# Patient Record
Sex: MALE | Race: WHITE | Employment: FULL TIME | URBAN - NONMETROPOLITAN AREA
[De-identification: names, ages, dates, MRNs, and addresses within clinical notes are randomized per-mention and may not be internally consistent; named-entity substitution may affect disease eponyms.]

---

## 2017-02-06 DIAGNOSIS — G89.29 CHRONIC ANKLE PAIN, UNSPECIFIED LATERALITY: ICD-10-CM

## 2017-02-06 DIAGNOSIS — M25.579 CHRONIC ANKLE PAIN, UNSPECIFIED LATERALITY: ICD-10-CM

## 2017-02-07 RX ORDER — HYDROCODONE BITARTRATE AND ACETAMINOPHEN 5; 325 MG/1; MG/1
TABLET ORAL
Qty: 30 TABLET | Refills: 0 | Status: SHIPPED | OUTPATIENT
Start: 2017-02-07 | End: 2017-04-19 | Stop reason: SDUPTHER

## 2017-03-26 DIAGNOSIS — E78.1 HIGH TRIGLYCERIDES: ICD-10-CM

## 2017-03-27 RX ORDER — ATORVASTATIN CALCIUM 20 MG/1
TABLET, FILM COATED ORAL
Qty: 90 TABLET | Refills: 0 | Status: SHIPPED | OUTPATIENT
Start: 2017-03-27 | End: 2017-04-19 | Stop reason: SDUPTHER

## 2017-04-19 ENCOUNTER — OFFICE VISIT (OUTPATIENT)
Dept: FAMILY MEDICINE CLINIC | Age: 52
End: 2017-04-19
Payer: COMMERCIAL

## 2017-04-19 VITALS
WEIGHT: 246 LBS | SYSTOLIC BLOOD PRESSURE: 148 MMHG | HEIGHT: 73 IN | BODY MASS INDEX: 32.6 KG/M2 | HEART RATE: 82 BPM | RESPIRATION RATE: 16 BRPM | DIASTOLIC BLOOD PRESSURE: 98 MMHG

## 2017-04-19 DIAGNOSIS — K21.9 GASTROESOPHAGEAL REFLUX DISEASE, ESOPHAGITIS PRESENCE NOT SPECIFIED: ICD-10-CM

## 2017-04-19 DIAGNOSIS — G89.29 CHRONIC PAIN OF LEFT ANKLE: ICD-10-CM

## 2017-04-19 DIAGNOSIS — Z23 NEED FOR TDAP VACCINATION: ICD-10-CM

## 2017-04-19 DIAGNOSIS — M25.572 CHRONIC PAIN OF LEFT ANKLE: ICD-10-CM

## 2017-04-19 DIAGNOSIS — Z23 NEED FOR PNEUMOCOCCAL VACCINATION: ICD-10-CM

## 2017-04-19 DIAGNOSIS — F95.2 TOURETTE'S SYNDROME: ICD-10-CM

## 2017-04-19 DIAGNOSIS — I10 ESSENTIAL HYPERTENSION: Primary | ICD-10-CM

## 2017-04-19 DIAGNOSIS — E78.1 HIGH TRIGLYCERIDES: ICD-10-CM

## 2017-04-19 PROCEDURE — 99214 OFFICE O/P EST MOD 30 MIN: CPT | Performed by: FAMILY MEDICINE

## 2017-04-19 RX ORDER — BLOOD PRESSURE TEST KIT
KIT MISCELLANEOUS
Qty: 1 KIT | Refills: 0 | Status: SHIPPED | OUTPATIENT
Start: 2017-04-19 | End: 2018-05-08 | Stop reason: SDUPTHER

## 2017-04-19 RX ORDER — BLOOD PRESSURE TEST KIT
KIT MISCELLANEOUS
Qty: 1 KIT | Refills: 0 | Status: SHIPPED | OUTPATIENT
Start: 2017-04-19 | End: 2017-04-19 | Stop reason: SDUPTHER

## 2017-04-19 RX ORDER — ATORVASTATIN CALCIUM 20 MG/1
TABLET, FILM COATED ORAL
Qty: 90 TABLET | Refills: 1 | Status: SHIPPED | OUTPATIENT
Start: 2017-04-19 | End: 2017-08-30 | Stop reason: SDUPTHER

## 2017-04-19 RX ORDER — HYDROCODONE BITARTRATE AND ACETAMINOPHEN 5; 325 MG/1; MG/1
TABLET ORAL
Qty: 30 TABLET | Refills: 0 | Status: SHIPPED | OUTPATIENT
Start: 2017-04-19 | End: 2017-06-02 | Stop reason: SDUPTHER

## 2017-04-19 RX ORDER — OMEPRAZOLE 20 MG/1
CAPSULE, DELAYED RELEASE ORAL
Qty: 90 CAPSULE | Refills: 1 | Status: SHIPPED | OUTPATIENT
Start: 2017-04-19 | End: 2017-08-30 | Stop reason: SDUPTHER

## 2017-04-19 RX ORDER — PIMOZIDE 1 MG/1
TABLET ORAL
Qty: 360 TABLET | Refills: 1 | Status: SHIPPED | OUTPATIENT
Start: 2017-04-19 | End: 2017-08-30 | Stop reason: SDUPTHER

## 2017-04-19 RX ORDER — HYDROCHLOROTHIAZIDE 25 MG/1
TABLET ORAL
Qty: 90 TABLET | Refills: 1 | Status: SHIPPED | OUTPATIENT
Start: 2017-04-19 | End: 2017-08-30 | Stop reason: SDUPTHER

## 2017-04-19 RX ORDER — LOSARTAN POTASSIUM AND HYDROCHLOROTHIAZIDE 25; 100 MG/1; MG/1
TABLET ORAL
Qty: 90 TABLET | Refills: 1 | Status: SHIPPED | OUTPATIENT
Start: 2017-04-19 | End: 2017-08-30 | Stop reason: SDUPTHER

## 2017-05-09 ENCOUNTER — OFFICE VISIT (OUTPATIENT)
Dept: PRIMARY CARE CLINIC | Age: 52
End: 2017-05-09
Payer: COMMERCIAL

## 2017-05-09 VITALS
SYSTOLIC BLOOD PRESSURE: 160 MMHG | BODY MASS INDEX: 32.07 KG/M2 | TEMPERATURE: 97 F | OXYGEN SATURATION: 92 % | HEART RATE: 84 BPM | HEIGHT: 73 IN | DIASTOLIC BLOOD PRESSURE: 94 MMHG | WEIGHT: 242 LBS

## 2017-05-09 DIAGNOSIS — J40 BRONCHITIS: Primary | ICD-10-CM

## 2017-05-09 DIAGNOSIS — Z72.0 TOBACCO ABUSE: ICD-10-CM

## 2017-05-09 DIAGNOSIS — J02.9 SORE THROAT: ICD-10-CM

## 2017-05-09 LAB — S PYO AG THROAT QL: NORMAL

## 2017-05-09 PROCEDURE — 87880 STREP A ASSAY W/OPTIC: CPT | Performed by: PHYSICIAN ASSISTANT

## 2017-05-09 PROCEDURE — 99213 OFFICE O/P EST LOW 20 MIN: CPT | Performed by: PHYSICIAN ASSISTANT

## 2017-05-09 RX ORDER — PREDNISONE 20 MG/1
20 TABLET ORAL 2 TIMES DAILY
Qty: 10 TABLET | Refills: 0 | Status: SHIPPED | OUTPATIENT
Start: 2017-05-09 | End: 2017-05-14

## 2017-05-09 RX ORDER — DOXYCYCLINE HYCLATE 100 MG
100 TABLET ORAL 2 TIMES DAILY
Qty: 20 TABLET | Refills: 0 | Status: SHIPPED | OUTPATIENT
Start: 2017-05-09 | End: 2017-05-19

## 2017-05-09 RX ORDER — GUAIFENESIN AND CODEINE PHOSPHATE 100; 10 MG/5ML; MG/5ML
5 SOLUTION ORAL 4 TIMES DAILY PRN
Qty: 150 ML | Refills: 0 | Status: SHIPPED | OUTPATIENT
Start: 2017-05-09 | End: 2017-05-16

## 2017-05-09 ASSESSMENT — ENCOUNTER SYMPTOMS
SORE THROAT: 1
COUGH: 1
RHINORRHEA: 1
SHORTNESS OF BREATH: 1
WHEEZING: 1

## 2017-06-02 DIAGNOSIS — M25.572 CHRONIC PAIN OF LEFT ANKLE: ICD-10-CM

## 2017-06-02 DIAGNOSIS — G89.29 CHRONIC PAIN OF LEFT ANKLE: ICD-10-CM

## 2017-06-02 RX ORDER — HYDROCODONE BITARTRATE AND ACETAMINOPHEN 5; 325 MG/1; MG/1
1 TABLET ORAL EVERY 4 HOURS PRN
Qty: 30 TABLET | Refills: 0 | Status: SHIPPED | OUTPATIENT
Start: 2017-06-02 | End: 2017-08-30 | Stop reason: SDUPTHER

## 2017-06-05 RX ORDER — HYDROCODONE BITARTRATE AND ACETAMINOPHEN 5; 325 MG/1; MG/1
TABLET ORAL
Qty: 30 TABLET | Refills: 0 | OUTPATIENT
Start: 2017-06-05

## 2017-07-27 ENCOUNTER — OFFICE VISIT (OUTPATIENT)
Dept: PRIMARY CARE CLINIC | Age: 52
End: 2017-07-27
Payer: COMMERCIAL

## 2017-07-27 VITALS
BODY MASS INDEX: 32.76 KG/M2 | TEMPERATURE: 98.2 F | DIASTOLIC BLOOD PRESSURE: 74 MMHG | WEIGHT: 247.2 LBS | HEART RATE: 70 BPM | HEIGHT: 73 IN | SYSTOLIC BLOOD PRESSURE: 132 MMHG | OXYGEN SATURATION: 98 %

## 2017-07-27 DIAGNOSIS — J01.00 ACUTE MAXILLARY SINUSITIS, RECURRENCE NOT SPECIFIED: Primary | ICD-10-CM

## 2017-07-27 PROCEDURE — 99213 OFFICE O/P EST LOW 20 MIN: CPT | Performed by: FAMILY MEDICINE

## 2017-07-27 RX ORDER — AMOXICILLIN 875 MG/1
875 TABLET, COATED ORAL 2 TIMES DAILY
Qty: 20 TABLET | Refills: 0 | Status: SHIPPED | OUTPATIENT
Start: 2017-07-27 | End: 2017-08-06

## 2017-07-27 RX ORDER — PREDNISONE 20 MG/1
20 TABLET ORAL 2 TIMES DAILY
Qty: 6 TABLET | Refills: 0 | Status: SHIPPED | OUTPATIENT
Start: 2017-07-27 | End: 2017-07-30

## 2017-07-27 RX ORDER — GUAIFENESIN AND CODEINE PHOSPHATE 100; 10 MG/5ML; MG/5ML
5 SOLUTION ORAL NIGHTLY PRN
Qty: 75 ML | Refills: 0 | Status: SHIPPED | OUTPATIENT
Start: 2017-07-27 | End: 2017-08-03

## 2017-08-30 ENCOUNTER — OFFICE VISIT (OUTPATIENT)
Dept: FAMILY MEDICINE CLINIC | Age: 52
End: 2017-08-30
Payer: COMMERCIAL

## 2017-08-30 VITALS
RESPIRATION RATE: 16 BRPM | DIASTOLIC BLOOD PRESSURE: 96 MMHG | SYSTOLIC BLOOD PRESSURE: 136 MMHG | HEIGHT: 73 IN | BODY MASS INDEX: 32.97 KG/M2 | HEART RATE: 86 BPM | WEIGHT: 248.8 LBS

## 2017-08-30 DIAGNOSIS — R73.01 ELEVATED FASTING GLUCOSE: ICD-10-CM

## 2017-08-30 DIAGNOSIS — K21.9 GASTROESOPHAGEAL REFLUX DISEASE, ESOPHAGITIS PRESENCE NOT SPECIFIED: ICD-10-CM

## 2017-08-30 DIAGNOSIS — I10 ESSENTIAL HYPERTENSION: Primary | ICD-10-CM

## 2017-08-30 DIAGNOSIS — Z23 NEED FOR TDAP VACCINATION: ICD-10-CM

## 2017-08-30 DIAGNOSIS — Z23 NEED FOR PNEUMOCOCCAL VACCINATION: ICD-10-CM

## 2017-08-30 DIAGNOSIS — G89.29 CHRONIC PAIN OF LEFT ANKLE: ICD-10-CM

## 2017-08-30 DIAGNOSIS — F95.2 TOURETTE'S SYNDROME: ICD-10-CM

## 2017-08-30 DIAGNOSIS — M25.572 CHRONIC PAIN OF LEFT ANKLE: ICD-10-CM

## 2017-08-30 DIAGNOSIS — Z23 NEED FOR INFLUENZA VACCINATION: ICD-10-CM

## 2017-08-30 DIAGNOSIS — E78.1 HIGH TRIGLYCERIDES: ICD-10-CM

## 2017-08-30 PROCEDURE — 90471 IMMUNIZATION ADMIN: CPT | Performed by: FAMILY MEDICINE

## 2017-08-30 PROCEDURE — 90686 IIV4 VACC NO PRSV 0.5 ML IM: CPT | Performed by: FAMILY MEDICINE

## 2017-08-30 PROCEDURE — 99214 OFFICE O/P EST MOD 30 MIN: CPT | Performed by: FAMILY MEDICINE

## 2017-08-30 PROCEDURE — 90472 IMMUNIZATION ADMIN EACH ADD: CPT | Performed by: FAMILY MEDICINE

## 2017-08-30 PROCEDURE — 90732 PPSV23 VACC 2 YRS+ SUBQ/IM: CPT | Performed by: FAMILY MEDICINE

## 2017-08-30 RX ORDER — PIMOZIDE 1 MG/1
TABLET ORAL
Qty: 360 TABLET | Refills: 1 | Status: SHIPPED | OUTPATIENT
Start: 2017-08-30 | End: 2017-10-17 | Stop reason: SDUPTHER

## 2017-08-30 RX ORDER — LOSARTAN POTASSIUM AND HYDROCHLOROTHIAZIDE 25; 100 MG/1; MG/1
TABLET ORAL
Qty: 90 TABLET | Refills: 1 | Status: SHIPPED | OUTPATIENT
Start: 2017-08-30 | End: 2018-03-01 | Stop reason: SDUPTHER

## 2017-08-30 RX ORDER — HYDROCODONE BITARTRATE AND ACETAMINOPHEN 5; 325 MG/1; MG/1
1 TABLET ORAL EVERY 4 HOURS PRN
Qty: 30 TABLET | Refills: 0 | Status: SHIPPED | OUTPATIENT
Start: 2017-08-30 | End: 2017-10-09 | Stop reason: SDUPTHER

## 2017-08-30 RX ORDER — AMLODIPINE BESYLATE 5 MG/1
5 TABLET ORAL DAILY
Qty: 90 TABLET | Refills: 1 | Status: SHIPPED | OUTPATIENT
Start: 2017-08-30 | End: 2017-11-30

## 2017-08-30 RX ORDER — ATORVASTATIN CALCIUM 20 MG/1
TABLET, FILM COATED ORAL
Qty: 90 TABLET | Refills: 1 | Status: SHIPPED | OUTPATIENT
Start: 2017-08-30 | End: 2017-11-30

## 2017-08-30 RX ORDER — HYDROCHLOROTHIAZIDE 25 MG/1
TABLET ORAL
Qty: 90 TABLET | Refills: 1 | Status: SHIPPED | OUTPATIENT
Start: 2017-08-30 | End: 2018-03-01 | Stop reason: SDUPTHER

## 2017-08-30 RX ORDER — OMEPRAZOLE 20 MG/1
CAPSULE, DELAYED RELEASE ORAL
Qty: 90 CAPSULE | Refills: 1 | Status: SHIPPED | OUTPATIENT
Start: 2017-08-30 | End: 2017-10-17 | Stop reason: SDUPTHER

## 2017-10-09 DIAGNOSIS — G89.29 CHRONIC PAIN OF LEFT ANKLE: Primary | ICD-10-CM

## 2017-10-09 DIAGNOSIS — M25.572 CHRONIC PAIN OF LEFT ANKLE: Primary | ICD-10-CM

## 2017-10-09 NOTE — TELEPHONE ENCOUNTER
OAR[place] from [place], [place] and [place] reviewed, last filled 8/30/17 #30 for a 5 day supply. Report available for your review. Last OV 8/30/17; next OV 11/30/17.

## 2017-10-10 RX ORDER — HYDROCODONE BITARTRATE AND ACETAMINOPHEN 5; 325 MG/1; MG/1
1 TABLET ORAL EVERY 4 HOURS PRN
Qty: 30 TABLET | Refills: 0 | Status: SHIPPED | OUTPATIENT
Start: 2017-10-10 | End: 2017-11-30 | Stop reason: SDUPTHER

## 2017-10-10 NOTE — TELEPHONE ENCOUNTER
Controlled substances monitoring: No signs of potential drug abuse or diversion identified when the OAR[place] report from [place], [place], and [place] was reviewed today. The activity on the report was consistent with the treatment plan.

## 2017-10-16 DIAGNOSIS — K21.9 GASTROESOPHAGEAL REFLUX DISEASE, ESOPHAGITIS PRESENCE NOT SPECIFIED: ICD-10-CM

## 2017-10-16 DIAGNOSIS — F95.2 TOURETTE'S SYNDROME: ICD-10-CM

## 2017-10-17 RX ORDER — PIMOZIDE 1 MG/1
TABLET ORAL
Qty: 360 TABLET | Refills: 0 | Status: SHIPPED | OUTPATIENT
Start: 2017-10-17 | End: 2018-03-01 | Stop reason: SDUPTHER

## 2017-10-17 RX ORDER — OMEPRAZOLE 20 MG/1
CAPSULE, DELAYED RELEASE ORAL
Qty: 90 CAPSULE | Refills: 0 | Status: SHIPPED | OUTPATIENT
Start: 2017-10-17 | End: 2018-03-01 | Stop reason: SDUPTHER

## 2017-11-17 ENCOUNTER — OFFICE VISIT (OUTPATIENT)
Dept: PRIMARY CARE CLINIC | Age: 52
End: 2017-11-17
Payer: COMMERCIAL

## 2017-11-17 VITALS
BODY MASS INDEX: 33.13 KG/M2 | TEMPERATURE: 98 F | DIASTOLIC BLOOD PRESSURE: 88 MMHG | OXYGEN SATURATION: 98 % | RESPIRATION RATE: 14 BRPM | HEIGHT: 73 IN | SYSTOLIC BLOOD PRESSURE: 138 MMHG | WEIGHT: 250 LBS | HEART RATE: 78 BPM

## 2017-11-17 DIAGNOSIS — J32.9 SINUSITIS, UNSPECIFIED CHRONICITY, UNSPECIFIED LOCATION: Primary | ICD-10-CM

## 2017-11-17 DIAGNOSIS — R05.9 COUGH: ICD-10-CM

## 2017-11-17 PROCEDURE — 99202 OFFICE O/P NEW SF 15 MIN: CPT | Performed by: NURSE PRACTITIONER

## 2017-11-17 RX ORDER — GUAIFENESIN AND CODEINE PHOSPHATE 100; 10 MG/5ML; MG/5ML
10 SOLUTION ORAL 3 TIMES DAILY PRN
Qty: 200 ML | Refills: 0 | Status: SHIPPED | OUTPATIENT
Start: 2017-11-17 | End: 2017-11-24

## 2017-11-17 RX ORDER — AMOXICILLIN 500 MG/1
1000 CAPSULE ORAL 2 TIMES DAILY
Qty: 28 CAPSULE | Refills: 0 | Status: SHIPPED | OUTPATIENT
Start: 2017-11-17 | End: 2017-11-24

## 2017-11-17 RX ORDER — PREDNISONE 20 MG/1
20 TABLET ORAL DAILY
Qty: 5 TABLET | Refills: 0 | Status: SHIPPED | OUTPATIENT
Start: 2017-11-17 | End: 2017-11-22

## 2017-11-17 ASSESSMENT — ENCOUNTER SYMPTOMS
COUGH: 1
RHINORRHEA: 1

## 2017-11-17 NOTE — PATIENT INSTRUCTIONS
Patient Education        Sinusitis: Care Instructions  Your Care Instructions    Sinusitis is an infection of the lining of the sinus cavities in your head. Sinusitis often follows a cold. It causes pain and pressure in your head and face. In most cases, sinusitis gets better on its own in 1 to 2 weeks. But some mild symptoms may last for several weeks. Sometimes antibiotics are needed. Follow-up care is a key part of your treatment and safety. Be sure to make and go to all appointments, and call your doctor if you are having problems. It's also a good idea to know your test results and keep a list of the medicines you take. How can you care for yourself at home? · Take an over-the-counter pain medicine, such as acetaminophen (Tylenol), ibuprofen (Advil, Motrin), or naproxen (Aleve). Read and follow all instructions on the label. · If the doctor prescribed antibiotics, take them as directed. Do not stop taking them just because you feel better. You need to take the full course of antibiotics. · Be careful when taking over-the-counter cold or flu medicines and Tylenol at the same time. Many of these medicines have acetaminophen, which is Tylenol. Read the labels to make sure that you are not taking more than the recommended dose. Too much acetaminophen (Tylenol) can be harmful. · Breathe warm, moist air from a steamy shower, a hot bath, or a sink filled with hot water. Avoid cold, dry air. Using a humidifier in your home may help. Follow the directions for cleaning the machine. · Use saline (saltwater) nasal washes to help keep your nasal passages open and wash out mucus and bacteria. You can buy saline nose drops at a grocery store or drugstore. Or you can make your own at home by adding 1 teaspoon of salt and 1 teaspoon of baking soda to 2 cups of distilled water. If you make your own, fill a bulb syringe with the solution, insert the tip into your nostril, and squeeze gently. Shanda Gaw your nose.   · Put a hot, wet towel or a warm gel pack on your face 3 or 4 times a day for 5 to 10 minutes each time. · Try a decongestant nasal spray like oxymetazoline (Afrin). Do not use it for more than 3 days in a row. Using it for more than 3 days can make your congestion worse. When should you call for help? Call your doctor now or seek immediate medical care if:  · You have new or worse swelling or redness in your face or around your eyes. · You have a new or higher fever. Watch closely for changes in your health, and be sure to contact your doctor if:  · You have new or worse facial pain. · The mucus from your nose becomes thicker (like pus) or has new blood in it. · You are not getting better as expected. Where can you learn more? Go to https://CounterTackronaeb.Lunagames. org and sign in to your [employer] account. Enter G173 in the [employer] box to learn more about \"Sinusitis: Care Instructions. \"     If you do not have an account, please click on the \"Sign Up Now\" link. Current as of: July 29, 2016  Content Version: 11.3  © 3348-2061 [employer]. Care instructions adapted under license by [facility] ([facility]). If you have questions about a medical condition or this instruction, always ask your healthcare professional. [place] [address] any warranty or liability for your use of this information. Patient Education        Saline Nasal Washes: Care Instructions  Your Care Instructions  Saline nasal washes help keep the nasal passages open by washing out thick or dried mucus. This simple remedy can help relieve symptoms of allergies, sinusitis, and colds. It also can make the nose feel more comfortable by keeping the mucous membranes moist. You may notice a little burning sensation in your nose the first few times you use the solution, but this usually gets better in a few days. Follow-up care is a key part of your treatment and safety.  Be sure to make and go to all appointments, warranty or liability for your use of this information. Patient Education        Cough: Care Instructions  Your Care Instructions  A cough is your body's response to something that bothers your throat or airways. Many things can cause a cough. You might cough because of a cold or the flu, bronchitis, or asthma. Smoking, postnasal drip, allergies, and stomach acid that backs up into your throat also can cause coughs. A cough is a symptom, not a disease. Most coughs stop when the cause, such as a cold, goes away. You can take a few steps at home to cough less and feel better. Follow-up care is a key part of your treatment and safety. Be sure to make and go to all appointments, and call your doctor if you are having problems. It's also a good idea to know your test results and keep a list of the medicines you take. How can you care for yourself at home? · Drink lots of water and other fluids. This helps thin the mucus and soothes a dry or sore throat. Honey or lemon juice in hot water or tea may ease a dry cough. · Take cough medicine as directed by your doctor. · Prop up your head on pillows to help you breathe and ease a dry cough. · Try cough drops to soothe a dry or sore throat. Cough drops don't stop a cough. Medicine-flavored cough drops are no better than candy-flavored drops or hard candy. · Do not smoke. Avoid secondhand smoke. If you need help quitting, talk to your doctor about stop-smoking programs and medicines. These can increase your chances of quitting for good. When should you call for help? Call 911 anytime you think you may need emergency care. For example, call if:  · You have severe trouble breathing. Call your doctor now or seek immediate medical care if:  · You cough up blood. · You have new or worse trouble breathing. · You have a new or higher fever. · You have a new rash.   Watch closely for changes in your health, and be sure to contact your doctor if:  · You cough more deeply or more often, especially if you notice more mucus or a change in the color of your mucus. · You have new symptoms, such as a sore throat, an earache, or sinus pain. · You do not get better as expected. Where can you learn more? Go to https://chpepiceweb.Spectra7 Microsystems. org and sign in to your [employer] account. Enter D279 in the [employer] box to learn more about \"Cough: Care Instructions. \"     If you do not have an account, please click on the \"Sign Up Now\" link. Current as of: March 25, 2017  Content Version: 11.3  © 1419-2167 Veebox, [employer]. Care instructions adapted under license by [facility] ([facility]). If you have questions about a medical condition or this instruction, always ask your healthcare professional. [address] any warranty or liability for your use of this information.

## 2017-11-30 ENCOUNTER — OFFICE VISIT (OUTPATIENT)
Dept: FAMILY MEDICINE CLINIC | Age: 52
End: 2017-11-30
Payer: COMMERCIAL

## 2017-11-30 VITALS
SYSTOLIC BLOOD PRESSURE: 138 MMHG | WEIGHT: 251.6 LBS | BODY MASS INDEX: 33.34 KG/M2 | DIASTOLIC BLOOD PRESSURE: 86 MMHG | HEIGHT: 73 IN | RESPIRATION RATE: 16 BRPM | HEART RATE: 86 BPM

## 2017-11-30 DIAGNOSIS — I10 ESSENTIAL HYPERTENSION: Primary | ICD-10-CM

## 2017-11-30 DIAGNOSIS — M25.572 CHRONIC PAIN OF LEFT ANKLE: ICD-10-CM

## 2017-11-30 DIAGNOSIS — E78.1 HYPERTRIGLYCERIDEMIA: ICD-10-CM

## 2017-11-30 DIAGNOSIS — G89.29 CHRONIC PAIN OF LEFT ANKLE: ICD-10-CM

## 2017-11-30 DIAGNOSIS — R73.01 ELEVATED FASTING GLUCOSE: ICD-10-CM

## 2017-11-30 PROCEDURE — 99214 OFFICE O/P EST MOD 30 MIN: CPT | Performed by: FAMILY MEDICINE

## 2017-11-30 RX ORDER — HYDROCODONE BITARTRATE AND ACETAMINOPHEN 5; 325 MG/1; MG/1
1 TABLET ORAL EVERY 4 HOURS PRN
Qty: 30 TABLET | Refills: 0 | Status: SHIPPED | OUTPATIENT
Start: 2017-11-30 | End: 2018-01-04 | Stop reason: SDUPTHER

## 2017-11-30 NOTE — PROGRESS NOTES
[address], [address] [place] [address] Drive                        Telephone [phone]             Fax [phone]     Ana Paula Dolan  1965  MRN:  [id number]  Date of visit:  11/30/17    Subjective:    Michelle Tran is a 47 y.o.  male who presents to [facility] today (11/30/17) for follow up/evaluation of:  Hypertension (3 month follow up,had stopped Norvasc-caused him to be drowsy); Ankle Pain (left ankle pain-request refill of Norco); and Hyperlipidemia (stopped Lipitor-had issue with drowsiness unsure which new medication caused symptoms)      At his last office visit 8/30/2017 was added to his regimen to try to achieve better blood pressure control. He states that after he started taking Norvasc, he felt very fatigued and drowsy. He states that he felt like he was unable to work due to the drowsiness. He discontinued Norvasc and Lipitor, as he was not certain which medication might be causing the fatigue. He states that he has felt much better since discontinuing these medications. He does not typically check his blood pressure outside of the office. He denies chest pain. He admits to some shortness of breath recently when he had a respiratory infection. He does request a refill of Norco that he takes for chronic left ankle pain. He has the following problem list:  Patient Active Problem List   Diagnosis    Essential hypertension    Tourette's syndrome    Gastroesophageal reflux disease    Tobacco abuse    Chronic pain of left ankle    Elevated fasting glucose    Hypertriglyceridemia        Current medications are:  Outpatient Prescriptions Marked as Taking for the 11/30/17 encounter (Office Visit) with Son Amezquita MD   Medication Sig Dispense Refill    HYDROcodone-acetaminophen (NORCO) 5-325 MG per tablet Take 1 tablet by mouth every 4 hours as needed for Pain . 30 tablet 0    omeprazole (PRILOSEC) 20 MG delayed release capsule TAKE 1 CAPSULE DAILY 90 capsule 0    pimozide (ORAP) 1 MG tablet TAKE 2 TABLETS TWICE A  tablet 0    losartan-hydrochlorothiazide (HYZAAR) 100-25 MG per tablet TAKE 1 TABLET DAILY 90 tablet 1    hydrochlorothiazide (HYDRODIURIL) 25 MG tablet TAKE 1 TABLET DAILY 90 tablet 1    Blood Pressure KIT Check your blood pressure three times a week. 1 kit 0       He is allergic to chantix [varenicline]. He is a smoker. He  reports that he has been smoking Cigarettes. He has a 30.00 pack-year smoking history. He quit smokeless tobacco use about 30 years ago. His smokeless tobacco use included Snuff. Objective:    Vitals:    11/30/17 1321   BP: 138/86   Site: Right Arm   Position: Sitting   Cuff Size: Large Adult   Pulse: 86   Resp: 16   Weight: 251 lb 9.6 oz (114.1 kg)   Height: 6' 1\" (1.854 m)     Body mass index is 33.19 kg/m². Obese  male, alert and cooperative. Neck is supple, with no lymphadenopathy. Chest is clear to auscultation, no wheezes, rales, or rhonchi. Heart sounds are regular rate and rhythm, no murmurs. Right lower extremity has no edema. There is trace to 1+ edema of the left ankle. He has had no recent labs. Assessment and Plan:    1. Essential hypertension  His blood pressure is marginally-controlled. (BP: 138/86)  As above, he was intolerant of Norvasc 5 mg. I have recommended that he continue with Hyzaar and Hydrochlorothiazide, and that he check his blood pressure outside of the office. He has orders for a comprehensive metabolic panel to be done when he returns fasting. He will be contacted when the lab results are available. 2. Chronic pain of left ankle  Controlled substances monitoring: No signs of potential drug abuse or diversion identified when the OAR[place] report from [place], and [place] was reviewed today.   The activity on the report was consistent with the treatment

## 2018-01-04 DIAGNOSIS — M25.572 CHRONIC PAIN OF LEFT ANKLE: ICD-10-CM

## 2018-01-04 DIAGNOSIS — G89.29 CHRONIC PAIN OF LEFT ANKLE: ICD-10-CM

## 2018-01-04 RX ORDER — HYDROCODONE BITARTRATE AND ACETAMINOPHEN 5; 325 MG/1; MG/1
1 TABLET ORAL EVERY 4 HOURS PRN
Qty: 30 TABLET | Refills: 0 | Status: SHIPPED | OUTPATIENT
Start: 2018-01-04 | End: 2018-02-07 | Stop reason: SDUPTHER

## 2018-02-07 DIAGNOSIS — G89.29 CHRONIC PAIN OF LEFT ANKLE: ICD-10-CM

## 2018-02-07 DIAGNOSIS — M25.572 CHRONIC PAIN OF LEFT ANKLE: ICD-10-CM

## 2018-02-07 RX ORDER — HYDROCODONE BITARTRATE AND ACETAMINOPHEN 5; 325 MG/1; MG/1
1 TABLET ORAL EVERY 4 HOURS PRN
Qty: 30 TABLET | Refills: 0 | Status: SHIPPED | OUTPATIENT
Start: 2018-02-07 | End: 2018-04-02 | Stop reason: SDUPTHER

## 2018-03-01 ENCOUNTER — OFFICE VISIT (OUTPATIENT)
Dept: FAMILY MEDICINE CLINIC | Age: 53
End: 2018-03-01
Payer: COMMERCIAL

## 2018-03-01 VITALS
WEIGHT: 251.6 LBS | SYSTOLIC BLOOD PRESSURE: 134 MMHG | BODY MASS INDEX: 33.34 KG/M2 | HEIGHT: 73 IN | HEART RATE: 89 BPM | TEMPERATURE: 96.9 F | DIASTOLIC BLOOD PRESSURE: 88 MMHG | OXYGEN SATURATION: 99 % | RESPIRATION RATE: 12 BRPM

## 2018-03-01 DIAGNOSIS — K21.9 GASTROESOPHAGEAL REFLUX DISEASE, ESOPHAGITIS PRESENCE NOT SPECIFIED: ICD-10-CM

## 2018-03-01 DIAGNOSIS — I10 ESSENTIAL HYPERTENSION: ICD-10-CM

## 2018-03-01 DIAGNOSIS — F95.2 TOURETTE'S SYNDROME: ICD-10-CM

## 2018-03-01 DIAGNOSIS — S91.302A OPEN WOUND OF LEFT FOOT, INITIAL ENCOUNTER: Primary | ICD-10-CM

## 2018-03-01 PROCEDURE — 99213 OFFICE O/P EST LOW 20 MIN: CPT | Performed by: NURSE PRACTITIONER

## 2018-03-01 RX ORDER — LOSARTAN POTASSIUM AND HYDROCHLOROTHIAZIDE 25; 100 MG/1; MG/1
TABLET ORAL
Qty: 90 TABLET | Refills: 0 | Status: SHIPPED | OUTPATIENT
Start: 2018-03-01 | End: 2018-05-08 | Stop reason: SDUPTHER

## 2018-03-01 RX ORDER — HYDROCHLOROTHIAZIDE 25 MG/1
TABLET ORAL
Qty: 90 TABLET | Refills: 0 | Status: SHIPPED | OUTPATIENT
Start: 2018-03-01 | End: 2018-05-08 | Stop reason: SDUPTHER

## 2018-03-01 RX ORDER — PIMOZIDE 1 MG/1
TABLET ORAL
Qty: 360 TABLET | Refills: 0 | Status: SHIPPED | OUTPATIENT
Start: 2018-03-01 | End: 2018-05-08 | Stop reason: SDUPTHER

## 2018-03-01 RX ORDER — OMEPRAZOLE 20 MG/1
CAPSULE, DELAYED RELEASE ORAL
Qty: 90 CAPSULE | Refills: 0 | Status: SHIPPED | OUTPATIENT
Start: 2018-03-01 | End: 2018-05-08 | Stop reason: SDUPTHER

## 2018-03-01 ASSESSMENT — ENCOUNTER SYMPTOMS
EYES NEGATIVE: 1
CONSTIPATION: 0
GASTROINTESTINAL NEGATIVE: 1
ABDOMINAL PAIN: 0
CHEST TIGHTNESS: 0
ALLERGIC/IMMUNOLOGIC NEGATIVE: 1
DIARRHEA: 0
TROUBLE SWALLOWING: 0
NAUSEA: 0
COUGH: 0
SHORTNESS OF BREATH: 0
VOMITING: 0
SINUS PRESSURE: 0

## 2018-03-01 NOTE — PATIENT INSTRUCTIONS
smoking, you improve the health of everyone who now breathes in your smoke. · Their heart, lung, and cancer risks drop, much like yours. · They are sick less. For babies and small children, living smoke-free means they're less likely to have ear infections, pneumonia, and bronchitis. · If you're a woman who is or will be pregnant someday, quitting smoking means a healthier . · Children who are close to you are less likely to become adult smokers. Where can you learn more? Go to https://Catheter ConnectionspeCodesign Cooperative.DocRun. org and sign in to your [employer] account. Enter [phone] in the [facility] box to learn more about \"Learning About Benefits From Quitting Smoking. \"     If you do not have an account, please click on the \"Sign Up Now\" link. Current as of: 2017  Content Version: 11.5  © 8625-7198 Healthwise, Incorporated. Care instructions adapted under license by [facility] ([facility]). If you have questions about a medical condition or this instruction, always ask your healthcare professional. [address] any warranty or liability for your use of this information.

## 2018-03-05 ENCOUNTER — OFFICE VISIT (OUTPATIENT)
Dept: WOUND CARE | Age: 53
End: 2018-03-05
Payer: COMMERCIAL

## 2018-03-05 VITALS
SYSTOLIC BLOOD PRESSURE: 138 MMHG | DIASTOLIC BLOOD PRESSURE: 80 MMHG | HEIGHT: 73 IN | WEIGHT: 252 LBS | HEART RATE: 68 BPM | TEMPERATURE: 97.3 F | BODY MASS INDEX: 33.4 KG/M2

## 2018-03-05 DIAGNOSIS — L97.522 SKIN ULCER OF LEFT GREAT TOE WITH FAT LAYER EXPOSED (HCC): Primary | ICD-10-CM

## 2018-03-05 DIAGNOSIS — Z72.0 TOBACCO ABUSE: ICD-10-CM

## 2018-03-05 PROCEDURE — 99202 OFFICE O/P NEW SF 15 MIN: CPT | Performed by: PODIATRIST

## 2018-03-05 PROCEDURE — 11042 DBRDMT SUBQ TIS 1ST 20SQCM/<: CPT | Performed by: PODIATRIST

## 2018-03-07 ENCOUNTER — HOSPITAL ENCOUNTER (OUTPATIENT)
Dept: GENERAL RADIOLOGY | Age: 53
Discharge: HOME | End: 2018-03-09
Payer: COMMERCIAL

## 2018-03-07 DIAGNOSIS — L97.522 SKIN ULCER OF LEFT GREAT TOE WITH FAT LAYER EXPOSED (HCC): ICD-10-CM

## 2018-03-07 DIAGNOSIS — Z72.0 TOBACCO ABUSE: ICD-10-CM

## 2018-03-07 PROCEDURE — 73630 X-RAY EXAM OF FOOT: CPT

## 2018-03-12 ENCOUNTER — OFFICE VISIT (OUTPATIENT)
Dept: WOUND CARE | Age: 53
End: 2018-03-12
Payer: COMMERCIAL

## 2018-03-12 VITALS
DIASTOLIC BLOOD PRESSURE: 76 MMHG | SYSTOLIC BLOOD PRESSURE: 134 MMHG | BODY MASS INDEX: 32.62 KG/M2 | TEMPERATURE: 97.8 F | HEART RATE: 76 BPM | HEIGHT: 74 IN | WEIGHT: 254.2 LBS

## 2018-03-12 DIAGNOSIS — L97.522 SKIN ULCER OF LEFT GREAT TOE WITH FAT LAYER EXPOSED (HCC): Primary | ICD-10-CM

## 2018-03-12 PROCEDURE — 97597 DBRDMT OPN WND 1ST 20 CM/<: CPT | Performed by: PODIATRIST

## 2018-03-12 NOTE — PROGRESS NOTES
Subjective:    Manuel Underwood is a 47 y.o. male who presents for an ulceration of the L big toe. Patient relates pain is Present. Pain is rated 2 out of 10 and is described as intermittent. Pt has been compliant with treatments. Currently denies F/C/N/V. Allergies   Allergen Reactions    Chantix [Varenicline] Other (See Comments)     Unusual dreams. Past Medical History:   Diagnosis Date    Ankle pain, left     chronic; s/p fracture in 1999    Gastroesophageal reflux disease     Hyperlipemia     Hypertension     Obesity     Tobacco abuse     Tourette's syndrome        Prior to Admission medications    Medication Sig Start Date End Date Taking? Authorizing Provider   omeprazole (PRILOSEC) 20 MG delayed release capsule TAKE 1 CAPSULE DAILY 3/1/18  Yes iLnda Kothari MD   pimozide (ORAP) 1 MG tablet TAKE 2 TABLETS TWICE A DAY 3/1/18  Yes Linda Kothari MD   losartan-hydrochlorothiazide (HYZAAR) 100-25 MG per tablet TAKE 1 TABLET DAILY 3/1/18  Yes Linda Kothari MD   hydrochlorothiazide (HYDRODIURIL) 25 MG tablet TAKE 1 TABLET DAILY 3/1/18  Yes Linda Kothari MD   Blood Pressure KIT Check your blood pressure three times a week. 4/19/17  Yes Linda Kothari MD       Past Surgical History:   Procedure Laterality Date    ANKLE FRACTURE SURGERY Left 1999    WISDOM TOOTH EXTRACTION Bilateral        Family History   Problem Relation Age of Onset    Cancer Maternal Uncle     Hypertension Mother     Hypertension Father     Cancer Maternal Uncle        Social History   Substance Use Topics    Smoking status: Current Every Day Smoker     Packs/day: 1.00     Years: 30.00     Types: Cigarettes    Smokeless tobacco: Former User     Types: Snuff     Quit date: 11/17/1987    Alcohol use 0.0 oz/week      Comment: occasional       Review of Systems: All 12 systems reviewed and pertinent positives noted above.     Lower Extremity Physical Examination:     Vitals:   Vitals:    03/12/18 1106   BP: 134/76   Pulse: 76

## 2018-03-19 ENCOUNTER — OFFICE VISIT (OUTPATIENT)
Dept: WOUND CARE | Age: 53
End: 2018-03-19
Payer: COMMERCIAL

## 2018-03-19 VITALS
HEIGHT: 73 IN | SYSTOLIC BLOOD PRESSURE: 130 MMHG | TEMPERATURE: 96.9 F | HEART RATE: 74 BPM | RESPIRATION RATE: 16 BRPM | DIASTOLIC BLOOD PRESSURE: 90 MMHG | WEIGHT: 255 LBS | BODY MASS INDEX: 33.8 KG/M2

## 2018-03-19 DIAGNOSIS — Z72.0 TOBACCO ABUSE: ICD-10-CM

## 2018-03-19 DIAGNOSIS — L97.521 SKIN ULCER OF LEFT GREAT TOE, LIMITED TO BREAKDOWN OF SKIN (HCC): Primary | ICD-10-CM

## 2018-03-19 PROCEDURE — 97597 DBRDMT OPN WND 1ST 20 CM/<: CPT | Performed by: PODIATRIST

## 2018-03-19 NOTE — PROGRESS NOTES
Subjective:    David Matson is a 47 y.o. male who presents for an ulceration of the L big toe. Patient relates pain is Present. Pain is rated 1 out of 10 and is described as intermittent. Currently denies F/C/N/V. Allergies   Allergen Reactions    Chantix [Varenicline] Other (See Comments)     Unusual dreams. Past Medical History:   Diagnosis Date    Ankle pain, left     chronic; s/p fracture in 1999    Gastroesophageal reflux disease     Hyperlipemia     Hypertension     Obesity     Tobacco abuse     Tourette's syndrome        Prior to Admission medications    Medication Sig Start Date End Date Taking? Authorizing Provider   omeprazole (PRILOSEC) 20 MG delayed release capsule TAKE 1 CAPSULE DAILY 3/1/18   Ariella Vila MD   pimozide (ORAP) 1 MG tablet TAKE 2 TABLETS TWICE A DAY 3/1/18   Ariella Vila MD   losartan-hydrochlorothiazide (HYZAAR) 100-25 MG per tablet TAKE 1 TABLET DAILY 3/1/18   Zachariah Garcia MD   hydrochlorothiazide (HYDRODIURIL) 25 MG tablet TAKE 1 TABLET DAILY 3/1/18   Zachariah Garcia MD   Blood Pressure KIT Check your blood pressure three times a week. 4/19/17   Zachariah Garcia MD       Past Surgical History:   Procedure Laterality Date    ANKLE FRACTURE SURGERY Left 1999    WISDOM TOOTH EXTRACTION Bilateral        Family History   Problem Relation Age of Onset    Cancer Maternal Uncle     Hypertension Mother     Hypertension Father     Cancer Maternal Uncle        Social History   Substance Use Topics    Smoking status: Current Every Day Smoker     Packs/day: 1.00     Years: 30.00     Types: Cigarettes    Smokeless tobacco: Former User     Types: Snuff     Quit date: 11/17/1987    Alcohol use 0.0 oz/week      Comment: occasional       Review of Systems: All 12 systems reviewed and pertinent positives noted above. Lower Extremity Physical Examination:     Vitals:   Vitals:    03/19/18 1130   BP: (!) 130/90   Pulse:    Resp:    Temp:      General: AAO x 3 in NAD. Vascular: DP and PT pulses palpable 2/4, bilateral.  CFT <3 seconds, bilateral.  Hair growth present to the level of the digits, bilateral.  Edema absent, bilateral.  Varicosities absent, bilateral. Erythema absent, bilateral. Distal Rubor absent bilateral.  Temperature within normal limits bilateral. Hyperpigmentation absent bilateral. No atrophic skin. Neurological: Sensation intact to light touch to level of digits, bilateral.  Protective sensation intact 10/10 sites via 5.07/10g [place] Monofilament, bilateral.  negative Tinel's, bilateral.  negative Valleix sign, bilateral.  Vibratory intact bilateral.  Reflexes Decreased bilateral.  Paresthesias negative. Dysthesias negative. Sharp/dull intact bilateral.    Musculoskeletal: Muscle strength 5/5, bilateral.  Pain absent upon palpation bilateral. Normal medial longitudinal arch, bilateral.  Ankle ROM decreased,bilateral.  1st MPJ ROM decreased bilateral.  Dorsally contracted digits absent. No other foot deformities. Integument: Warm, dry, supple, Bilateral.  Open lesion present, Left. Ulcer sub L hallux IPJ, decreased callous rim, healthy red granular base, no probing no undermining no malodor. No surrounding signs of infx. Interdigital maceration absent to web spaces , Bilateral.  Fissures absent, Bilateral. Hyperkeratotic tissue is present.    Wound 03/05/18 Left Great Toe (Active)   Dressing/Treatment Antibacterial Ointment 3/12/2018 11:14 AM   Wound Cleansed Rinsed/Irrigated with saline 3/19/2018 11:04 AM   Wound Length (cm) 0.8 cm 3/19/2018 11:04 AM   Wound Width (cm) 0.4 cm 3/19/2018 11:04 AM   Wound Depth (cm)  0.3 3/19/2018 11:04 AM   Calculated Wound Size (cm^2) (l*w) 0.32 cm^2 3/19/2018 11:04 AM   Change in Wound Size % (l*w) 84 3/19/2018 11:04 AM   Wound Assessment Red 3/19/2018 11:04 AM   Drainage Amount Small 3/19/2018 11:04 AM   Drainage Description Serosanguinous 3/19/2018 11:04 AM   Odor None 3/19/2018 11:04 AM   Melissa-wound

## 2018-03-26 ENCOUNTER — OFFICE VISIT (OUTPATIENT)
Dept: WOUND CARE | Age: 53
End: 2018-03-26
Payer: COMMERCIAL

## 2018-03-26 VITALS
BODY MASS INDEX: 33.66 KG/M2 | WEIGHT: 254 LBS | DIASTOLIC BLOOD PRESSURE: 80 MMHG | HEART RATE: 80 BPM | TEMPERATURE: 98.8 F | SYSTOLIC BLOOD PRESSURE: 138 MMHG | HEIGHT: 73 IN

## 2018-03-26 DIAGNOSIS — L97.521 SKIN ULCER OF LEFT GREAT TOE, LIMITED TO BREAKDOWN OF SKIN (HCC): Primary | ICD-10-CM

## 2018-03-26 PROCEDURE — 97597 DBRDMT OPN WND 1ST 20 CM/<: CPT | Performed by: PODIATRIST

## 2018-04-02 ENCOUNTER — OFFICE VISIT (OUTPATIENT)
Dept: WOUND CARE | Age: 53
End: 2018-04-02
Payer: COMMERCIAL

## 2018-04-02 VITALS
SYSTOLIC BLOOD PRESSURE: 136 MMHG | BODY MASS INDEX: 34.11 KG/M2 | TEMPERATURE: 96.8 F | WEIGHT: 257.4 LBS | DIASTOLIC BLOOD PRESSURE: 88 MMHG | HEART RATE: 80 BPM | RESPIRATION RATE: 20 BRPM | HEIGHT: 73 IN

## 2018-04-02 DIAGNOSIS — G89.29 CHRONIC PAIN OF LEFT ANKLE: ICD-10-CM

## 2018-04-02 DIAGNOSIS — M25.572 CHRONIC PAIN OF LEFT ANKLE: ICD-10-CM

## 2018-04-02 DIAGNOSIS — L97.521 SKIN ULCER OF LEFT GREAT TOE, LIMITED TO BREAKDOWN OF SKIN (HCC): Primary | ICD-10-CM

## 2018-04-02 DIAGNOSIS — Z72.0 TOBACCO ABUSE: ICD-10-CM

## 2018-04-02 PROCEDURE — 97597 DBRDMT OPN WND 1ST 20 CM/<: CPT | Performed by: PODIATRIST

## 2018-04-03 RX ORDER — HYDROCODONE BITARTRATE AND ACETAMINOPHEN 5; 325 MG/1; MG/1
1 TABLET ORAL EVERY 4 HOURS PRN
Qty: 30 TABLET | Refills: 0 | Status: SHIPPED | OUTPATIENT
Start: 2018-04-03 | End: 2018-05-03

## 2018-04-09 ENCOUNTER — OFFICE VISIT (OUTPATIENT)
Dept: WOUND CARE | Age: 53
End: 2018-04-09
Payer: COMMERCIAL

## 2018-04-09 VITALS
HEART RATE: 88 BPM | SYSTOLIC BLOOD PRESSURE: 136 MMHG | WEIGHT: 258.6 LBS | TEMPERATURE: 96.4 F | DIASTOLIC BLOOD PRESSURE: 84 MMHG | BODY MASS INDEX: 34.27 KG/M2 | HEIGHT: 73 IN | RESPIRATION RATE: 20 BRPM

## 2018-04-09 DIAGNOSIS — L97.521 SKIN ULCER OF LEFT GREAT TOE, LIMITED TO BREAKDOWN OF SKIN (HCC): Primary | ICD-10-CM

## 2018-04-09 PROCEDURE — 97597 DBRDMT OPN WND 1ST 20 CM/<: CPT | Performed by: PODIATRIST

## 2018-04-16 ENCOUNTER — OFFICE VISIT (OUTPATIENT)
Dept: WOUND CARE | Age: 53
End: 2018-04-16
Payer: COMMERCIAL

## 2018-04-16 VITALS
SYSTOLIC BLOOD PRESSURE: 136 MMHG | HEART RATE: 76 BPM | BODY MASS INDEX: 34.38 KG/M2 | RESPIRATION RATE: 20 BRPM | DIASTOLIC BLOOD PRESSURE: 82 MMHG | TEMPERATURE: 97 F | HEIGHT: 73 IN | WEIGHT: 259.4 LBS

## 2018-04-16 DIAGNOSIS — L97.521 SKIN ULCER OF LEFT GREAT TOE, LIMITED TO BREAKDOWN OF SKIN (HCC): Primary | ICD-10-CM

## 2018-04-16 PROCEDURE — 99212 OFFICE O/P EST SF 10 MIN: CPT | Performed by: PODIATRIST

## 2018-05-08 ENCOUNTER — OFFICE VISIT (OUTPATIENT)
Dept: FAMILY MEDICINE CLINIC | Age: 53
End: 2018-05-08
Payer: COMMERCIAL

## 2018-05-08 VITALS
BODY MASS INDEX: 33.72 KG/M2 | RESPIRATION RATE: 16 BRPM | DIASTOLIC BLOOD PRESSURE: 82 MMHG | HEIGHT: 73 IN | HEART RATE: 78 BPM | SYSTOLIC BLOOD PRESSURE: 134 MMHG | WEIGHT: 254.4 LBS

## 2018-05-08 DIAGNOSIS — F95.2 TOURETTE'S SYNDROME: ICD-10-CM

## 2018-05-08 DIAGNOSIS — G89.29 CHRONIC PAIN OF LEFT ANKLE: ICD-10-CM

## 2018-05-08 DIAGNOSIS — R73.01 ELEVATED FASTING GLUCOSE: ICD-10-CM

## 2018-05-08 DIAGNOSIS — M25.572 CHRONIC PAIN OF LEFT ANKLE: ICD-10-CM

## 2018-05-08 DIAGNOSIS — I10 ESSENTIAL HYPERTENSION: Primary | ICD-10-CM

## 2018-05-08 DIAGNOSIS — K21.9 GASTROESOPHAGEAL REFLUX DISEASE, ESOPHAGITIS PRESENCE NOT SPECIFIED: ICD-10-CM

## 2018-05-08 PROCEDURE — 99214 OFFICE O/P EST MOD 30 MIN: CPT | Performed by: FAMILY MEDICINE

## 2018-05-08 RX ORDER — HYDROCODONE BITARTRATE AND ACETAMINOPHEN 5; 325 MG/1; MG/1
1 TABLET ORAL EVERY 4 HOURS PRN
Qty: 30 TABLET | Refills: 0 | Status: SHIPPED | OUTPATIENT
Start: 2018-05-08 | End: 2018-05-08 | Stop reason: SDUPTHER

## 2018-05-08 RX ORDER — LOSARTAN POTASSIUM AND HYDROCHLOROTHIAZIDE 25; 100 MG/1; MG/1
TABLET ORAL
Qty: 90 TABLET | Refills: 1 | Status: SHIPPED | OUTPATIENT
Start: 2018-05-08 | End: 2018-11-08 | Stop reason: SDUPTHER

## 2018-05-08 RX ORDER — BLOOD PRESSURE TEST KIT
KIT MISCELLANEOUS
Qty: 1 KIT | Refills: 0 | Status: SHIPPED | OUTPATIENT
Start: 2018-05-08 | End: 2018-12-17

## 2018-05-08 RX ORDER — HYDROCHLOROTHIAZIDE 25 MG/1
TABLET ORAL
Qty: 90 TABLET | Refills: 1 | Status: SHIPPED | OUTPATIENT
Start: 2018-05-08 | End: 2018-11-08 | Stop reason: SDUPTHER

## 2018-05-08 RX ORDER — PIMOZIDE 1 MG/1
TABLET ORAL
Qty: 360 TABLET | Refills: 1 | Status: SHIPPED | OUTPATIENT
Start: 2018-05-08 | End: 2018-11-08 | Stop reason: SDUPTHER

## 2018-05-08 RX ORDER — HYDROCODONE BITARTRATE AND ACETAMINOPHEN 5; 325 MG/1; MG/1
1 TABLET ORAL EVERY 4 HOURS PRN
Qty: 30 TABLET | Refills: 0 | Status: SHIPPED | OUTPATIENT
Start: 2018-05-08 | End: 2018-06-27 | Stop reason: SDUPTHER

## 2018-05-08 RX ORDER — OMEPRAZOLE 20 MG/1
CAPSULE, DELAYED RELEASE ORAL
Qty: 90 CAPSULE | Refills: 1 | Status: SHIPPED | OUTPATIENT
Start: 2018-05-08 | End: 2018-11-08 | Stop reason: SDUPTHER

## 2018-05-08 ASSESSMENT — PATIENT HEALTH QUESTIONNAIRE - PHQ9
1. LITTLE INTEREST OR PLEASURE IN DOING THINGS: 0
SUM OF ALL RESPONSES TO PHQ9 QUESTIONS 1 & 2: 0
2. FEELING DOWN, DEPRESSED OR HOPELESS: 0
SUM OF ALL RESPONSES TO PHQ QUESTIONS 1-9: 0

## 2018-05-16 ENCOUNTER — TELEPHONE (OUTPATIENT)
Dept: PRIMARY CARE CLINIC | Age: 53
End: 2018-05-16

## 2018-06-27 DIAGNOSIS — M25.572 CHRONIC PAIN OF LEFT ANKLE: ICD-10-CM

## 2018-06-27 DIAGNOSIS — G89.29 CHRONIC PAIN OF LEFT ANKLE: ICD-10-CM

## 2018-06-27 RX ORDER — HYDROCODONE BITARTRATE AND ACETAMINOPHEN 5; 325 MG/1; MG/1
1 TABLET ORAL EVERY 4 HOURS PRN
Qty: 30 TABLET | Refills: 0 | Status: SHIPPED | OUTPATIENT
Start: 2018-06-27 | End: 2018-07-02

## 2018-06-28 ENCOUNTER — TELEPHONE (OUTPATIENT)
Dept: FAMILY MEDICINE CLINIC | Age: 53
End: 2018-06-28

## 2018-06-28 DIAGNOSIS — K08.89 TOOTH PAIN: Primary | ICD-10-CM

## 2018-06-28 RX ORDER — PENICILLIN V POTASSIUM 500 MG/1
500 TABLET ORAL 3 TIMES DAILY
Qty: 30 TABLET | Refills: 0 | Status: SHIPPED | OUTPATIENT
Start: 2018-06-28 | End: 2018-07-08

## 2018-09-07 DIAGNOSIS — E78.1 HYPERTRIGLYCERIDEMIA: ICD-10-CM

## 2018-09-07 DIAGNOSIS — I10 ESSENTIAL HYPERTENSION: Primary | ICD-10-CM

## 2018-09-07 DIAGNOSIS — R73.01 ELEVATED FASTING GLUCOSE: ICD-10-CM

## 2018-11-08 ENCOUNTER — OFFICE VISIT (OUTPATIENT)
Dept: FAMILY MEDICINE CLINIC | Age: 53
End: 2018-11-08
Payer: COMMERCIAL

## 2018-11-08 VITALS
RESPIRATION RATE: 16 BRPM | WEIGHT: 253 LBS | SYSTOLIC BLOOD PRESSURE: 130 MMHG | HEART RATE: 78 BPM | DIASTOLIC BLOOD PRESSURE: 82 MMHG | HEIGHT: 73 IN | BODY MASS INDEX: 33.53 KG/M2

## 2018-11-08 DIAGNOSIS — Z87.891 PERSONAL HISTORY OF TOBACCO USE, PRESENTING HAZARDS TO HEALTH: ICD-10-CM

## 2018-11-08 DIAGNOSIS — G89.29 CHRONIC PAIN OF LEFT ANKLE: ICD-10-CM

## 2018-11-08 DIAGNOSIS — F17.200 NICOTINE DEPENDENCE WITH CURRENT USE: ICD-10-CM

## 2018-11-08 DIAGNOSIS — Z23 NEED FOR INFLUENZA VACCINATION: ICD-10-CM

## 2018-11-08 DIAGNOSIS — M25.572 CHRONIC PAIN OF LEFT ANKLE: ICD-10-CM

## 2018-11-08 DIAGNOSIS — R73.01 ELEVATED FASTING GLUCOSE: ICD-10-CM

## 2018-11-08 DIAGNOSIS — F95.2 TOURETTE'S SYNDROME: ICD-10-CM

## 2018-11-08 DIAGNOSIS — K21.9 GASTROESOPHAGEAL REFLUX DISEASE, ESOPHAGITIS PRESENCE NOT SPECIFIED: ICD-10-CM

## 2018-11-08 DIAGNOSIS — I10 ESSENTIAL HYPERTENSION: Primary | ICD-10-CM

## 2018-11-08 PROCEDURE — 99214 OFFICE O/P EST MOD 30 MIN: CPT | Performed by: FAMILY MEDICINE

## 2018-11-08 PROCEDURE — 99406 BEHAV CHNG SMOKING 3-10 MIN: CPT | Performed by: FAMILY MEDICINE

## 2018-11-08 PROCEDURE — 90686 IIV4 VACC NO PRSV 0.5 ML IM: CPT | Performed by: FAMILY MEDICINE

## 2018-11-08 PROCEDURE — 90471 IMMUNIZATION ADMIN: CPT | Performed by: FAMILY MEDICINE

## 2018-11-08 RX ORDER — PIMOZIDE 1 MG/1
TABLET ORAL
Qty: 360 TABLET | Refills: 1 | Status: SHIPPED | OUTPATIENT
Start: 2018-11-08 | End: 2019-05-14 | Stop reason: SDUPTHER

## 2018-11-08 RX ORDER — HYDROCODONE BITARTRATE AND ACETAMINOPHEN 5; 325 MG/1; MG/1
1 TABLET ORAL EVERY 4 HOURS PRN
Qty: 30 TABLET | Refills: 0 | Status: SHIPPED | OUTPATIENT
Start: 2018-11-08 | End: 2018-11-24 | Stop reason: SDUPTHER

## 2018-11-08 RX ORDER — HYDROCODONE BITARTRATE AND ACETAMINOPHEN 5; 325 MG/1; MG/1
1 TABLET ORAL EVERY 4 HOURS PRN
COMMUNITY
End: 2018-11-08 | Stop reason: SDUPTHER

## 2018-11-08 RX ORDER — LOSARTAN POTASSIUM AND HYDROCHLOROTHIAZIDE 25; 100 MG/1; MG/1
TABLET ORAL
Qty: 90 TABLET | Refills: 1 | Status: SHIPPED | OUTPATIENT
Start: 2018-11-08 | End: 2019-05-14 | Stop reason: SDUPTHER

## 2018-11-08 RX ORDER — HYDROCHLOROTHIAZIDE 25 MG/1
TABLET ORAL
Qty: 90 TABLET | Refills: 1 | Status: SHIPPED | OUTPATIENT
Start: 2018-11-08 | End: 2019-05-14 | Stop reason: SDUPTHER

## 2018-11-08 RX ORDER — OMEPRAZOLE 20 MG/1
CAPSULE, DELAYED RELEASE ORAL
Qty: 90 CAPSULE | Refills: 1 | Status: SHIPPED | OUTPATIENT
Start: 2018-11-08 | End: 2019-05-14 | Stop reason: SDUPTHER

## 2018-11-08 ASSESSMENT — PATIENT HEALTH QUESTIONNAIRE - PHQ9
SUM OF ALL RESPONSES TO PHQ QUESTIONS 1-9: 0
1. LITTLE INTEREST OR PLEASURE IN DOING THINGS: 0
SUM OF ALL RESPONSES TO PHQ QUESTIONS 1-9: 0
SUM OF ALL RESPONSES TO PHQ9 QUESTIONS 1 & 2: 0
2. FEELING DOWN, DEPRESSED OR HOPELESS: 0

## 2018-11-08 NOTE — PATIENT INSTRUCTIONS
Learning About Benefits From Quitting Smoking  How does quitting smoking make you healthier? If you're thinking about quitting smoking, you may have a few reasons to be smoke-free. Your health may be one of them. · When you quit smoking, you lower your risks for cancer, lung disease, heart attack, stroke, blood vessel disease, and blindness from macular degeneration. · When you're smoke-free, you get sick less often, and you heal faster. You are less likely to get colds, flu, bronchitis, and pneumonia. · As a nonsmoker, you may find that your mood is better and you are less stressed. When and how will you feel healthier? Quitting has real health benefits that start from day 1 of being smoke-free. And the longer you stay smoke-free, the healthier you get and the better you feel. The first hours  · After just 20 minutes, your blood pressure and heart rate go down. That means there's less stress on your heart and blood vessels. · Within 12 hours, the level of carbon monoxide in your blood drops back to normal. That makes room for more oxygen. With more oxygen in your body, you may notice that you have more energy than when you smoked. After 2 weeks  · Your lungs start to work better. · Your risk of heart attack starts to drop. After 1 month  · When your lungs are clear, you cough less and breathe deeper, so it's easier to be active. · Your sense of taste and smell return. That means you can enjoy food more than you have since you started smoking. Over the years  · After 1 year, your risk of heart disease is half what it would be if you kept smoking. · After 5 years, your risk of stroke starts to shrink. Within a few years after that, it's about the same as if you'd never smoked. · After 10 years, your risk of dying from lung cancer is cut by about half. And your risk for many other types of cancer is lower too. How would quitting help others in your life?   When you quit smoking, you improve the ease cravings and withdrawal symptoms. · Getting counseling along with using medicine can raise your chances of quitting even more. · If you smoke fewer than 5 cigarettes a day, you may not need medicines to help you quit smoking. · These medicines have less nicotine than cigarettes. And by itself, nicotine is not nearly as harmful as smoking. The tars, carbon monoxide, and other toxic chemicals in tobacco cause the harmful effects. · The side effects of nicotine replacement products depend on the type of product. For example, a patch can make your skin red and itchy. Medicines in pill form can make you sick to your stomach. They can also cause dry mouth and trouble sleeping. For most people, the side effects are not bad enough to make them stop using the products. Why might you choose to use medicines to quit smoking? · You have tried on your own to stop smoking, but you were not able to stop. · You smoke more than 5 cigarettes a day. · You want to increase your chances of quitting smoking. · You want to reduce your cravings and withdrawal symptoms. · You feel the benefits of medicine outweigh the side effects. Why might you choose not to use medicine? · You want to try quitting on your own by stopping all at once (\"cold turkey\"). · You want to cut back slowly on the number of cigarettes you smoke. · You smoke fewer than 5 cigarettes a day. · You do not like using medicine. · You feel the side effects of medicines outweigh the benefits. · You are worried about the cost of medicines. Your decision  Thinking about the facts and your feelings can help you make a decision that is right for you. Be sure you understand the benefits and risks of your options, and think about what else you need to do before you make the decision. Where can you learn more? Go to https://mandy.Employma. org and sign in to your YouTern account.  Enter J952 in the [employer] box to learn more about \"Deciding About Using Medicines To Quit Smoking. \"     If you do not have an account, please click on the \"Sign Up Now\" link. Current as of: November 29, 2017  Content Version: 11.8  © 8057-4410 Healthwise, Incorporated. Care instructions adapted under license by [facility] ([facility]). If you have questions about a medical condition or this instruction, always ask your healthcare professional. [place] [address] any warranty or liability for your use of this information.

## 2018-11-20 ENCOUNTER — OFFICE VISIT (OUTPATIENT)
Dept: PRIMARY CARE CLINIC | Age: 53
End: 2018-11-20
Payer: COMMERCIAL

## 2018-11-20 VITALS
BODY MASS INDEX: 32.8 KG/M2 | TEMPERATURE: 99.4 F | DIASTOLIC BLOOD PRESSURE: 80 MMHG | HEART RATE: 74 BPM | OXYGEN SATURATION: 98 % | WEIGHT: 248.6 LBS | SYSTOLIC BLOOD PRESSURE: 132 MMHG

## 2018-11-20 DIAGNOSIS — J01.40 ACUTE NON-RECURRENT PANSINUSITIS: Primary | ICD-10-CM

## 2018-11-20 DIAGNOSIS — S86.912A MUSCLE STRAIN OF LEFT LOWER LEG, INITIAL ENCOUNTER: ICD-10-CM

## 2018-11-20 PROCEDURE — A6448 LT COMPRES BAND <3"/YD: HCPCS | Performed by: FAMILY MEDICINE

## 2018-11-20 PROCEDURE — 99214 OFFICE O/P EST MOD 30 MIN: CPT | Performed by: FAMILY MEDICINE

## 2018-11-20 RX ORDER — PREDNISONE 20 MG/1
TABLET ORAL
Qty: 15 TABLET | Refills: 0 | Status: SHIPPED | OUTPATIENT
Start: 2018-11-20 | End: 2018-12-07

## 2018-11-20 RX ORDER — CEFUROXIME AXETIL 500 MG/1
500 TABLET ORAL 2 TIMES DAILY
Qty: 20 TABLET | Refills: 0 | Status: SHIPPED | OUTPATIENT
Start: 2018-11-20 | End: 2018-12-07

## 2018-11-20 ASSESSMENT — ENCOUNTER SYMPTOMS
CONSTIPATION: 0
SORE THROAT: 1
TROUBLE SWALLOWING: 0
SINUS PRESSURE: 1
RHINORRHEA: 1
EYE REDNESS: 0
DIARRHEA: 0
VOMITING: 0
ABDOMINAL PAIN: 0
WHEEZING: 0
SINUS PAIN: 1
EYE DISCHARGE: 0
NAUSEA: 0
SHORTNESS OF BREATH: 0
COUGH: 1

## 2018-11-24 ENCOUNTER — OFFICE VISIT (OUTPATIENT)
Dept: PRIMARY CARE CLINIC | Age: 53
End: 2018-11-24
Payer: COMMERCIAL

## 2018-11-24 VITALS
OXYGEN SATURATION: 98 % | SYSTOLIC BLOOD PRESSURE: 140 MMHG | HEIGHT: 73 IN | BODY MASS INDEX: 31.81 KG/M2 | TEMPERATURE: 97.1 F | RESPIRATION RATE: 18 BRPM | WEIGHT: 240 LBS | HEART RATE: 80 BPM | DIASTOLIC BLOOD PRESSURE: 92 MMHG

## 2018-11-24 DIAGNOSIS — M79.662 PAIN OF LEFT CALF: Primary | ICD-10-CM

## 2018-11-24 PROCEDURE — 99214 OFFICE O/P EST MOD 30 MIN: CPT | Performed by: FAMILY MEDICINE

## 2018-11-24 RX ORDER — CYCLOBENZAPRINE HCL 5 MG
5-10 TABLET ORAL 3 TIMES DAILY PRN
Qty: 30 TABLET | Refills: 0 | Status: SHIPPED | OUTPATIENT
Start: 2018-11-24 | End: 2018-12-07 | Stop reason: SDUPTHER

## 2018-11-24 RX ORDER — HYDROCODONE BITARTRATE AND ACETAMINOPHEN 5; 325 MG/1; MG/1
1-2 TABLET ORAL EVERY 6 HOURS PRN
Qty: 30 TABLET | Refills: 0 | Status: SHIPPED | OUTPATIENT
Start: 2018-11-24 | End: 2018-12-01

## 2018-11-24 ASSESSMENT — PATIENT HEALTH QUESTIONNAIRE - PHQ9
2. FEELING DOWN, DEPRESSED OR HOPELESS: 0
1. LITTLE INTEREST OR PLEASURE IN DOING THINGS: 0
SUM OF ALL RESPONSES TO PHQ QUESTIONS 1-9: 0
SUM OF ALL RESPONSES TO PHQ QUESTIONS 1-9: 0
SUM OF ALL RESPONSES TO PHQ9 QUESTIONS 1 & 2: 0

## 2018-11-24 NOTE — PROGRESS NOTES
[address]  [address], [address] Jeannine Mike  [phone]      Eliud Cifuentes is a 47 y.o. male who is c/o of Leg Pain (left leg continues to be painful, unable to bear weight without pain. was seen 11/20 for same issue)      HPI:     HPI   Pt here today for continued L lower leg pain. Pt was seen on 11/20 in UC - diagnosed with probable muscle strain of L lower leg, and prescribed Prednisone; also recommended to wrap with ACE wrap, and do stretches. Pt states he is having worsened pain over L calf - now radiating up into posterior upper leg and L buttock. 9/10 pain, constant but does wax/wane. Worse with ambulation, only able to walk a few minutes before he has to rest again. No relief with the Prednisone from UC visit - taking each morning as directed. Taking Norco (home med) more often - up to 3-4x's per day; however, he states this does not \"even touch the pain\". Tried wrapping it with ACE wrap, but it seemed to make the symptoms worse. Missed work 11/19 - 11/20 - would like a note today.         Subjective:      Past Medical History:   Diagnosis Date    Ankle pain, left     chronic; s/p fracture in 1999    Gastroesophageal reflux disease     Hyperlipemia     Hypertension     Obesity     Tobacco abuse     Tourette's syndrome       Past Surgical History:   Procedure Laterality Date    ANKLE FRACTURE SURGERY Left 1999    WISDOM TOOTH EXTRACTION Bilateral        Social History   Substance Use Topics    Smoking status: Current Every Day Smoker     Packs/day: 1.00     Years: 30.00     Types: Cigarettes    Smokeless tobacco: Former User     Types: Snuff     Quit date: 11/17/1987    Alcohol use 0.0 oz/week      Comment: occasional      Current Outpatient Prescriptions   Medication Sig Dispense Refill    cyclobenzaprine (FLEXERIL) 5 MG tablet Take 1-2 tablets by mouth 3 times daily as needed for Muscle spasms 30 tablet 0   

## 2018-11-24 NOTE — LETTER
[address]  Phone: [phone]  Fax: [phone] Pat Eloy Higgins, DO        November 24, 2018     Patient: Eliud Cifuentes   YOB: 1965   Date of Visit: 11/24/2018       To Whom it May Concern:    Harmeet Marsh was seen in my clinic on 11/24/2018. Please excuse him from work on 11/19, 11/20, and 11/26/18 due to multiple appointments and imaging studies. He may return to work on 11/27/18. If you have any questions or concerns, please don't hesitate to call.     Sincerely,         Bev Spine, DO

## 2018-11-26 ENCOUNTER — TELEPHONE (OUTPATIENT)
Dept: FAMILY MEDICINE CLINIC | Age: 53
End: 2018-11-26

## 2018-11-26 NOTE — LETTER
[facility] [place] [address] [facility]  [address] [address]  Phone: [phone]  Fax: [phone]    Maurisio Coon DO        November 27, 2018     Patient: Homar Tom   YOB: 1965   Date of Visit: 11/24/2018       To Whom It May Concern:    Please excuse Yumiko Sevilla from work 11/27/18 and 11/28/18 due to illness and subsequent medical testing. If you have any questions or concerns, please don't hesitate to call.     Sincerely,        Maurisio Coon DO

## 2018-11-28 ENCOUNTER — OFFICE VISIT (OUTPATIENT)
Dept: FAMILY MEDICINE CLINIC | Age: 53
End: 2018-11-28
Payer: COMMERCIAL

## 2018-11-28 ENCOUNTER — HOSPITAL ENCOUNTER (OUTPATIENT)
Dept: INTERVENTIONAL RADIOLOGY/VASCULAR | Age: 53
Discharge: HOME | End: 2018-11-30
Payer: COMMERCIAL

## 2018-11-28 ENCOUNTER — HOSPITAL ENCOUNTER (OUTPATIENT)
Dept: LAB | Age: 53
Discharge: HOME | End: 2018-11-28
Payer: COMMERCIAL

## 2018-11-28 VITALS
DIASTOLIC BLOOD PRESSURE: 100 MMHG | TEMPERATURE: 95.7 F | SYSTOLIC BLOOD PRESSURE: 140 MMHG | RESPIRATION RATE: 16 BRPM | OXYGEN SATURATION: 97 % | HEART RATE: 88 BPM

## 2018-11-28 DIAGNOSIS — I10 ESSENTIAL HYPERTENSION: ICD-10-CM

## 2018-11-28 DIAGNOSIS — R73.01 ELEVATED FASTING GLUCOSE: ICD-10-CM

## 2018-11-28 DIAGNOSIS — M79.662 PAIN OF LEFT CALF: ICD-10-CM

## 2018-11-28 DIAGNOSIS — I82.4Z2 ACUTE DEEP VEIN THROMBOSIS (DVT) OF DISTAL VEIN OF LEFT LOWER EXTREMITY (HCC): ICD-10-CM

## 2018-11-28 DIAGNOSIS — I82.4Z2 ACUTE DEEP VEIN THROMBOSIS (DVT) OF DISTAL VEIN OF LEFT LOWER EXTREMITY (HCC): Primary | ICD-10-CM

## 2018-11-28 LAB
ABSOLUTE EOS #: 0.1 K/UL (ref 0–0.4)
ABSOLUTE IMMATURE GRANULOCYTE: ABNORMAL K/UL (ref 0–0.3)
ABSOLUTE LYMPH #: 1.7 K/UL (ref 1–4.8)
ABSOLUTE MONO #: 0.8 K/UL (ref 0.1–1.2)
BASOPHILS # BLD: 0 % (ref 0–2)
BASOPHILS ABSOLUTE: 0.1 K/UL (ref 0–0.2)
DIFFERENTIAL TYPE: ABNORMAL
EOSINOPHILS RELATIVE PERCENT: 0 % (ref 1–8)
ESTIMATED AVERAGE GLUCOSE: 140 MG/DL
HBA1C MFR BLD: 6.5 % (ref 4.8–5.9)
HCT VFR BLD CALC: 43.1 % (ref 41–53)
HEMOGLOBIN: 14.5 G/DL (ref 13.5–17.5)
IMMATURE GRANULOCYTES: ABNORMAL %
LYMPHOCYTES # BLD: 8 % (ref 15–43)
MCH RBC QN AUTO: 31.6 PG (ref 26–34)
MCHC RBC AUTO-ENTMCNC: 33.7 G/DL (ref 31–37)
MCV RBC AUTO: 93.9 FL (ref 80–100)
MONOCYTES # BLD: 4 % (ref 6–14)
NRBC AUTOMATED: ABNORMAL PER 100 WBC
PDW BLD-RTO: 13.6 % (ref 11–14.5)
PLATELET # BLD: 484 K/UL (ref 140–450)
PLATELET ESTIMATE: ABNORMAL
PMV BLD AUTO: 6.8 FL (ref 6–12)
RBC # BLD: 4.59 M/UL (ref 4.5–5.9)
RBC # BLD: ABNORMAL 10*6/UL
SEG NEUTROPHILS: 88 % (ref 44–74)
SEGMENTED NEUTROPHILS ABSOLUTE COUNT: 18.5 K/UL (ref 1.8–7.7)
WBC # BLD: 21.1 K/UL (ref 3.5–11)
WBC # BLD: ABNORMAL 10*3/UL

## 2018-11-28 PROCEDURE — 36415 COLL VENOUS BLD VENIPUNCTURE: CPT

## 2018-11-28 PROCEDURE — 99213 OFFICE O/P EST LOW 20 MIN: CPT | Performed by: FAMILY MEDICINE

## 2018-11-28 PROCEDURE — 93971 EXTREMITY STUDY: CPT

## 2018-11-28 PROCEDURE — 85025 COMPLETE CBC W/AUTO DIFF WBC: CPT

## 2018-11-28 PROCEDURE — 83036 HEMOGLOBIN GLYCOSYLATED A1C: CPT

## 2018-11-28 NOTE — PROGRESS NOTES
HISTORY: Pain of left calf TECHNOLOGIST PROVIDED HISTORY: significant pain in L upper calf x 7 days following severe cramp; no trauma; worsening despite rest, steroids, and pain meds Ordering Physician Provided Reason for Exam: left leg pain x 7 days Acuity: Acute Type of Exam: Initial FINDINGS: The visualized common femoral vein, saphenous vein, proximal and mid femoral vein of the left lower extremity are patent and free of echogenic thrombus. The veins are normally compressible and have normal phasic flow. The distal femoral vein and popliteal vein are distended and noncompressible and show little flow with Doppler evaluation. Visualized calf veins including segments of the posterior tibial and peroneal veins are not definitely compressible and shows little to no flow. Acute deep venous thrombosis in the left lower extremity involving the visualized calf veins and popliteal vein extending to the distal femoral vein. Findings were discussed with Griselda Chavis at 1:43 pm on 11/28/2018. Assessment and Plan:    Acute deep vein thrombosis (DVT) of distal vein of left lower extremity ([address]  After discussion, I have recommended Xarelto. He is in agreement. Xarelto was prescribed:  - rivaroxaban (XARELTO) 15 MG TABS tablet; Take 1 tablet by mouth 2 times daily (with meals) for 21 days  Dispense: 30 tablet; Refill: 0    - CBC Auto Differential; Future was ordered to be done today. He also has orders in [facility] for a comprehensive metabolic panel. I will have him get this today as well. I will have him return next week for re-evaluation, or sooner prn. Printed information regarding Deep Vein Thrombosis was provided to the patient with his after visit summary.            (Please note that portions of this note were completed with a voice-recognition program. Efforts were made to edit the dictation but occasionally words are mis-transcribed.)

## 2018-11-29 DIAGNOSIS — E78.1 HYPERTRIGLYCERIDEMIA: Primary | ICD-10-CM

## 2018-11-29 DIAGNOSIS — D72.829 LEUKOCYTOSIS, UNSPECIFIED TYPE: ICD-10-CM

## 2018-11-29 DIAGNOSIS — R73.01 ELEVATED FASTING GLUCOSE: ICD-10-CM

## 2018-12-04 ENCOUNTER — HOSPITAL ENCOUNTER (OUTPATIENT)
Dept: LAB | Age: 53
Discharge: HOME | End: 2018-12-04
Payer: COMMERCIAL

## 2018-12-04 ENCOUNTER — TELEPHONE (OUTPATIENT)
Dept: FAMILY MEDICINE CLINIC | Age: 53
End: 2018-12-04

## 2018-12-04 DIAGNOSIS — R73.01 ELEVATED FASTING GLUCOSE: ICD-10-CM

## 2018-12-04 DIAGNOSIS — E78.1 HYPERTRIGLYCERIDEMIA: ICD-10-CM

## 2018-12-04 DIAGNOSIS — D72.829 LEUKOCYTOSIS, UNSPECIFIED TYPE: ICD-10-CM

## 2018-12-04 LAB
ABSOLUTE EOS #: 0.2 K/UL (ref 0–0.4)
ABSOLUTE IMMATURE GRANULOCYTE: ABNORMAL K/UL (ref 0–0.3)
ABSOLUTE LYMPH #: 2.4 K/UL (ref 1–4.8)
ABSOLUTE MONO #: 1 K/UL (ref 0.1–1.2)
ALBUMIN SERPL-MCNC: 4.1 G/DL (ref 3.5–5.2)
ALBUMIN/GLOBULIN RATIO: 1.1 (ref 1–2.5)
ALP BLD-CCNC: 106 U/L (ref 40–129)
ALT SERPL-CCNC: 19 U/L (ref 5–41)
ANION GAP SERPL CALCULATED.3IONS-SCNC: 11 MMOL/L (ref 9–17)
AST SERPL-CCNC: 14 U/L
BASOPHILS # BLD: 1 % (ref 0–2)
BASOPHILS ABSOLUTE: 0.1 K/UL (ref 0–0.2)
BILIRUB SERPL-MCNC: 0.86 MG/DL (ref 0.3–1.2)
BUN BLDV-MCNC: 10 MG/DL (ref 6–20)
BUN/CREAT BLD: 14 (ref 9–20)
CALCIUM SERPL-MCNC: 9.6 MG/DL (ref 8.6–10.4)
CHLORIDE BLD-SCNC: 96 MMOL/L (ref 98–107)
CHOLESTEROL, FASTING: 181 MG/DL
CHOLESTEROL/HDL RATIO: 5.7
CO2: 29 MMOL/L (ref 20–31)
CREAT SERPL-MCNC: 0.69 MG/DL (ref 0.7–1.2)
DIFFERENTIAL TYPE: ABNORMAL
EOSINOPHILS RELATIVE PERCENT: 1 % (ref 1–8)
GFR AFRICAN AMERICAN: >60 ML/MIN
GFR NON-AFRICAN AMERICAN: >60 ML/MIN
GFR SERPL CREATININE-BSD FRML MDRD: ABNORMAL ML/MIN/{1.73_M2}
GFR SERPL CREATININE-BSD FRML MDRD: ABNORMAL ML/MIN/{1.73_M2}
GLUCOSE BLD-MCNC: 115 MG/DL (ref 70–99)
HCT VFR BLD CALC: 44.4 % (ref 41–53)
HDLC SERPL-MCNC: 32 MG/DL
HEMOGLOBIN: 14.8 G/DL (ref 13.5–17.5)
IMMATURE GRANULOCYTES: ABNORMAL %
LDL CHOLESTEROL: 90 MG/DL (ref 0–130)
LYMPHOCYTES # BLD: 17 % (ref 15–43)
MCH RBC QN AUTO: 31.6 PG (ref 26–34)
MCHC RBC AUTO-ENTMCNC: 33.4 G/DL (ref 31–37)
MCV RBC AUTO: 94.7 FL (ref 80–100)
MONOCYTES # BLD: 7 % (ref 6–14)
NRBC AUTOMATED: ABNORMAL PER 100 WBC
PDW BLD-RTO: 13.9 % (ref 11–14.5)
PLATELET # BLD: 406 K/UL (ref 140–450)
PLATELET ESTIMATE: ABNORMAL
PMV BLD AUTO: 7 FL (ref 6–12)
POTASSIUM SERPL-SCNC: 4.5 MMOL/L (ref 3.7–5.3)
RBC # BLD: 4.69 M/UL (ref 4.5–5.9)
RBC # BLD: ABNORMAL 10*6/UL
SEG NEUTROPHILS: 74 % (ref 44–74)
SEGMENTED NEUTROPHILS ABSOLUTE COUNT: 10.8 K/UL (ref 1.8–7.7)
SODIUM BLD-SCNC: 136 MMOL/L (ref 135–144)
TOTAL PROTEIN: 7.7 G/DL (ref 6.4–8.3)
TRIGLYCERIDE, FASTING: 295 MG/DL
VLDLC SERPL CALC-MCNC: ABNORMAL MG/DL (ref 1–30)
WBC # BLD: 14.4 K/UL (ref 3.5–11)
WBC # BLD: ABNORMAL 10*3/UL

## 2018-12-04 PROCEDURE — 36415 COLL VENOUS BLD VENIPUNCTURE: CPT

## 2018-12-04 PROCEDURE — 80053 COMPREHEN METABOLIC PANEL: CPT

## 2018-12-04 PROCEDURE — 85025 COMPLETE CBC W/AUTO DIFF WBC: CPT

## 2018-12-04 PROCEDURE — 80061 LIPID PANEL: CPT

## 2018-12-06 NOTE — PROGRESS NOTES
Immature Granulocytes 12/04/2018 NOT REPORTED  0 % Final    Absolute Immature Granulocyte 12/04/2018 NOT REPORTED  0.00 - 0.30 k/uL Final    WBC Morphology 12/04/2018 NOT REPORTED   Final    RBC Morphology 12/04/2018 NOT REPORTED   Final    Platelet Estimate 21/34/5866 NOT REPORTED   Final    Seg Neutrophils 12/04/2018 74  44 - 74 % Final    Lymphocytes 12/04/2018 17  15 - 43 % Final    Monocytes 12/04/2018 7  6 - 14 % Final    Eosinophils % 12/04/2018 1  1 - 8 % Final    Basophils 12/04/2018 1  0 - 2 % Final    Segs Absolute 12/04/2018 10.80* 1.8 - 7.7 k/uL Final    Absolute Lymph # 12/04/2018 2.40  1.0 - 4.8 k/uL Final    Absolute Mono # 12/04/2018 1.00  0.1 - 1.2 k/uL Final    Absolute Eos # 12/04/2018 0.20  0.0 - 0.4 k/uL Final    Basophils # 12/04/2018 0.10  0.0 - 0.2 k/uL Final   Hospital Outpatient Visit on 11/28/2018   Component Date Value Ref Range Status    Hemoglobin A1C 11/28/2018 6.5* 4.8 - 5.9 % Final    Estimated Avg Glucose 11/28/2018 140  mg/dL Final    WBC 11/28/2018 21.1* 3.5 - 11.0 k/uL Final    RBC 11/28/2018 4.59  4.5 - 5.9 m/uL Final    Hemoglobin 11/28/2018 14.5  13.5 - 17.5 g/dL Final    Hematocrit 11/28/2018 43.1  41 - 53 % Final    MCV 11/28/2018 93.9  80 - 100 fL Final    MCH 11/28/2018 31.6  26 - 34 pg Final    MCHC 11/28/2018 33.7  31 - 37 g/dL Final    RDW 11/28/2018 13.6  11.0 - 14.5 % Final    Platelets 44/27/9836 484* 140 - 450 k/uL Final    MPV 11/28/2018 6.8  6.0 - 12.0 fL Final    NRBC Automated 11/28/2018 NOT REPORTED  per 100 WBC Final    Differential Type 11/28/2018 NOT REPORTED   Final    Immature Granulocytes 11/28/2018 NOT REPORTED  0 % Final    Absolute Immature Granulocyte 11/28/2018 NOT REPORTED  0.00 - 0.30 k/uL Final    WBC Morphology 11/28/2018 NOT REPORTED   Final    RBC Morphology 11/28/2018 NOT REPORTED   Final    Platelet Estimate 12/41/7925 NOT REPORTED   Final    Seg Neutrophils 11/28/2018 88* 44 - 74 % Final    Lymphocytes provided to the patient with his after visit summary. 5. Hypertriglyceridemia  His lipid profile was not at goal on his recent lab work. I will discuss statin therapy with him at his next office visit. - Lipid, Fasting; Future prior to his return visit in 6 months. 6.  Routine health maintenance  Health maintenance was reviewed with the patient. He has received an influenza vaccine this influenza season. I will discuss colonoscopy, Tdap, and Shingrix with him at his next office visit. All other health maintenance, including Pneumovax, is up to date at this time. There is no indication for Prevnar-13 at this time.      (Please note that portions of this note were completed with a voice-recognition program. Efforts were made to edit the dictation but occasionally words are mis-transcribed.)

## 2018-12-07 ENCOUNTER — OFFICE VISIT (OUTPATIENT)
Dept: FAMILY MEDICINE CLINIC | Age: 53
End: 2018-12-07
Payer: COMMERCIAL

## 2018-12-07 VITALS
BODY MASS INDEX: 33.08 KG/M2 | RESPIRATION RATE: 16 BRPM | SYSTOLIC BLOOD PRESSURE: 138 MMHG | HEIGHT: 73 IN | WEIGHT: 249.6 LBS | DIASTOLIC BLOOD PRESSURE: 86 MMHG | OXYGEN SATURATION: 97 % | HEART RATE: 84 BPM

## 2018-12-07 DIAGNOSIS — I10 ESSENTIAL HYPERTENSION: ICD-10-CM

## 2018-12-07 DIAGNOSIS — E78.1 HYPERTRIGLYCERIDEMIA: ICD-10-CM

## 2018-12-07 DIAGNOSIS — E11.9 TYPE 2 DIABETES MELLITUS WITHOUT COMPLICATION, WITHOUT LONG-TERM CURRENT USE OF INSULIN (HCC): ICD-10-CM

## 2018-12-07 DIAGNOSIS — M79.662 PAIN OF LEFT CALF: ICD-10-CM

## 2018-12-07 DIAGNOSIS — I82.4Z2 ACUTE DEEP VEIN THROMBOSIS (DVT) OF DISTAL VEIN OF LEFT LOWER EXTREMITY (HCC): Primary | ICD-10-CM

## 2018-12-07 PROCEDURE — 99214 OFFICE O/P EST MOD 30 MIN: CPT | Performed by: FAMILY MEDICINE

## 2018-12-07 RX ORDER — CYCLOBENZAPRINE HCL 5 MG
5-10 TABLET ORAL 3 TIMES DAILY PRN
Qty: 30 TABLET | Refills: 0 | Status: SHIPPED | OUTPATIENT
Start: 2018-12-07 | End: 2018-12-17

## 2018-12-07 RX ORDER — HYDROCODONE BITARTRATE AND ACETAMINOPHEN 5; 325 MG/1; MG/1
1 TABLET ORAL EVERY 6 HOURS PRN
COMMUNITY
End: 2019-01-09 | Stop reason: SDUPTHER

## 2018-12-07 ASSESSMENT — PATIENT HEALTH QUESTIONNAIRE - PHQ9
SUM OF ALL RESPONSES TO PHQ QUESTIONS 1-9: 0
SUM OF ALL RESPONSES TO PHQ QUESTIONS 1-9: 0
2. FEELING DOWN, DEPRESSED OR HOPELESS: 0
SUM OF ALL RESPONSES TO PHQ9 QUESTIONS 1 & 2: 0
1. LITTLE INTEREST OR PLEASURE IN DOING THINGS: 0

## 2018-12-07 NOTE — PATIENT INSTRUCTIONS
Patient Education        Prediabetes: Care Instructions  Your Care Instructions    Prediabetes is a warning sign that you are at risk for getting type 2 diabetes. It means that your blood sugar is higher than it should be. The food you eat turns into sugar, which your body uses for energy. Normally, an organ called the pancreas makes insulin, which allows the sugar in your blood to get into your body's cells. But when your body can't use insulin the right way, the sugar doesn't move into cells. It stays in your blood instead. This is called insulin resistance. The buildup of sugar in the blood causes prediabetes. The good news is that lifestyle changes may help you get your blood sugar back to normal and help you avoid or delay diabetes. Follow-up care is a key part of your treatment and safety. Be sure to make and go to all appointments, and call your doctor if you are having problems. It's also a good idea to know your test results and keep a list of the medicines you take. How can you care for yourself at home? · Watch your weight. A healthy weight helps your body use insulin properly. · Limit the amount of calories, sweets, and unhealthy fat you eat. Ask your doctor if you should see a dietitian. A registered dietitian can help you create meal plans that fit your lifestyle. · Get at least 30 minutes of exercise on most days of the week. Exercise helps control your blood sugar. It also helps you maintain a healthy weight. Walking is a good choice. You also may want to do other activities, such as running, swimming, cycling, or playing tennis or team sports. · Do not smoke. Smoking can make prediabetes worse. If you need help quitting, talk to your doctor about stop-smoking programs and medicines. These can increase your chances of quitting for good. · If your doctor prescribed medicines, take them exactly as prescribed. Call your doctor if you think you are having a problem with your medicine.  You will get more details on the specific medicines your doctor prescribes. When should you call for help? Watch closely for changes in your health, and be sure to contact your doctor if:    · You have any symptoms of diabetes. These may include:  ? Being thirsty more often. ? Urinating more. ? Being hungrier. ? Losing weight. ? Being very tired. ? Having blurry vision.     · You have a wound that will not heal.     · You have an infection that will not go away.     · You have problems with your blood pressure.     · You want more information about diabetes and how you can keep from getting it. Where can you learn more? Go to https://DashlanepeMemolaneeb.Ingeniatrics. org and sign in to your [employer] account. Enter I222 in the [employer] box to learn more about \"Prediabetes: Care Instructions. \"     If you do not have an account, please click on the \"Sign Up Now\" link. Current as of: December 7, 2017  Content Version: 11.8  © 8478-6866 Healthwise, Incorporated. Care instructions adapted under license by [facility] ([facility]). If you have questions about a medical condition or this instruction, always ask your healthcare professional. [address] any warranty or liability for your use of this information.

## 2018-12-12 ENCOUNTER — TELEPHONE (OUTPATIENT)
Dept: FAMILY MEDICINE CLINIC | Age: 53
End: 2018-12-12

## 2018-12-12 NOTE — TELEPHONE ENCOUNTER
Writer spoke with Tita Fritz in the  insurance department,she had  faxed the ov notes from 11/28/18 today to  benefits,she will send 12/07/18 notes as requested.

## 2018-12-16 NOTE — PROGRESS NOTES
[address], [address] [place] [address] Drive                        Telephone [phone]             Fax [phone]     Cortney Dolan  1965  MRN:  [id number]  Date of visit:  12/17/2018    Subjective:    Genevieve Montes is a 47 y.o.  male who presents to [facility] today (12/17/2018) for follow up/evaluation of: Other (DVT left lower leg)      He was diagnosed with a DVT in the left leg 11/28/2018. Xarelto was prescribed. He continues to have pain in the left lower leg, but he reports that the pain has been gradually improving. He is now able to stand up and walk around for about an hour before the pain disrupts his activities. He is tolerating Xarelto well. He has been off work, but he feels like he will be able to return to work soon. He has the following problem list:  Patient Active Problem List   Diagnosis    Essential hypertension    Tourette's syndrome    Gastroesophageal reflux disease    Tobacco abuse    Chronic pain of left ankle    Type 2 diabetes mellitus without complication ([facility] [address]    Hypertriglyceridemia        Current medications are:  Outpatient Prescriptions Marked as Taking for the 12/17/18 encounter (Office Visit) with Yesenia King MD   Medication Sig Dispense Refill    HYDROcodone-acetaminophen (NORCO) 5-325 MG per tablet Take 1 tablet by mouth every 6 hours as needed for Pain. Take 1-2 tablets every 6 hours prn .       cyclobenzaprine (FLEXERIL) 5 MG tablet Take 1-2 tablets by mouth 3 times daily as needed for Muscle spasms 30 tablet 0    [START ON 12/20/2018] rivaroxaban (XARELTO) 20 MG TABS tablet Take 1 tablet by mouth daily (with breakfast) 90 tablet 1    omeprazole (PRILOSEC) 20 MG delayed release capsule TAKE 1 CAPSULE DAILY 90 capsule 1    pimozide (ORAP) 1 MG tablet TAKE 2 TABLETS TWICE A  tablet 1    losartan-hydrochlorothiazide

## 2018-12-17 ENCOUNTER — OFFICE VISIT (OUTPATIENT)
Dept: FAMILY MEDICINE CLINIC | Age: 53
End: 2018-12-17
Payer: COMMERCIAL

## 2018-12-17 VITALS
RESPIRATION RATE: 16 BRPM | DIASTOLIC BLOOD PRESSURE: 84 MMHG | WEIGHT: 249.8 LBS | HEIGHT: 73 IN | BODY MASS INDEX: 33.11 KG/M2 | HEART RATE: 80 BPM | SYSTOLIC BLOOD PRESSURE: 136 MMHG

## 2018-12-17 DIAGNOSIS — I82.432 ACUTE DEEP VEIN THROMBOSIS (DVT) OF POPLITEAL VEIN OF LEFT LOWER EXTREMITY (HCC): Primary | ICD-10-CM

## 2018-12-17 PROCEDURE — 99213 OFFICE O/P EST LOW 20 MIN: CPT | Performed by: FAMILY MEDICINE

## 2018-12-17 ASSESSMENT — PATIENT HEALTH QUESTIONNAIRE - PHQ9
SUM OF ALL RESPONSES TO PHQ QUESTIONS 1-9: 0
2. FEELING DOWN, DEPRESSED OR HOPELESS: 0
1. LITTLE INTEREST OR PLEASURE IN DOING THINGS: 0
SUM OF ALL RESPONSES TO PHQ9 QUESTIONS 1 & 2: 0
SUM OF ALL RESPONSES TO PHQ QUESTIONS 1-9: 0

## 2019-01-09 DIAGNOSIS — M25.572 CHRONIC PAIN OF LEFT ANKLE: Primary | ICD-10-CM

## 2019-01-09 DIAGNOSIS — G89.29 CHRONIC PAIN OF LEFT ANKLE: Primary | ICD-10-CM

## 2019-01-09 RX ORDER — HYDROCODONE BITARTRATE AND ACETAMINOPHEN 5; 325 MG/1; MG/1
1 TABLET ORAL EVERY 6 HOURS PRN
Qty: 30 TABLET | Refills: 0 | Status: SHIPPED | OUTPATIENT
Start: 2019-01-09 | End: 2019-02-04 | Stop reason: ALTCHOICE

## 2019-02-04 ENCOUNTER — OFFICE VISIT (OUTPATIENT)
Dept: PRIMARY CARE CLINIC | Age: 54
End: 2019-02-04
Payer: COMMERCIAL

## 2019-02-04 VITALS
WEIGHT: 259.2 LBS | TEMPERATURE: 97.3 F | HEIGHT: 73 IN | DIASTOLIC BLOOD PRESSURE: 86 MMHG | HEART RATE: 89 BPM | OXYGEN SATURATION: 98 % | SYSTOLIC BLOOD PRESSURE: 132 MMHG | BODY MASS INDEX: 34.35 KG/M2

## 2019-02-04 DIAGNOSIS — K04.7 DENTAL INFECTION: Primary | ICD-10-CM

## 2019-02-04 DIAGNOSIS — K08.89 PAIN, DENTAL: ICD-10-CM

## 2019-02-04 PROCEDURE — 99213 OFFICE O/P EST LOW 20 MIN: CPT | Performed by: NURSE PRACTITIONER

## 2019-02-04 RX ORDER — HYDROCODONE BITARTRATE AND ACETAMINOPHEN 5; 325 MG/1; MG/1
1 TABLET ORAL EVERY 6 HOURS PRN
Qty: 10 TABLET | Refills: 0 | Status: SHIPPED | OUTPATIENT
Start: 2019-02-04 | End: 2019-02-07

## 2019-02-04 RX ORDER — PENICILLIN V POTASSIUM 500 MG/1
500 TABLET ORAL 4 TIMES DAILY
Qty: 28 TABLET | Refills: 0 | Status: SHIPPED | OUTPATIENT
Start: 2019-02-04 | End: 2019-02-11

## 2019-02-04 ASSESSMENT — ENCOUNTER SYMPTOMS: RESPIRATORY NEGATIVE: 1

## 2019-05-14 ENCOUNTER — OFFICE VISIT (OUTPATIENT)
Dept: FAMILY MEDICINE CLINIC | Age: 54
End: 2019-05-14
Payer: COMMERCIAL

## 2019-05-14 VITALS
HEART RATE: 82 BPM | SYSTOLIC BLOOD PRESSURE: 150 MMHG | RESPIRATION RATE: 16 BRPM | DIASTOLIC BLOOD PRESSURE: 96 MMHG | HEIGHT: 73 IN | BODY MASS INDEX: 33.48 KG/M2 | WEIGHT: 252.6 LBS

## 2019-05-14 DIAGNOSIS — Z87.891 PERSONAL HISTORY OF TOBACCO USE, PRESENTING HAZARDS TO HEALTH: ICD-10-CM

## 2019-05-14 DIAGNOSIS — I10 ESSENTIAL HYPERTENSION: ICD-10-CM

## 2019-05-14 DIAGNOSIS — E11.9 TYPE 2 DIABETES MELLITUS WITHOUT COMPLICATION, WITHOUT LONG-TERM CURRENT USE OF INSULIN (HCC): Primary | ICD-10-CM

## 2019-05-14 DIAGNOSIS — F17.210 CIGARETTE NICOTINE DEPENDENCE WITHOUT COMPLICATION: ICD-10-CM

## 2019-05-14 DIAGNOSIS — F95.2 TOURETTE'S SYNDROME: ICD-10-CM

## 2019-05-14 DIAGNOSIS — K21.9 GASTROESOPHAGEAL REFLUX DISEASE, ESOPHAGITIS PRESENCE NOT SPECIFIED: ICD-10-CM

## 2019-05-14 DIAGNOSIS — M25.572 CHRONIC PAIN OF LEFT ANKLE: ICD-10-CM

## 2019-05-14 DIAGNOSIS — G89.29 CHRONIC PAIN OF LEFT ANKLE: ICD-10-CM

## 2019-05-14 PROCEDURE — 99406 BEHAV CHNG SMOKING 3-10 MIN: CPT | Performed by: FAMILY MEDICINE

## 2019-05-14 PROCEDURE — 99214 OFFICE O/P EST MOD 30 MIN: CPT | Performed by: FAMILY MEDICINE

## 2019-05-14 RX ORDER — PIMOZIDE 1 MG/1
TABLET ORAL
Qty: 360 TABLET | Refills: 1 | Status: SHIPPED | OUTPATIENT
Start: 2019-05-14 | End: 2019-10-04 | Stop reason: SDUPTHER

## 2019-05-14 RX ORDER — HYDROCHLOROTHIAZIDE 25 MG/1
TABLET ORAL
Qty: 90 TABLET | Refills: 1 | Status: SHIPPED | OUTPATIENT
Start: 2019-05-14 | End: 2019-10-04 | Stop reason: SDUPTHER

## 2019-05-14 RX ORDER — LOSARTAN POTASSIUM AND HYDROCHLOROTHIAZIDE 25; 100 MG/1; MG/1
TABLET ORAL
Qty: 90 TABLET | Refills: 1 | Status: SHIPPED | OUTPATIENT
Start: 2019-05-14 | End: 2019-10-04 | Stop reason: SDUPTHER

## 2019-05-14 RX ORDER — HYDROCODONE BITARTRATE AND ACETAMINOPHEN 5; 325 MG/1; MG/1
1 TABLET ORAL EVERY 6 HOURS PRN
Qty: 30 TABLET | Refills: 0 | Status: SHIPPED | OUTPATIENT
Start: 2019-05-14 | End: 2019-06-13

## 2019-05-14 RX ORDER — OMEPRAZOLE 20 MG/1
CAPSULE, DELAYED RELEASE ORAL
Qty: 90 CAPSULE | Refills: 1 | Status: SHIPPED | OUTPATIENT
Start: 2019-05-14 | End: 2019-10-04 | Stop reason: SDUPTHER

## 2019-05-14 ASSESSMENT — PATIENT HEALTH QUESTIONNAIRE - PHQ9
2. FEELING DOWN, DEPRESSED OR HOPELESS: 0
SUM OF ALL RESPONSES TO PHQ9 QUESTIONS 1 & 2: 0
SUM OF ALL RESPONSES TO PHQ QUESTIONS 1-9: 0
1. LITTLE INTEREST OR PLEASURE IN DOING THINGS: 0
SUM OF ALL RESPONSES TO PHQ QUESTIONS 1-9: 0

## 2019-05-14 NOTE — PROGRESS NOTES
[address], [address] [place] [address] Drive                        Telephone [phone]             Fax [phone]     Karli Dolan  1965  MRN:  [id number]  Date of visit:  5/14/2019    Subjective:    Katie Vyas is a 47 y.o.  male who presents to [facility] today (5/14/2019) for follow up/evaluation of:  Diabetes (6 month follow up); Hypertension (6 month follow up); and Hyperlipidemia (6 month follow up)      He reports that he has not had the labs done that were ordered to be done before today's visit. He is not exercising regularly. He denies chest pain or shortness of breath with activity or at rest.  He is tolerating his medications well. He requests a refill of Norco. He uses Norco for chronic left ankle pain. He continues to smoke. He has no other concerns or complaints today. He has the following problem list:  Patient Active Problem List   Diagnosis    Essential hypertension    Tourette's syndrome    Gastroesophageal reflux disease    Tobacco abuse    Chronic pain of left ankle    Type 2 diabetes mellitus without complication ([facility]    Hypertriglyceridemia        Current medications are:  Outpatient Medications Marked as Taking for the 5/14/19 encounter (Office Visit) with Ion Lombardo MD   Medication Sig Dispense Refill    rivaroxaban (XARELTO) 20 MG TABS tablet Take 1 tablet by mouth daily (with breakfast) 90 tablet 1    omeprazole (PRILOSEC) 20 MG delayed release capsule TAKE 1 CAPSULE DAILY 90 capsule 1    pimozide (ORAP) 1 MG tablet TAKE 2 TABLETS TWICE A  tablet 1    losartan-hydrochlorothiazide (HYZAAR) 100-25 MG per tablet TAKE 1 TABLET DAILY 90 tablet 1    hydrochlorothiazide (HYDRODIURIL) 25 MG tablet TAKE 1 TABLET DAILY 90 tablet 1       He is allergic to chantix [varenicline]. He is a smoker.   He  reports that he has been smoking cigarettes. He started smoking about 39 years ago. He has a 30.00 pack-year smoking history. He quit smokeless tobacco use about 31 years ago. His smokeless tobacco use included snuff. Objective:    Vitals:    05/14/19 1304 05/14/19 1314   BP: (!) 152/98 (!) 150/96   Site: Right Upper Arm Right Upper Arm   Position: Sitting Sitting   Cuff Size: Large Adult Large Adult   Pulse: 82    Resp: 16    Weight: 252 lb 9.6 oz (114.6 kg)    Height: 6' 1\" (1.854 m)      Body mass index is 33.33 kg/m². Obese  male, healthy-appearing, alert, cooperative and in no distress. Neck supple. No adenopathy. Thyroid symmetric, normal size. Chest:  Normal expansion. Clear to auscultation. No rales, rhonchi, or wheezing. Heart sounds are normal.  Regular rate and rhythm without murmur, gallop or rub. Lower extremities have no edema. He has had no recent labs. Assessment and Plan:    1. Type 2 diabetes mellitus without complication, without long-term current use of insulin ([facility] [address] [facility]  As above, he has not had recent labs. He has orders to be done when he returns fasting. Labs were also ordered to be done prior to his return visit in 6 months:  - Microalbumin, Ur; Future  - Lipid, Fasting; Future  - Hemoglobin A1C; Future    2. Essential hypertension  His blood pressure is elevated. (BP: (!) 150/96)   He was advised to continue current medications. Losartan-Hydrochlorothiazide and Hydrochlorothiazide were refilled:   - losartan-hydrochlorothiazide (HYZAAR) 100-25 MG per tablet; TAKE 1 TABLET DAILY  Dispense: 90 tablet; Refill: 1  - hydrochlorothiazide (HYDRODIURIL) 25 MG tablet; TAKE 1 TABLET DAILY  Dispense: 90 tablet; Refill: 1    He was advised to have his blood pressure checked at work, and to call if his blood pressure is consistently above 140/90. He has orders for a comprehensive metabolic panel to be done when he returns fasting.  - Comprehensive Metabolic Panel;  Future prior to his return visit in 6 months. 3. Gastroesophageal reflux disease, esophagitis presence not specified  He is doing well with Omeprazole; this was refilled:  - omeprazole (PRILOSEC) 20 MG delayed release capsule; TAKE 1 CAPSULE DAILY  Dispense: 90 capsule; Refill: 1    4. Tourette's syndrome  He is doing well with Orap, and he is tolerating this well. Orap was refilled:  - pimozide (ORAP) 1 MG tablet; TAKE 2 TABLETS TWICE A DAY  Dispense: 360 tablet; Refill: 1    5. Chronic pain of left ankle  Controlled substances monitoring: No signs of potential drug abuse or diversion identified when the OARRS report from [facility], [place], and [place] was reviewed today. The activity on the report was consistent with the treatment plan. He appears to be taking Norco appropriately. Norco was refilled:  - HYDROcodone-acetaminophen (NORCO) 5-325 MG per tablet; Take 1 tablet by mouth every 6 hours as needed for Pain for up to 30 days. Take 1-2 tablets every 6 hours prn  Dispense: 30 tablet; Refill: 0    6. Personal history of tobacco use, presenting hazards to health  7. Cigarette nicotine dependence without complication  Tobacco Cessation Counseling: Patient advised about behavior change, including information about personal health harms, usage of appropriate cessation measures and benefits of cessation. Time spent (minutes): 5  Printed information regarding Stopping Smoking was provided to the patient with his after visit summary.     - ME TOBACCO USE CESSATION INTERMEDIATE 3-10 MINUTES [76673]    8. Routine health maintenance  Health maintenance was reviewed with the patient. Colonoscopy was recommended. He declined. Shingrix, Tdap, and Hepatitis B vaccines were recommended and declined. All other health maintenance, including Pneumovax, is up to date at this time. There is no indication for Prevnar-13 at this time.      (Please note that portions of this note were completed with a voice-recognition program. Efforts were made to edit the dictation but occasionally words are mis-transcribed.)

## 2019-05-14 NOTE — PATIENT INSTRUCTIONS
Stopping Smoking: Care Instructions  Your Care Instructions  Cigarette smokers crave the nicotine in cigarettes. Giving it up is much harder than simply changing a habit. Your body has to stop craving the nicotine. It is hard to quit, but you can do it. There are many tools that people use to quit smoking. You may find that combining tools works best for you. There are several steps to quitting. First you get ready to quit. Then you get support to help you. After that, you learn new skills and behaviors to become a nonsmoker. For many people, a necessary step is getting and using medicine. Your doctor will help you set up the plan that best meets your needs. You may want to attend a smoking cessation program to help you quit smoking. When you choose a program, look for one that has proven success. Ask your doctor for ideas. You will greatly increase your chances of success if you take medicine as well as get counseling or join a cessation program.  Some of the changes you feel when you first quit tobacco are uncomfortable. Your body will miss the nicotine at first, and you may feel short-tempered and grumpy. You may have trouble sleeping or concentrating. Medicine can help you deal with these symptoms. You may struggle with changing your smoking habits and rituals. The last step is the tricky one: Be prepared for the smoking urge to continue for a time. This is a lot to deal with, but keep at it. You will feel better. Follow-up care is a key part of your treatment and safety. Be sure to make and go to all appointments, and call your doctor if you are having problems. It's also a good idea to know your test results and keep a list of the medicines you take. How can you care for yourself at home? · Ask your family, friends, and coworkers for support. You have a better chance of quitting if you have help and support.   · Join a support group, such as Nicotine Anonymous, for people who are trying to quit to keep trying. Most people are not successful the first few times they try to quit. Do not get mad at yourself if you smoke again. Make a list of things you learned and think about when you want to try again, such as next week, next month, or next year. Where can you learn more? Go to https://chpepicewwoody.DrawQuest. org and sign in to your "[employer] account. Enter A521 in the [employer] box to learn more about \"Stopping Smoking: Care Instructions. \"     If you do not have an account, please click on the \"Sign Up Now\" link. Current as of: September 26, 2018  Content Version: 12.0  © 9567-4191 Healthwise, Incorporated. Care instructions adapted under license by [facility] ([facility]). If you have questions about a medical condition or this instruction, always ask your healthcare professional. [address] any warranty or liability for your use of this information.

## 2019-10-04 DIAGNOSIS — F95.2 TOURETTE'S SYNDROME: ICD-10-CM

## 2019-10-04 DIAGNOSIS — I10 ESSENTIAL HYPERTENSION: ICD-10-CM

## 2019-10-04 DIAGNOSIS — K21.9 GASTROESOPHAGEAL REFLUX DISEASE, ESOPHAGITIS PRESENCE NOT SPECIFIED: ICD-10-CM

## 2019-10-04 DIAGNOSIS — I82.4Z2 ACUTE DEEP VEIN THROMBOSIS (DVT) OF DISTAL VEIN OF LEFT LOWER EXTREMITY (HCC): ICD-10-CM

## 2019-10-04 RX ORDER — LOSARTAN POTASSIUM AND HYDROCHLOROTHIAZIDE 25; 100 MG/1; MG/1
TABLET ORAL
Qty: 90 TABLET | Refills: 0 | Status: SHIPPED | OUTPATIENT
Start: 2019-10-04 | End: 2019-11-13 | Stop reason: SDUPTHER

## 2019-10-04 RX ORDER — OMEPRAZOLE 20 MG/1
CAPSULE, DELAYED RELEASE ORAL
Qty: 15 CAPSULE | Refills: 0 | Status: SHIPPED | OUTPATIENT
Start: 2019-10-04 | End: 2019-11-13

## 2019-10-04 RX ORDER — PIMOZIDE 1 MG/1
2 TABLET ORAL 2 TIMES DAILY
Qty: 30 TABLET | Refills: 0 | Status: SHIPPED | OUTPATIENT
Start: 2019-10-04 | End: 2019-11-13

## 2019-10-04 RX ORDER — HYDROCHLOROTHIAZIDE 25 MG/1
TABLET ORAL
Qty: 90 TABLET | Refills: 0 | Status: SHIPPED | OUTPATIENT
Start: 2019-10-04 | End: 2019-11-13 | Stop reason: SDUPTHER

## 2019-10-04 RX ORDER — OMEPRAZOLE 20 MG/1
20 CAPSULE, DELAYED RELEASE ORAL DAILY
Qty: 30 CAPSULE | Refills: 0 | Status: SHIPPED | OUTPATIENT
Start: 2019-10-04 | End: 2019-11-13 | Stop reason: SDUPTHER

## 2019-10-04 RX ORDER — PIMOZIDE 1 MG/1
TABLET ORAL
Qty: 360 TABLET | Refills: 0 | Status: SHIPPED | OUTPATIENT
Start: 2019-10-04 | End: 2019-11-13 | Stop reason: SDUPTHER

## 2019-10-08 DIAGNOSIS — I10 ESSENTIAL HYPERTENSION: ICD-10-CM

## 2019-10-08 RX ORDER — LOSARTAN POTASSIUM AND HYDROCHLOROTHIAZIDE 25; 100 MG/1; MG/1
TABLET ORAL
Status: CANCELLED | OUTPATIENT
Start: 2019-10-08

## 2019-11-13 ENCOUNTER — OFFICE VISIT (OUTPATIENT)
Dept: FAMILY MEDICINE CLINIC | Age: 54
End: 2019-11-13
Payer: COMMERCIAL

## 2019-11-13 ENCOUNTER — HOSPITAL ENCOUNTER (OUTPATIENT)
Dept: LAB | Age: 54
Discharge: HOME | End: 2019-11-13
Payer: COMMERCIAL

## 2019-11-13 VITALS
RESPIRATION RATE: 16 BRPM | SYSTOLIC BLOOD PRESSURE: 146 MMHG | BODY MASS INDEX: 32.63 KG/M2 | HEART RATE: 84 BPM | DIASTOLIC BLOOD PRESSURE: 98 MMHG | WEIGHT: 246.2 LBS | HEIGHT: 73 IN

## 2019-11-13 DIAGNOSIS — E11.9 TYPE 2 DIABETES MELLITUS WITHOUT COMPLICATION, WITHOUT LONG-TERM CURRENT USE OF INSULIN (HCC): ICD-10-CM

## 2019-11-13 DIAGNOSIS — L84 CALLUS OF FOOT: ICD-10-CM

## 2019-11-13 DIAGNOSIS — Z23 NEED FOR INFLUENZA VACCINATION: ICD-10-CM

## 2019-11-13 DIAGNOSIS — F95.2 TOURETTE'S SYNDROME: ICD-10-CM

## 2019-11-13 DIAGNOSIS — K21.9 GASTROESOPHAGEAL REFLUX DISEASE, ESOPHAGITIS PRESENCE NOT SPECIFIED: ICD-10-CM

## 2019-11-13 DIAGNOSIS — G89.29 CHRONIC PAIN OF LEFT ANKLE: ICD-10-CM

## 2019-11-13 DIAGNOSIS — I10 ESSENTIAL HYPERTENSION: ICD-10-CM

## 2019-11-13 DIAGNOSIS — M25.572 CHRONIC PAIN OF LEFT ANKLE: ICD-10-CM

## 2019-11-13 DIAGNOSIS — E78.2 MIXED HYPERLIPIDEMIA: ICD-10-CM

## 2019-11-13 DIAGNOSIS — Z87.891 PERSONAL HISTORY OF TOBACCO USE, PRESENTING HAZARDS TO HEALTH: ICD-10-CM

## 2019-11-13 DIAGNOSIS — E11.9 TYPE 2 DIABETES MELLITUS WITHOUT COMPLICATION, WITHOUT LONG-TERM CURRENT USE OF INSULIN (HCC): Primary | ICD-10-CM

## 2019-11-13 DIAGNOSIS — E78.1 HYPERTRIGLYCERIDEMIA: ICD-10-CM

## 2019-11-13 LAB
ALBUMIN SERPL-MCNC: 4.4 G/DL (ref 3.5–5.2)
ALBUMIN/GLOBULIN RATIO: 1.1 (ref 1–2.5)
ALP BLD-CCNC: 93 U/L (ref 40–129)
ALT SERPL-CCNC: 15 U/L (ref 5–41)
ANION GAP SERPL CALCULATED.3IONS-SCNC: 10 MMOL/L (ref 9–17)
AST SERPL-CCNC: 18 U/L
BILIRUB SERPL-MCNC: 0.44 MG/DL (ref 0.3–1.2)
BUN BLDV-MCNC: 8 MG/DL (ref 6–20)
BUN/CREAT BLD: 11 (ref 9–20)
CALCIUM SERPL-MCNC: 10.1 MG/DL (ref 8.6–10.4)
CHLORIDE BLD-SCNC: 94 MMOL/L (ref 98–107)
CHOLESTEROL, FASTING: 181 MG/DL
CHOLESTEROL/HDL RATIO: 6.5
CO2: 33 MMOL/L (ref 20–31)
CREAT SERPL-MCNC: 0.75 MG/DL (ref 0.7–1.2)
CREATININE URINE: 121.6 MG/DL (ref 39–259)
ESTIMATED AVERAGE GLUCOSE: 131 MG/DL
GFR AFRICAN AMERICAN: >60 ML/MIN
GFR NON-AFRICAN AMERICAN: >60 ML/MIN
GFR SERPL CREATININE-BSD FRML MDRD: ABNORMAL ML/MIN/{1.73_M2}
GFR SERPL CREATININE-BSD FRML MDRD: ABNORMAL ML/MIN/{1.73_M2}
GLUCOSE BLD-MCNC: 122 MG/DL (ref 70–99)
HBA1C MFR BLD: 6.2 % (ref 4.8–5.9)
HDLC SERPL-MCNC: 28 MG/DL
LDL CHOLESTEROL: 83 MG/DL (ref 0–130)
MICROALBUMIN/CREAT 24H UR: <12 MG/L
MICROALBUMIN/CREAT UR-RTO: NORMAL MCG/MG CREAT
POTASSIUM SERPL-SCNC: 4.5 MMOL/L (ref 3.7–5.3)
SODIUM BLD-SCNC: 137 MMOL/L (ref 135–144)
TOTAL PROTEIN: 8.4 G/DL (ref 6.4–8.3)
TRIGLYCERIDE, FASTING: 349 MG/DL
VLDLC SERPL CALC-MCNC: ABNORMAL MG/DL (ref 1–30)

## 2019-11-13 PROCEDURE — 83036 HEMOGLOBIN GLYCOSYLATED A1C: CPT

## 2019-11-13 PROCEDURE — 80053 COMPREHEN METABOLIC PANEL: CPT

## 2019-11-13 PROCEDURE — 36415 COLL VENOUS BLD VENIPUNCTURE: CPT

## 2019-11-13 PROCEDURE — 99214 OFFICE O/P EST MOD 30 MIN: CPT | Performed by: FAMILY MEDICINE

## 2019-11-13 PROCEDURE — 82043 UR ALBUMIN QUANTITATIVE: CPT

## 2019-11-13 PROCEDURE — 82570 ASSAY OF URINE CREATININE: CPT

## 2019-11-13 PROCEDURE — 99406 BEHAV CHNG SMOKING 3-10 MIN: CPT | Performed by: FAMILY MEDICINE

## 2019-11-13 PROCEDURE — 90471 IMMUNIZATION ADMIN: CPT | Performed by: FAMILY MEDICINE

## 2019-11-13 PROCEDURE — 90686 IIV4 VACC NO PRSV 0.5 ML IM: CPT | Performed by: FAMILY MEDICINE

## 2019-11-13 PROCEDURE — 80061 LIPID PANEL: CPT

## 2019-11-13 RX ORDER — HYDROCODONE BITARTRATE AND ACETAMINOPHEN 5; 325 MG/1; MG/1
1 TABLET ORAL EVERY 6 HOURS PRN
Qty: 30 TABLET | Refills: 0 | Status: SHIPPED | OUTPATIENT
Start: 2019-11-13 | End: 2020-06-17 | Stop reason: SDUPTHER

## 2019-11-13 RX ORDER — ATORVASTATIN CALCIUM 20 MG/1
20 TABLET, FILM COATED ORAL DAILY
Qty: 30 TABLET | Refills: 3 | Status: SHIPPED | OUTPATIENT
Start: 2019-11-13 | End: 2020-10-21 | Stop reason: SDUPTHER

## 2019-11-13 RX ORDER — LISINOPRIL 10 MG/1
10 TABLET ORAL DAILY
Qty: 30 TABLET | Refills: 5 | Status: SHIPPED | OUTPATIENT
Start: 2019-11-13 | End: 2020-08-03

## 2019-11-13 RX ORDER — HYDROCODONE BITARTRATE AND ACETAMINOPHEN 5; 325 MG/1; MG/1
1 TABLET ORAL EVERY 6 HOURS PRN
COMMUNITY
End: 2019-11-13 | Stop reason: SDUPTHER

## 2019-11-13 RX ORDER — PIMOZIDE 1 MG/1
TABLET ORAL
Qty: 360 TABLET | Refills: 1 | Status: SHIPPED | OUTPATIENT
Start: 2019-11-13 | End: 2020-07-15

## 2019-11-13 RX ORDER — LOSARTAN POTASSIUM AND HYDROCHLOROTHIAZIDE 25; 100 MG/1; MG/1
TABLET ORAL
Qty: 90 TABLET | Refills: 1 | Status: SHIPPED | OUTPATIENT
Start: 2019-11-13 | End: 2020-07-15

## 2019-11-13 RX ORDER — HYDROCHLOROTHIAZIDE 25 MG/1
TABLET ORAL
Qty: 90 TABLET | Refills: 1 | Status: SHIPPED | OUTPATIENT
Start: 2019-11-13 | End: 2020-07-15

## 2019-11-13 RX ORDER — OMEPRAZOLE 20 MG/1
20 CAPSULE, DELAYED RELEASE ORAL DAILY
Qty: 90 CAPSULE | Refills: 1 | Status: SHIPPED | OUTPATIENT
Start: 2019-11-13 | End: 2020-06-02

## 2019-11-13 ASSESSMENT — PATIENT HEALTH QUESTIONNAIRE - PHQ9
1. LITTLE INTEREST OR PLEASURE IN DOING THINGS: 0
SUM OF ALL RESPONSES TO PHQ QUESTIONS 1-9: 0
2. FEELING DOWN, DEPRESSED OR HOPELESS: 0
SUM OF ALL RESPONSES TO PHQ9 QUESTIONS 1 & 2: 0
SUM OF ALL RESPONSES TO PHQ QUESTIONS 1-9: 0

## 2019-11-15 DIAGNOSIS — E78.2 MIXED HYPERLIPIDEMIA: ICD-10-CM

## 2019-11-15 RX ORDER — ATORVASTATIN CALCIUM 20 MG/1
20 TABLET, FILM COATED ORAL DAILY
OUTPATIENT
Start: 2019-11-15 | End: 2020-02-13

## 2020-03-09 ENCOUNTER — OFFICE VISIT (OUTPATIENT)
Dept: PRIMARY CARE CLINIC | Age: 55
End: 2020-03-09
Payer: COMMERCIAL

## 2020-03-09 VITALS
TEMPERATURE: 97.6 F | BODY MASS INDEX: 33.27 KG/M2 | HEIGHT: 73 IN | OXYGEN SATURATION: 98 % | HEART RATE: 82 BPM | SYSTOLIC BLOOD PRESSURE: 142 MMHG | WEIGHT: 251 LBS | DIASTOLIC BLOOD PRESSURE: 86 MMHG | RESPIRATION RATE: 18 BRPM

## 2020-03-09 LAB
INFLUENZA A ANTIBODY: NORMAL
INFLUENZA B ANTIBODY: NORMAL

## 2020-03-09 PROCEDURE — 87804 INFLUENZA ASSAY W/OPTIC: CPT | Performed by: FAMILY MEDICINE

## 2020-03-09 PROCEDURE — 99213 OFFICE O/P EST LOW 20 MIN: CPT | Performed by: FAMILY MEDICINE

## 2020-03-09 RX ORDER — PREDNISONE 20 MG/1
40 TABLET ORAL DAILY
Qty: 10 TABLET | Refills: 0 | Status: SHIPPED | OUTPATIENT
Start: 2020-03-09 | End: 2020-03-14

## 2020-03-09 RX ORDER — GUAIFENESIN AND CODEINE PHOSPHATE 100; 10 MG/5ML; MG/5ML
5 SOLUTION ORAL EVERY 4 HOURS PRN
Qty: 120 ML | Refills: 0 | Status: SHIPPED | OUTPATIENT
Start: 2020-03-09 | End: 2020-03-12

## 2020-03-09 RX ORDER — AZITHROMYCIN 250 MG/1
250 TABLET, FILM COATED ORAL SEE ADMIN INSTRUCTIONS
Qty: 6 TABLET | Refills: 0 | Status: SHIPPED | OUTPATIENT
Start: 2020-03-09 | End: 2020-03-14

## 2020-03-09 ASSESSMENT — ENCOUNTER SYMPTOMS
GASTROINTESTINAL NEGATIVE: 1
CHEST TIGHTNESS: 1
EYES NEGATIVE: 1
SHORTNESS OF BREATH: 1
ALLERGIC/IMMUNOLOGIC NEGATIVE: 1
RHINORRHEA: 1
COUGH: 1

## 2020-03-09 NOTE — PROGRESS NOTES
3/9/2020     Dylan Dolan (:  1965) is a 49 y.o. male, here for evaluation of the following medical concerns:    HPI   Acute urgent care visit for cough and cold symptoms over the last week. Weakness and fatigue. Chest tightness ,active smoker. No wheezing. Productive cough in the am.  Dry during. Cant get rid of the cough, seems to slowly progress. Past Medical History:   Diagnosis Date    Ankle pain, left     chronic; s/p fracture in     Gastroesophageal reflux disease     Hyperlipemia     Hypertension     Obesity     Tobacco abuse     Tourette's syndrome      Past Surgical History:   Procedure Laterality Date    ANKLE FRACTURE SURGERY Left     WISDOM TOOTH EXTRACTION Bilateral          Review of Systems   Constitutional: Positive for chills and fatigue. Negative for fever. HENT: Positive for congestion and rhinorrhea. Eyes: Negative. Respiratory: Positive for cough, chest tightness and shortness of breath. Cardiovascular: Negative. Gastrointestinal: Negative. Endocrine: Negative. Genitourinary: Negative. Musculoskeletal: Negative. Skin: Negative. Allergic/Immunologic: Negative. Neurological: Negative. Hematological: Negative. Psychiatric/Behavioral: Negative. Prior to Visit Medications    Medication Sig Taking?  Authorizing Provider   omeprazole (PRILOSEC) 20 MG delayed release capsule Take 1 capsule by mouth daily Yes Randi Savage MD   pimozide (ORAP) 1 MG tablet TAKE 2 TABLETS TWICE A DAY Yes Randi Savage MD   hydrochlorothiazide (HYDRODIURIL) 25 MG tablet TAKE 1 TABLET DAILY Yes Randi Savage MD   losartan-hydrochlorothiazide (HYZAAR) 100-25 MG per tablet TAKE 1 TABLET DAILY Yes Randi Savage MD   lisinopril (PRINIVIL;ZESTRIL) 10 MG tablet Take 1 tablet by mouth daily Yes Randi Savage MD   atorvastatin (LIPITOR) 20 MG tablet Take 1 tablet by mouth daily Yes Randi Savage MD        Social History     Tobacco Use    Smoking Thought content normal.         Judgment: Judgment normal.     BP (!) 142/86   Pulse 82   Temp 97.6 °F (36.4 °C)   Resp 18   Ht 6' 1\" (1.854 m)   Wt 251 lb (113.9 kg)   SpO2 98%   BMI 33.12 kg/m²       ASSESSMENT/PLAN:  Encounter Diagnosis   Name Primary?  Cough Yes     Post viral tracheitis and ongoing cough. Some bronchitis symptoms. An electronic signature was used to authenticate this note.     --Macrina Lara MD on 3/9/2020 at 4:00 PM

## 2020-06-16 RX ORDER — ATORVASTATIN CALCIUM 20 MG/1
TABLET, FILM COATED ORAL
Qty: 30 TABLET | Refills: 3 | OUTPATIENT
Start: 2020-06-16

## 2020-06-17 ENCOUNTER — OFFICE VISIT (OUTPATIENT)
Dept: FAMILY MEDICINE CLINIC | Age: 55
End: 2020-06-17
Payer: COMMERCIAL

## 2020-06-17 VITALS
SYSTOLIC BLOOD PRESSURE: 140 MMHG | HEIGHT: 73 IN | DIASTOLIC BLOOD PRESSURE: 92 MMHG | HEART RATE: 82 BPM | WEIGHT: 240.9 LBS | RESPIRATION RATE: 16 BRPM | BODY MASS INDEX: 31.93 KG/M2

## 2020-06-17 PROCEDURE — 99214 OFFICE O/P EST MOD 30 MIN: CPT | Performed by: FAMILY MEDICINE

## 2020-06-17 PROCEDURE — 99406 BEHAV CHNG SMOKING 3-10 MIN: CPT | Performed by: FAMILY MEDICINE

## 2020-06-17 RX ORDER — HYDROCODONE BITARTRATE AND ACETAMINOPHEN 5; 325 MG/1; MG/1
1 TABLET ORAL EVERY 6 HOURS PRN
Qty: 30 TABLET | Refills: 0 | Status: SHIPPED | OUTPATIENT
Start: 2020-06-17 | End: 2020-07-22 | Stop reason: SDUPTHER

## 2020-06-17 ASSESSMENT — PATIENT HEALTH QUESTIONNAIRE - PHQ9
SUM OF ALL RESPONSES TO PHQ9 QUESTIONS 1 & 2: 0
2. FEELING DOWN, DEPRESSED OR HOPELESS: 0
1. LITTLE INTEREST OR PLEASURE IN DOING THINGS: 0
SUM OF ALL RESPONSES TO PHQ QUESTIONS 1-9: 0
SUM OF ALL RESPONSES TO PHQ QUESTIONS 1-9: 0

## 2020-06-17 NOTE — PATIENT INSTRUCTIONS
Stopping Smoking: Care Instructions  Your Care Instructions     Cigarette smokers crave the nicotine in cigarettes. Giving it up is much harder than simply changing a habit. Your body has to stop craving the nicotine. It is hard to quit, but you can do it. There are many tools that people use to quit smoking. You may find that combining tools works best for you. There are several steps to quitting. First you get ready to quit. Then you get support to help you. After that, you learn new skills and behaviors to become a nonsmoker. For many people, a necessary step is getting and using medicine. Your doctor will help you set up the plan that best meets your needs. You may want to attend a smoking cessation program to help you quit smoking. When you choose a program, look for one that has proven success. Ask your doctor for ideas. You will greatly increase your chances of success if you take medicine as well as get counseling or join a cessation program.  Some of the changes you feel when you first quit tobacco are uncomfortable. Your body will miss the nicotine at first, and you may feel short-tempered and grumpy. You may have trouble sleeping or concentrating. Medicine can help you deal with these symptoms. You may struggle with changing your smoking habits and rituals. The last step is the tricky one: Be prepared for the smoking urge to continue for a time. This is a lot to deal with, but keep at it. You will feel better. Follow-up care is a key part of your treatment and safety. Be sure to make and go to all appointments, and call your doctor if you are having problems. It's also a good idea to know your test results and keep a list of the medicines you take. How can you care for yourself at home? · Ask your family, friends, and coworkers for support. You have a better chance of quitting if you have help and support.   · Join a support group, such as Nicotine Anonymous, for people who are trying to quit

## 2020-06-17 NOTE — PROGRESS NOTES
pressure is marginally-controlled. (BP: (!) 140/92)  He states that his blood pressure is generally better when it is checked outside of the clinic. He was advised to continue current medications. He states that he does not need a refill today. He has orders for a comprehensive panel to be done when he returns for fasting labs. 3. Mixed hyperlipidemia  He has orders for a lipid panel to be done when he returns for fasting labs. He is tolerating Lipitor well. He states that he does not need a refill today. 4. Tourette's syndrome  He is tolerating Orap well. He states that he does not need a refill today. 5. Chronic pain of left ankle  Controlled substances monitoring: No signs of potential drug abuse or diversion identified when the [place] from [place], [place], and [place] was reviewed today. The activity on the report was consistent with the treatment plan. He signed a medication contract today. He signed an opioid agreement today. Norco was refilled:  - HYDROcodone-acetaminophen (NORCO) 5-325 MG per tablet; Take 1 tablet by mouth every 6 hours as needed for Pain for up to 30 days. Dispense: 30 tablet; Refill: 0    6. Foot callus  He was referred to podiatry for diabetic foot care:  - External Referral To Podiatry    7. Personal history of tobacco use, presenting hazards to health  Efforts were made to edit the dictation but occasionally words are mis-transcribed.)  Tobacco Cessation Counseling: Patient advised about behavior change, including information about personal health harms, usage of appropriate cessation measures and benefits of cessation. Time spent (minutes): 5    Printed information regarding Stopping Smoking was provided to the patient with his after visit summary.     - [place] TOBACCO USE CESSATION INTERMEDIATE 3-10 MINUTES [27017]    8. Routine health maintenance  Health maintenance was reviewed with the patient. Colonoscopy was recommended. He declined.    Eusebio,

## 2020-07-09 ENCOUNTER — OFFICE VISIT (OUTPATIENT)
Dept: PODIATRY | Age: 55
End: 2020-07-09
Payer: COMMERCIAL

## 2020-07-09 VITALS
BODY MASS INDEX: 31.28 KG/M2 | HEART RATE: 88 BPM | DIASTOLIC BLOOD PRESSURE: 84 MMHG | WEIGHT: 236 LBS | HEIGHT: 73 IN | SYSTOLIC BLOOD PRESSURE: 138 MMHG

## 2020-07-09 PROCEDURE — 97597 DBRDMT OPN WND 1ST 20 CM/<: CPT | Performed by: PODIATRIST

## 2020-07-09 PROCEDURE — 99213 OFFICE O/P EST LOW 20 MIN: CPT | Performed by: PODIATRIST

## 2020-07-09 PROCEDURE — A9283 FOOT PRESS OFF LOAD SUPP DEV: HCPCS | Performed by: PODIATRIST

## 2020-07-09 NOTE — PROGRESS NOTES
Subjective:    Eliot Philip is a 49 y.o. male who presents with the ulceration of the foot/ankle/leg. Pt just thought the callous was getting bad again, recently saw Ab Coronado. Patient has not been compliant with off loading. Patient relates pain is Present. Pain is rated 3 out of 10 and is described as intermittent. Currently denies F/C/N/V. Overall diabetic control is not changed. Allergies   Allergen Reactions    Chantix [Varenicline] Other (See Comments)     Unusual dreams. Past Medical History:   Diagnosis Date    Ankle pain, left     chronic; s/p fracture in 1999    Gastroesophageal reflux disease     Hyperlipemia     Hypertension     Obesity     Tobacco abuse     Tourette's syndrome        Prior to Admission medications    Medication Sig Start Date End Date Taking? Authorizing Provider   HYDROcodone-acetaminophen (NORCO) 5-325 MG per tablet Take 1 tablet by mouth every 6 hours as needed for Pain for up to 30 days. 6/17/20 7/17/20 Yes Gloria Wang MD   omeprazole (PRILOSEC) 20 MG delayed release capsule TAKE 1 CAPSULE DAILY 6/2/20  Yes Gloria Wang MD   pimozide (ORAP) 1 MG tablet TAKE 2 TABLETS TWICE A DAY 11/13/19  Yes Gloria Wang MD   hydrochlorothiazide (HYDRODIURIL) 25 MG tablet TAKE 1 TABLET DAILY 11/13/19  Yes Gloria Wang MD   losartan-hydrochlorothiazide (HYZAAR) 100-25 MG per tablet TAKE 1 TABLET DAILY 11/13/19  Yes Gloria Wang MD   lisinopril (PRINIVIL;ZESTRIL) 10 MG tablet Take 1 tablet by mouth daily 11/13/19  Yes Gloria Wang MD   atorvastatin (LIPITOR) 20 MG tablet Take 1 tablet by mouth daily 11/13/19  Yes Gloria Wang MD       Social History     Tobacco Use    Smoking status: Current Every Day Smoker     Packs/day: 1.00     Years: 30.00     Pack years: 30.00     Types: Cigarettes     Start date: 1/1/1980    Smokeless tobacco: Former User     Types: Snuff     Quit date: 11/17/1987   Substance Use Topics    Alcohol use:  Yes     Alcohol/week: 0.0 standard drinks Comment: occasional       ROS: All 14 ROS systems reviewed and pertinent positives noted above, all others negative. Lower Extremity Physical Examination:     Vitals:   Vitals:    07/09/20 1315   BP: 138/84   Pulse: 88     General: AAO x 3 in NAD. Vascular: DP and PT pulses palpable 2/4, bilateral.  CFT <3 seconds, bilateral.  Hair growth present to the level of the digits, bilateral.  Edema absent, bilateral.  Varicosities absent, bilateral. Erythema absent, bilateral. Distal Rubor absent bilateral.  Temperature within normal limits bilateral. Hyperpigmentation absent bilateral. No atrophic skin. Neurological: Sensation intact to light touch to level of digits, bilateral.  Protective sensation intact 10/10 sites via 5.07/10g [place] Monofilament, bilateral.  negative Tinel's, bilateral.  negative Valleix sign, bilateral.  Vibratory intact bilateral.  Reflexes Decreased bilateral.  Paresthesias negative. Dysthesias negative. Sharp/dull intact bilateral.    Musculoskeletal: Muscle strength 5/5, bilateral.  Pain absent upon palpation bilateral. Normal medial longitudinal arch, bilateral.  Ankle ROM decreased,bilateral.  1st MPJ ROM decreased L bilateral.  Dorsally contracted digits absent. No other foot deformities. Integument:   Open lesion present, Left. Ulcer sub L hallux IPJ, aggressive callous, positive fibrin down into sub q tissue, healthy red granular base, no probing no undermining no malodor. No surrounding signs of infx. Interdigital maceration absent to web spaces , Bilateral.  Nails b/l dystrophic. Fissures absent, Bilateral. Hyperkeratotic tissue is present.     Wound 07/09/20 Toe (Comment  which one) Left;Plantar left great toe (Active)   Dressing/Treatment Alginate with Ag 7/9/2020  1:44 PM   Wound Cleansed Rinsed/Irrigated with saline 7/9/2020  1:44 PM   Post-Procedure Length (cm) 2.5 cm 7/9/2020  1:44 PM   Post-Procedure Width (cm) 0.5 cm 7/9/2020  1:44 PM   Post-Procedure Depth (cm) 0.4 cm 7/9/2020  1:44 PM   Post-Procedure Surface Area (cm^2) 1.25 cm^2 7/9/2020  1:44 PM   Post-Procedure Volume (cm^3) 0.5 cm^3 7/9/2020  1:44 PM   Drainage Amount Small 7/9/2020  1:44 PM   Drainage Description Serous 7/9/2020  1:44 PM   Odor None 7/9/2020  1:44 PM   Melissa-wound Assessment Calloused 7/9/2020  1:44 PM   Red%Wound Bed 100 7/9/2020  1:44 PM   Number of days: 0             Asessment: Patient is a 49 y.o. male with:    Diagnosis Orders   1. Ulcer of left great toe due to diabetes mellitus (HCC)  [place] DEBRIDEMENT OPEN WOUND 20 SQ CM<    XR FOOT LEFT (MIN 3 VIEWS)   2. Type 2 diabetes mellitus without complication, without long-term current use of insulin (HCC)  VA DEBRIDEMENT OPEN WOUND 20 SQ CM<   3. Tobacco abuse     4. Hallux limitus of left foot  XR FOOT LEFT (MIN 3 VIEWS)     Ulcer Classification:  Diabetic ulcer grade 2 C. IDSA  no infection. Plan:   Patient examined and evaluated. Diabetic foot education and exam.  Current condition and treatment options discussed in detail. Topical lidocaine applied to wound. Debridement Sharp excisional debridement took place of the ulceration, sub-cutaneous in nature,  tissue nippers were used for debridement. All non viable and necrotic tissue was removed to promote healing. Bleeding was present. See wound assessment note for post debridement measurements. .  Minimal blood loss noted and hemostasis was obtained with direct pressure. Patient related pain absent post debridement. Patient advised importance of overall diabetic control and will continue offloading as advised and stressed the importance of this in regards to wound healing. Today's dressing:alginate silver. DM foot ed and exam  Due to level of pain/deformity/condition, it is in my medical opinion that patient will benefit from and is medically necessary for offloading device at this time. Patient was dispensed a surgical shoe with offloading insole to wear 100% of the time WB. Patient was educated on appropriate use of the device and the need to be compliant with offloading therapy. Patient understands that non compliance with the device will be detrimental to the healing of the condition/ulceration. Patient needs the device to help support the foot and reduce pain. This device is medically necessary due to above listed diagnosis. Orders Placed This Encounter   Procedures    XR FOOT LEFT (MIN 3 VIEWS)     Standing Status:   Future     Standing Expiration Date:   7/10/2021     Scheduling Instructions:      WB    [place] DEBRIDEMENT OPEN WOUND 20 SQ CM<     Further recs post xrays  Contact clinic with any questions/problems/concerns or new signs of infection. Follow-up at [facility] in 1week(s).

## 2020-07-09 NOTE — PATIENT INSTRUCTIONS
Start silver alginate to left great toe, apply dressing daily. Wear off loading surgical shoe at all times weight bearing.    Do not soak toe wound   Follow up in 1 week

## 2020-07-15 RX ORDER — LOSARTAN POTASSIUM AND HYDROCHLOROTHIAZIDE 25; 100 MG/1; MG/1
TABLET ORAL
Qty: 90 TABLET | Refills: 1 | Status: SHIPPED | OUTPATIENT
Start: 2020-07-15 | End: 2021-01-25

## 2020-07-15 RX ORDER — HYDROCHLOROTHIAZIDE 25 MG/1
TABLET ORAL
Qty: 90 TABLET | Refills: 1 | Status: SHIPPED | OUTPATIENT
Start: 2020-07-15 | End: 2021-02-11 | Stop reason: SDUPTHER

## 2020-07-15 RX ORDER — PIMOZIDE 1 MG/1
TABLET ORAL
Qty: 360 TABLET | Refills: 1 | Status: SHIPPED | OUTPATIENT
Start: 2020-07-15 | End: 2021-01-08

## 2020-07-16 ENCOUNTER — OFFICE VISIT (OUTPATIENT)
Dept: PODIATRY | Age: 55
End: 2020-07-16
Payer: COMMERCIAL

## 2020-07-16 VITALS
SYSTOLIC BLOOD PRESSURE: 134 MMHG | WEIGHT: 241.2 LBS | DIASTOLIC BLOOD PRESSURE: 84 MMHG | RESPIRATION RATE: 20 BRPM | TEMPERATURE: 97.1 F | BODY MASS INDEX: 31.82 KG/M2

## 2020-07-16 PROCEDURE — 97597 DBRDMT OPN WND 1ST 20 CM/<: CPT | Performed by: PODIATRIST

## 2020-07-16 RX ORDER — CEPHALEXIN 500 MG/1
500 CAPSULE ORAL 3 TIMES DAILY
Qty: 30 CAPSULE | Refills: 0 | Status: SHIPPED | OUTPATIENT
Start: 2020-07-16 | End: 2020-07-26

## 2020-07-16 NOTE — PATIENT INSTRUCTIONS
Cleanse wound with normal saline or in the shower if allowed. Pat dry. Cut silver alginate to fit wound, pack lightly. (Silver Alginate will swell as it absorbs drainage.) Cover with dry gauze. Hold with tape, roll gauze or coban. Change at least every three days and whenever drainage comes through the outer dressing. Mail order dressings are from [employer]. You have 2 refills. Call [phone], ext. [phone]. SIGNS OF INFECTION  - Redness, swelling, skin hot  - Wound bed turns black or stringy yellow  - Foul odor  - Increased drainage or pus  - Increased pain  - Fever greater than 100F    CALL YOUR DOCTOR OR SEEK MEDICAL ATTENTION IF SIGNS OF INFECTION. DO NOT WAIT UNTIL YOUR NEXT APPOINTMENT    Call Nessa Waddell with any other questions or concerns- [phone]    Continue to off load wound at all times. Return to see Nessa Waddell as scheduled.

## 2020-07-16 NOTE — PROGRESS NOTES
Subjective:    Danyelle Wright is a 49 y.o. male who presents with the ulceration of the L big toe. Pt has not decreased activity. Patient has not been compliant with off loading. Patient relates pain is Present. Pain is rated 1 out of 10 and is described as mild. No issues with dressings at home. .  Currently denies F/C/N/V. Overall diabetic control is not changed. Allergies   Allergen Reactions    Chantix [Varenicline] Other (See Comments)     Unusual dreams. Past Medical History:   Diagnosis Date    Ankle pain, left     chronic; s/p fracture in 1999    Gastroesophageal reflux disease     Hyperlipemia     Hypertension     Obesity     Tobacco abuse     Tourette's syndrome        Prior to Admission medications    Medication Sig Start Date End Date Taking? Authorizing Provider   pimozide (ORAP) 1 MG tablet TAKE 2 TABLETS TWICE A DAY 7/15/20  Yes Henrry Wood MD   hydroCHLOROthiazide (HYDRODIURIL) 25 MG tablet TAKE 1 TABLET DAILY 7/15/20  Yes Henrry Wood MD   losartan-hydrochlorothiazide (HYZAAR) 100-25 MG per tablet TAKE 1 TABLET DAILY 7/15/20  Yes Henrry Wood MD   HYDROcodone-acetaminophen (NORCO) 5-325 MG per tablet Take 1 tablet by mouth every 6 hours as needed for Pain for up to 30 days. 6/17/20 7/17/20 Yes Henrry Wood MD   omeprazole (PRILOSEC) 20 MG delayed release capsule TAKE 1 CAPSULE DAILY 6/2/20  Yes Henrry Wood MD   lisinopril (PRINIVIL;ZESTRIL) 10 MG tablet Take 1 tablet by mouth daily 11/13/19  Yes Henrry Wood MD   atorvastatin (LIPITOR) 20 MG tablet Take 1 tablet by mouth daily 11/13/19  Yes Henrry Wood MD       Social History     Tobacco Use    Smoking status: Current Every Day Smoker     Packs/day: 1.00     Years: 30.00     Pack years: 30.00     Types: Cigarettes     Start date: 1/1/1980    Smokeless tobacco: Former User     Types: Snuff     Quit date: 11/17/1987   Substance Use Topics    Alcohol use:  Yes     Alcohol/week: 0.0 standard drinks     Comment: occasional ROS: All 14 ROS systems reviewed and pertinent positives noted above, all others negative. Lower Extremity Physical Examination:     Vitals:   Vitals:    07/16/20 1152   BP: 134/84   Resp: 20   Temp: 97.1 °F (36.2 °C)     General: AAO x 3 in NAD. Vascular: DP and PT pulses palpable 2/4, bilateral.  CFT <3 seconds, bilateral.  Hair growth present to the level of the digits, bilateral.  Edema absent, bilateral.  Varicosities absent, bilateral. Erythema absent, bilateral. Distal Rubor absent bilateral.  Temperature within normal limits bilateral. Hyperpigmentation absent bilateral. No atrophic skin. Neurological: Sensation intact to light touch to level of digits, bilateral.  Protective sensation intact 10/10 sites via 5.07/10g [place] Monofilament, bilateral.  negative Tinel's, bilateral.  negative Valleix sign, bilateral.  Vibratory intact bilateral.  Reflexes Decreased bilateral.  Paresthesias negative. Dysthesias negative. Sharp/dull intact bilateral.    Musculoskeletal: Muscle strength 5/5, bilateral.  Pain absent upon palpation bilateral. Normal medial longitudinal arch, bilateral.  Ankle ROM decreased,bilateral.  1st MPJ ROM decreased L bilateral.  Dorsally contracted digits absent. No other foot deformities. Integument:   Open lesion present, Left. Ulcer sub L hallux IPJ,  Callous returns, maceration present,, positive fibrin down  no probing , distal edge undermining, no malodor. No surrounding signs of infx. Interdigital maceration absent to web spaces , Bilateral.  Nails b/l dystrophic. Fissures absent, Bilateral. Hyperkeratotic tissue is present.   Wound 07/09/20 Toe (Comment  which one) Left;Plantar left great toe (Active)   Dressing/Treatment Alginate with Ag 07/09/20 1344   Wound Cleansed Rinsed/Irrigated with saline 07/09/20 1344   Post-Procedure Length (cm) 2.5 cm 07/09/20 1344   Post-Procedure Width (cm) 0.5 cm 07/09/20 1344   Post-Procedure Depth (cm) 0.4 cm 07/09/20 1344   Post-Procedure Surface Area (cm^2) 1.25 cm^2 07/09/20 1344   Post-Procedure Volume (cm^3) 0.5 cm^3 07/09/20 1344   Drainage Amount Small 07/09/20 1344   Drainage Description Serous 07/09/20 1344   Odor None 07/09/20 1344   Melissa-wound Assessment Calloused 07/09/20 1344   Red%Wound Bed 100 07/09/20 1344   Number of days: 6           Asessment: Patient is a 49 y.o. male with:    Diagnosis Orders   1. Ulcer of left great toe due to diabetes mellitus (HCC)     2. Hallux limitus of left foot     3. Type 2 diabetes mellitus without complication, without long-term current use of insulin ([address]     4. Tobacco abuse       Ulcer Classification:  Diabetic ulcer grade 2 C. IDSA  no infection. Plan:   Patient examined and evaluated. Diabetic foot education and exam.  Current condition and treatment options discussed in detail. Active wound management took place 100% non excisional with the use of  tissue nippers. All non viable tissue (including epidermis and/or dermis) and bio burden was removed to promote healing. Bleeding was present. Please see chart for exact measurements, if not documented then size was less than 20 sq cm. Patient advised importance of overall diabetic control and will continue offloading as advised and stressed the importance of this in regards to wound healing. Today's dressing:alginate silver qd  Patient stressed the importance of offloading as advised by this office. Risks and complications were discussed when it comes to wound care and inappropriate offloading. Patient is putting themselves at significant increased risk by not offloading appropriately.   Continue sx shoe with offloading insole  Pt has still not gotten his x rays  Orders Placed This Encounter   Medications    cephALEXin (KEFLEX) 500 MG capsule     Sig: Take 1 capsule by mouth 3 times daily for 10 days     Dispense:  30 capsule     Refill:  0     Rx po abx due to increased drainage and high suspicion bacterial

## 2020-07-22 RX ORDER — HYDROCODONE BITARTRATE AND ACETAMINOPHEN 5; 325 MG/1; MG/1
1 TABLET ORAL EVERY 6 HOURS PRN
Qty: 30 TABLET | Refills: 0 | Status: SHIPPED | OUTPATIENT
Start: 2020-07-22 | End: 2020-08-24 | Stop reason: SDUPTHER

## 2020-07-22 NOTE — TELEPHONE ENCOUNTER
Ana Charles called requesting a refill of the below medication which has been pended for you: [place] from [place], [place], and [place] reviewed. NORCO 5-325 mg last filled 6/17/20 #30/7 days. Report available for your review. Requested Prescriptions     Pending Prescriptions Disp Refills    HYDROcodone-acetaminophen (NORCO) 5-325 MG per tablet 30 tablet 0     Sig: Take 1 tablet by mouth every 6 hours as needed for Pain for up to 30 days. Last Appointment Date: 6/17/2020  Next Appointment Date: 10/21/2020    Allergies   Allergen Reactions    Chantix [Varenicline] Other (See Comments)     Unusual dreams.

## 2020-07-27 ENCOUNTER — HOSPITAL ENCOUNTER (OUTPATIENT)
Dept: WOUND CARE | Age: 55
Discharge: HOME | End: 2020-07-28
Payer: COMMERCIAL

## 2020-07-27 VITALS
BODY MASS INDEX: 31.68 KG/M2 | WEIGHT: 239 LBS | DIASTOLIC BLOOD PRESSURE: 82 MMHG | HEIGHT: 73 IN | SYSTOLIC BLOOD PRESSURE: 138 MMHG | HEART RATE: 84 BPM | TEMPERATURE: 97.1 F | RESPIRATION RATE: 18 BRPM

## 2020-07-27 PROBLEM — L97.521 SKIN ULCER OF TOE OF LEFT FOOT, LIMITED TO BREAKDOWN OF SKIN (HCC): Status: ACTIVE | Noted: 2020-07-27

## 2020-07-27 PROCEDURE — 97597 DBRDMT OPN WND 1ST 20 CM/<: CPT | Performed by: PODIATRIST

## 2020-07-27 RX ORDER — BACITRACIN, NEOMYCIN, POLYMYXIN B 400; 3.5; 5 [USP'U]/G; MG/G; [USP'U]/G
OINTMENT TOPICAL ONCE
Status: CANCELLED | OUTPATIENT
Start: 2020-07-27

## 2020-07-27 RX ORDER — GINSENG 100 MG
CAPSULE ORAL ONCE
Status: CANCELLED | OUTPATIENT
Start: 2020-07-27

## 2020-07-27 RX ORDER — LIDOCAINE HYDROCHLORIDE 40 MG/ML
SOLUTION TOPICAL ONCE
Status: CANCELLED | OUTPATIENT
Start: 2020-07-27

## 2020-07-27 RX ORDER — LIDOCAINE 40 MG/G
CREAM TOPICAL ONCE
Status: CANCELLED | OUTPATIENT
Start: 2020-07-27

## 2020-07-27 RX ORDER — BETAMETHASONE DIPROPIONATE 0.05 %
OINTMENT (GRAM) TOPICAL ONCE
Status: CANCELLED | OUTPATIENT
Start: 2020-07-27

## 2020-07-27 RX ORDER — BACITRACIN ZINC AND POLYMYXIN B SULFATE 500; 1000 [USP'U]/G; [USP'U]/G
OINTMENT TOPICAL ONCE
Status: CANCELLED | OUTPATIENT
Start: 2020-07-27

## 2020-07-27 RX ORDER — LIDOCAINE 50 MG/G
OINTMENT TOPICAL ONCE
Status: CANCELLED | OUTPATIENT
Start: 2020-07-27

## 2020-07-27 RX ORDER — CLOBETASOL PROPIONATE 0.5 MG/G
OINTMENT TOPICAL ONCE
Status: CANCELLED | OUTPATIENT
Start: 2020-07-27

## 2020-07-27 RX ORDER — GENTAMICIN SULFATE 1 MG/G
OINTMENT TOPICAL ONCE
Status: CANCELLED | OUTPATIENT
Start: 2020-07-27

## 2020-07-27 NOTE — PROGRESS NOTES
Comment: occasional       ROS: All 14 ROS systems reviewed and pertinent positives noted above, all others negative. Lower Extremity Physical Examination:     Vitals:   Vitals:    07/27/20 1311   BP: 138/82   Pulse: 84   Resp: 18   Temp: 97.1 °F (36.2 °C)     General: AAO x 3 in NAD. Vascular: DP and PT pulses palpable 2/4, bilateral.  CFT <3 seconds, bilateral.  Hair growth present to the level of the digits, bilateral.  Edema absent, bilateral.  Varicosities absent, bilateral. Erythema absent, bilateral. Distal Rubor absent bilateral.  Temperature within normal limits bilateral. Hyperpigmentation absent bilateral. No atrophic skin. Neurological: Sensation intact to light touch to level of digits, bilateral.  Protective sensation intact 10/10 sites via 5.07/10g [place] Monofilament, bilateral.  negative Tinel's, bilateral.  negative Valleix sign, bilateral.  Vibratory intact bilateral.  Reflexes Decreased bilateral.  Paresthesias negative. Dysthesias negative. Sharp/dull intact bilateral.    Musculoskeletal: Muscle strength 5/5, bilateral.  Pain absent upon palpation bilateral. Normal medial longitudinal arch, bilateral.  Ankle ROM decreased,bilateral.  1st MPJ ROM decreased L bilateral.  Dorsally contracted digits absent. No other foot deformities. Integument:   Open lesion present, Left. Ulcer sub L hallux IPJ,  Mild Callous , no maceration present,, positive fibrin,  no probing , no undermining, no malodor. No surrounding signs of infx. Interdigital maceration absent to web spaces , Bilateral.  Nails b/l dystrophic. Fissures absent, Bilateral. Hyperkeratotic tissue is present.     Wound 07/09/20 Toe (Comment  which one) Left;Plantar left great toe (Active)   Dressing/Treatment Alginate with Ag 07/09/20 1344   Wound Cleansed Rinsed/Irrigated with saline 07/09/20 1344   Post-Procedure Length (cm) 2.2 cm 07/16/20 1210   Post-Procedure Width (cm) 1.5 cm 07/16/20 1210   Post-Procedure Depth (cm) 0.1 cm 07/16/20 1210   Post-Procedure Surface Area (cm^2) 3.3 cm^2 07/16/20 1210   Post-Procedure Volume (cm^3) 0.33 cm^3 07/16/20 1210   Drainage Amount Small 07/09/20 1344   Drainage Description Serous 07/09/20 1344   Odor None 07/09/20 1344   Melissa-wound Assessment Calloused 07/09/20 1344   Red%Wound Bed 100 07/16/20 1210   Number of days: 17       Wound 07/16/20 #2 left great toe superior wound (Active)   Wound Image   07/27/20 1311   Dressing Status Old drainage 07/27/20 1311   Dressing Changed Changed/New 07/27/20 1311   Wound Cleansed Rinsed/Irrigated with saline 07/27/20 1311   Dressing Change Due 07/28/20 07/27/20 1311   Wound Length (cm) 1.5 cm 07/27/20 1311   Wound Width (cm) 0.8 cm 07/27/20 1311   Wound Depth (cm) 0.2 cm 07/27/20 1311   Wound Surface Area (cm^2) 1.2 cm^2 07/27/20 1311   Wound Volume (cm^3) 0.24 cm^3 07/27/20 1311   Post-Procedure Length (cm) 1.5 cm 07/16/20 1213   Post-Procedure Width (cm) 0.5 cm 07/16/20 1213   Post-Procedure Depth (cm) 0.1 cm 07/16/20 1213   Post-Procedure Surface Area (cm^2) 0.75 cm^2 07/16/20 1213   Post-Procedure Volume (cm^3) 0.08 cm^3 07/16/20 1213   Wound Assessment [place] 07/27/20 1311   Drainage Amount Small 07/27/20 1311   Odor None 07/27/20 1311   Margins Defined edges 07/27/20 1311   Melissa-wound Assessment Maceration 07/27/20 1311   Non-staged Wound Description Full thickness 07/27/20 1311   [place]%Wound Bed 100 07/27/20 1311   Culture Taken No 07/27/20 1311   Number of days: 11       Asessment: Patient is a 49 y.o. male with:    Diagnosis Orders   1. Ulcer of left great toe due to diabetes mellitus (HCC)     2. Hallux limitus of left foot     3. Type 2 diabetes mellitus without complication, without long-term current use of insulin ([facility] [address]     4. Tobacco abuse       Ulcer Classification:  Diabetic ulcer grade 2 C. IDSA  no infection. Plan:   Patient examined and evaluated.  Diabetic foot education and exam.  Current condition and treatment options discussed in detail. Active wound management took place 100% non excisional with the use of  tissue nippers. All non viable tissue (including epidermis and/or dermis) and bio burden was removed to promote healing. Bleeding was present. Please see chart for exact measurements, if not documented then size was less than 20 sq cm. Patient advised importance of overall diabetic control and will continue offloading as advised and stressed the importance of this in regards to wound healing. Today's dressing:alginate silver qd  Patient stressed the importance of offloading as advised by this office. Risks and complications were discussed when it comes to wound care and inappropriate offloading. Patient is putting themselves at significant increased risk by not offloading appropriately. Continue sx shoe with offloading insole  Pt refuses to wear sx shoe and decrease activity with WB. He has tried to wear it more frequently when not at work. Contact clinic with any questions/problems/concerns or new signs of infection. Follow-up at [facility] in 1week(s).

## 2020-07-27 NOTE — PLAN OF CARE
Problem: Weight control:  Goal: Ability to maintain an optimal weight for height and age will be supported  Description: Ability to maintain an optimal weight for height and age will be supported  Outcome: Ongoing     Problem: Falls - Risk of:  Goal: Will remain free from falls  Description: Will remain free from falls  Outcome: Ongoing     Problem: Blood Glucose:  Goal: Ability to maintain appropriate glucose levels will improve  Description: Ability to maintain appropriate glucose levels will improve  Outcome: Ongoing

## 2020-08-03 RX ORDER — LISINOPRIL 10 MG/1
TABLET ORAL
Qty: 90 TABLET | Refills: 0 | Status: SHIPPED | OUTPATIENT
Start: 2020-08-03 | End: 2020-10-21 | Stop reason: SDUPTHER

## 2020-08-03 NOTE — TELEPHONE ENCOUNTER
According to 11/13/2019 note from Dr Janet Araiza he is supposed to be on all 3. I did call patient and he says he just takes what she gives him but did not know the names and did not have the bottles to look at them.

## 2020-08-03 NOTE — TELEPHONE ENCOUNTER
Mariah Reyna called requesting a refill of the below medication which has been pended for you:     Requested Prescriptions     Pending Prescriptions Disp Refills    lisinopril (PRINIVIL;ZESTRIL) 10 MG tablet [Pharmacy Med Name: LISINOPRIL TAB 10MG] 30 tablet 5     Sig: TAKE 1 TABLET DAILY       Last Appointment Date: 6/17/2020  Next Appointment Date: 10/21/2020    Allergies   Allergen Reactions    Chantix [Varenicline] Other (See Comments)     Unusual dreams.

## 2020-08-10 ENCOUNTER — HOSPITAL ENCOUNTER (OUTPATIENT)
Dept: WOUND CARE | Age: 55
Discharge: HOME | End: 2020-08-11
Payer: COMMERCIAL

## 2020-08-10 PROCEDURE — 97597 DBRDMT OPN WND 1ST 20 CM/<: CPT | Performed by: PODIATRIST

## 2020-08-10 RX ORDER — GINSENG 100 MG
CAPSULE ORAL ONCE
Status: CANCELLED | OUTPATIENT
Start: 2020-08-10

## 2020-08-10 RX ORDER — BACITRACIN ZINC AND POLYMYXIN B SULFATE 500; 1000 [USP'U]/G; [USP'U]/G
OINTMENT TOPICAL ONCE
Status: CANCELLED | OUTPATIENT
Start: 2020-08-10

## 2020-08-10 RX ORDER — BACITRACIN, NEOMYCIN, POLYMYXIN B 400; 3.5; 5 [USP'U]/G; MG/G; [USP'U]/G
OINTMENT TOPICAL ONCE
Status: CANCELLED | OUTPATIENT
Start: 2020-08-10

## 2020-08-10 RX ORDER — LIDOCAINE HYDROCHLORIDE 40 MG/ML
SOLUTION TOPICAL ONCE
Status: CANCELLED | OUTPATIENT
Start: 2020-08-10

## 2020-08-10 RX ORDER — BETAMETHASONE DIPROPIONATE 0.05 %
OINTMENT (GRAM) TOPICAL ONCE
Status: CANCELLED | OUTPATIENT
Start: 2020-08-10

## 2020-08-10 RX ORDER — LIDOCAINE 50 MG/G
OINTMENT TOPICAL ONCE
Status: CANCELLED | OUTPATIENT
Start: 2020-08-10

## 2020-08-10 RX ORDER — LIDOCAINE 40 MG/G
CREAM TOPICAL ONCE
Status: CANCELLED | OUTPATIENT
Start: 2020-08-10

## 2020-08-10 RX ORDER — CLOBETASOL PROPIONATE 0.5 MG/G
OINTMENT TOPICAL ONCE
Status: CANCELLED | OUTPATIENT
Start: 2020-08-10

## 2020-08-10 RX ORDER — GENTAMICIN SULFATE 1 MG/G
OINTMENT TOPICAL ONCE
Status: CANCELLED | OUTPATIENT
Start: 2020-08-10

## 2020-08-10 NOTE — PROGRESS NOTES
Subjective:    Zunilda Monique is a 49 y.o. male who presents with the ulceration of the L big toe. Pt has still been active working. He has wore offloading shoe outside of work. Patient has not been compliant with off loading. Patient relates pain is absent. Currently denies F/C/N/V. Overall diabetic control is not changed. Allergies   Allergen Reactions    Chantix [Varenicline] Other (See Comments)     Unusual dreams. Past Medical History:   Diagnosis Date    Ankle pain, left     chronic; s/p fracture in 1999    Gastroesophageal reflux disease     Hyperlipemia     Hypertension     Obesity     Tobacco abuse     Tourette's syndrome        Prior to Admission medications    Medication Sig Start Date End Date Taking? Authorizing Provider   lisinopril (PRINIVIL;ZESTRIL) 10 MG tablet TAKE 1 TABLET DAILY 8/3/20  Yes Lindy Santiago,    HYDROcodone-acetaminophen (NORCO) 5-325 MG per tablet Take 1 tablet by mouth every 6 hours as needed for Pain for up to 30 days. 7/22/20 8/21/20 Yes Leena Rodriguez MD   pimozide (ORAP) 1 MG tablet TAKE 2 TABLETS TWICE A DAY 7/15/20  Yes Leena Rodriguez MD   hydroCHLOROthiazide (HYDRODIURIL) 25 MG tablet TAKE 1 TABLET DAILY 7/15/20  Yes Leena Rodriguez MD   losartan-hydrochlorothiazide (HYZAAR) 100-25 MG per tablet TAKE 1 TABLET DAILY 7/15/20  Yes Leena Rodriguez MD   omeprazole (PRILOSEC) 20 MG delayed release capsule TAKE 1 CAPSULE DAILY 6/2/20  Yes Leena Rodriguez MD   atorvastatin (LIPITOR) 20 MG tablet Take 1 tablet by mouth daily 11/13/19  Yes Leena Rodriguez MD       Social History     Tobacco Use    Smoking status: Current Every Day Smoker     Packs/day: 1.00     Years: 30.00     Pack years: 30.00     Types: Cigarettes     Start date: 1/1/1980    Smokeless tobacco: Former User     Types: Snuff     Quit date: 11/17/1987   Substance Use Topics    Alcohol use:  Yes     Alcohol/week: 0.0 standard drinks     Comment: occasional       ROS: All 14 ROS systems reviewed and pertinent positives noted above, all others negative. Lower Extremity Physical Examination:     Vitals: There were no vitals filed for this visit. General: AAO x 3 in NAD. Vascular: DP and PT pulses palpable 2/4, bilateral.  CFT <3 seconds, bilateral.  Hair growth present to the level of the digits, bilateral.  Edema absent, bilateral.  Varicosities absent, bilateral. Erythema absent, bilateral. Distal Rubor absent bilateral.  Temperature within normal limits bilateral. Hyperpigmentation absent bilateral. No atrophic skin. Neurological: Sensation intact to light touch to level of digits, bilateral.  Protective sensation intact 10/10 sites via 5.07/10g [place]iona Monofilament, bilateral.  negative Tinel's, bilateral.  negative Valleix sign, bilateral.  Vibratory intact bilateral.  Reflexes Decreased bilateral.  Paresthesias negative. Dysthesias negative. Sharp/dull intact bilateral.    Musculoskeletal: Muscle strength 5/5, bilateral.  Pain absent upon palpation bilateral. Normal medial longitudinal arch, bilateral.  Ankle ROM decreased,bilateral.  1st MPJ ROM decreased L bilateral.  Dorsally contracted digits absent. No other foot deformities. Integument:   Open lesion present, Left. Ulcer sub L hallux IPJ,  Mild Callous rim , no maceration present,, positive fibrin,  no probing , no undermining, no malodor. No surrounding signs of infx. Interdigital maceration absent to web spaces , Bilateral.  Nails b/l dystrophic. Fissures absent, Bilateral. Hyperkeratotic tissue is present.     Wound 07/09/20 Toe (Comment  which one) Left;Plantar left great toe (Active)   Post-Procedure Length (cm) 2.2 cm 07/16/20 1210   Post-Procedure Width (cm) 1.5 cm 07/16/20 1210   Post-Procedure Depth (cm) 0.1 cm 07/16/20 1210   Post-Procedure Surface Area (cm^2) 3.3 cm^2 07/16/20 1210   Post-Procedure Volume (cm^3) 0.33 cm^3 07/16/20 1210   Red%Wound Bed 100 07/16/20 1210   Number of days: 32       Wound 07/16/20 #2 left great toe superior wound (Active)   Wound Image   07/27/20 1311   Dressing Status Old drainage 07/27/20 1311   Dressing Changed Changed/New 07/27/20 1311   Wound Cleansed Rinsed/Irrigated with saline 07/27/20 1311   Dressing Change Due 07/28/20 07/27/20 1311   Wound Length (cm) 1.5 cm 08/10/20 1336   Wound Width (cm) 0.9 cm 08/10/20 1336   Wound Depth (cm) 0.2 cm 08/10/20 1336   Wound Surface Area (cm^2) 1.35 cm^2 08/10/20 1336   Change in Wound Size % (l*w) -12.5 08/10/20 1336   Wound Volume (cm^3) 0.27 cm^3 08/10/20 1336   Wound Healing % -12 08/10/20 1336   Post-Procedure Length (cm) 1.5 cm 07/16/20 1213   Post-Procedure Width (cm) 0.5 cm 07/16/20 1213   Post-Procedure Depth (cm) 0.1 cm 07/16/20 1213   Post-Procedure Surface Area (cm^2) 0.75 cm^2 07/16/20 1213   Post-Procedure Volume (cm^3) 0.08 cm^3 07/16/20 1213   Wound Assessment Intact 08/10/20 1336   Drainage Amount Small 08/10/20 1336   Drainage Description Serosanguinous 08/10/20 1336   Odor None 08/10/20 1336   Margins Attached edges 08/10/20 1336   Melissa-wound Assessment Calloused; Maceration 08/10/20 1336   Non-staged Wound Description Full thickness 07/27/20 1311   [place]%Wound Bed 100 08/10/20 1336   Culture Taken No 07/27/20 1311   Number of days: 25             Asessment: Patient is a 49 y.o. male with:    Diagnosis Orders   1. Ulcer of left great toe due to diabetes mellitus (HCC)     2. Hallux limitus of left foot     3. Type 2 diabetes mellitus without complication, without long-term current use of insulin ([address]     4. Tobacco abuse       Ulcer Classification:  Diabetic ulcer grade 2 C. IDSA  no infection. Plan:   Patient examined and evaluated. Diabetic foot education and exam.  Current condition and treatment options discussed in detail. Active wound management took place 100% non excisional with the use of  tissue nippers. All non viable tissue (including epidermis and/or dermis) and bio burden was removed to promote healing. Bleeding was present. Please see chart for exact measurements, if not documented then size was less than 20 sq cm. Patient advised importance of overall diabetic control and will continue offloading as advised and stressed the importance of this in regards to wound healing. Today's dressing:collagen qd  Patient stressed the importance of offloading as advised by this office. Risks and complications were discussed when it comes to wound care and inappropriate offloading. Patient is putting themselves at significant increased risk by not offloading appropriately. Continue sx shoe with offloading insole  Pt refuses to wear sx shoe at all times, he needs to stay at work. He has worn outisde of work though. Contact clinic with any questions/problems/concerns or new signs of infection. Follow-up at [facility] in 1week(s).

## 2020-08-17 ENCOUNTER — HOSPITAL ENCOUNTER (OUTPATIENT)
Dept: WOUND CARE | Age: 55
Discharge: HOME | End: 2020-08-18
Payer: COMMERCIAL

## 2020-08-17 VITALS
HEART RATE: 82 BPM | DIASTOLIC BLOOD PRESSURE: 74 MMHG | HEIGHT: 73 IN | TEMPERATURE: 97.4 F | WEIGHT: 238 LBS | SYSTOLIC BLOOD PRESSURE: 128 MMHG | RESPIRATION RATE: 18 BRPM | BODY MASS INDEX: 31.54 KG/M2

## 2020-08-17 PROCEDURE — 97597 DBRDMT OPN WND 1ST 20 CM/<: CPT | Performed by: PODIATRIST

## 2020-08-17 RX ORDER — GENTAMICIN SULFATE 1 MG/G
OINTMENT TOPICAL ONCE
Status: CANCELLED | OUTPATIENT
Start: 2020-08-17

## 2020-08-17 RX ORDER — LIDOCAINE 40 MG/G
CREAM TOPICAL ONCE
Status: CANCELLED | OUTPATIENT
Start: 2020-08-17

## 2020-08-17 RX ORDER — LIDOCAINE 50 MG/G
OINTMENT TOPICAL ONCE
Status: CANCELLED | OUTPATIENT
Start: 2020-08-17

## 2020-08-17 RX ORDER — BACITRACIN ZINC AND POLYMYXIN B SULFATE 500; 1000 [USP'U]/G; [USP'U]/G
OINTMENT TOPICAL ONCE
Status: CANCELLED | OUTPATIENT
Start: 2020-08-17

## 2020-08-17 RX ORDER — BETAMETHASONE DIPROPIONATE 0.05 %
OINTMENT (GRAM) TOPICAL ONCE
Status: CANCELLED | OUTPATIENT
Start: 2020-08-17

## 2020-08-17 RX ORDER — CLOBETASOL PROPIONATE 0.5 MG/G
OINTMENT TOPICAL ONCE
Status: CANCELLED | OUTPATIENT
Start: 2020-08-17

## 2020-08-17 RX ORDER — BACITRACIN, NEOMYCIN, POLYMYXIN B 400; 3.5; 5 [USP'U]/G; MG/G; [USP'U]/G
OINTMENT TOPICAL ONCE
Status: CANCELLED | OUTPATIENT
Start: 2020-08-17

## 2020-08-17 RX ORDER — LIDOCAINE HYDROCHLORIDE 40 MG/ML
SOLUTION TOPICAL ONCE
Status: CANCELLED | OUTPATIENT
Start: 2020-08-17

## 2020-08-17 RX ORDER — GINSENG 100 MG
CAPSULE ORAL ONCE
Status: CANCELLED | OUTPATIENT
Start: 2020-08-17

## 2020-08-17 ASSESSMENT — PAIN DESCRIPTION - LOCATION: LOCATION: TOE (COMMENT WHICH ONE)

## 2020-08-17 ASSESSMENT — PAIN DESCRIPTION - PAIN TYPE: TYPE: CHRONIC PAIN

## 2020-08-17 NOTE — PROGRESS NOTES
Subjective:    Puneet Mccarthy is a 49 y.o. male who presents with the ulceration of the L big toe. no signs of infx seen at home. Pt has still been active working. He has wore offloading shoe outside of work. Patient has not been compliant with off loading. Patient relates pain is absent. Currently denies F/C/N/V. Overall diabetic control is not changed. Allergies   Allergen Reactions    Chantix [Varenicline] Other (See Comments)     Unusual dreams. Past Medical History:   Diagnosis Date    Ankle pain, left     chronic; s/p fracture in 1999    Gastroesophageal reflux disease     Hyperlipemia     Hypertension     Obesity     Tobacco abuse     Tourette's syndrome        Prior to Admission medications    Medication Sig Start Date End Date Taking? Authorizing Provider   lisinopril (PRINIVIL;ZESTRIL) 10 MG tablet TAKE 1 TABLET DAILY 8/3/20  Yes April Wallace DO   HYDROcodone-acetaminophen (NORCO) 5-325 MG per tablet Take 1 tablet by mouth every 6 hours as needed for Pain for up to 30 days. 7/22/20 8/21/20 Yes Eduin Howard MD   pimozide (ORAP) 1 MG tablet TAKE 2 TABLETS TWICE A DAY 7/15/20  Yes Eduin Howard MD   hydroCHLOROthiazide (HYDRODIURIL) 25 MG tablet TAKE 1 TABLET DAILY 7/15/20  Yes Eduin Howard MD   losartan-hydrochlorothiazide (HYZAAR) 100-25 MG per tablet TAKE 1 TABLET DAILY 7/15/20  Yes Eduin Howard MD   omeprazole (PRILOSEC) 20 MG delayed release capsule TAKE 1 CAPSULE DAILY 6/2/20  Yes Eduin Howard MD   atorvastatin (LIPITOR) 20 MG tablet Take 1 tablet by mouth daily 11/13/19  Yes Eduin Howard MD       Social History     Tobacco Use    Smoking status: Current Every Day Smoker     Packs/day: 1.00     Years: 30.00     Pack years: 30.00     Types: Cigarettes     Start date: 1/1/1980    Smokeless tobacco: Former User     Types: Snuff     Quit date: 11/17/1987   Substance Use Topics    Alcohol use:  Yes     Alcohol/week: 0.0 standard drinks     Comment: occasional       ROS: All 14 ROS systems reviewed and pertinent positives noted above, all others negative. Lower Extremity Physical Examination:     Vitals:   Vitals:    08/17/20 1307   BP: 128/74   Pulse: 82   Resp:    Temp:      General: AAO x 3 in NAD. Vascular: DP and PT pulses palpable 2/4, bilateral.  CFT <3 seconds, bilateral.  Hair growth present to the level of the digits, bilateral.  Edema absent, bilateral.  Varicosities absent, bilateral. Erythema absent, bilateral. Distal Rubor absent bilateral.  Temperature within normal limits bilateral. Hyperpigmentation absent bilateral. No atrophic skin. Neurological: Sensation intact to light touch to level of digits, bilateral.  Protective sensation intact 10/10 sites via 5.07/10g [place]-Fiona Monofilament, bilateral.  negative Tinel's, bilateral.  negative Valleix sign, bilateral.  Vibratory intact bilateral.  Reflexes Decreased bilateral.  Paresthesias negative. Dysthesias negative. Sharp/dull intact bilateral.    Musculoskeletal: Muscle strength 5/5, bilateral.  Pain absent upon palpation bilateral. Normal medial longitudinal arch, bilateral.  Ankle ROM decreased,bilateral.  1st MPJ ROM decreased L bilateral.  Dorsally contracted digits absent. No other foot deformities. Integument:   Open lesion present, Left. Ulcer sub L hallux IPJ,  Mild Callous rim , no maceration present,, positive fibrin,  no probing , no undermining, no malodor. No surrounding signs of infx. Interdigital maceration absent to web spaces , Bilateral.  Nails b/l dystrophic. Fissures absent, Bilateral. Hyperkeratotic tissue is present.     Wound 07/09/20 Toe (Comment  which one) Left;Plantar left great toe (Active)   Dressing/Treatment Alginate with Ag 07/09/20 1344   Wound Cleansed Rinsed/Irrigated with saline 07/09/20 1344   Post-Procedure Length (cm) 2.2 cm 07/16/20 1210   Post-Procedure Width (cm) 1.5 cm 07/16/20 1210   Post-Procedure Depth (cm) 0.1 cm 07/16/20 1210   Post-Procedure Surface Area (cm^2) 3.3 cm^2 07/16/20 1210   Post-Procedure Volume (cm^3) 0.33 cm^3 07/16/20 1210   Drainage Amount Small 07/09/20 1344   Drainage Description Serous 07/09/20 1344   Odor None 07/09/20 1344   Melissa-wound Assessment Calloused 07/09/20 1344   Red%Wound Bed 100 07/16/20 1210   Number of days: 38       Wound 07/16/20 #2 left great toe superior wound (Active)   Wound Image   08/17/20 1304   Dressing Status Old drainage 08/17/20 1304   Dressing Changed Changed/New 07/27/20 1311   Wound Cleansed Rinsed/Irrigated with saline 08/17/20 1304   Dressing Change Due 07/28/20 07/27/20 1311   Wound Length (cm) 1.6 cm 08/17/20 1304   Wound Width (cm) 1.7 cm 08/17/20 1304   Wound Depth (cm) 0.2 cm 08/17/20 1304   Wound Surface Area (cm^2) 2.72 cm^2 08/17/20 1304   Change in Wound Size % (l*w) -126.67 08/17/20 1304   Wound Volume (cm^3) 0.54 cm^3 08/17/20 1304   Wound Healing % -125 08/17/20 1304   Post-Procedure Length (cm) 1.7 cm 08/10/20 1359   Post-Procedure Width (cm) 0.9 cm 08/10/20 1359   Post-Procedure Depth (cm) 0.2 cm 08/10/20 1359   Post-Procedure Surface Area (cm^2) 1.53 cm^2 08/10/20 1359   Post-Procedure Volume (cm^3) 0.31 cm^3 08/10/20 1359   Wound Assessment Intact 08/10/20 1336   Drainage Amount Moderate 08/17/20 1304   Drainage Description Serosanguinous 08/17/20 1304   Odor None 08/17/20 1304   Margins Attached edges 08/17/20 1304   Melissa-wound Assessment Calloused;Pink 08/17/20 1304   Non-staged Wound Description Full thickness 08/17/20 1304   [place]%Wound Bed 100 08/17/20 1304   Culture Taken No 08/17/20 1304   Number of days: 32         Asessment: Patient is a 49 y.o. male with:    Diagnosis Orders   1. Ulcer of left great toe due to diabetes mellitus (HCC)     2. Hallux limitus of left foot     3. Type 2 diabetes mellitus without complication, without long-term current use of insulin ([facility] [address]     4. Tobacco abuse       Ulcer Classification:  Diabetic ulcer grade 2 C. IDSA  no infection.     Plan:   Patient examined and evaluated. Diabetic foot education and exam.  Current condition and treatment options discussed in detail. Active wound management took place 100% non excisional with the use of  tissue nippers. All non viable tissue (including epidermis and/or dermis) and bio burden was removed to promote healing. Bleeding was present. Please see chart for exact measurements, if not documented then size was less than 20 sq cm. Patient advised importance of overall diabetic control and will continue offloading as advised and stressed the importance of this in regards to wound healing. Today's dressing:collagen qd  Patient stressed the importance of offloading as advised by this office. Risks and complications were discussed when it comes to wound care and inappropriate offloading. Patient is putting themselves at significant increased risk by not offloading appropriately. Continue sx shoe with offloading insole  Pt refuses to wear sx shoe at all times, he needs to stay at work. He has worn outisde of work though. Contact clinic with any questions/problems/concerns or new signs of infection. Follow-up at [facility] in 1week(s).

## 2020-08-17 NOTE — PLAN OF CARE
Problem: Wound:  Goal: Will show signs of wound healing; wound closure and no evidence of infection  Description: Will show signs of wound healing; wound closure and no evidence of infection  Outcome: Ongoing     Problem: Pressure Ulcer:  Goal: Signs of wound healing will improve  Description: Signs of wound healing will improve  Outcome: Ongoing  Goal: Absence of new pressure ulcer  Description: Absence of new pressure ulcer  Outcome: Ongoing  Goal: Will show no infection signs and symptoms  Description: Will show no infection signs and symptoms  Outcome: Ongoing     Problem: Weight control:  Goal: Ability to maintain an optimal weight for height and age will be supported  Description: Ability to maintain an optimal weight for height and age will be supported  Outcome: Ongoing     Problem: Falls - Risk of:  Goal: Will remain free from falls  Description: Will remain free from falls  Outcome: Ongoing     Problem: Blood Glucose:  Goal: Ability to maintain appropriate glucose levels will improve  Description: Ability to maintain appropriate glucose levels will improve  Outcome: Ongoing

## 2020-08-24 RX ORDER — HYDROCODONE BITARTRATE AND ACETAMINOPHEN 5; 325 MG/1; MG/1
1 TABLET ORAL EVERY 6 HOURS PRN
Qty: 30 TABLET | Refills: 0 | Status: SHIPPED | OUTPATIENT
Start: 2020-08-24 | End: 2020-09-28 | Stop reason: SDUPTHER

## 2020-08-31 ENCOUNTER — HOSPITAL ENCOUNTER (OUTPATIENT)
Dept: WOUND CARE | Age: 55
Discharge: HOME | End: 2020-09-01
Payer: COMMERCIAL

## 2020-08-31 VITALS
HEIGHT: 73 IN | HEART RATE: 82 BPM | RESPIRATION RATE: 20 BRPM | BODY MASS INDEX: 31.54 KG/M2 | TEMPERATURE: 97.4 F | SYSTOLIC BLOOD PRESSURE: 138 MMHG | DIASTOLIC BLOOD PRESSURE: 82 MMHG | WEIGHT: 238 LBS

## 2020-08-31 PROCEDURE — 97597 DBRDMT OPN WND 1ST 20 CM/<: CPT | Performed by: PODIATRIST

## 2020-08-31 RX ORDER — GENTAMICIN SULFATE 1 MG/G
OINTMENT TOPICAL ONCE
Status: CANCELLED | OUTPATIENT
Start: 2020-08-31

## 2020-08-31 RX ORDER — BACITRACIN ZINC AND POLYMYXIN B SULFATE 500; 1000 [USP'U]/G; [USP'U]/G
OINTMENT TOPICAL ONCE
Status: CANCELLED | OUTPATIENT
Start: 2020-08-31

## 2020-08-31 RX ORDER — CLOBETASOL PROPIONATE 0.5 MG/G
OINTMENT TOPICAL ONCE
Status: CANCELLED | OUTPATIENT
Start: 2020-08-31

## 2020-08-31 RX ORDER — LIDOCAINE HYDROCHLORIDE 40 MG/ML
SOLUTION TOPICAL ONCE
Status: CANCELLED | OUTPATIENT
Start: 2020-08-31

## 2020-08-31 RX ORDER — BACITRACIN, NEOMYCIN, POLYMYXIN B 400; 3.5; 5 [USP'U]/G; MG/G; [USP'U]/G
OINTMENT TOPICAL ONCE
Status: CANCELLED | OUTPATIENT
Start: 2020-08-31

## 2020-08-31 RX ORDER — LIDOCAINE 50 MG/G
OINTMENT TOPICAL ONCE
Status: CANCELLED | OUTPATIENT
Start: 2020-08-31

## 2020-08-31 RX ORDER — GINSENG 100 MG
CAPSULE ORAL ONCE
Status: CANCELLED | OUTPATIENT
Start: 2020-08-31

## 2020-08-31 RX ORDER — LIDOCAINE 40 MG/G
CREAM TOPICAL ONCE
Status: CANCELLED | OUTPATIENT
Start: 2020-08-31

## 2020-08-31 RX ORDER — BETAMETHASONE DIPROPIONATE 0.05 %
OINTMENT (GRAM) TOPICAL ONCE
Status: CANCELLED | OUTPATIENT
Start: 2020-08-31

## 2020-08-31 ASSESSMENT — PAIN SCALES - GENERAL: PAINLEVEL_OUTOF10: 3

## 2020-08-31 NOTE — PROGRESS NOTES
Subjective:    Estefania Salinas is a 49 y.o. male who presents with the ulceration of the L big toe. no signs of infx seen at home. No issues with the dressings. Pt has still been active working. He has wore offloading shoe outside of work. Patient has not been compliant with off loading. Patient relates pain is absent. Currently denies F/C/N/V. Overall diabetic control is not changed. Allergies   Allergen Reactions    Chantix [Varenicline] Other (See Comments)     Unusual dreams. Past Medical History:   Diagnosis Date    Ankle pain, left     chronic; s/p fracture in 1999    Gastroesophageal reflux disease     Hyperlipemia     Hypertension     Obesity     Tobacco abuse     Tourette's syndrome        Prior to Admission medications    Medication Sig Start Date End Date Taking? Authorizing Provider   HYDROcodone-acetaminophen (NORCO) 5-325 MG per tablet Take 1 tablet by mouth every 6 hours as needed for Pain for up to 30 days. 8/24/20 9/23/20 Yes KRISTINA Zepeda   lisinopril (PRINIVIL;ZESTRIL) 10 MG tablet TAKE 1 TABLET DAILY 8/3/20  Yes He Lu DO   pimozide (ORAP) 1 MG tablet TAKE 2 TABLETS TWICE A DAY 7/15/20  Yes Piotr Stacy MD   hydroCHLOROthiazide (HYDRODIURIL) 25 MG tablet TAKE 1 TABLET DAILY 7/15/20  Yes Piotr Stacy MD   losartan-hydrochlorothiazide (HYZAAR) 100-25 MG per tablet TAKE 1 TABLET DAILY 7/15/20  Yes Piotr Stacy MD   omeprazole (PRILOSEC) 20 MG delayed release capsule TAKE 1 CAPSULE DAILY 6/2/20  Yes Piotr Stacy MD   atorvastatin (LIPITOR) 20 MG tablet Take 1 tablet by mouth daily 11/13/19  Yes Piotr Stacy MD       Social History     Tobacco Use    Smoking status: Current Every Day Smoker     Packs/day: 1.00     Years: 30.00     Pack years: 30.00     Types: Cigarettes     Start date: 1/1/1980    Smokeless tobacco: Former User     Types: Snuff     Quit date: 11/17/1987   Substance Use Topics    Alcohol use:  Yes     Alcohol/week: 0.0 standard drinks Comment: occasional       ROS: All 14 ROS systems reviewed and pertinent positives noted above, all others negative. Lower Extremity Physical Examination:     Vitals:   Vitals:    08/31/20 1302   BP: 138/82   Pulse: 82   Resp: 20   Temp: 97.4 °F (36.3 °C)     General: AAO x 3 in NAD. Vascular: DP and PT pulses palpable 2/4, bilateral.  CFT <3 seconds, bilateral.  Hair growth present to the level of the digits, bilateral.  Edema absent, bilateral.  Varicosities absent, bilateral. Erythema absent, bilateral. Distal Rubor absent bilateral.  Temperature within normal limits bilateral. Hyperpigmentation absent bilateral. No atrophic skin. Neurological: Sensation intact to light touch to level of digits, bilateral.  Protective sensation intact 10/10 sites via 5.07/10g Mangham-Fiona Monofilament, bilateral.  negative Tinel's, bilateral.  negative Valleix sign, bilateral.  Vibratory intact bilateral.  Reflexes Decreased bilateral.  Paresthesias negative. Dysthesias negative. Sharp/dull intact bilateral.    Musculoskeletal: Muscle strength 5/5, bilateral.  Pain absent upon palpation bilateral. Normal medial longitudinal arch, bilateral.  Ankle ROM decreased,bilateral.  1st MPJ ROM decreased L bilateral.  Dorsally contracted digits absent. No other foot deformities. Integument:   Open lesion present, Left. Ulcer sub L hallux IPJ,  Mild Callous rim , no maceration , positive fibrin,  no probing , no undermining, no malodor. No surrounding signs of infx. Interdigital maceration absent to web spaces , Bilateral.  Nails b/l dystrophic. Fissures absent, Bilateral. Hyperkeratotic tissue is present.     Wound 07/09/20 Toe (Comment  which one) Left;Plantar left great toe (Active)   Dressing/Treatment Alginate with Ag 07/09/20 1344   Wound Cleansed Rinsed/Irrigated with saline 07/09/20 1344   Post-Procedure Length (cm) 2.2 cm 07/16/20 1210   Post-Procedure Width (cm) 1.5 cm 07/16/20 1210   Post-Procedure Depth (cm) 0.1 cm 07/16/20 1210   Post-Procedure Surface Area (cm^2) 3.3 cm^2 07/16/20 1210   Post-Procedure Volume (cm^3) 0.33 cm^3 07/16/20 1210   Drainage Amount Small 07/09/20 1344   Drainage Description Serous 07/09/20 1344   Odor None 07/09/20 1344   Melissa-wound Assessment Calloused 07/09/20 1344   Red%Wound Bed 100 07/16/20 1210   Number of days: 52       Wound 07/16/20 #2 left great toe superior wound (Active)   Wound Image   08/31/20 1306   Dressing Status Old drainage 08/31/20 1306   Dressing Changed Changed/New 08/31/20 1306   Dressing/Treatment Collagen 08/31/20 1306   Wound Cleansed Rinsed/Irrigated with saline 08/31/20 1306   Dressing Change Due 08/18/20 08/17/20 1304   Wound Length (cm) 1.4 cm 08/31/20 1306   Wound Width (cm) 0.7 cm 08/31/20 1306   Wound Depth (cm) 0.2 cm 08/31/20 1306   Wound Surface Area (cm^2) 0.98 cm^2 08/31/20 1306   Change in Wound Size % (l*w) 18.33 08/31/20 1306   Wound Volume (cm^3) 0.2 cm^3 08/31/20 1306   Wound Healing % 17 08/31/20 1306   Post-Procedure Length (cm) 1.7 cm 08/10/20 1359   Post-Procedure Width (cm) 0.9 cm 08/10/20 1359   Post-Procedure Depth (cm) 0.2 cm 08/10/20 1359   Post-Procedure Surface Area (cm^2) 1.53 cm^2 08/10/20 1359   Post-Procedure Volume (cm^3) 0.31 cm^3 08/10/20 1359   Wound Assessment Red 08/31/20 1306   Drainage Amount Moderate 08/31/20 1306   Drainage Description Serosanguinous 08/31/20 1306   Odor None 08/31/20 1306   Margins Attached edges 08/31/20 1306   Melissa-wound Assessment Calloused 08/31/20 1306   Non-staged Wound Description Full thickness 08/31/20 1306   [place]%Wound Bed 100 08/31/20 1306   Culture Taken No 08/31/20 1306   Number of days: 55         Asessment: Patient is a 49 y.o. male with:    Diagnosis Orders   1. Ulcer of left great toe due to diabetes mellitus (HCC)     2. Hallux limitus of left foot     3. Type 2 diabetes mellitus without complication, without long-term current use of insulin ([address]     4.  Tobacco abuse       Ulcer Classification:  Diabetic ulcer grade 2 C. IDSA  no infection. Plan:   Patient examined and evaluated. Diabetic foot education and exam.  Current condition and treatment options discussed in detail. Active wound management took place 100% non excisional with the use of  tissue nippers. All non viable tissue (including epidermis and/or dermis) and bio burden was removed to promote healing. Bleeding was present. Please see chart for exact measurements, if not documented then size was less than 20 sq cm. Patient advised importance of overall diabetic control and will continue offloading as advised and stressed the importance of this in regards to wound healing. Today's dressing:collagen qd  Patient stressed the importance of offloading as advised by this office. Risks and complications were discussed when it comes to wound care and inappropriate offloading. Patient is putting themselves at significant increased risk by not offloading appropriately. Continue sx shoe with offloading insole  Contact clinic with any questions/problems/concerns or new signs of infection. Follow-up at [facility] in 1week(s).

## 2020-08-31 NOTE — PLAN OF CARE
Problem: Pain:  Description: Pain management should include both nonpharmacologic and pharmacologic interventions.   Goal: Pain level will decrease  Description: Pain level will decrease  Outcome: Ongoing  Goal: Control of acute pain  Description: Control of acute pain  Outcome: Ongoing  Goal: Control of chronic pain  Description: Control of chronic pain  Outcome: Ongoing     Problem: Wound:  Goal: Will show signs of wound healing; wound closure and no evidence of infection  Description: Will show signs of wound healing; wound closure and no evidence of infection  Outcome: Ongoing     Problem: Pressure Ulcer:  Goal: Signs of wound healing will improve  Description: Signs of wound healing will improve  Outcome: Ongoing  Goal: Absence of new pressure ulcer  Description: Absence of new pressure ulcer  Outcome: Ongoing  Goal: Will show no infection signs and symptoms  Description: Will show no infection signs and symptoms  Outcome: Ongoing     Problem: Weight control:  Goal: Ability to maintain an optimal weight for height and age will be supported  Description: Ability to maintain an optimal weight for height and age will be supported  Outcome: Ongoing     Problem: Falls - Risk of:  Goal: Will remain free from falls  Description: Will remain free from falls  Outcome: Ongoing     Problem: Blood Glucose:  Goal: Ability to maintain appropriate glucose levels will improve  Description: Ability to maintain appropriate glucose levels will improve  Outcome: Ongoing

## 2020-09-25 NOTE — TELEPHONE ENCOUNTER
Pt calling requesting refill of [place] to [facility]. Pt aware RR is not in office Fri or Mon, but will be routed to on call. Marc Limon called requesting a refill of the below medication which has been pended for you:     Requested Prescriptions     Pending Prescriptions Disp Refills    HYDROcodone-acetaminophen (NORCO) 5-325 MG per tablet 30 tablet 0     Sig: Take 1 tablet by mouth every 6 hours as needed for Pain for up to 30 days. Last Appointment Date: 6/17/2020  Next Appointment Date: 10/21/2020    Allergies   Allergen Reactions    Chantix [Varenicline] Other (See Comments)     Unusual dreams.

## 2020-09-28 RX ORDER — HYDROCODONE BITARTRATE AND ACETAMINOPHEN 5; 325 MG/1; MG/1
1 TABLET ORAL EVERY 6 HOURS PRN
Qty: 30 TABLET | Refills: 0 | Status: SHIPPED | OUTPATIENT
Start: 2020-09-28 | End: 2020-10-21 | Stop reason: SDUPTHER

## 2020-10-21 ENCOUNTER — OFFICE VISIT (OUTPATIENT)
Dept: FAMILY MEDICINE CLINIC | Age: 55
End: 2020-10-21
Payer: COMMERCIAL

## 2020-10-21 ENCOUNTER — HOSPITAL ENCOUNTER (OUTPATIENT)
Dept: LAB | Age: 55
Discharge: HOME | End: 2020-10-21
Payer: COMMERCIAL

## 2020-10-21 VITALS
DIASTOLIC BLOOD PRESSURE: 88 MMHG | HEIGHT: 73 IN | WEIGHT: 242.8 LBS | HEART RATE: 82 BPM | BODY MASS INDEX: 32.18 KG/M2 | RESPIRATION RATE: 16 BRPM | SYSTOLIC BLOOD PRESSURE: 138 MMHG

## 2020-10-21 LAB
ALBUMIN SERPL-MCNC: 4.2 G/DL (ref 3.5–5.2)
ALBUMIN/GLOBULIN RATIO: 1.2 (ref 1–2.5)
ALP BLD-CCNC: 91 U/L (ref 40–129)
ALT SERPL-CCNC: 9 U/L (ref 5–41)
ANION GAP SERPL CALCULATED.3IONS-SCNC: 10 MMOL/L (ref 9–17)
AST SERPL-CCNC: 12 U/L
BILIRUB SERPL-MCNC: 0.39 MG/DL (ref 0.3–1.2)
BUN BLDV-MCNC: 9 MG/DL (ref 6–20)
BUN/CREAT BLD: 12 (ref 9–20)
CALCIUM SERPL-MCNC: 9.5 MG/DL (ref 8.6–10.4)
CHLORIDE BLD-SCNC: 97 MMOL/L (ref 98–107)
CHOLESTEROL, FASTING: 167 MG/DL
CHOLESTEROL/HDL RATIO: 6.4
CO2: 29 MMOL/L (ref 20–31)
CREAT SERPL-MCNC: 0.78 MG/DL (ref 0.7–1.2)
ESTIMATED AVERAGE GLUCOSE: 120 MG/DL
GFR AFRICAN AMERICAN: >60 ML/MIN
GFR NON-AFRICAN AMERICAN: >60 ML/MIN
GFR SERPL CREATININE-BSD FRML MDRD: ABNORMAL ML/MIN/{1.73_M2}
GFR SERPL CREATININE-BSD FRML MDRD: ABNORMAL ML/MIN/{1.73_M2}
GLUCOSE BLD-MCNC: 109 MG/DL (ref 70–99)
HBA1C MFR BLD: 5.8 % (ref 4.8–5.9)
HDLC SERPL-MCNC: 26 MG/DL
LDL CHOLESTEROL: 70 MG/DL (ref 0–130)
POTASSIUM SERPL-SCNC: 3.6 MMOL/L (ref 3.7–5.3)
SODIUM BLD-SCNC: 136 MMOL/L (ref 135–144)
TOTAL PROTEIN: 7.6 G/DL (ref 6.4–8.3)
TRIGLYCERIDE, FASTING: 353 MG/DL
VLDLC SERPL CALC-MCNC: ABNORMAL MG/DL (ref 1–30)

## 2020-10-21 PROCEDURE — 80061 LIPID PANEL: CPT

## 2020-10-21 PROCEDURE — 99214 OFFICE O/P EST MOD 30 MIN: CPT | Performed by: FAMILY MEDICINE

## 2020-10-21 PROCEDURE — 36415 COLL VENOUS BLD VENIPUNCTURE: CPT

## 2020-10-21 PROCEDURE — 90686 IIV4 VACC NO PRSV 0.5 ML IM: CPT | Performed by: FAMILY MEDICINE

## 2020-10-21 PROCEDURE — 90471 IMMUNIZATION ADMIN: CPT | Performed by: FAMILY MEDICINE

## 2020-10-21 PROCEDURE — 83036 HEMOGLOBIN GLYCOSYLATED A1C: CPT

## 2020-10-21 PROCEDURE — 80053 COMPREHEN METABOLIC PANEL: CPT

## 2020-10-21 RX ORDER — ATORVASTATIN CALCIUM 20 MG/1
20 TABLET, FILM COATED ORAL DAILY
Qty: 90 TABLET | Refills: 1 | Status: SHIPPED | OUTPATIENT
Start: 2020-10-21 | End: 2021-02-11 | Stop reason: SDUPTHER

## 2020-10-21 RX ORDER — HYDROCODONE BITARTRATE AND ACETAMINOPHEN 5; 325 MG/1; MG/1
1 TABLET ORAL EVERY 6 HOURS PRN
Qty: 30 TABLET | Refills: 0 | Status: SHIPPED | OUTPATIENT
Start: 2020-10-21 | End: 2020-11-18 | Stop reason: SDUPTHER

## 2020-10-21 RX ORDER — LISINOPRIL 10 MG/1
TABLET ORAL
Qty: 90 TABLET | Refills: 1 | Status: SHIPPED | OUTPATIENT
Start: 2020-10-21 | End: 2021-02-11 | Stop reason: SDUPTHER

## 2020-10-21 ASSESSMENT — PATIENT HEALTH QUESTIONNAIRE - PHQ9
SUM OF ALL RESPONSES TO PHQ QUESTIONS 1-9: 0
2. FEELING DOWN, DEPRESSED OR HOPELESS: 0
1. LITTLE INTEREST OR PLEASURE IN DOING THINGS: 0
SUM OF ALL RESPONSES TO PHQ QUESTIONS 1-9: 0
SUM OF ALL RESPONSES TO PHQ9 QUESTIONS 1 & 2: 0
SUM OF ALL RESPONSES TO PHQ QUESTIONS 1-9: 0

## 2020-10-21 NOTE — PROGRESS NOTES
tablet TAKE 2 TABLETS TWICE A  tablet 1    hydroCHLOROthiazide (HYDRODIURIL) 25 MG tablet TAKE 1 TABLET DAILY 90 tablet 1    losartan-hydrochlorothiazide (HYZAAR) 100-25 MG per tablet TAKE 1 TABLET DAILY 90 tablet 1    omeprazole (PRILOSEC) 20 MG delayed release capsule TAKE 1 CAPSULE DAILY 90 capsule 0    atorvastatin (LIPITOR) 20 MG tablet Take 1 tablet by mouth daily 30 tablet 3       He is allergic to chantix [varenicline]. He is a smoker. He  reports that he has been smoking cigarettes. He started smoking about 40 years ago. He has a 30.00 pack-year smoking history. He quit smokeless tobacco use about 32 years ago. His smokeless tobacco use included snuff. Objective:    Vitals:    10/21/20 1257   BP: 138/88   Site: Right Upper Arm   Position: Sitting   Cuff Size: Large Adult   Pulse: 82   Resp: 16   Weight: 242 lb 12.8 oz (110.1 kg)   Height: 6' 1\" (1.854 m)     Body mass index is 32.03 kg/m². Obese  male, healthy-appearing, alert, cooperative and in no distress. Neck supple. No adenopathy. Thyroid symmetric, normal size. Chest:  Normal expansion. Clear to auscultation. No rales, rhonchi, or wheezing. Heart sounds are normal.  Regular rate and rhythm without murmur, gallop or rub. Lower extremities have no edema. He has had no recent labs. Assessment and Plan:    1. Type 2 diabetes mellitus without complication, without long-term current use of insulin ([facility] [address]  He has not been checking his glucose at home. He has orders for a HgbA1c. I asked him to do this today. He will be contacted when the results are available. 2. Essential hypertension  His blood pressure is marginally-controlled. (BP: 138/88)  He states that his blood pressure outside of the office is generally lower than his blood pressure in the office. He was advised to continue current medications.   Lisinopril was refilled:   - lisinopril (PRINIVIL;ZESTRIL) 10 MG tablet; TAKE 1 TABLET DAILY Dispense: 90 tablet; Refill: 1    He has orders for a comprehensive panel. I asked him to do this today. He will be contacted when the results are available. 3. Mixed hyperlipidemia  He has orders for a lipid panel. I asked him to do this today. He will be contacted when the results are available. He is tolerating Lipitor well; this was refilled:  - atorvastatin (LIPITOR) 20 MG tablet; Take 1 tablet by mouth daily  Dispense: 90 tablet; Refill: 1    4. Chronic pain of left ankle  Controlled substances monitoring: No signs of potential drug abuse or diversion identified when the OAR[place] from [place], [place], and [place] was reviewed today. The activity on the report was consistent with the treatment plan. Norco was refilled:  - HYDROcodone-acetaminophen (NORCO) 5-325 MG per tablet; Take 1 tablet by mouth every 6 hours as needed for Pain for up to 30 days. Dispense: 30 tablet; Refill: 0    5. Routine health maintenance  Health maintenance was reviewed with the patient. Annual influenza vaccine was recommended and ordered. Colonoscopy was recommended. He declined. Hepatitis B vaccine series, Tdap, and Shingrix were recommended and declined. All other health maintenance, including Pneumovax, is up to date at this time. There is no indication for Prevnar-13 at this time.      (Please note that portions of this note were completed with a voice-recognition program. Efforts were made to edit the dictation but occasionally words are mis-transcribed.)

## 2020-10-28 ENCOUNTER — TELEPHONE (OUTPATIENT)
Dept: FAMILY MEDICINE CLINIC | Age: 55
End: 2020-10-28

## 2020-10-28 NOTE — TELEPHONE ENCOUNTER
Attempted to reach x4. Letter mailed to patient. Future labs were ordered and mailed to patient as well.

## 2020-10-28 NOTE — LETTER
[address] department of [place] [address]  Phone: [phone]  Fax: [phone]    Stormy Palacio MD        October 28, 2020    [address] [place] [address]      Dear Macrina Kaye:    We have been unable to reach you. Please call our office regarding recent laboratory results. You can reach us at [phone].         Sincerely,        Stormy Palacio MD

## 2020-11-18 RX ORDER — HYDROCODONE BITARTRATE AND ACETAMINOPHEN 5; 325 MG/1; MG/1
1 TABLET ORAL EVERY 6 HOURS PRN
Qty: 30 TABLET | Refills: 0 | Status: SHIPPED | OUTPATIENT
Start: 2020-11-18 | End: 2020-12-22 | Stop reason: SDUPTHER

## 2020-11-18 NOTE — TELEPHONE ENCOUNTER
Brandi Mckenzie called requesting a refill of the below medication which has been pended for you: O[place] from [place], and [place] reviewed. NORCO 5-325 mg last filled 10/21/20 #30/8 days. Report available for your review. Requested Prescriptions     Pending Prescriptions Disp Refills    HYDROcodone-acetaminophen (NORCO) 5-325 MG per tablet 30 tablet 0     Sig: Take 1 tablet by mouth every 6 hours as needed for Pain for up to 30 days. Last Appointment Date: 10/21/2020  Next Appointment Date: 2/11/2021    Allergies   Allergen Reactions    Chantix [Varenicline] Other (See Comments)     Unusual dreams.

## 2020-11-18 NOTE — TELEPHONE ENCOUNTER
Martin Torres called requesting a refill of the below medication which has been pended for you:     Requested Prescriptions     Pending Prescriptions Disp Refills    HYDROcodone-acetaminophen (NORCO) 5-325 MG per tablet 30 tablet 0     Sig: Take 1 tablet by mouth every 6 hours as needed for Pain for up to 30 days. Last Appointment Date: 10/21/2020  Next Appointment Date: 2/11/2021    Allergies   Allergen Reactions    Chantix [Varenicline] Other (See Comments)     Unusual dreams.

## 2020-12-15 ENCOUNTER — VIRTUAL VISIT (OUTPATIENT)
Dept: FAMILY MEDICINE CLINIC | Age: 55
End: 2020-12-15
Payer: COMMERCIAL

## 2020-12-15 PROCEDURE — 98967 PH1 ASSMT&MGMT NQHP 11-20: CPT | Performed by: NURSE PRACTITIONER

## 2020-12-15 ASSESSMENT — PATIENT HEALTH QUESTIONNAIRE - PHQ9
2. FEELING DOWN, DEPRESSED OR HOPELESS: 1
SUM OF ALL RESPONSES TO PHQ QUESTIONS 1-9: 2
1. LITTLE INTEREST OR PLEASURE IN DOING THINGS: 1
SUM OF ALL RESPONSES TO PHQ9 QUESTIONS 1 & 2: 2

## 2020-12-15 NOTE — LETTER
[facility] department of [place] [address]  Phone: [phone]  Fax: [phone]    JOHN Nelson CNP        December 15, 2020     Patient: Thais Bowling   YOB: 1965   Date of Visit: 12/15/2020       To Whom It May Concern: It is my medical opinion that Yajaira Dial may return to work on December 21, 2020. If you have any questions or concerns, please don't hesitate to call.     Sincerely,        JOHN Nelson CNP

## 2020-12-15 NOTE — PROGRESS NOTES
Taylor Zavaleta is a 48 y.o. male evaluated via telephone on 12/15/2020. Consent:  He and/or health care decision maker is aware that that he may receive a bill for this telephone service, depending on his insurance coverage, and has provided verbal consent to proceed: Yes      Documentation:  I communicated with the patient and/or health care decision maker about returning to work due to COVID-19. Details of this discussion including any medical advice provided: Pt presents for this visit via a telephone call. He tested positive for COVID-19 on December 8th at his work. His symptoms started 12/7/2020. He continues to have a headache along with some nausea and muscle aches. He denies fevers. The congestion and cough have improved. He denies shortness of breath or chest tightness. He is planning to return to work on 12/21/2020. His 10 day isolation is up on December 17th. He is requesting to go back to work on Monday. He has no further concerns. Diagnosis Orders   1. COVID-19       Pt continues to be experiencing symptoms. It is reasonable for him to return on Monday 12/21/2020. If he requires further days I would recommend reexamination. Supportive care  Push fluids  Return if symptoms worsen or do not improve   Pt to return PRN       I affirm this is a Patient Initiated Episode with a Patient who has not had a related appointment within my department in the past 7 days or scheduled within the next 24 hours.     Patient identification was verified at the start of the visit: Yes    Total Time: minutes: 11-20 minutes    Note: not billable if this call serves to triage the patient into an appointment for the relevant concern      Silvestre Sun

## 2020-12-16 PROBLEM — Z86.16 HISTORY OF 2019 NOVEL CORONAVIRUS DISEASE (COVID-19): Status: ACTIVE | Noted: 2020-12-16

## 2020-12-22 RX ORDER — HYDROCODONE BITARTRATE AND ACETAMINOPHEN 5; 325 MG/1; MG/1
1 TABLET ORAL EVERY 6 HOURS PRN
Qty: 30 TABLET | Refills: 0 | Status: SHIPPED | OUTPATIENT
Start: 2020-12-22 | End: 2021-01-19 | Stop reason: SDUPTHER

## 2020-12-22 NOTE — TELEPHONE ENCOUNTER
Bola Rick called requesting a refill of the below medication which has been pended for you: O[place] from [place], and [place] reviewed. NORCO 5-325 mg last filled 11/19/20 #30/30 days. Report available for your review. Requested Prescriptions     Pending Prescriptions Disp Refills    HYDROcodone-acetaminophen (NORCO) 5-325 MG per tablet 30 tablet 0     Sig: Take 1 tablet by mouth every 6 hours as needed for Pain for up to 30 days. Last Appointment Date: 10/21/2020  Next Appointment Date: 2/11/2021    Allergies   Allergen Reactions    Chantix [Varenicline] Other (See Comments)     Unusual dreams.

## 2021-01-08 DIAGNOSIS — F95.2 TOURETTE'S SYNDROME: ICD-10-CM

## 2021-01-08 RX ORDER — PIMOZIDE 1 MG/1
TABLET ORAL
Qty: 360 TABLET | Refills: 0 | Status: SHIPPED | OUTPATIENT
Start: 2021-01-08 | End: 2021-02-11 | Stop reason: SDUPTHER

## 2021-01-08 NOTE — TELEPHONE ENCOUNTER
Maribell Rodriguez called requesting a refill of the below medication which has been pended for you:     Requested Prescriptions     Pending Prescriptions Disp Refills    pimozide (ORAP) 1 MG tablet [Pharmacy Med Name: PIMOZIDE TAB 1MG] 360 tablet 0     Sig: TAKE 2 TABLETS TWICE A DAY       Last Appointment Date: 10/21/2020  Next Appointment Date: 2/11/2021    Allergies   Allergen Reactions    Chantix [Varenicline] Other (See Comments)     Unusual dreams.

## 2021-01-19 DIAGNOSIS — M25.572 CHRONIC PAIN OF LEFT ANKLE: ICD-10-CM

## 2021-01-19 DIAGNOSIS — G89.29 CHRONIC PAIN OF LEFT ANKLE: ICD-10-CM

## 2021-01-19 RX ORDER — HYDROCODONE BITARTRATE AND ACETAMINOPHEN 5; 325 MG/1; MG/1
1 TABLET ORAL EVERY 6 HOURS PRN
Qty: 30 TABLET | Refills: 0 | Status: SHIPPED | OUTPATIENT
Start: 2021-01-19 | End: 2021-02-17 | Stop reason: SDUPTHER

## 2021-01-19 NOTE — TELEPHONE ENCOUNTER
Last ov 10/21/2021,next ov 02/11/2021. Per [facility] requested medication last filled 12/22/2020,#30,30 day supply.

## 2021-01-24 DIAGNOSIS — I10 ESSENTIAL HYPERTENSION: ICD-10-CM

## 2021-01-26 RX ORDER — LOSARTAN POTASSIUM AND HYDROCHLOROTHIAZIDE 25; 100 MG/1; MG/1
TABLET ORAL
Qty: 90 TABLET | Refills: 0 | Status: SHIPPED | OUTPATIENT
Start: 2021-01-26 | End: 2021-02-11 | Stop reason: SDUPTHER

## 2021-02-11 ENCOUNTER — OFFICE VISIT (OUTPATIENT)
Dept: FAMILY MEDICINE CLINIC | Age: 56
End: 2021-02-11
Payer: COMMERCIAL

## 2021-02-11 VITALS
DIASTOLIC BLOOD PRESSURE: 98 MMHG | RESPIRATION RATE: 16 BRPM | HEIGHT: 73 IN | HEART RATE: 82 BPM | SYSTOLIC BLOOD PRESSURE: 158 MMHG | WEIGHT: 234.6 LBS | BODY MASS INDEX: 31.09 KG/M2

## 2021-02-11 DIAGNOSIS — I10 ESSENTIAL HYPERTENSION: ICD-10-CM

## 2021-02-11 DIAGNOSIS — K21.9 GASTROESOPHAGEAL REFLUX DISEASE, UNSPECIFIED WHETHER ESOPHAGITIS PRESENT: ICD-10-CM

## 2021-02-11 DIAGNOSIS — G89.29 CHRONIC PAIN OF LEFT ANKLE: ICD-10-CM

## 2021-02-11 DIAGNOSIS — E78.2 MIXED HYPERLIPIDEMIA: ICD-10-CM

## 2021-02-11 DIAGNOSIS — Z86.16 HISTORY OF 2019 NOVEL CORONAVIRUS DISEASE (COVID-19): ICD-10-CM

## 2021-02-11 DIAGNOSIS — E11.9 TYPE 2 DIABETES MELLITUS WITHOUT COMPLICATION, WITHOUT LONG-TERM CURRENT USE OF INSULIN (HCC): ICD-10-CM

## 2021-02-11 DIAGNOSIS — F95.2 TOURETTE'S SYNDROME: ICD-10-CM

## 2021-02-11 DIAGNOSIS — Z00.00 ANNUAL VISIT FOR GENERAL ADULT MEDICAL EXAMINATION WITHOUT ABNORMAL FINDINGS: Primary | ICD-10-CM

## 2021-02-11 DIAGNOSIS — M25.572 CHRONIC PAIN OF LEFT ANKLE: ICD-10-CM

## 2021-02-11 PROCEDURE — 99396 PREV VISIT EST AGE 40-64: CPT | Performed by: FAMILY MEDICINE

## 2021-02-11 RX ORDER — PIMOZIDE 1 MG/1
TABLET ORAL
Qty: 360 TABLET | Refills: 1 | Status: SHIPPED | OUTPATIENT
Start: 2021-02-11 | End: 2021-07-15 | Stop reason: SDUPTHER

## 2021-02-11 RX ORDER — ATORVASTATIN CALCIUM 20 MG/1
20 TABLET, FILM COATED ORAL DAILY
Qty: 90 TABLET | Refills: 1 | Status: SHIPPED | OUTPATIENT
Start: 2021-02-11 | End: 2021-07-15 | Stop reason: SDUPTHER

## 2021-02-11 RX ORDER — OMEPRAZOLE 20 MG/1
CAPSULE, DELAYED RELEASE ORAL
Qty: 90 CAPSULE | Refills: 1 | Status: SHIPPED | OUTPATIENT
Start: 2021-02-11 | End: 2021-07-15 | Stop reason: SDUPTHER

## 2021-02-11 RX ORDER — LOSARTAN POTASSIUM AND HYDROCHLOROTHIAZIDE 25; 100 MG/1; MG/1
TABLET ORAL
Qty: 90 TABLET | Refills: 1 | Status: SHIPPED | OUTPATIENT
Start: 2021-02-11 | End: 2021-07-15 | Stop reason: SDUPTHER

## 2021-02-11 RX ORDER — HYDROCODONE BITARTRATE AND ACETAMINOPHEN 5; 325 MG/1; MG/1
1 TABLET ORAL EVERY 6 HOURS PRN
Qty: 30 TABLET | Refills: 0 | Status: CANCELLED | OUTPATIENT
Start: 2021-02-11 | End: 2021-03-13

## 2021-02-11 RX ORDER — LISINOPRIL 10 MG/1
TABLET ORAL
Qty: 90 TABLET | Refills: 1 | Status: SHIPPED | OUTPATIENT
Start: 2021-02-11 | End: 2021-02-17

## 2021-02-11 RX ORDER — HYDROCHLOROTHIAZIDE 25 MG/1
TABLET ORAL
Qty: 90 TABLET | Refills: 1 | Status: SHIPPED | OUTPATIENT
Start: 2021-02-11 | End: 2021-07-15 | Stop reason: SDUPTHER

## 2021-02-11 ASSESSMENT — PATIENT HEALTH QUESTIONNAIRE - PHQ9
SUM OF ALL RESPONSES TO PHQ9 QUESTIONS 1 & 2: 0
SUM OF ALL RESPONSES TO PHQ QUESTIONS 1-9: 0
1. LITTLE INTEREST OR PLEASURE IN DOING THINGS: 0

## 2021-02-11 NOTE — PROGRESS NOTES
[address], [address]                        Telephone [phone]             Fax [phone]     Loretta Dolan  1965  MRN:  [id number]  Date of visit:  2/11/2021    Subjective:    Vicente Ariza is a 48 y.o. male who presents to [facility] (2/11/2021) for follow up/evaluation of: Annual Exam, Diabetes, Hypertension, and Ankle Pain (Chronic Left ankle pain)      He reports that he has been feeling the same. He has been tolerating his medications well. He has not had the labs drawn that were ordered to be done before today's visit. He does not check his blood pressure at home. He has occasionally had his blood pressure checked at work. He states that his readings at work have been good. He reports increased pain and swelling in his left ankle when he has been standing for an extended period of time. He reports that he has not been exercising regularly. He reports that he had Covid-19 in December 2020. He did not require hospitalization or supplemental oxygen. He reports that he has some persistent fatigue since he recovered from the acute Covid-19 symptoms. He has no other new concerns or complaints today.         He has the following problem list:  Patient Active Problem List   Diagnosis    Essential hypertension    Tourette's syndrome    Gastroesophageal reflux disease    Tobacco abuse    Chronic pain of left ankle    Type 2 diabetes mellitus without complication (HCC)    Hypertriglyceridemia    Skin ulcer of toe of left foot, limited to breakdown of skin ([facility]    History of 2019 novel coronavirus disease (COVID-19)        Current medications are:  Outpatient Medications Marked as Taking for the 2/11/21 encounter (Office Visit) with Wyatt Cowan MD   Medication Sig Dispense Refill  losartan-hydroCHLOROthiazide (HYZAAR) 100-25 MG per tablet TAKE 1 TABLET DAILY 90 tablet 0    HYDROcodone-acetaminophen (NORCO) 5-325 MG per tablet Take 1 tablet by mouth every 6 hours as needed for Pain for up to 30 days. 30 tablet 0    pimozide (ORAP) 1 MG tablet TAKE 2 TABLETS TWICE A  tablet 0    lisinopril (PRINIVIL;ZESTRIL) 10 MG tablet TAKE 1 TABLET DAILY 90 tablet 1    atorvastatin (LIPITOR) 20 MG tablet Take 1 tablet by mouth daily 90 tablet 1    hydroCHLOROthiazide (HYDRODIURIL) 25 MG tablet TAKE 1 TABLET DAILY 90 tablet 1    omeprazole (PRILOSEC) 20 MG delayed release capsule TAKE 1 CAPSULE DAILY 90 capsule 0       He is allergic to chantix [varenicline]. He is a smoker. He  reports that he has been smoking cigarettes. He started smoking about 41 years ago. He has a 30.00 pack-year smoking history. He quit smokeless tobacco use about 33 years ago. His smokeless tobacco use included snuff. Objective:    Vitals:    02/11/21 1326 02/11/21 1341   BP: (!) 172/102 (!) 158/98   Site: Right Upper Arm Right Upper Arm   Position: Sitting Sitting   Cuff Size: Large Adult Thigh   Pulse: 82    Resp: 16    Weight: 234 lb 9.6 oz (106.4 kg)    Height: 6' 1\" (1.854 m)      Body mass index is 30.95 kg/m². Obese male, healthy-appearing, alert, cooperative and in no distress. Neck supple. No adenopathy. Thyroid symmetric, normal size. Chest:  Normal expansion. Clear to auscultation. No rales, rhonchi, or wheezing. Heart sounds are normal.  Regular rate and rhythm without murmur, gallop or rub. There is no edema of the right ankle. There is trace to 1+ edema of the left ankle. He has had no recent labs. Assessment and Plan:    1.  Annual visit for general adult medical examination without abnormal findings - atorvastatin (LIPITOR) 20 MG tablet; Take 1 tablet by mouth daily  Dispense: 90 tablet; Refill: 1    He has orders for a lipid panel. He was encouraged to come to the lab when he is fasting to have his labs done. He will be contacted when the results are available. 5. Tourette's syndrome  He is doing well with his current dose of Orap; this was refilled:  - pimozide (ORAP) 1 MG tablet; TAKE 2 TABLETS TWICE A DAY  Dispense: 360 tablet; Refill: 1    6. Gastroesophageal reflux disease  He is doing well with Omeprazole; this was refilled:  - omeprazole (PRILOSEC) 20 MG delayed release capsule; TAKE 1 CAPSULE DAILY  Dispense: 90 capsule; Refill: 1    7. Chronic pain of left ankle  Controlled substances monitoring: No signs of potential drug abuse or diversion identified when the [place] report from [place], [place], and [place] was reviewed today. The activity on the report was consistent with the treatment plan. He appears to be taking Norco appropriately. He states that he does not need a refill of Norco today. He requests a refill next week. 8. History of 2019 novel coronavirus disease (COVID-19)  As above, he reports some persistent fatigue since his diagnosis in December. He was advised that this is common after a Covid-19 diagnosis. He was advised to get a Covid-19 vaccine when he is eligible.     (Please note that portions of this note were completed with a voice-recognition program. Efforts were made to edit the dictation but occasionally words are mis-transcribed.)

## 2021-02-11 NOTE — Clinical Note
Please call the patient and advise him that he should be taking Losartan-Hydrochlorothiazide and Hydrochlorothiazide ONLY. He should not be taking Lisinopril also. He should check his blood pressure frequently outside of the office, and call or send a [facility] Chart message with his blood pressure readings in one week.

## 2021-02-17 ENCOUNTER — TELEPHONE (OUTPATIENT)
Dept: FAMILY MEDICINE CLINIC | Age: 56
End: 2021-02-17

## 2021-02-17 DIAGNOSIS — M25.572 CHRONIC PAIN OF LEFT ANKLE: Primary | ICD-10-CM

## 2021-02-17 DIAGNOSIS — G89.29 CHRONIC PAIN OF LEFT ANKLE: Primary | ICD-10-CM

## 2021-02-17 RX ORDER — HYDROCODONE BITARTRATE AND ACETAMINOPHEN 5; 325 MG/1; MG/1
1 TABLET ORAL EVERY 6 HOURS PRN
Qty: 30 TABLET | Refills: 0 | Status: SHIPPED | OUTPATIENT
Start: 2021-02-17 | End: 2021-03-24 | Stop reason: SDUPTHER

## 2021-02-17 NOTE — TELEPHONE ENCOUNTER
Fax received from [facility] that there is a \"drug to drug\" interaction between Prinivil and Losartan/HCT. Dr Ramone Bansal recommends to discontinue Lisinopril. See form. Attempted to reach patient,no answer and unable to leave a message.

## 2021-02-17 NOTE — TELEPHONE ENCOUNTER
----- Message from Bonny Connolly MD sent at 2/17/2021  2:39 PM EST -----  Please call the patient and advise him that he should be taking Losartan-Hydrochlorothiazide and Hydrochlorothiazide ONLY. He should not be taking Lisinopril also. He should check his blood pressure frequently outside of the office, and call or send a [employer] message with his blood pressure readings in one week.

## 2021-03-16 ENCOUNTER — HOSPITAL ENCOUNTER (OUTPATIENT)
Dept: WOUND CARE | Age: 56
Discharge: HOME | End: 2021-03-17
Payer: COMMERCIAL

## 2021-03-16 VITALS
WEIGHT: 231.6 LBS | BODY MASS INDEX: 30.69 KG/M2 | HEIGHT: 73 IN | TEMPERATURE: 96.1 F | RESPIRATION RATE: 18 BRPM | SYSTOLIC BLOOD PRESSURE: 134 MMHG | HEART RATE: 88 BPM | DIASTOLIC BLOOD PRESSURE: 78 MMHG

## 2021-03-16 DIAGNOSIS — L97.521 SKIN ULCER OF TOE OF LEFT FOOT, LIMITED TO BREAKDOWN OF SKIN (HCC): Primary | ICD-10-CM

## 2021-03-16 PROCEDURE — 87186 SC STD MICRODIL/AGAR DIL: CPT

## 2021-03-16 PROCEDURE — 87205 SMEAR GRAM STAIN: CPT

## 2021-03-16 PROCEDURE — 87075 CULTR BACTERIA EXCEPT BLOOD: CPT

## 2021-03-16 PROCEDURE — 87077 CULTURE AEROBIC IDENTIFY: CPT

## 2021-03-16 PROCEDURE — 87070 CULTURE OTHR SPECIMN AEROBIC: CPT

## 2021-03-16 PROCEDURE — 87075 CULTR BACTERIA EXCEPT BLOOD: CPT | Performed by: SURGERY

## 2021-03-16 PROCEDURE — 87070 CULTURE OTHR SPECIMN AEROBIC: CPT | Performed by: SURGERY

## 2021-03-16 PROCEDURE — 11044 DBRDMT BONE 1ST 20 SQ CM/<: CPT | Performed by: SURGERY

## 2021-03-16 RX ORDER — LIDOCAINE HYDROCHLORIDE 40 MG/ML
SOLUTION TOPICAL ONCE
Status: CANCELLED | OUTPATIENT
Start: 2021-03-16 | End: 2021-03-16

## 2021-03-16 RX ORDER — LIDOCAINE 50 MG/G
OINTMENT TOPICAL ONCE
Status: CANCELLED | OUTPATIENT
Start: 2021-03-16 | End: 2021-03-16

## 2021-03-16 RX ORDER — GENTAMICIN SULFATE 1 MG/G
OINTMENT TOPICAL ONCE
Status: CANCELLED | OUTPATIENT
Start: 2021-03-16 | End: 2021-03-16

## 2021-03-16 RX ORDER — LIDOCAINE 40 MG/G
CREAM TOPICAL ONCE
Status: CANCELLED | OUTPATIENT
Start: 2021-03-16 | End: 2021-03-16

## 2021-03-16 RX ORDER — BACITRACIN, NEOMYCIN, POLYMYXIN B 400; 3.5; 5 [USP'U]/G; MG/G; [USP'U]/G
OINTMENT TOPICAL ONCE
Status: CANCELLED | OUTPATIENT
Start: 2021-03-16 | End: 2021-03-16

## 2021-03-16 RX ORDER — BACITRACIN ZINC AND POLYMYXIN B SULFATE 500; 1000 [USP'U]/G; [USP'U]/G
OINTMENT TOPICAL ONCE
Status: CANCELLED | OUTPATIENT
Start: 2021-03-16 | End: 2021-03-16

## 2021-03-16 RX ORDER — GINSENG 100 MG
CAPSULE ORAL ONCE
Status: CANCELLED | OUTPATIENT
Start: 2021-03-16 | End: 2021-03-16

## 2021-03-16 RX ORDER — BETAMETHASONE DIPROPIONATE 0.05 %
OINTMENT (GRAM) TOPICAL ONCE
Status: CANCELLED | OUTPATIENT
Start: 2021-03-16 | End: 2021-03-16

## 2021-03-16 RX ORDER — CLOBETASOL PROPIONATE 0.5 MG/G
OINTMENT TOPICAL ONCE
Status: CANCELLED | OUTPATIENT
Start: 2021-03-16 | End: 2021-03-16

## 2021-03-16 NOTE — PLAN OF CARE
Problem: Wound:  Goal: Will show signs of wound healing; wound closure and no evidence of infection  Description: Will show signs of wound healing; wound closure and no evidence of infection  Outcome: Ongoing     Problem: Smoking cessation:  Goal: Ability to formulate a plan to maintain a tobacco-free life will be supported  Description: Ability to formulate a plan to maintain a tobacco-free life will be supported  Outcome: Ongoing     Problem: Weight control:  Goal: Ability to maintain an optimal weight for height and age will be supported  Description: Ability to maintain an optimal weight for height and age will be supported  Outcome: Ongoing     Problem: Falls - Risk of:  Goal: Will remain free from falls  Description: Will remain free from falls  Outcome: Ongoing     Problem: Blood Glucose:  Goal: Ability to maintain appropriate glucose levels will improve  Description: Ability to maintain appropriate glucose levels will improve  Outcome: Ongoing

## 2021-03-16 NOTE — LETTER
[address]  Phone: [phone]  Fax: [phone]    Ruthie Ulloa MD        March 16, 2021     Patient: Krishna Reynolds   YOB: 1965   Date of Visit: 3/16/2021       To Whom It May Concern: It is my medical opinion that Adeel Brown must remain out of work until indefinitely. If you have any questions or concerns, please don't hesitate to call.     Sincerely,        Ruthie Ulloa MD

## 2021-03-16 NOTE — PROGRESS NOTES
Patient comes in with a large ulceration on his left great toe. He was actually seeing Dr. Megan Saunders about this last summer and has had problems with these before. Never healed up. He went back to work and the ulcer is progressed against considerably. He is having some pain with it. Patient is diabetic and does smoke. Most recent A1c was good though. Past Medical History:   Diagnosis Date    Ankle pain, left     chronic; s/p fracture in 1999    Gastroesophageal reflux disease     Hyperlipemia     Hypertension     Obesity     Tobacco abuse     Tourette's syndrome      Past Surgical History:   Procedure Laterality Date    ANKLE FRACTURE SURGERY Left 1999    WISDOM TOOTH EXTRACTION Bilateral      Current Outpatient Medications   Medication Sig Dispense Refill    HYDROcodone-acetaminophen (NORCO) 5-325 MG per tablet Take 1 tablet by mouth every 6 hours as needed for Pain for up to 30 days. 30 tablet 0    losartan-hydroCHLOROthiazide (HYZAAR) 100-25 MG per tablet TAKE 1 TABLET DAILY 90 tablet 1    pimozide (ORAP) 1 MG tablet TAKE 2 TABLETS TWICE A  tablet 1    atorvastatin (LIPITOR) 20 MG tablet Take 1 tablet by mouth daily 90 tablet 1    hydroCHLOROthiazide (HYDRODIURIL) 25 MG tablet TAKE 1 TABLET DAILY 90 tablet 1    omeprazole (PRILOSEC) 20 MG delayed release capsule TAKE 1 CAPSULE DAILY 90 capsule 1     No current facility-administered medications for this encounter. Allergies   Allergen Reactions    Chantix [Varenicline] Other (See Comments)     Unusual dreams. Social History     Tobacco Use    Smoking status: Current Every Day Smoker     Packs/day: 1.00     Years: 30.00     Pack years: 30.00     Types: Cigarettes     Start date: 1/1/1980    Smokeless tobacco: Former User     Types: Snuff     Quit date: 11/17/1987   Substance Use Topics    Alcohol use:  Yes     Alcohol/week: 0.0 standard drinks     Comment: occasional    Drug use: No     Family History   Problem Relation Age of Onset    Cancer Maternal Uncle     Hypertension Mother     Hypertension Father     Cancer Maternal Uncle        Pulse 88   Temp 96.1 °F (35.6 °C) (Tympanic)   Resp 18   Ht 6' 1\" (1.854 m)   Wt 231 lb 9.6 oz (105.1 kg)   BMI 30.56 kg/m²         He has a decent pulse in his foot. It is not obvious from this photo but PIP joint is hyperextended. His proximal phalanx is protruding from the wound. In this photo is is covered with pink and yellow exudate and unhealthy granulation. The distal phalanx is dislocated with the hyperextension. IMP/PLAN  1) Diabetic foot ulcer of left toe with osteomyelitis. - Both the proximal and distal phalanx are exposed  - I doubt the toe is salvageable. - Tissue taken for culture today  - Will check xray  2) Tobacco abuse - is critical for him to quit smoking. He is almost certainly going to loose his toe. Continuing smoking will impair wound healing and make it more likely for his to loose his foot. Procedure note:  Naoma Ards wound(s) debrided. Devitalized, necrotic, fibrinous material and biofilm was removed. Hemostasis by direct pressure as needed. Instruments used:   Curette: Yes   Forceps and scissors:  No   Scalpel: No   Tissue nippers or rongeur: Yes  Additional injected local anesthetic: No  Tissue obtained for culture: Yes  Additional hemostatic agents used:   Silver Nitrate: No   Electrocautery: No   Sutures: No   Topical agents (gel foam, thrombin, Surgicel): No  Depth of Debridement - bone  Debridement Size:  Roughly 2.5 cm2    Silver alginate  No weight bearing    Follow up in 1 week with a wound doctor.

## 2021-03-19 LAB
CULTURE: ABNORMAL
DIRECT EXAM: ABNORMAL
DIRECT EXAM: ABNORMAL
Lab: ABNORMAL
SPECIMEN DESCRIPTION: ABNORMAL

## 2021-03-22 ENCOUNTER — HOSPITAL ENCOUNTER (OUTPATIENT)
Dept: GENERAL RADIOLOGY | Age: 56
Discharge: HOME | End: 2021-03-24
Payer: COMMERCIAL

## 2021-03-22 DIAGNOSIS — L97.521 SKIN ULCER OF TOE OF LEFT FOOT, LIMITED TO BREAKDOWN OF SKIN (HCC): ICD-10-CM

## 2021-03-22 DIAGNOSIS — L08.9 LEFT FOOT INFECTION: Primary | ICD-10-CM

## 2021-03-22 PROCEDURE — 73630 X-RAY EXAM OF FOOT: CPT

## 2021-03-22 RX ORDER — CIPROFLOXACIN 500 MG/1
500 TABLET, FILM COATED ORAL 2 TIMES DAILY
Qty: 84 TABLET | Refills: 0 | Status: SHIPPED | OUTPATIENT
Start: 2021-03-22 | End: 2021-05-03

## 2021-03-22 NOTE — ADDENDUM NOTE
Encounter addended by: Monique Caceres RN on: 3/22/2021 2:00 PM   Actions taken: Order list changed, Diagnosis association updated

## 2021-03-23 DIAGNOSIS — G89.29 CHRONIC PAIN OF LEFT ANKLE: Primary | ICD-10-CM

## 2021-03-23 DIAGNOSIS — M25.572 CHRONIC PAIN OF LEFT ANKLE: Primary | ICD-10-CM

## 2021-03-24 RX ORDER — HYDROCODONE BITARTRATE AND ACETAMINOPHEN 5; 325 MG/1; MG/1
1 TABLET ORAL EVERY 6 HOURS PRN
Qty: 30 TABLET | Refills: 0 | Status: SHIPPED | OUTPATIENT
Start: 2021-03-24 | End: 2021-04-05 | Stop reason: SDUPTHER

## 2021-03-25 ENCOUNTER — OFFICE VISIT (OUTPATIENT)
Dept: SURGERY | Age: 56
End: 2021-03-25
Payer: COMMERCIAL

## 2021-03-25 ENCOUNTER — HOSPITAL ENCOUNTER (OUTPATIENT)
Dept: LAB | Age: 56
Discharge: HOME | End: 2021-03-25
Payer: COMMERCIAL

## 2021-03-25 ENCOUNTER — HOSPITAL ENCOUNTER (OUTPATIENT)
Dept: GENERAL RADIOLOGY | Age: 56
Discharge: HOME | End: 2021-03-27
Payer: COMMERCIAL

## 2021-03-25 VITALS — TEMPERATURE: 97.5 F | WEIGHT: 231 LBS | RESPIRATION RATE: 18 BRPM | HEART RATE: 92 BPM | BODY MASS INDEX: 30.48 KG/M2

## 2021-03-25 DIAGNOSIS — L97.524 SKIN ULCER OF TOE OF LEFT FOOT WITH NECROSIS OF BONE (HCC): Primary | ICD-10-CM

## 2021-03-25 DIAGNOSIS — L97.524 SKIN ULCER OF TOE OF LEFT FOOT WITH NECROSIS OF BONE (HCC): ICD-10-CM

## 2021-03-25 LAB
ABSOLUTE EOS #: 0.2 K/UL (ref 0–0.44)
ABSOLUTE IMMATURE GRANULOCYTE: 0.04 K/UL (ref 0–0.3)
ABSOLUTE LYMPH #: 2.81 K/UL (ref 1.1–3.7)
ABSOLUTE MONO #: 0.71 K/UL (ref 0.1–1.2)
ANION GAP SERPL CALCULATED.3IONS-SCNC: 12 MMOL/L (ref 9–17)
BASOPHILS # BLD: 0 % (ref 0–2)
BASOPHILS ABSOLUTE: 0.04 K/UL (ref 0–0.2)
BUN BLDV-MCNC: 9 MG/DL (ref 6–20)
BUN/CREAT BLD: 11 (ref 9–20)
CALCIUM SERPL-MCNC: 9.7 MG/DL (ref 8.6–10.4)
CHLORIDE BLD-SCNC: 94 MMOL/L (ref 98–107)
CO2: 28 MMOL/L (ref 20–31)
CREAT SERPL-MCNC: 0.8 MG/DL (ref 0.7–1.2)
DIFFERENTIAL TYPE: ABNORMAL
EOSINOPHILS RELATIVE PERCENT: 2 % (ref 1–4)
GFR AFRICAN AMERICAN: >60 ML/MIN
GFR NON-AFRICAN AMERICAN: >60 ML/MIN
GFR SERPL CREATININE-BSD FRML MDRD: ABNORMAL ML/MIN/{1.73_M2}
GFR SERPL CREATININE-BSD FRML MDRD: ABNORMAL ML/MIN/{1.73_M2}
GLUCOSE BLD-MCNC: 122 MG/DL (ref 70–99)
HCT VFR BLD CALC: 43 % (ref 40.7–50.3)
HEMOGLOBIN: 14.6 G/DL (ref 13–17)
IMMATURE GRANULOCYTES: 0 %
LYMPHOCYTES # BLD: 25 % (ref 24–43)
MCH RBC QN AUTO: 31.1 PG (ref 25.2–33.5)
MCHC RBC AUTO-ENTMCNC: 34 G/DL (ref 25.2–33.5)
MCV RBC AUTO: 91.7 FL (ref 82.6–102.9)
MONOCYTES # BLD: 6 % (ref 3–12)
NRBC AUTOMATED: 0 PER 100 WBC
PDW BLD-RTO: 12.5 % (ref 11.8–14.4)
PLATELET # BLD: 362 K/UL (ref 138–453)
PLATELET ESTIMATE: ABNORMAL
PMV BLD AUTO: 9 FL (ref 8.1–13.5)
POTASSIUM SERPL-SCNC: 4.1 MMOL/L (ref 3.7–5.3)
RBC # BLD: 4.69 M/UL (ref 4.21–5.77)
RBC # BLD: ABNORMAL 10*6/UL
SEG NEUTROPHILS: 67 % (ref 36–65)
SEGMENTED NEUTROPHILS ABSOLUTE COUNT: 7.44 K/UL (ref 1.5–8.1)
SODIUM BLD-SCNC: 134 MMOL/L (ref 135–144)
WBC # BLD: 11.2 K/UL (ref 3.5–11.3)
WBC # BLD: ABNORMAL 10*3/UL

## 2021-03-25 PROCEDURE — 99214 OFFICE O/P EST MOD 30 MIN: CPT | Performed by: SURGERY

## 2021-03-25 PROCEDURE — 80048 BASIC METABOLIC PNL TOTAL CA: CPT

## 2021-03-25 PROCEDURE — 71046 X-RAY EXAM CHEST 2 VIEWS: CPT

## 2021-03-25 PROCEDURE — 36415 COLL VENOUS BLD VENIPUNCTURE: CPT

## 2021-03-25 PROCEDURE — 85025 COMPLETE CBC W/AUTO DIFF WBC: CPT

## 2021-03-25 NOTE — PROGRESS NOTES
Name:  Meaghan Schreiber  Age:  48 y.o.   :  1965    Physician: Varney Aase, MD       Chief Complaint: Osteomyelitis left great toe      HPI:  Patient has a diabetic foot ulcer for quite a while, that has gotten progressively worse      MEDICAL HISTORY:    Past Medical History:        Diagnosis Date    Ankle pain, left     chronic; s/p fracture in     Gastroesophageal reflux disease     Hyperlipemia     Hypertension     Obesity     Tobacco abuse     Tourette's syndrome        Past Surgical History:        Procedure Laterality Date    ANKLE FRACTURE SURGERY Left     WISDOM TOOTH EXTRACTION Bilateral        Prior to Admission medications    Medication Sig Start Date End Date Taking? Authorizing Provider   HYDROcodone-acetaminophen (NORCO) 5-325 MG per tablet Take 1 tablet by mouth every 6 hours as needed for Pain for up to 30 days. 3/24/21 4/23/21 Yes Tha Bennett MD   ciprofloxacin (CIPRO) 500 MG tablet Take 1 tablet by mouth 2 times daily 3/22/21 5/3/21 Yes Sakina Magana MD   losartan-hydroCHLOROthiazide [facility]) 100-25 MG per tablet TAKE 1 TABLET DAILY 21  Yes Tha Bennett MD   pimozide (ORAP) 1 MG tablet TAKE 2 TABLETS TWICE A DAY 21  Yes Tha Bennett MD   atorvastatin (LIPITOR) 20 MG tablet Take 1 tablet by mouth daily 21  Yes Tha Bennett MD   hydroCHLOROthiazide (HYDRODIURIL) 25 MG tablet TAKE 1 TABLET DAILY 21  Yes Tha Bennett MD   omeprazole (PRILOSEC) 20 MG delayed release capsule TAKE 1 CAPSULE DAILY 21  Yes Tha Bennett MD       Allergies   Allergen Reactions    Chantix [Varenicline] Other (See Comments)     Unusual dreams. reports that he has been smoking cigarettes. He started smoking about 41 years ago. He has a 30.00 pack-year smoking history. He quit smokeless tobacco use about 33 years ago. His smokeless tobacco use included snuff. reports current alcohol use.     Family History   Problem Relation Age of Onset    Cancer Maternal Uncle     Hypertension Mother     Hypertension Father     Cancer Maternal Uncle             REVIEW OF SYSTEMS:  General:  negative  Eye:  negative   ENT:negative  Allergy/Immunology:  negative  Hematology/Lymphatic: negative  Lungs: negative  Cardiovascular: negative  Gastrointestinal: negative  : negative  Neurological: negative      PHYSICAL EXAM:    Pulse 92   Temp 97.5 °F (36.4 °C)   Resp 18   Wt 231 lb (104.8 kg)   BMI 30.48 kg/m²       Gen: Alert and oriented x3, no acute distress, well-appearing    Eyes: PERRL, Sclera Anicteric    Head: Normocephalic, non tender     Neck: Supple, no significant adenopathy. No carotid bruits, thyroid normal size and no masses    Chest: CTA, no wheezes, no rales, no rhonchi, symmetrical    Heart: Normal rate, regular rhythm, no murmurs    Abdomen: Soft, positive bowel sounds, non tender, non distended, no masses, no hernias, no HSM, no bruits. Neuro: Normal speech, motor/sensory grossly normal bilateral    MSK:       The IP joint is destroyed. He has no flexion in the toe at all, and the toe is permanently hyperextended. ASSESSMENT:  1) Osteomyelitis of left great toe - This is unlikely to heal.  Even if it were to heal the toe is so badly deformed it is likely deonte-njury will be a repeat problem. Therefore I recommend a toe amputation. He has faint foot pulses and his remaining foot looks to be in good shape. I think this has the best chance of returning him to maximal activity. There are significant risks of ongoing infection and wound healing issues. To maximize his chance at a good outcome he will need to stay off of it and quit smoking. PLAN:  1) Left Great Toe amputation.     Electronically signed by Joshua Domínguez MD on 3/25/2021 at 2:39 PM

## 2021-03-25 NOTE — PATIENT INSTRUCTIONS
Left great toe amputationPatient Education        Toe Amputation: What to Expect at [address] had amputation surgery to remove one or more of your toes. For most people, pain improves within a week after surgery. You may have stitches or sutures. The doctor will probably take these out about 10 days after the surgery. You may need to wear a cast or a special type of shoe for about 2 to 4 weeks. You may think you have feeling or pain where your toe had been. This is called phantom pain. It is common, and it may come and go for a year or longer. If you have this kind of pain, your doctor may prescribe medicine to treat it. This care sheet gives you a general idea about how long it will take for you to recover. But each person recovers at a different pace. Follow the steps below to get better as quickly as possible. How can you care for yourself at home? Activity    · Rest when you feel tired. Getting enough sleep will help you recover.     · Follow your doctor's instructions about how much weight you can put on your foot and when you can go back to your usual activities. If you were given crutches, use them as directed.     · Try to walk each day if you are able. Start by walking a little more than you did the day before. Bit by bit, increase the amount you walk. Walking boosts blood flow and helps prevent blood clots.     · You may notice some changes in your balance when you walk. Your balance will improve over time.     · Prop up your foot and leg on a pillow when you ice it or anytime you sit or lie down during the next 3 days. Try to keep it above the level of your heart. This will help reduce swelling.     · Ask your doctor when you can drive again.     · You may shower, unless your doctor tells you not to. Keep the bandage dry. If the bandage has been removed, you can wash the area with warm water and soap.  Pat the area dry.     · You will probably need to take about 4 weeks off from work or your normal routine. How much time you need to take off depends on the type of work you do and your overall health. Diet    · You can eat your normal diet. If your stomach is upset, try bland, low-fat foods like plain rice, broiled chicken, toast, and yogurt.     · You may notice that your bowel movements are not regular right after your surgery. This is common. Try to avoid constipation and straining with bowel movements. You may want to take a fiber supplement every day. If you have not had a bowel movement after a couple of days, ask your doctor about taking a mild laxative. Medicines    · Your doctor will tell you if and when you can restart your medicines. You will also get instructions about taking any new medicines.     · If you take aspirin or some other blood thinner, ask your doctor if and when to start taking it again. Make sure that you understand exactly what your doctor wants you to do.     · Take pain medicines exactly as directed. ? If the doctor gave you a prescription medicine for pain, take it as prescribed. ? If you are not taking a prescription pain medicine, ask your doctor if you can take an over-the-counter medicine.     · If your doctor prescribed antibiotics, take them as directed. Do not stop taking them just because you feel better. You need to take the full course of antibiotics.     · If you think your pain medicine is making you sick to your stomach:  ? Take your medicine after meals (unless your doctor has told you not to). ? Ask your doctor for a different pain medicine. Incision care    · Your doctor will probably remove the bandages after several days. Or your doctor may have you remove your bandages at home. Do not touch the surgery area. Keep it dry.     · Do not soak your foot until your doctor says it is okay. Follow-up care is a key part of your treatment and safety. Be sure to make and go to all appointments, and call your doctor if you are having problems.  It's also a good idea to know your test results and keep a list of the medicines you take. When should you call for help? Call 911 anytime you think you may need emergency care. For example, call if:    · You passed out (lost consciousness).     · You have sudden chest pain, are short of breath, or you cough up blood. Call your doctor now or seek immediate medical care if:    · You have pain that does not get better after you take pain medicine.     · You are sick to your stomach or cannot drink fluids.     · You have loose stitches, or your incision comes open.     · You have signs of a blood clot in your leg (called a deep vein thrombosis), such as:  ? Pain in your calf, back of the knee, thigh, or groin. ? Redness or swelling in your leg.     · You have signs of infection, such as:  ? Increased pain, swelling, warmth, or redness. ? Red streaks leading from the incision. ? Pus draining from the incision. ? A fever.     · You bleed through your bandage. Watch closely for any changes in your health, and be sure to contact your doctor if you have any problems. Where can you learn more? Go to https://[email or web address]. org and sign in to your [employer] account. Enter Q484 in the [employer] box to learn more about \"Toe Amputation: What to Expect at Home. \"     If you do not have an account, please click on the \"Sign Up Now\" link. Current as of: November 16, 2020               Content Version: 12.8  © 2006-2021 Healthwise, Incorporated. Care instructions adapted under license by [facility] ([facility]). If you have questions about a medical condition or this instruction, always ask your healthcare professional. [place] [address] any warranty or liability for your use of this information. surgery at [facility] 3/29/21.

## 2021-03-29 ENCOUNTER — TELEPHONE (OUTPATIENT)
Dept: SURGERY | Age: 56
End: 2021-03-29

## 2021-03-29 DIAGNOSIS — G89.29 CHRONIC PAIN OF LEFT ANKLE: ICD-10-CM

## 2021-03-29 DIAGNOSIS — M25.572 CHRONIC PAIN OF LEFT ANKLE: ICD-10-CM

## 2021-03-29 DIAGNOSIS — I10 ESSENTIAL HYPERTENSION: ICD-10-CM

## 2021-04-05 ENCOUNTER — TELEPHONE (OUTPATIENT)
Dept: SURGERY | Age: 56
End: 2021-04-05

## 2021-04-05 DIAGNOSIS — M25.572 CHRONIC PAIN OF LEFT ANKLE: ICD-10-CM

## 2021-04-05 DIAGNOSIS — G89.29 CHRONIC PAIN OF LEFT ANKLE: ICD-10-CM

## 2021-04-05 RX ORDER — HYDROCODONE BITARTRATE AND ACETAMINOPHEN 5; 325 MG/1; MG/1
1 TABLET ORAL EVERY 6 HOURS PRN
Qty: 30 TABLET | Refills: 0 | Status: SHIPPED | OUTPATIENT
Start: 2021-04-05 | End: 2021-04-20 | Stop reason: SDUPTHER

## 2021-04-05 NOTE — TELEPHONE ENCOUNTER
He has a pain contract with Dr. Oscar Candelario. Check with her to see if it is okay to give him some more pain medication for this acute post surgical pain. His last prescription was filled 3/24.

## 2021-04-05 NOTE — TELEPHONE ENCOUNTER
Controlled substances monitoring: No signs of potential drug abuse or diversion identified when the [place] from [place], [place], and [place] was reviewed today. The activity on the report was consistent with the treatment plan. A refill of [place]sn[place] was sent to the clinic pharmacy.

## 2021-04-06 ENCOUNTER — OFFICE VISIT (OUTPATIENT)
Dept: SURGERY | Age: 56
End: 2021-04-06
Payer: COMMERCIAL

## 2021-04-06 VITALS
RESPIRATION RATE: 18 BRPM | SYSTOLIC BLOOD PRESSURE: 146 MMHG | TEMPERATURE: 96.6 F | DIASTOLIC BLOOD PRESSURE: 88 MMHG | WEIGHT: 231 LBS | HEART RATE: 90 BPM | BODY MASS INDEX: 30.48 KG/M2

## 2021-04-06 DIAGNOSIS — L97.524 SKIN ULCER OF TOE OF LEFT FOOT WITH NECROSIS OF BONE (HCC): Primary | ICD-10-CM

## 2021-04-06 PROCEDURE — 99024 POSTOP FOLLOW-UP VISIT: CPT | Performed by: SURGERY

## 2021-04-06 NOTE — PATIENT INSTRUCTIONS
Keep dressing intact till next appt. SIGNS OF INFECTION  - Redness, swelling, skin hot  - Wound bed turns black or stringy yellow  - Foul odor  - Increased drainage or pus  - Increased pain  - Fever greater than 100F    CALL YOUR DOCTOR OR SEEK MEDICAL ATTENTION IF SIGNS OF INFECTION.   DO NOT WAIT UNTIL YOUR NEXT APPOINTMENT    Call the Wound Care Nurse with any other questions or concerns- [phone]  Follow up as scheduled

## 2021-04-13 ENCOUNTER — HOSPITAL ENCOUNTER (OUTPATIENT)
Dept: WOUND CARE | Age: 56
Discharge: HOME | End: 2021-04-14
Payer: COMMERCIAL

## 2021-04-13 VITALS
RESPIRATION RATE: 18 BRPM | HEIGHT: 73 IN | WEIGHT: 221 LBS | HEART RATE: 84 BPM | BODY MASS INDEX: 29.29 KG/M2 | SYSTOLIC BLOOD PRESSURE: 130 MMHG | DIASTOLIC BLOOD PRESSURE: 90 MMHG | TEMPERATURE: 97.1 F

## 2021-04-13 DIAGNOSIS — L97.524 SKIN ULCER OF TOE OF LEFT FOOT WITH NECROSIS OF BONE (HCC): Primary | ICD-10-CM

## 2021-04-13 RX ORDER — BACITRACIN, NEOMYCIN, POLYMYXIN B 400; 3.5; 5 [USP'U]/G; MG/G; [USP'U]/G
OINTMENT TOPICAL ONCE
Status: CANCELLED | OUTPATIENT
Start: 2021-04-13 | End: 2021-04-13

## 2021-04-13 RX ORDER — BETAMETHASONE DIPROPIONATE 0.05 %
OINTMENT (GRAM) TOPICAL ONCE
Status: CANCELLED | OUTPATIENT
Start: 2021-04-13 | End: 2021-04-13

## 2021-04-13 RX ORDER — GENTAMICIN SULFATE 1 MG/G
OINTMENT TOPICAL ONCE
Status: CANCELLED | OUTPATIENT
Start: 2021-04-13 | End: 2021-04-13

## 2021-04-13 RX ORDER — BACITRACIN ZINC AND POLYMYXIN B SULFATE 500; 1000 [USP'U]/G; [USP'U]/G
OINTMENT TOPICAL ONCE
Status: CANCELLED | OUTPATIENT
Start: 2021-04-13 | End: 2021-04-13

## 2021-04-13 RX ORDER — LIDOCAINE HYDROCHLORIDE 40 MG/ML
SOLUTION TOPICAL ONCE
Status: CANCELLED | OUTPATIENT
Start: 2021-04-13 | End: 2021-04-13

## 2021-04-13 RX ORDER — GINSENG 100 MG
CAPSULE ORAL ONCE
Status: CANCELLED | OUTPATIENT
Start: 2021-04-13 | End: 2021-04-13

## 2021-04-13 RX ORDER — LIDOCAINE 50 MG/G
OINTMENT TOPICAL ONCE
Status: CANCELLED | OUTPATIENT
Start: 2021-04-13 | End: 2021-04-13

## 2021-04-13 RX ORDER — LIDOCAINE 40 MG/G
CREAM TOPICAL ONCE
Status: CANCELLED | OUTPATIENT
Start: 2021-04-13 | End: 2021-04-13

## 2021-04-13 RX ORDER — CLOBETASOL PROPIONATE 0.5 MG/G
OINTMENT TOPICAL ONCE
Status: CANCELLED | OUTPATIENT
Start: 2021-04-13 | End: 2021-04-13

## 2021-04-13 ASSESSMENT — PAIN SCALES - GENERAL: PAINLEVEL_OUTOF10: 4

## 2021-04-20 ENCOUNTER — OFFICE VISIT (OUTPATIENT)
Dept: SURGERY | Age: 56
End: 2021-04-20
Payer: COMMERCIAL

## 2021-04-20 ENCOUNTER — OFFICE VISIT (OUTPATIENT)
Dept: PODIATRY | Age: 56
End: 2021-04-20
Payer: COMMERCIAL

## 2021-04-20 VITALS
RESPIRATION RATE: 18 BRPM | TEMPERATURE: 96.2 F | HEART RATE: 80 BPM | DIASTOLIC BLOOD PRESSURE: 86 MMHG | BODY MASS INDEX: 29.82 KG/M2 | SYSTOLIC BLOOD PRESSURE: 136 MMHG | WEIGHT: 226 LBS

## 2021-04-20 VITALS
WEIGHT: 226 LBS | BODY MASS INDEX: 29.95 KG/M2 | SYSTOLIC BLOOD PRESSURE: 136 MMHG | DIASTOLIC BLOOD PRESSURE: 86 MMHG | HEART RATE: 80 BPM | HEIGHT: 73 IN | TEMPERATURE: 96.2 F

## 2021-04-20 DIAGNOSIS — G89.29 CHRONIC PAIN OF LEFT ANKLE: ICD-10-CM

## 2021-04-20 DIAGNOSIS — S98.112A AMPUTATED GREAT TOE, LEFT (HCC): Primary | ICD-10-CM

## 2021-04-20 DIAGNOSIS — L97.524 SKIN ULCER OF TOE OF LEFT FOOT WITH NECROSIS OF BONE (HCC): Primary | ICD-10-CM

## 2021-04-20 DIAGNOSIS — S98.112A AMPUTATED GREAT TOE OF LEFT FOOT (HCC): Primary | ICD-10-CM

## 2021-04-20 DIAGNOSIS — M79.672 PAIN IN LEFT FOOT: ICD-10-CM

## 2021-04-20 DIAGNOSIS — M21.6X2 EQUINUS DEFORMITY OF BOTH FEET: ICD-10-CM

## 2021-04-20 DIAGNOSIS — M21.6X1 EQUINUS DEFORMITY OF BOTH FEET: ICD-10-CM

## 2021-04-20 DIAGNOSIS — E11.42 DIABETIC POLYNEUROPATHY ASSOCIATED WITH TYPE 2 DIABETES MELLITUS (HCC): ICD-10-CM

## 2021-04-20 DIAGNOSIS — M25.572 CHRONIC PAIN OF LEFT ANKLE: ICD-10-CM

## 2021-04-20 PROCEDURE — 99213 OFFICE O/P EST LOW 20 MIN: CPT | Performed by: PODIATRIST

## 2021-04-20 PROCEDURE — 99024 POSTOP FOLLOW-UP VISIT: CPT | Performed by: SURGERY

## 2021-04-20 RX ORDER — HYDROCODONE BITARTRATE AND ACETAMINOPHEN 5; 325 MG/1; MG/1
1 TABLET ORAL EVERY 6 HOURS PRN
Qty: 30 TABLET | Refills: 0 | Status: SHIPPED | OUTPATIENT
Start: 2021-04-20 | End: 2021-05-18 | Stop reason: SDUPTHER

## 2021-04-20 NOTE — PATIENT INSTRUCTIONS
Ok to shower, pat dry. OK to apply lotion to feet to help with dryness. Keep steri strips on to they fall off. SIGNS OF INFECTION  - Redness, swelling, skin hot  - Wound bed turns black or stringy yellow  - Foul odor  - Increased drainage or pus  - Increased pain  - Fever greater than 100F    CALL YOUR DOCTOR OR SEEK MEDICAL ATTENTION IF SIGNS OF INFECTION.   DO NOT WAIT UNTIL YOUR NEXT APPOINTMENT    Call the Wound Care Nurse with any other questions or concerns- [phone]

## 2021-04-20 NOTE — PROGRESS NOTES
Subjective:    Alta Vanegas is a 48 y.o. male who presents with the recent procedure left big toe. He states the wound that was there he got worse and ended up with amputation. Patient was sent to consider different types of insoles. Patient is looking more active soon. Patient aware about return of ulceration with complications like he recently had. Currently denies F/C/N/V. Overall diabetic control is not changed. Allergies   Allergen Reactions    Chantix [Varenicline] Other (See Comments)     Unusual dreams. Past Medical History:   Diagnosis Date    Ankle pain, left     chronic; s/p fracture in 1999    Gastroesophageal reflux disease     Hyperlipemia     Hypertension     Obesity     Tobacco abuse     Tourette's syndrome        Prior to Admission medications    Medication Sig Start Date End Date Taking? Authorizing Provider   Diabetic Shoe MISC by Does not apply route Dispense DM shoe and insoles. custom accomodative offloading insole with toe filler L side. Amputated great toe of left foot ([facility]  (primary encounter diagnosis)  Equinus deformity of both feet  Diabetic polyneuropathy associated with type 2 diabetes mellitus ([facility] 4/20/21  Yes Bhanu Roberson DPM   HYDROcodone-acetaminophen (NORCO) 5-325 MG per tablet Take 1 tablet by mouth every 6 hours as needed for Pain for up to 30 days.  4/5/21 5/5/21 Yes Brittnee Braswell MD   ciprofloxacin (CIPRO) 500 MG tablet Take 1 tablet by mouth 2 times daily 3/22/21 5/3/21 Yes Alejandro Bernardo MD   losartan-hydroCHLOROthiazide [facility]) 100-25 MG per tablet TAKE 1 TABLET DAILY 2/11/21  Yes Brittnee Braswell MD   pimozide (ORAP) 1 MG tablet TAKE 2 TABLETS TWICE A DAY 2/11/21  Yes Brittnee Braswell MD   atorvastatin (LIPITOR) 20 MG tablet Take 1 tablet by mouth daily 2/11/21  Yes Brittnee Braswell MD   hydroCHLOROthiazide (HYDRODIURIL) 25 MG tablet TAKE 1 TABLET DAILY 2/11/21  Yes Brittnee Braswell MD   omeprazole (PRILOSEC) 20 MG delayed release capsule TAKE 1 CAPSULE DAILY 2/11/21  Yes Jeremi Eddy MD       Social History     Tobacco Use    Smoking status: Current Every Day Smoker     Packs/day: 1.00     Years: 30.00     Pack years: 30.00     Types: Cigarettes     Start date: 1/1/1980    Smokeless tobacco: Former User     Types: Snuff     Quit date: 11/17/1987   Substance Use Topics    Alcohol use: Yes     Alcohol/week: 0.0 standard drinks     Comment: occasional       ROS: All 14 ROS systems reviewed and pertinent positives noted above, all others negative. Lower Extremity Physical Examination:     Vitals:   Vitals:    04/20/21 1429   BP: 136/86   Pulse: 80   Temp: 96.2 °F (35.7 °C)     General: AAO x 3 in NAD. Vascular: DP and PT pulses palpable 2/4, bilateral.  CFT <3 seconds, bilateral.  Hair growth present to the level of the digits, bilateral.  Edema absent, bilateral.  Varicosities absent, bilateral. Erythema absent, bilateral. Distal Rubor absent bilateral.  Temperature within normal limits bilateral. Hyperpigmentation absent bilateral. No atrophic skin. Neurological: Sensation intact to light touch to level of digits, bilateral.  Protective sensation intact 10/10 sites via 5.07/10g [place] Monofilament, bilateral.  negative Tinel's, bilateral.  negative Valleix sign, bilateral.  Vibratory intact bilateral.  Reflexes Decreased bilateral.  Paresthesias positive. Alejandro [place] Dysthesias negative. Sharp/dull intact bilateral.    Musculoskeletal: Muscle strength 5/5, bilateral.  Pain absent upon palpation bilateral. Normal medial longitudinal arch, bilateral.  Ankle ROM decreased,bilateral.  1st MPJ ROM decreased L bilateral.  Dorsally contracted digits absent. Great toe amp L. Integument:   Open lesion absent, Left. . Interdigital maceration absent to web spaces , Bilateral.  Nails b/l dystrophic. Fissures absent, Bilateral. Hyperkeratotic tissue is present. Asessment: Patient is a 48 y.o. male with:   Diagnosis Orders   1.  Amputated great toe of left foot ([facility]     2. Equinus deformity of both feet     3. Diabetic polyneuropathy associated with type 2 diabetes mellitus ([facility]         Plan:   Patient examined and evaluated. Diabetic foot education and exam.  Current condition and treatment options discussed in detail. Orders Placed This Encounter   Medications    Diabetic Shoe MISC     Sig: by Does not apply route Dispense DM shoe and insoles. custom accomodative offloading insole with toe filler L side. Amputated great toe of left foot ([facility] [address] [facility]  (primary encounter diagnosis)  Equinus deformity of both feet  Diabetic polyneuropathy associated with type 2 diabetes mellitus (HCC)     Dispense:  1 each     Refill:  0   DM foot ed and exam  Patient had low level medical decision making overall. Patient's stable chronic condition along with acute uncomplicated condition. 1 prescription given. No new testing ordered. Overall low risk morbidity with current treatment course. Continue sx shoe with offloading insole  Contact clinic with any questions/problems/concerns or new signs of infection.  Follow-up at [facility] in 1 year

## 2021-04-20 NOTE — TELEPHONE ENCOUNTER
Controlled substances monitoring: No signs of potential drug abuse or diversion identified when the [place] from [place], [place], and [place] was reviewed today. The activity on the report was consistent with the treatment plan. Please advise the patient that he will need an appointment for his next refill.

## 2021-04-20 NOTE — TELEPHONE ENCOUNTER
Patient stopped at the window requesting a refill, he states that he know he is early, he states that he just got his foot amputated so he has been taking 2 a day since that happened.  And he is almost out

## 2021-04-20 NOTE — LETTER
[address]  [facility] department of [place] [address]  Phone: [phone]  Fax: [phone]    Gloria Brooks MD        April 23, 2021     Patient: Homa Tran   YOB: 1965   Date of Visit: 4/20/2021       To Whom It May Concern: It is my medical opinion that Shira Jaimes should remain out of work until 5/31/21. Patient will be reevaluated on weekly basis. .    If you have any questions or concerns, please don't hesitate to call.     Sincerely,        Gloria Brooks MD

## 2021-04-20 NOTE — TELEPHONE ENCOUNTER
Last ov 02/11/2021,next ov 06/22/2021. Per [facility] requested medication last filled 04/05/2021 #30,30 day supply.

## 2021-05-04 ENCOUNTER — OFFICE VISIT (OUTPATIENT)
Dept: SURGERY | Age: 56
End: 2021-05-04
Payer: COMMERCIAL

## 2021-05-04 VITALS — RESPIRATION RATE: 18 BRPM | TEMPERATURE: 97.5 F | BODY MASS INDEX: 29.69 KG/M2 | WEIGHT: 225 LBS

## 2021-05-04 DIAGNOSIS — L97.524 SKIN ULCER OF TOE OF LEFT FOOT WITH NECROSIS OF BONE (HCC): Primary | ICD-10-CM

## 2021-05-04 PROCEDURE — 99024 POSTOP FOLLOW-UP VISIT: CPT | Performed by: SURGERY

## 2021-05-04 NOTE — PROGRESS NOTES
Post amputation left great toe    Wound is well healed        He saw Dr. Mylene Suresh 2 weeks ago and had an insert prescribed. He may resume full activity as her tolerates it once he get his insert.     Follow up with me PRN

## 2021-05-04 NOTE — PATIENT INSTRUCTIONS
Follow up as needed. SIGNS OF INFECTION  - Redness, swelling, skin hot  - Wound bed turns black or stringy yellow  - Foul odor  - Increased drainage or pus  - Increased pain  - Fever greater than 100F    CALL YOUR DOCTOR OR SEEK MEDICAL ATTENTION IF SIGNS OF INFECTION.   DO NOT WAIT UNTIL YOUR NEXT APPOINTMENT    Call the Wound Care Nurse with any other questions or concerns- [phone]

## 2021-05-18 ENCOUNTER — HOSPITAL ENCOUNTER (OUTPATIENT)
Dept: GENERAL RADIOLOGY | Age: 56
Discharge: HOME | End: 2021-05-20
Payer: COMMERCIAL

## 2021-05-18 ENCOUNTER — OFFICE VISIT (OUTPATIENT)
Dept: PODIATRY | Age: 56
End: 2021-05-18
Payer: COMMERCIAL

## 2021-05-18 VITALS
SYSTOLIC BLOOD PRESSURE: 132 MMHG | DIASTOLIC BLOOD PRESSURE: 78 MMHG | WEIGHT: 238 LBS | BODY MASS INDEX: 31.54 KG/M2 | HEIGHT: 73 IN | HEART RATE: 80 BPM

## 2021-05-18 DIAGNOSIS — G89.29 CHRONIC PAIN OF LEFT ANKLE: ICD-10-CM

## 2021-05-18 DIAGNOSIS — S98.112A AMPUTATED GREAT TOE OF LEFT FOOT (HCC): ICD-10-CM

## 2021-05-18 DIAGNOSIS — M25.572 CHRONIC PAIN OF LEFT ANKLE: ICD-10-CM

## 2021-05-18 DIAGNOSIS — S92.515A CLOSED NONDISPLACED FRACTURE OF PROXIMAL PHALANX OF LESSER TOE OF LEFT FOOT, INITIAL ENCOUNTER: Primary | ICD-10-CM

## 2021-05-18 DIAGNOSIS — M79.675 PAIN OF TOE OF LEFT FOOT: ICD-10-CM

## 2021-05-18 DIAGNOSIS — S98.112A AMPUTATED GREAT TOE, LEFT (HCC): ICD-10-CM

## 2021-05-18 DIAGNOSIS — E11.42 DIABETIC POLYNEUROPATHY ASSOCIATED WITH TYPE 2 DIABETES MELLITUS (HCC): ICD-10-CM

## 2021-05-18 PROCEDURE — 28510 TREATMENT OF TOE FRACTURE: CPT | Performed by: PODIATRIST

## 2021-05-18 PROCEDURE — 73630 X-RAY EXAM OF FOOT: CPT

## 2021-05-18 PROCEDURE — 99213 OFFICE O/P EST LOW 20 MIN: CPT | Performed by: PODIATRIST

## 2021-05-18 PROCEDURE — L3260 AMBULATORY SURGICAL BOOT EAC: HCPCS | Performed by: PODIATRIST

## 2021-05-18 RX ORDER — HYDROCODONE BITARTRATE AND ACETAMINOPHEN 5; 325 MG/1; MG/1
1 TABLET ORAL EVERY 6 HOURS PRN
Qty: 30 TABLET | Refills: 0 | Status: SHIPPED | OUTPATIENT
Start: 2021-05-18 | End: 2021-06-17

## 2021-05-18 NOTE — TELEPHONE ENCOUNTER
Micheal Welch called requesting a refill of the below medication which has been pended for you: O[place] from [place], and [place] reviewed. NORCO 5-325 mg last filled 4/20/21 #30/10 days. Report available for your review. Requested Prescriptions     Pending Prescriptions Disp Refills    HYDROcodone-acetaminophen (NORCO) 5-325 MG per tablet 30 tablet 0     Sig: Take 1 tablet by mouth every 6 hours as needed for Pain for up to 30 days. Last Appointment Date: 2/11/2021  Next Appointment Date: 6/22/2021    Allergies   Allergen Reactions    Chantix [Varenicline] Other (See Comments)     Unusual dreams.

## 2021-05-18 NOTE — PROGRESS NOTES
appropriate offloading. Education on normal bone healing in 4-6 weeks and risks of bone healing complications including malunion, delayed union, and nonunion if patient is non-compliant with offloading. For pain management patient will use OTC anti-inflammatory PRN. Patient will be seen back in  3week(s) with new x-rays 1st.  Patient had low level medical decision-making overall. Patient is stable chronic condition along with acute uncomplicated condition. 1 new test ordered. Low risk morbidity with the current treatment course.   Orders Placed This Encounter   Procedures    XR FOOT LEFT (MIN 3 VIEWS)     Standing Status:   Future     Number of Occurrences:   1     Standing Expiration Date:   5/19/2022     Scheduling Instructions:      WB     Order Specific Question:   Reason for exam:     Answer:   left 2nd toe pain with swelling

## 2021-06-08 ENCOUNTER — HOSPITAL ENCOUNTER (OUTPATIENT)
Dept: GENERAL RADIOLOGY | Age: 56
Discharge: HOME | End: 2021-06-10
Payer: COMMERCIAL

## 2021-06-08 ENCOUNTER — OFFICE VISIT (OUTPATIENT)
Dept: PODIATRY | Age: 56
End: 2021-06-08
Payer: COMMERCIAL

## 2021-06-08 VITALS
HEART RATE: 80 BPM | WEIGHT: 237 LBS | DIASTOLIC BLOOD PRESSURE: 80 MMHG | BODY MASS INDEX: 31.41 KG/M2 | HEIGHT: 73 IN | SYSTOLIC BLOOD PRESSURE: 138 MMHG

## 2021-06-08 DIAGNOSIS — M79.675 PAIN OF TOE OF LEFT FOOT: ICD-10-CM

## 2021-06-08 DIAGNOSIS — S92.515A CLOSED NONDISPLACED FRACTURE OF PROXIMAL PHALANX OF LESSER TOE OF LEFT FOOT, INITIAL ENCOUNTER: Primary | ICD-10-CM

## 2021-06-08 DIAGNOSIS — E11.42 DIABETIC POLYNEUROPATHY ASSOCIATED WITH TYPE 2 DIABETES MELLITUS (HCC): ICD-10-CM

## 2021-06-08 DIAGNOSIS — S92.515A CLOSED NONDISPLACED FRACTURE OF PROXIMAL PHALANX OF LESSER TOE OF LEFT FOOT, INITIAL ENCOUNTER: ICD-10-CM

## 2021-06-08 DIAGNOSIS — S92.515G CLOSED NONDISPLACED FRACTURE OF PROXIMAL PHALANX OF LESSER TOE OF LEFT FOOT WITH DELAYED HEALING, SUBSEQUENT ENCOUNTER: ICD-10-CM

## 2021-06-08 DIAGNOSIS — S98.112A AMPUTATED GREAT TOE OF LEFT FOOT (HCC): ICD-10-CM

## 2021-06-08 PROCEDURE — 28510 TREATMENT OF TOE FRACTURE: CPT | Performed by: PODIATRIST

## 2021-06-08 PROCEDURE — 99024 POSTOP FOLLOW-UP VISIT: CPT | Performed by: PODIATRIST

## 2021-06-08 PROCEDURE — 73630 X-RAY EXAM OF FOOT: CPT

## 2021-06-08 NOTE — PROGRESS NOTES
Subjective:    Marcia Saunders is a 48 y.o. male who presents with the L 2nd toe fracture. Patient has been compliant with off loading surgical shoe. . Patient relates pain is Present. Pain is rated 1 out of 10 and is described as mild. Patient states that toes stayed swollen and came down slowly. Pain comes and goes. Past Medical History:   Diagnosis Date    Ankle pain, left     chronic; s/p fracture in 1999    Gastroesophageal reflux disease     Hyperlipemia     Hypertension     Obesity     Tobacco abuse     Tourette's syndrome      Social History     Socioeconomic History    Marital status: Single     Spouse name: None    Number of children: None    Years of education: None    Highest education level: None   Occupational History    None   Tobacco Use    Smoking status: Current Every Day Smoker     Packs/day: 1.00     Years: 30.00     Pack years: 30.00     Types: Cigarettes     Start date: 1/1/1980    Smokeless tobacco: Former User     Types: Snuff     Quit date: 11/17/1987   Substance and Sexual Activity    Alcohol use: Yes     Alcohol/week: 0.0 standard drinks     Comment: occasional    Drug use: No    Sexual activity: None   Other Topics Concern    None   Social History Narrative    None     Social Determinants of Health     Financial Resource Strain:     Difficulty of Paying Living Expenses:    Food Insecurity:     Worried About Running Out of Food in the Last Year:     Ran Out of Food in the Last Year:    Transportation Needs:     Lack of Transportation (Medical):      Lack of Transportation (Non-Medical):    Physical Activity:     Days of Exercise per Week:     Minutes of Exercise per Session:    Stress:     Feeling of Stress :    Social Connections:     Frequency of Communication with Friends and Family:     Frequency of Social Gatherings with Friends and Family:     Attends Jehovah's witness Services:     Active Member of Clubs or Organizations:     Attends Club or Organization bilateral. No atrophic skin. Neurological: Sensation intact to light touch to level of digits, bilateral.  Protective sensation intact 10/10 sites via 5.07/10g [place] Monofilament, bilateral.  negative Tinel's, bilateral.  negative Valleix sign, bilateral.  Vibratory intact bilateral.  Reflexes Decreased bilateral.  Paresthesias negative. Dysthesias negative. Sharp/dull intact bilateral.  Musculoskeletal: Muscle strength 5/5, Bilateral.  Pain absent upon palpation of 2nd toe, Left. within normal limits medial longitudinal arch, Bilateral.  Ankle ROM decreased,Bilateral.  1st MPJ ROM within normal limits, Bilateral.  Dorsally contracted digits absent digits none Bilateral. Other foot deformities L hallux amp. Integument: Warm, dry, supple, bilateral.  Open lesion absent, bilateral.  Interdigital maceration absent to web spaces bilateral.  Nails within normal limits. Fissures absent, bilateral. Hyperkeratotic tissue is absent. Radiographic interpretation:  3 views foot weightbearing left : no soft tissue deficits, no soft tissue emphysema, no masses, all joints appear well-aligned. Fracture is proximal Proximal phalanx, increased bone callus seen , 2nd mild displaced of the lateral fragment and oblique Fracture position acceptable with healing appropriate for time frame  . New fracture identified proximal left third toe proximal phalanx no displacement. No other significant foot deformities. Assessment:   Diagnosis Orders   1. Closed nondisplaced fracture of proximal phalanx of lesser toe of left foot, initial encounter     2. Diabetic polyneuropathy associated with type 2 diabetes mellitus ([facility]     3. Amputated great toe of left foot ([facility])     4. Closed nondisplaced fracture of proximal phalanx of lesser toe of left foot with delayed healing, subsequent encounter         Plan:  Patient condition was discussed and all questions answered. X-rays were reviewed with the patient.   Patient will

## 2021-06-17 DIAGNOSIS — M25.572 CHRONIC PAIN OF LEFT ANKLE: ICD-10-CM

## 2021-06-17 DIAGNOSIS — G89.29 CHRONIC PAIN OF LEFT ANKLE: ICD-10-CM

## 2021-06-18 RX ORDER — HYDROCODONE BITARTRATE AND ACETAMINOPHEN 5; 325 MG/1; MG/1
1 TABLET ORAL EVERY 6 HOURS PRN
Qty: 30 TABLET | Refills: 0 | OUTPATIENT
Start: 2021-06-18 | End: 2021-07-18

## 2021-06-29 ENCOUNTER — OFFICE VISIT (OUTPATIENT)
Dept: PODIATRY | Age: 56
End: 2021-06-29
Payer: COMMERCIAL

## 2021-06-29 ENCOUNTER — HOSPITAL ENCOUNTER (OUTPATIENT)
Dept: GENERAL RADIOLOGY | Age: 56
Discharge: HOME | End: 2021-07-01
Payer: COMMERCIAL

## 2021-06-29 VITALS
BODY MASS INDEX: 31.29 KG/M2 | SYSTOLIC BLOOD PRESSURE: 138 MMHG | HEART RATE: 84 BPM | RESPIRATION RATE: 20 BRPM | WEIGHT: 237.2 LBS | DIASTOLIC BLOOD PRESSURE: 86 MMHG

## 2021-06-29 DIAGNOSIS — S92.515A CLOSED NONDISPLACED FRACTURE OF PROXIMAL PHALANX OF LESSER TOE OF LEFT FOOT, INITIAL ENCOUNTER: ICD-10-CM

## 2021-06-29 DIAGNOSIS — S92.515G CLOSED NONDISPLACED FRACTURE OF PROXIMAL PHALANX OF LESSER TOE OF LEFT FOOT WITH DELAYED HEALING, SUBSEQUENT ENCOUNTER: ICD-10-CM

## 2021-06-29 DIAGNOSIS — S92.515G CLOSED NONDISPLACED FRACTURE OF PROXIMAL PHALANX OF LESSER TOE OF LEFT FOOT WITH DELAYED HEALING, SUBSEQUENT ENCOUNTER: Primary | ICD-10-CM

## 2021-06-29 PROCEDURE — 99024 POSTOP FOLLOW-UP VISIT: CPT | Performed by: PODIATRIST

## 2021-06-29 PROCEDURE — 73630 X-RAY EXAM OF FOOT: CPT

## 2021-06-29 NOTE — LETTER
[facility] A department of [facility]  Phone: [phone]  Fax: [phone]    Swathi Erickson        June 29, 2021     Patient: Christel Stanton   YOB: 1965   Date of Visit: 6/29/2021       To Whom It May Concern: It is my medical opinion that Antoine Varela may return to work on 7/19/2021. He was seen in my office today 6/29/2021      If you have any questions or concerns, please don't hesitate to call.     Sincerely,        Fly Ocampo DPM

## 2021-06-29 NOTE — PROGRESS NOTES
Subjective:    Naila Leon is a 48 y.o. male who presents with the L 2nd and 3rd  toe fracture. Patient has been compliant with off loading surgical shoe. . Patient relates pain is Present. Pain is rated 1 out of 10 and is described as mild. Past Medical History:   Diagnosis Date    Ankle pain, left     chronic; s/p fracture in 1999    Gastroesophageal reflux disease     Hyperlipemia     Hypertension     Obesity     Tobacco abuse     Tourette's syndrome      Social History     Socioeconomic History    Marital status: Single     Spouse name: None    Number of children: None    Years of education: None    Highest education level: None   Occupational History    None   Tobacco Use    Smoking status: Current Every Day Smoker     Packs/day: 1.00     Years: 30.00     Pack years: 30.00     Types: Cigarettes     Start date: 1/1/1980    Smokeless tobacco: Former User     Types: Snuff     Quit date: 11/17/1987   Substance and Sexual Activity    Alcohol use: Yes     Alcohol/week: 0.0 standard drinks     Comment: occasional    Drug use: No    Sexual activity: None   Other Topics Concern    None   Social History Narrative    None     Social Determinants of Health     Financial Resource Strain:     Difficulty of Paying Living Expenses:    Food Insecurity:     Worried About Running Out of Food in the Last Year:     Ran Out of Food in the Last Year:    Transportation Needs:     Lack of Transportation (Medical):      Lack of Transportation (Non-Medical):    Physical Activity:     Days of Exercise per Week:     Minutes of Exercise per Session:    Stress:     Feeling of Stress :    Social Connections:     Frequency of Communication with Friends and Family:     Frequency of Social Gatherings with Friends and Family:     Attends [employer] Services:     Active Member of Clubs or Organizations:     Attends Club or Organization Meetings:     Marital Status:    Intimate Partner Violence:     Fear of Current or Ex-Partner:     Emotionally Abused:     Physically Abused:     Sexually Abused:      Current Outpatient Medications   Medication Sig Dispense Refill    Diabetic Shoe MISC by Does not apply route Dispense DM shoe and insoles. custom accomodative offloading insole with toe filler L side. Amputated great toe of left foot ([address]  (primary encounter diagnosis)  Equinus deformity of both feet  Diabetic polyneuropathy associated with type 2 diabetes mellitus (HCC) 1 each 0    losartan-hydroCHLOROthiazide (HYZAAR) 100-25 MG per tablet TAKE 1 TABLET DAILY 90 tablet 1    pimozide (ORAP) 1 MG tablet TAKE 2 TABLETS TWICE A  tablet 1    atorvastatin (LIPITOR) 20 MG tablet Take 1 tablet by mouth daily 90 tablet 1    hydroCHLOROthiazide (HYDRODIURIL) 25 MG tablet TAKE 1 TABLET DAILY 90 tablet 1    omeprazole (PRILOSEC) 20 MG delayed release capsule TAKE 1 CAPSULE DAILY 90 capsule 1     No current facility-administered medications for this visit. Allergies   Allergen Reactions    Chantix [Varenicline] Other (See Comments)     Unusual dreams. ROS: All 14 ROS systems reviewed and pertinent positives noted above, all others negative. Lower Extremity Physical Examination:     Vitals:   Vitals:    06/29/21 1331   BP: 138/86   Pulse: 84   Resp: 20     General: AAO x 3 in NAD. Vascular: DP and PT pulses palpable 2/4, bilateral.  CFT <3 seconds, bilateral.  Hair growth present to the level of the digits, bilateral.  Edema Present, bilateral.  Increased left second toe MPJ level and base of the toe. Ecchymosis is Absent . Varicosities absent, bilateral. Erythema absent, bilateral. Distal Rubor absent bilateral.  Temperature within normal limits bilateral. Hyperpigmentation absent bilateral. No atrophic skin.   Neurological: Sensation intact to light touch to level of digits, bilateral.  Protective sensation intact 10/10 sites via 5.07/10g [place]-Fiona Monofilament, bilateral.  negative Tinel's, bilateral.  negative Valleix sign, bilateral.  Vibratory intact bilateral.  Reflexes Decreased bilateral.  Paresthesias negative. Dysthesias negative. Sharp/dull intact bilateral.  Musculoskeletal: Muscle strength 5/5, Bilateral.  Pain absent upon palpation of 2nd toe, Left. within normal limits medial longitudinal arch, Bilateral.  Ankle ROM decreased,Bilateral.  1st MPJ ROM within normal limits, Bilateral.  Dorsally contracted digits absent digits none Bilateral. Other foot deformities L hallux amp. Integument: Warm, dry, supple, bilateral.  Open lesion absent, bilateral.  Interdigital maceration absent to web spaces bilateral.  Nails within normal limits. Fissures absent, bilateral. Hyperkeratotic tissue is absent. Radiographic interpretation:  3 views foot weightbearing left : no soft tissue deficits, no soft tissue emphysema, no masses, all joints appear well-aligned. Fracture is proximal Proximal phalanx, increased bone callus seen , 2nd mild displaced of the lateral fragment and oblique Fracture position acceptable with healing appropriate for time frame  . New fracture identified proximal left third toe proximal phalanx no displacement. No other significant foot deformities. Assessment:   Diagnosis Orders   1. Closed nondisplaced fracture of proximal phalanx of lesser toe of left foot with delayed healing, subsequent encounter         Plan:  Patient condition was discussed and all questions answered. X-rays were reviewed with the patient. Patient will continue fracture care due to the L 3rd  toe fracture. This fracture was not seen on initial x-ray is seen today on the repeat x-rays. Patient will continue fracture care due to left second toe fracture. Patient will ice and elevate as directed. Patient will be FWB with use of regular shoe for appropriate offloading.   Education on normal bone healing in 4-6 weeks and risks of bone healing complications including malunion, delayed

## 2021-07-15 DIAGNOSIS — K21.9 GASTROESOPHAGEAL REFLUX DISEASE, UNSPECIFIED WHETHER ESOPHAGITIS PRESENT: ICD-10-CM

## 2021-07-15 DIAGNOSIS — E78.2 MIXED HYPERLIPIDEMIA: ICD-10-CM

## 2021-07-15 DIAGNOSIS — I10 ESSENTIAL HYPERTENSION: ICD-10-CM

## 2021-07-15 DIAGNOSIS — F95.2 TOURETTE'S SYNDROME: ICD-10-CM

## 2021-07-15 RX ORDER — ATORVASTATIN CALCIUM 20 MG/1
20 TABLET, FILM COATED ORAL DAILY
Qty: 30 TABLET | Refills: 0 | Status: SHIPPED | OUTPATIENT
Start: 2021-07-15 | End: 2021-09-08 | Stop reason: SDUPTHER

## 2021-07-15 RX ORDER — PIMOZIDE 1 MG/1
TABLET ORAL
Qty: 60 TABLET | Refills: 0 | Status: SHIPPED | OUTPATIENT
Start: 2021-07-15 | End: 2021-09-08 | Stop reason: SDUPTHER

## 2021-07-15 RX ORDER — OMEPRAZOLE 20 MG/1
CAPSULE, DELAYED RELEASE ORAL
Qty: 30 CAPSULE | Refills: 0 | Status: SHIPPED | OUTPATIENT
Start: 2021-07-15 | End: 2021-09-08 | Stop reason: SDUPTHER

## 2021-07-15 RX ORDER — LOSARTAN POTASSIUM AND HYDROCHLOROTHIAZIDE 25; 100 MG/1; MG/1
TABLET ORAL
Qty: 30 TABLET | Refills: 0 | Status: SHIPPED | OUTPATIENT
Start: 2021-07-15 | End: 2021-09-08 | Stop reason: SDUPTHER

## 2021-07-15 RX ORDER — HYDROCHLOROTHIAZIDE 25 MG/1
TABLET ORAL
Qty: 30 TABLET | Refills: 0 | Status: SHIPPED | OUTPATIENT
Start: 2021-07-15 | End: 2021-09-08 | Stop reason: SDUPTHER

## 2021-07-15 NOTE — TELEPHONE ENCOUNTER
Iris Juarez called requesting a refill of the below medication which has been pended for you:     RR out of office    Requested Prescriptions     Pending Prescriptions Disp Refills    pimozide (ORAP) 1 MG tablet 60 tablet 0     Sig: TAKE 2 TABLETS TWICE A DAY    omeprazole (PRILOSEC) 20 MG delayed release capsule 30 capsule 0     Sig: TAKE 1 CAPSULE DAILY    atorvastatin (LIPITOR) 20 MG tablet 30 tablet 0     Sig: Take 1 tablet by mouth daily    losartan-hydroCHLOROthiazide (HYZAAR) 100-25 MG per tablet 30 tablet 0     Sig: TAKE 1 TABLET DAILY    hydroCHLOROthiazide (HYDRODIURIL) 25 MG tablet 30 tablet 0     Sig: TAKE 1 TABLET DAILY       Last Appointment Date: 2/11/2021  Next Appointment Date: 8/17/2021    Allergies   Allergen Reactions    Chantix [Varenicline] Other (See Comments)     Unusual dreams.

## 2021-07-27 DIAGNOSIS — M25.572 CHRONIC PAIN OF LEFT ANKLE: ICD-10-CM

## 2021-07-27 DIAGNOSIS — G89.29 CHRONIC PAIN OF LEFT ANKLE: ICD-10-CM

## 2021-07-27 RX ORDER — HYDROCODONE BITARTRATE AND ACETAMINOPHEN 5; 325 MG/1; MG/1
1 TABLET ORAL EVERY 6 HOURS PRN
Qty: 15 TABLET | Refills: 0 | Status: SHIPPED | OUTPATIENT
Start: 2021-07-27 | End: 2021-09-10

## 2021-07-27 NOTE — TELEPHONE ENCOUNTER
Last ov 02/11/2021,next ov 08/17/2021. Per [facility] requested medication last filled 06/17/2021 #30,30 day supply.

## 2021-07-27 NOTE — TELEPHONE ENCOUNTER
Controlled substances monitoring: No signs of potential drug abuse or diversion identified when the O[place] from [place], and [place] was reviewed today. The activity on the report was consistent with the treatment plan. He will need to sign medication contracts at his next office visit. Also, he will need office visits Q3-4 months due to controlled medication use.

## 2021-09-08 DIAGNOSIS — E78.2 MIXED HYPERLIPIDEMIA: ICD-10-CM

## 2021-09-08 DIAGNOSIS — G89.29 CHRONIC PAIN OF LEFT ANKLE: ICD-10-CM

## 2021-09-08 DIAGNOSIS — K21.9 GASTROESOPHAGEAL REFLUX DISEASE, UNSPECIFIED WHETHER ESOPHAGITIS PRESENT: ICD-10-CM

## 2021-09-08 DIAGNOSIS — F95.2 TOURETTE'S SYNDROME: ICD-10-CM

## 2021-09-08 DIAGNOSIS — M25.572 CHRONIC PAIN OF LEFT ANKLE: ICD-10-CM

## 2021-09-08 DIAGNOSIS — I10 ESSENTIAL HYPERTENSION: ICD-10-CM

## 2021-09-08 RX ORDER — PIMOZIDE 1 MG/1
TABLET ORAL
Qty: 120 TABLET | Refills: 1 | Status: SHIPPED | OUTPATIENT
Start: 2021-09-08 | End: 2021-09-16 | Stop reason: SDUPTHER

## 2021-09-08 RX ORDER — LOSARTAN POTASSIUM AND HYDROCHLOROTHIAZIDE 25; 100 MG/1; MG/1
TABLET ORAL
Qty: 30 TABLET | Refills: 1 | Status: SHIPPED | OUTPATIENT
Start: 2021-09-08 | End: 2021-09-16 | Stop reason: SDUPTHER

## 2021-09-08 RX ORDER — OMEPRAZOLE 20 MG/1
CAPSULE, DELAYED RELEASE ORAL
Qty: 30 CAPSULE | Refills: 1 | Status: SHIPPED | OUTPATIENT
Start: 2021-09-08 | End: 2021-09-16 | Stop reason: SDUPTHER

## 2021-09-08 RX ORDER — HYDROCHLOROTHIAZIDE 25 MG/1
TABLET ORAL
Qty: 30 TABLET | Refills: 1 | Status: SHIPPED | OUTPATIENT
Start: 2021-09-08 | End: 2021-09-16 | Stop reason: SDUPTHER

## 2021-09-08 RX ORDER — ATORVASTATIN CALCIUM 20 MG/1
20 TABLET, FILM COATED ORAL DAILY
Qty: 30 TABLET | Refills: 1 | Status: SHIPPED | OUTPATIENT
Start: 2021-09-08 | End: 2021-09-16 | Stop reason: SDUPTHER

## 2021-09-08 NOTE — TELEPHONE ENCOUNTER
Omeprazole, Orap, Hydrochlorothiazide, Losartan-Hydrochlorothiazide, and Lipitor were refilled.   He will need an office visit for refills of [place].

## 2021-09-09 DIAGNOSIS — G89.29 CHRONIC PAIN OF LEFT ANKLE: Primary | ICD-10-CM

## 2021-09-09 DIAGNOSIS — M25.572 CHRONIC PAIN OF LEFT ANKLE: Primary | ICD-10-CM

## 2021-09-10 NOTE — TELEPHONE ENCOUNTER
Roberto Marie called requesting a refill of the below medication which has been pended for you: [place] from [place], and [place] reviewed. NORCO 5-325 mg last filled 7/27/21 #15/3 days. Patient Is scheduled for 9/16/21. Please advise. Requested Prescriptions     Pending Prescriptions Disp Refills    HYDROcodone-acetaminophen ([facility]) 5-325 MG per tablet [Pharmacy Med Name: Onalee Sagar TABS] 15 tablet 0     Sig: TAKE ONE TABLET BY MOUTH EVERY 6 HOURS AS NEEDED FOR PAIN FOR UP TO 7 DAYS. REDUCE DOSES TAKEN AS PAIN BECOMES MANAGEABLE. Last Appointment Date: 2/11/2021  Next Appointment Date: 9/16/2021    Allergies   Allergen Reactions    Chantix [Varenicline] Other (See Comments)     Unusual dreams.

## 2021-09-11 RX ORDER — HYDROCODONE BITARTRATE AND ACETAMINOPHEN 5; 325 MG/1; MG/1
TABLET ORAL
Qty: 15 TABLET | Refills: 0 | Status: SHIPPED | OUTPATIENT
Start: 2021-09-11 | End: 2021-09-18

## 2021-09-14 RX ORDER — HYDROCODONE BITARTRATE AND ACETAMINOPHEN 5; 325 MG/1; MG/1
1 TABLET ORAL EVERY 6 HOURS PRN
Qty: 15 TABLET | Refills: 0 | OUTPATIENT
Start: 2021-09-14 | End: 2021-09-21

## 2021-09-16 ENCOUNTER — OFFICE VISIT (OUTPATIENT)
Dept: FAMILY MEDICINE CLINIC | Age: 56
End: 2021-09-16
Payer: COMMERCIAL

## 2021-09-16 ENCOUNTER — HOSPITAL ENCOUNTER (OUTPATIENT)
Dept: LAB | Age: 56
Discharge: HOME | End: 2021-09-16
Payer: COMMERCIAL

## 2021-09-16 VITALS
DIASTOLIC BLOOD PRESSURE: 92 MMHG | HEART RATE: 84 BPM | RESPIRATION RATE: 16 BRPM | HEIGHT: 73 IN | SYSTOLIC BLOOD PRESSURE: 158 MMHG | WEIGHT: 239 LBS | BODY MASS INDEX: 31.68 KG/M2

## 2021-09-16 DIAGNOSIS — E11.9 TYPE 2 DIABETES MELLITUS WITHOUT COMPLICATION, WITHOUT LONG-TERM CURRENT USE OF INSULIN (HCC): ICD-10-CM

## 2021-09-16 DIAGNOSIS — K21.9 GASTROESOPHAGEAL REFLUX DISEASE, UNSPECIFIED WHETHER ESOPHAGITIS PRESENT: ICD-10-CM

## 2021-09-16 DIAGNOSIS — E11.9 TYPE 2 DIABETES MELLITUS WITHOUT COMPLICATION, WITHOUT LONG-TERM CURRENT USE OF INSULIN (HCC): Primary | ICD-10-CM

## 2021-09-16 DIAGNOSIS — I10 ESSENTIAL HYPERTENSION: ICD-10-CM

## 2021-09-16 DIAGNOSIS — Z23 NEED FOR INFLUENZA VACCINATION: ICD-10-CM

## 2021-09-16 DIAGNOSIS — F95.2 TOURETTE'S SYNDROME: ICD-10-CM

## 2021-09-16 DIAGNOSIS — E78.2 MIXED HYPERLIPIDEMIA: ICD-10-CM

## 2021-09-16 DIAGNOSIS — M79.672 PAIN IN LEFT FOOT: ICD-10-CM

## 2021-09-16 LAB
ALBUMIN SERPL-MCNC: 4.4 G/DL (ref 3.5–5.2)
ALBUMIN/GLOBULIN RATIO: 1.4 (ref 1–2.5)
ALP BLD-CCNC: 95 U/L (ref 40–129)
ALT SERPL-CCNC: 12 U/L (ref 5–41)
ANION GAP SERPL CALCULATED.3IONS-SCNC: 11 MMOL/L (ref 9–17)
AST SERPL-CCNC: 16 U/L
BILIRUB SERPL-MCNC: 0.42 MG/DL (ref 0.3–1.2)
BUN BLDV-MCNC: 10 MG/DL (ref 6–20)
BUN/CREAT BLD: 12 (ref 9–20)
CALCIUM SERPL-MCNC: 9.6 MG/DL (ref 8.6–10.4)
CHLORIDE BLD-SCNC: 95 MMOL/L (ref 98–107)
CHOLESTEROL/HDL RATIO: 4.8
CHOLESTEROL: 120 MG/DL
CO2: 30 MMOL/L (ref 20–31)
CREAT SERPL-MCNC: 0.81 MG/DL (ref 0.7–1.2)
CREATININE URINE: 27.1 MG/DL (ref 39–259)
GFR AFRICAN AMERICAN: >60 ML/MIN
GFR NON-AFRICAN AMERICAN: >60 ML/MIN
GFR SERPL CREATININE-BSD FRML MDRD: ABNORMAL ML/MIN/{1.73_M2}
GFR SERPL CREATININE-BSD FRML MDRD: ABNORMAL ML/MIN/{1.73_M2}
GLUCOSE BLD-MCNC: 106 MG/DL (ref 70–99)
HDLC SERPL-MCNC: 25 MG/DL
LDL CHOLESTEROL: 21 MG/DL (ref 0–130)
MICROALBUMIN/CREAT 24H UR: <12 MG/L
MICROALBUMIN/CREAT UR-RTO: ABNORMAL MCG/MG CREAT
POTASSIUM SERPL-SCNC: 4.2 MMOL/L (ref 3.7–5.3)
SODIUM BLD-SCNC: 136 MMOL/L (ref 135–144)
TOTAL PROTEIN: 7.5 G/DL (ref 6.4–8.3)
TRIGL SERPL-MCNC: 369 MG/DL
VLDLC SERPL CALC-MCNC: ABNORMAL MG/DL (ref 1–30)

## 2021-09-16 PROCEDURE — 82570 ASSAY OF URINE CREATININE: CPT

## 2021-09-16 PROCEDURE — 99214 OFFICE O/P EST MOD 30 MIN: CPT | Performed by: FAMILY MEDICINE

## 2021-09-16 PROCEDURE — 36415 COLL VENOUS BLD VENIPUNCTURE: CPT

## 2021-09-16 PROCEDURE — 90686 IIV4 VACC NO PRSV 0.5 ML IM: CPT | Performed by: FAMILY MEDICINE

## 2021-09-16 PROCEDURE — 90471 IMMUNIZATION ADMIN: CPT | Performed by: FAMILY MEDICINE

## 2021-09-16 PROCEDURE — 83036 HEMOGLOBIN GLYCOSYLATED A1C: CPT

## 2021-09-16 PROCEDURE — 80061 LIPID PANEL: CPT

## 2021-09-16 PROCEDURE — 82043 UR ALBUMIN QUANTITATIVE: CPT

## 2021-09-16 PROCEDURE — 80053 COMPREHEN METABOLIC PANEL: CPT

## 2021-09-16 RX ORDER — ATORVASTATIN CALCIUM 20 MG/1
20 TABLET, FILM COATED ORAL DAILY
Qty: 90 TABLET | Refills: 1 | Status: SHIPPED | OUTPATIENT
Start: 2021-09-16 | End: 2022-01-25 | Stop reason: SDUPTHER

## 2021-09-16 RX ORDER — LOSARTAN POTASSIUM AND HYDROCHLOROTHIAZIDE 25; 100 MG/1; MG/1
TABLET ORAL
Qty: 90 TABLET | Refills: 1 | Status: SHIPPED | OUTPATIENT
Start: 2021-09-16 | End: 2022-01-25 | Stop reason: SDUPTHER

## 2021-09-16 RX ORDER — PIMOZIDE 1 MG/1
TABLET ORAL
Qty: 360 TABLET | Refills: 1 | Status: SHIPPED | OUTPATIENT
Start: 2021-09-16 | End: 2021-11-01 | Stop reason: SDUPTHER

## 2021-09-16 RX ORDER — HYDROCHLOROTHIAZIDE 25 MG/1
TABLET ORAL
Qty: 90 TABLET | Refills: 1 | Status: SHIPPED | OUTPATIENT
Start: 2021-09-16 | End: 2022-01-25 | Stop reason: SDUPTHER

## 2021-09-16 RX ORDER — OMEPRAZOLE 20 MG/1
CAPSULE, DELAYED RELEASE ORAL
Qty: 90 CAPSULE | Refills: 1 | Status: SHIPPED | OUTPATIENT
Start: 2021-09-16 | End: 2022-01-25 | Stop reason: SDUPTHER

## 2021-09-16 ASSESSMENT — PATIENT HEALTH QUESTIONNAIRE - PHQ9
SUM OF ALL RESPONSES TO PHQ QUESTIONS 1-9: 0
1. LITTLE INTEREST OR PLEASURE IN DOING THINGS: 0
2. FEELING DOWN, DEPRESSED OR HOPELESS: 0
SUM OF ALL RESPONSES TO PHQ9 QUESTIONS 1 & 2: 0

## 2021-09-16 NOTE — PROGRESS NOTES
[address], [facility] [address]                        Telephone [phone]             Fax [phone]     Vivi Dolan  1965  MRN:  [id number]  Date of visit:  9/16/2021    Subjective:    Lakeisha Soto is a 48 y.o. male who presents to [facility] today (9/16/2021) for follow up/evaluation of:  Diabetes, Hypertension, and Ankle Pain      Patient is having ongoing swelling and pain of his left foot,he no longer is seeing podiatry. He is tolerating his medications well. He continues to have pain in the left foot. He had the left great toe amputated 3/29/2021. He is no longer seeing podiatry. He states that he has tried Tylenol and Ibuprofen for the pain without any significant improvement in his symptoms. He has not had the labs drawn that were ordered to be done before today's visit. Leilani Helms He has received the [employer] Covid-19 vaccine. He also had Covid-19 in December 2020. 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 REDUCE DOSES TAKEN AS PAIN BECOMES MANAGEABLE.  15 tablet 0    omeprazole (PRILOSEC) 20 MG delayed release capsule TAKE 1 CAPSULE DAILY 30 capsule 1    pimozide (ORAP) 1 MG tablet TAKE 2 use of insulin ([facility]  He has orders for a HgbA1c to be done today. He will be contacted when the results are available. Annual diabetic eye exam was recommended and declined. 2. Essential hypertension  His blood pressure is elevated today. (BP: (!) 158/92)  He was advised to monitor his blood pressure outside of the office. He was advised to continue current medications. Hydrochlorothiazide and Losartan-Hydrochlorothiazide was refilled:   - hydroCHLOROthiazide (HYDRODIURIL) 25 MG tablet; TAKE 1 TABLET DAILY  Dispense: 90 tablet; Refill: 1  - losartan-hydroCHLOROthiazide (HYZAAR) 100-25 MG per tablet; TAKE 1 TABLET DAILY  Dispense: 90 tablet; Refill: 1    3. Mixed hyperlipidemia  He has orders for a lipid panel. He will be contacted when the results are available. He is tolerating Lipitor well; this was refilled:  - atorvastatin (LIPITOR) 20 MG tablet; Take 1 tablet by mouth daily  Dispense: 90 tablet; Refill: 1    4. Gastroesophageal reflux disease, unspecified whether esophagitis present  He is doing well with Omeprazole; this was refilled:  - omeprazole (PRILOSEC) 20 MG delayed release capsule; TAKE 1 CAPSULE DAILY  Dispense: 90 capsule; Refill: 1    5. Tourette's syndrome  He is doing well with Orap; this was refilled:  - pimozide (ORAP) 1 MG tablet; TAKE 2 TABLETS TWICE A DAY  Dispense: 360 tablet; Refill: 1    6. Pain in left foot  Controlled substances monitoring: No signs of potential drug abuse or diversion identified when the OAR[place] report from [place], and [place] was reviewed today. The activity on the report was consistent with the treatment plan. [address] [place] [address],[address] was refilled 9/11/2021. He was advised to follow up in 3 months. A medication agreement was signed today. 7.  Routine health maintenance  Health maintenance was reviewed with the patient. He has received the "[employer]" Covid-19 vaccine. He was advised that a second dose may be recommended.     Annual influenza

## 2021-09-16 NOTE — PROGRESS NOTES
Patient declines hepatitis C screening,eye exam,Hepatitis B vaccine,Tdap,shingles vaccine,colonoscopy,low dose CT scan.

## 2021-09-17 LAB
ESTIMATED AVERAGE GLUCOSE: 128 MG/DL
HBA1C MFR BLD: 6.1 % (ref 4–6)

## 2021-10-05 DIAGNOSIS — M25.572 CHRONIC PAIN OF LEFT ANKLE: ICD-10-CM

## 2021-10-05 DIAGNOSIS — G89.29 CHRONIC PAIN OF LEFT ANKLE: ICD-10-CM

## 2021-10-05 RX ORDER — HYDROCODONE BITARTRATE AND ACETAMINOPHEN 5; 325 MG/1; MG/1
1 TABLET ORAL EVERY 6 HOURS PRN
Qty: 15 TABLET | Refills: 0 | Status: SHIPPED | OUTPATIENT
Start: 2021-10-05 | End: 2021-11-01 | Stop reason: SDUPTHER

## 2021-10-05 NOTE — TELEPHONE ENCOUNTER
Last ov 09/16/2021,next ov 12/22/2021. Per [facility] requested medication last filled 09/13/2021 #15,3 day supply.

## 2021-10-11 DIAGNOSIS — E11.9 TYPE 2 DIABETES MELLITUS WITHOUT COMPLICATION, WITHOUT LONG-TERM CURRENT USE OF INSULIN (HCC): Primary | ICD-10-CM

## 2021-11-01 DIAGNOSIS — G89.29 CHRONIC PAIN OF LEFT ANKLE: ICD-10-CM

## 2021-11-01 DIAGNOSIS — F95.2 TOURETTE'S SYNDROME: ICD-10-CM

## 2021-11-01 DIAGNOSIS — I10 ESSENTIAL HYPERTENSION: ICD-10-CM

## 2021-11-01 DIAGNOSIS — M25.572 CHRONIC PAIN OF LEFT ANKLE: ICD-10-CM

## 2021-11-01 RX ORDER — HYDROCODONE BITARTRATE AND ACETAMINOPHEN 5; 325 MG/1; MG/1
1 TABLET ORAL EVERY 6 HOURS PRN
Qty: 15 TABLET | Refills: 0 | Status: SHIPPED | OUTPATIENT
Start: 2021-11-01 | End: 2021-12-02 | Stop reason: SDUPTHER

## 2021-11-01 RX ORDER — LISINOPRIL 10 MG/1
TABLET ORAL
Qty: 90 TABLET | Refills: 1 | OUTPATIENT
Start: 2021-11-01

## 2021-11-01 RX ORDER — PIMOZIDE 1 MG/1
TABLET ORAL
Qty: 120 TABLET | Refills: 0 | Status: SHIPPED | OUTPATIENT
Start: 2021-11-01 | End: 2022-01-25 | Stop reason: SDUPTHER

## 2021-11-01 NOTE — TELEPHONE ENCOUNTER
Please send to on call. Patient is out and doesn't feel right without it. [id number] [address] Combs is requesting a refill on the following medication(s):  Requested Prescriptions     Pending Prescriptions Disp Refills    pimozide (ORAP) 1 MG tablet 360 tablet 1     Sig: TAKE 2 TABLETS TWICE A DAY       Last Visit Date (If Applicable):  0/04/0223    Next Visit Date:    12/22/2021

## 2021-11-01 NOTE — TELEPHONE ENCOUNTER
Katy Villalobos called requesting a refill of the below medication which has been pended for you: [place] from [place], and [place] reviewed. . NORCO 5-325 mg last filled 10/5/21 #15/7 days. RR out of office    Requested Prescriptions     Pending Prescriptions Disp Refills    HYDROcodone-acetaminophen (NORCO) 5-325 MG per tablet 15 tablet 0     Sig: Take 1 tablet by mouth every 6 hours as needed for Pain for up to 7 days. Last Appointment Date: 9/16/2021  Next Appointment Date: 12/22/2021    Allergies   Allergen Reactions    Chantix [Varenicline] Other (See Comments)     Unusual dreams.

## 2021-11-08 DIAGNOSIS — I10 ESSENTIAL HYPERTENSION: ICD-10-CM

## 2021-11-08 RX ORDER — LISINOPRIL 10 MG/1
TABLET ORAL
Qty: 90 TABLET | Refills: 1 | OUTPATIENT
Start: 2021-11-08

## 2021-11-11 ENCOUNTER — TELEPHONE (OUTPATIENT)
Dept: FAMILY MEDICINE CLINIC | Age: 56
End: 2021-11-11

## 2021-11-11 NOTE — TELEPHONE ENCOUNTER
Patient is having problem with receiving his  Orap through [facility] pharmacy. A refill was sent 09/16/2021 but not delivered to him. He ran out of the medication and had filled it locally for a month supply. He had called [facility],automated message said he had to contact his PCP. Writer spoke with [facility], he has refills and needs to call #[phone] with Rx # [id number]. It will be available to be shipped to him November 24th. Attempted to reach patient,no answer and mailbox is full-unable to leave a message.

## 2021-11-11 NOTE — TELEPHONE ENCOUNTER
Patient was contacted,bad connection with his phone. He will come into the clinic and get information.

## 2021-11-18 DIAGNOSIS — E78.2 MIXED HYPERLIPIDEMIA: ICD-10-CM

## 2021-11-18 RX ORDER — ATORVASTATIN CALCIUM 20 MG/1
20 TABLET, FILM COATED ORAL DAILY
Qty: 90 TABLET | Refills: 1 | OUTPATIENT
Start: 2021-11-18

## 2021-12-02 DIAGNOSIS — M25.572 CHRONIC PAIN OF LEFT ANKLE: ICD-10-CM

## 2021-12-02 DIAGNOSIS — G89.29 CHRONIC PAIN OF LEFT ANKLE: ICD-10-CM

## 2021-12-02 RX ORDER — HYDROCODONE BITARTRATE AND ACETAMINOPHEN 5; 325 MG/1; MG/1
1 TABLET ORAL EVERY 6 HOURS PRN
Qty: 15 TABLET | Refills: 0 | Status: SHIPPED | OUTPATIENT
Start: 2021-12-02 | End: 2021-12-22 | Stop reason: SDUPTHER

## 2021-12-02 NOTE — TELEPHONE ENCOUNTER
[employer] called requesting a refill of the below medication which has been pended for you: O[place] from [place] and [place] reviewed. NORCO 5-325 mg last filled 11/1/21 #15/7 days. Report available for your review. Requested Prescriptions     Pending Prescriptions Disp Refills    HYDROcodone-acetaminophen (NORCO) 5-325 MG per tablet 15 tablet 0     Sig: Take 1 tablet by mouth every 6 hours as needed for Pain for up to 7 days. Last Appointment Date: 9/16/2021  Next Appointment Date: 12/22/2021    Allergies   Allergen Reactions    Chantix [Varenicline] Other (See Comments)     Unusual dreams.

## 2021-12-06 ENCOUNTER — TELEPHONE (OUTPATIENT)
Dept: PODIATRY | Age: 56
End: 2021-12-06

## 2021-12-06 NOTE — TELEPHONE ENCOUNTER
Hollie from [address] [place] [address] called to confirm a prescription for diabetic shoes and custom inserts was received by Dr. Tomi Arenas office. Please call Hollie back at [phone].

## 2021-12-21 DIAGNOSIS — G89.29 CHRONIC PAIN OF LEFT ANKLE: ICD-10-CM

## 2021-12-21 DIAGNOSIS — M25.572 CHRONIC PAIN OF LEFT ANKLE: ICD-10-CM

## 2021-12-21 NOTE — TELEPHONE ENCOUNTER
Camryn Figueroa called requesting a refill of the below medication which has been pended for you: [place] from [place] reviewed. NORCO 5-325 mg last filled 12/2/21 #15/7 days. Requested Prescriptions     Pending Prescriptions Disp Refills    HYDROcodone-acetaminophen (NORCO) 5-325 MG per tablet 15 tablet 0     Sig: Take 1 tablet by mouth every 6 hours as needed for Pain for up to 7 days. Last Appointment Date: 9/16/2021  Next Appointment Date: 12/22/2021    Allergies   Allergen Reactions    Chantix [Varenicline] Other (See Comments)     Unusual dreams.

## 2021-12-21 NOTE — TELEPHONE ENCOUNTER
Does he need a refill before his appointment tomorrow?     Future Appointments   Date Time Provider Peter Corey   12/22/2021  1:20 PM [facility]

## 2021-12-22 ENCOUNTER — TELEPHONE (OUTPATIENT)
Dept: FAMILY MEDICINE CLINIC | Age: 56
End: 2021-12-22

## 2021-12-22 RX ORDER — HYDROCODONE BITARTRATE AND ACETAMINOPHEN 5; 325 MG/1; MG/1
TABLET ORAL
Qty: 15 TABLET | Refills: 0 | OUTPATIENT
Start: 2021-12-22

## 2021-12-22 RX ORDER — HYDROCODONE BITARTRATE AND ACETAMINOPHEN 5; 325 MG/1; MG/1
1 TABLET ORAL EVERY 6 HOURS PRN
Qty: 15 TABLET | Refills: 0 | Status: SHIPPED | OUTPATIENT
Start: 2021-12-22 | End: 2022-01-13 | Stop reason: SDUPTHER

## 2021-12-22 NOTE — TELEPHONE ENCOUNTER
Attempted to reach patient regarding missed appointment on 12/22/21. Unable to contact at this time. Unable to leave message. Letter mailed to reschedule.

## 2021-12-22 NOTE — TELEPHONE ENCOUNTER
Controlled substances monitoring: No signs of potential drug abuse or diversion identified when the O[place] report from [place], and [place] was reviewed today. The activity on the report was consistent with the treatment plan. Can he be scheduled sooner than 2/1/2021?

## 2022-01-13 DIAGNOSIS — I10 ESSENTIAL HYPERTENSION: ICD-10-CM

## 2022-01-13 DIAGNOSIS — M25.572 CHRONIC PAIN OF LEFT ANKLE: ICD-10-CM

## 2022-01-13 DIAGNOSIS — G89.29 CHRONIC PAIN OF LEFT ANKLE: ICD-10-CM

## 2022-01-13 DIAGNOSIS — K21.9 GASTROESOPHAGEAL REFLUX DISEASE, UNSPECIFIED WHETHER ESOPHAGITIS PRESENT: ICD-10-CM

## 2022-01-13 RX ORDER — OMEPRAZOLE 20 MG/1
CAPSULE, DELAYED RELEASE ORAL
Qty: 90 CAPSULE | Refills: 1 | OUTPATIENT
Start: 2022-01-13

## 2022-01-13 RX ORDER — HYDROCODONE BITARTRATE AND ACETAMINOPHEN 5; 325 MG/1; MG/1
1 TABLET ORAL EVERY 6 HOURS PRN
Qty: 15 TABLET | Refills: 0 | Status: SHIPPED | OUTPATIENT
Start: 2022-01-13 | End: 2022-02-02 | Stop reason: SDUPTHER

## 2022-01-13 RX ORDER — HYDROCHLOROTHIAZIDE 25 MG/1
TABLET ORAL
Qty: 90 TABLET | Refills: 1 | OUTPATIENT
Start: 2022-01-13

## 2022-01-13 RX ORDER — LOSARTAN POTASSIUM AND HYDROCHLOROTHIAZIDE 25; 100 MG/1; MG/1
TABLET ORAL
Qty: 90 TABLET | Refills: 1 | OUTPATIENT
Start: 2022-01-13

## 2022-01-13 NOTE — TELEPHONE ENCOUNTER
Prudence Genna called requesting a refill of the below medication which has been pended for you: O[place] from [place] and [place] reviewed. NORCO 5-325 mg last filled 12/23/21 #15/7 days. Report available for your review. Requested Prescriptions     Pending Prescriptions Disp Refills    HYDROcodone-acetaminophen (NORCO) 5-325 MG per tablet 15 tablet 0     Sig: Take 1 tablet by mouth every 6 hours as needed for Pain for up to 7 days. Last Appointment Date: 9/16/2021  Next Appointment Date: 2/1/2022    Allergies   Allergen Reactions    Chantix [Varenicline] Other (See Comments)     Unusual dreams.

## 2022-01-24 ENCOUNTER — APPOINTMENT (OUTPATIENT)
Dept: CT IMAGING | Age: 57
End: 2022-01-24
Payer: COMMERCIAL

## 2022-01-24 ENCOUNTER — HOSPITAL ENCOUNTER (EMERGENCY)
Age: 57
Discharge: HOME | End: 2022-01-24
Attending: EMERGENCY MEDICINE
Payer: COMMERCIAL

## 2022-01-24 VITALS
TEMPERATURE: 97.4 F | RESPIRATION RATE: 16 BRPM | DIASTOLIC BLOOD PRESSURE: 88 MMHG | BODY MASS INDEX: 31.66 KG/M2 | SYSTOLIC BLOOD PRESSURE: 168 MMHG | OXYGEN SATURATION: 96 % | HEART RATE: 74 BPM | WEIGHT: 240 LBS

## 2022-01-24 DIAGNOSIS — M54.31 RIGHT SIDED SCIATICA: Primary | ICD-10-CM

## 2022-01-24 PROCEDURE — 6360000002 HC RX W HCPCS: Performed by: EMERGENCY MEDICINE

## 2022-01-24 PROCEDURE — 72131 CT LUMBAR SPINE W/O DYE: CPT

## 2022-01-24 PROCEDURE — 99285 EMERGENCY DEPT VISIT HI MDM: CPT

## 2022-01-24 PROCEDURE — 96372 THER/PROPH/DIAG INJ SC/IM: CPT

## 2022-01-24 RX ORDER — HYDROCODONE BITARTRATE AND ACETAMINOPHEN 5; 325 MG/1; MG/1
1 TABLET ORAL EVERY 6 HOURS PRN
Qty: 12 TABLET | Refills: 0 | Status: SHIPPED | OUTPATIENT
Start: 2022-01-24 | End: 2022-01-27

## 2022-01-24 RX ORDER — HYDROCODONE BITARTRATE AND ACETAMINOPHEN 5; 325 MG/1; MG/1
1 TABLET ORAL EVERY 6 HOURS
COMMUNITY
End: 2022-01-24 | Stop reason: ALTCHOICE

## 2022-01-24 RX ORDER — MORPHINE SULFATE 10 MG/ML
10 INJECTION, SOLUTION INTRAMUSCULAR; INTRAVENOUS ONCE
Status: COMPLETED | OUTPATIENT
Start: 2022-01-24 | End: 2022-01-24

## 2022-01-24 RX ADMIN — MORPHINE SULFATE 10 MG: 10 INJECTION, SOLUTION INTRAMUSCULAR; INTRAVENOUS at 07:46

## 2022-01-24 ASSESSMENT — PAIN DESCRIPTION - FREQUENCY: FREQUENCY: CONTINUOUS

## 2022-01-24 ASSESSMENT — PAIN DESCRIPTION - ORIENTATION: ORIENTATION: RIGHT

## 2022-01-24 ASSESSMENT — PAIN SCALES - GENERAL
PAINLEVEL_OUTOF10: 10
PAINLEVEL_OUTOF10: 6

## 2022-01-24 ASSESSMENT — PAIN DESCRIPTION - PAIN TYPE: TYPE: ACUTE PAIN

## 2022-01-24 ASSESSMENT — PAIN DESCRIPTION - DESCRIPTORS: DESCRIPTORS: STABBING

## 2022-01-24 ASSESSMENT — PAIN DESCRIPTION - LOCATION: LOCATION: HIP

## 2022-01-24 NOTE — ED NOTES
Patient concerned that he would not be able to walk when he got home and that he would have to be off work. ED attending aware. Patient states that he is unable to get into his doctor any time soon. Call to family practice and follow up appt set up for tomorrow afternoon at 1340. Patient and family made aware and are agreeable to follow up.      Renetta Mason RN  01/24/22 0581

## 2022-01-24 NOTE — ED PROVIDER NOTES
[address]      Pt Name: Bassem Hernandez  MRN: [id number]  [place] 1965  Date of evaluation: 1/24/2022      CHIEF COMPLAINT       Chief Complaint   Patient presents with    Hip Pain         HISTORY OF PRESENT ILLNESS      The patient presents with right hip pain. He says yesterday he was in the shower and he bent over and felt a popping sensation. He now has pain radiating from his back down his thigh. This has been going on for at least 24 hours. He had some leftover Norco that he tried. The patient denies focal weakness or numbness. He denies bowel or bladder issues. He has had sciatica in the past.  The patient reports no fever. He has had no surgery on his back. He says the pain is worse if he bends over or tries to move. Nothing makes his symptoms better or worse otherwise. REVIEW OF SYSTEMS       All systems reviewed and negative unless noted in HPI. The patient denies fever or constitutional symptoms. Denies vision change. Denies any sore throat or rhinorrhea. Denies any neck pain or stiffness. Denies chest pain or shortness of breath. No nausea,  vomiting or diarrhea. Denies any dysuria. Denies urinary frequency or hematuria. Pain in back radiating down the right hip. Denies any weakness, numbness or focal neurologic deficit. Denies any skin rash or edema. No recent psychiatric issues. No easy bruising or bleeding. Denies any polyuria, polydypsia or history of immunocompromise. PAST MEDICAL HISTORY    has a past medical history of Ankle pain, left, Gastroesophageal reflux disease, Hyperlipemia, Hypertension, Obesity, Tobacco abuse, and Tourette's syndrome. SURGICAL HISTORY      has a past surgical history that includes Ankle fracture surgery (Left, 01/01/1999); [place] tooth extraction (Bilateral); and Toe amputation (Left, 03/29/2021).     CURRENT MEDICATIONS       Previous Medications    ATORVASTATIN (LIPITOR) 20 MG TABLET    Take 1 tablet by mouth daily    DIABETIC SHOE MISC    by Does not apply route Dispense DM shoe and insoles. custom accomodative offloading insole with toe filler L side. Amputated great toe of left foot ([address]  (primary encounter diagnosis)  Equinus deformity of both feet  Diabetic polyneuropathy associated with type 2 diabetes mellitus (HCC)    HYDROCHLOROTHIAZIDE (HYDRODIURIL) 25 MG TABLET    TAKE 1 TABLET DAILY    LOSARTAN-HYDROCHLOROTHIAZIDE (HYZAAR) 100-25 MG PER TABLET    TAKE 1 TABLET DAILY    OMEPRAZOLE (PRILOSEC) 20 MG DELAYED RELEASE CAPSULE    TAKE 1 CAPSULE DAILY    PIMOZIDE (ORAP) 1 MG TABLET    TAKE 2 TABLETS TWICE A DAY       ALLERGIES     is allergic to chantix [varenicline]. FAMILY HISTORY     He indicated that his mother is alive. He indicated that his father is alive. He indicated that his sister is alive. He indicated that his maternal grandmother is . He indicated that his maternal grandfather is . He indicated that his paternal grandmother is . He indicated that his paternal grandfather is . He indicated that his son is alive. He indicated that both of his maternal uncles are . family history includes Cancer in his maternal uncle and maternal uncle; Hypertension in his father and mother. SOCIAL HISTORY      reports that he has been smoking cigarettes. He started smoking about 42 years ago. He has a 30.00 pack-year smoking history. He quit smokeless tobacco use about 34 years ago. His smokeless tobacco use included snuff. He reports current alcohol use. He reports that he does not use drugs. PHYSICAL EXAM     INITIAL VITALS:  weight is 240 lb (108.9 kg). His tympanic temperature is 97.4 °F (36.3 °C). His blood pressure is 168/88 (abnormal) and his pulse is 74. His respiration is 16 and oxygen saturation is 96%. The patient is alert and oriented, in mild distress due to pain. HEENT is atraumatic.     Pupils are PERRL at 4 mm. Mucous membranes moist.    Neck is supple with no lymphadenopathy. No meningismus. Heart sounds regular rate and rhythm with no gallops, murmurs, or rubs. Lungs clear, no wheezes, rales or rhonchi. Abdomen: soft, nontender with no pain to palpation. No pulsatile mass. Normal bowel sounds are noted. No rebound or guarding. Musculoskeletal exam shows no evidence of trauma. No pain to the midline of the cervical, thoracic, or lumbar spine. Patient locates pain in the right sciatic notch. Positive straight leg raise on right side only. No saddle paresthesias. Normal distal pulses in all extremities. 5 out of 5 gross motor strength in all extremities. Skin: no rash or edema. Neurological exam reveals cranial nerves 2 through 12 grossly intact. Patient has equal  and normal deep tendon reflexes. No saddle paresthesias. Psychiatric: no hallucinations or suicidal ideation. Lymphatics.:  No lymphadenopathy. DIFFERENTIAL DIAGNOSIS/ MDM:     Sciatica, cauda equina syndrome, epidural abscess    DIAGNOSTIC RESULTS         RADIOLOGY:   I reviewed the radiologist interpretations:  CT LUMBAR SPINE WO CONTRAST   Final Result   1. No acute osseous abnormality. 2. Mild degenerative changes. 3. Limited evaluation of disc levels in the absence of intrathecal contrast.   Disc bulges at L4-5 and L5-S1 with mild L4-5 central canal narrowing.                   CT LUMBAR SPINE WO CONTRAST (Final result)  Result time 01/24/22 09:01:29  Final result by Radha Camarena MD (01/24/22 09:01:29)                Impression:    1. No acute osseous abnormality. 2. Mild degenerative changes. 3. Limited evaluation of disc levels in the absence of intrathecal contrast.   Disc bulges at L4-5 and L5-S1 with mild L4-5 central canal narrowing.              Narrative:    EXAMINATION:   CT OF THE LUMBAR SPINE WITHOUT CONTRAST  1/24/2022     TECHNIQUE:   CT of the lumbar spine was performed without the administration of   intravenous contrast. Multiplanar reformatted images are provided for review. Adjustment of mA and/or kV according to patient size was utilized. Glennette Echevaria   modulation, iterative reconstruction, and/or weight based adjustment of the   mA/kV was utilized to reduce the radiation dose to as low as reasonably   achievable. COMPARISON:   None     HISTORY:   ORDERING SYSTEM PROVIDED HISTORY: pain   TECHNOLOGIST PROVIDED HISTORY:   pain   Decision Support Exception - unselect if not a suspected or confirmed   emergency medical condition->Emergency Medical Condition (MA)   Reason for Exam: Low back pain radiating down posterior thigh; felt pop in   back when bending over in shower     FINDINGS:   BONES/ALIGNMENT: There is normal alignment of the spine. The vertebral body   heights are maintained. No osseous destructive lesion is seen.  No acute   fracture. DEGENERATIVE CHANGES: Mild vertebral body osteophytosis. Level by level evaluation of the discs is limited in the absence of   intrathecal contrast.  There appears to be disc bulges at L4-5 and L5-S1   along with mild disc space narrowing.  There is some disc annular   calcification at L5-S1. Mild central canal narrowing L4-5. SOFT TISSUES/RETROPERITONEUM: No paraspinal mass is seen. EMERGENCY DEPARTMENT COURSE:   Vitals:    Vitals:    01/24/22 0742 01/24/22 0745 01/24/22 0902   BP: (!) 209/112 (!) 166/95 (!) 168/88   Pulse: 87 78 74   Resp: 18 16 16   Temp: 97.4 °F (36.3 °C)     TempSrc: Tympanic     SpO2: 97% 98% 96%   Weight: 240 lb (108.9 kg)       -------------------------  BP: (!) 168/88, Temp: 97.4 °F (36.3 °C), Pulse: 74, Resp: 16      Re-evaluation Notes    The patient was provided medicine for pain. Have advised he needs to see his PCP for further prescriptions. He has seen his doctor a lot for pain related complaints. He will need a referral for continued back pain.   I hesitate to put him on steroids given his diabetes. The patient is discharged in good condition. Controlled Substance Monitoring:    Acute and Chronic Pain Monitoring:   RX Monitoring 12/22/2021   Attestation -   Periodic Controlled Substance Monitoring No signs of potential drug abuse or diversion identified. FINAL IMPRESSION      1. Right sided sciatica          DISPOSITION/PLAN   DISPOSITION Decision To Discharge 01/24/2022 09:08:33 AM      Condition on Disposition    good  PATIENT REFERRED TO:  No follow-up provider specified. DISCHARGE MEDICATIONS:  New Prescriptions    HYDROCODONE-ACETAMINOPHEN (NORCO) 5-325 MG PER TABLET    Take 1 tablet by mouth every 6 hours as needed for Pain for up to 3 days. Intended supply: 3 days.  Take lowest dose possible to manage pain       (Please note that portions of this note were completed with a voice recognition program.  Efforts were made to edit the dictations but occasionally words are mis-transcribed.)    Caitlyn Buckley MD,, MD   Attending Emergency Physician         Princess Feliciano MD  01/24/22 [id number]

## 2022-01-25 ENCOUNTER — OFFICE VISIT (OUTPATIENT)
Dept: FAMILY MEDICINE CLINIC | Age: 57
End: 2022-01-25
Payer: COMMERCIAL

## 2022-01-25 ENCOUNTER — HOSPITAL ENCOUNTER (OUTPATIENT)
Dept: LAB | Age: 57
Discharge: HOME | End: 2022-01-25
Payer: COMMERCIAL

## 2022-01-25 VITALS
WEIGHT: 229.4 LBS | HEART RATE: 95 BPM | BODY MASS INDEX: 30.4 KG/M2 | HEIGHT: 73 IN | OXYGEN SATURATION: 99 % | DIASTOLIC BLOOD PRESSURE: 90 MMHG | SYSTOLIC BLOOD PRESSURE: 148 MMHG | TEMPERATURE: 97.3 F

## 2022-01-25 DIAGNOSIS — I10 ESSENTIAL HYPERTENSION: ICD-10-CM

## 2022-01-25 DIAGNOSIS — M54.41 ACUTE MIDLINE LOW BACK PAIN WITH RIGHT-SIDED SCIATICA: Primary | ICD-10-CM

## 2022-01-25 DIAGNOSIS — K21.9 GASTROESOPHAGEAL REFLUX DISEASE, UNSPECIFIED WHETHER ESOPHAGITIS PRESENT: ICD-10-CM

## 2022-01-25 DIAGNOSIS — E11.9 TYPE 2 DIABETES MELLITUS WITHOUT COMPLICATION, WITHOUT LONG-TERM CURRENT USE OF INSULIN (HCC): ICD-10-CM

## 2022-01-25 DIAGNOSIS — E78.2 MIXED HYPERLIPIDEMIA: ICD-10-CM

## 2022-01-25 DIAGNOSIS — F95.2 TOURETTE'S SYNDROME: ICD-10-CM

## 2022-01-25 LAB
ALBUMIN SERPL-MCNC: 4.4 G/DL (ref 3.5–5.2)
ALBUMIN/GLOBULIN RATIO: 1.3 (ref 1–2.5)
ALP BLD-CCNC: 112 U/L (ref 40–129)
ALT SERPL-CCNC: 11 U/L (ref 5–41)
ANION GAP SERPL CALCULATED.3IONS-SCNC: 11 MMOL/L (ref 9–17)
AST SERPL-CCNC: 14 U/L
BILIRUB SERPL-MCNC: 0.48 MG/DL (ref 0.3–1.2)
BUN BLDV-MCNC: 10 MG/DL (ref 6–20)
BUN/CREAT BLD: 14 (ref 9–20)
CALCIUM SERPL-MCNC: 10.1 MG/DL (ref 8.6–10.4)
CHLORIDE BLD-SCNC: 99 MMOL/L (ref 98–107)
CHOLESTEROL/HDL RATIO: 4.9
CHOLESTEROL: 138 MG/DL
CO2: 28 MMOL/L (ref 20–31)
CREAT SERPL-MCNC: 0.73 MG/DL (ref 0.7–1.2)
GFR AFRICAN AMERICAN: >60 ML/MIN
GFR NON-AFRICAN AMERICAN: >60 ML/MIN
GFR SERPL CREATININE-BSD FRML MDRD: ABNORMAL ML/MIN/{1.73_M2}
GFR SERPL CREATININE-BSD FRML MDRD: ABNORMAL ML/MIN/{1.73_M2}
GLUCOSE BLD-MCNC: 119 MG/DL (ref 70–99)
HDLC SERPL-MCNC: 28 MG/DL
LDL CHOLESTEROL: 45 MG/DL (ref 0–130)
POTASSIUM SERPL-SCNC: 3.7 MMOL/L (ref 3.7–5.3)
SODIUM BLD-SCNC: 138 MMOL/L (ref 135–144)
TOTAL PROTEIN: 7.9 G/DL (ref 6.4–8.3)
TRIGL SERPL-MCNC: 326 MG/DL
VLDLC SERPL CALC-MCNC: ABNORMAL MG/DL (ref 1–30)

## 2022-01-25 PROCEDURE — 99214 OFFICE O/P EST MOD 30 MIN: CPT | Performed by: FAMILY MEDICINE

## 2022-01-25 PROCEDURE — 80061 LIPID PANEL: CPT

## 2022-01-25 PROCEDURE — 83036 HEMOGLOBIN GLYCOSYLATED A1C: CPT

## 2022-01-25 PROCEDURE — 80053 COMPREHEN METABOLIC PANEL: CPT

## 2022-01-25 PROCEDURE — 36415 COLL VENOUS BLD VENIPUNCTURE: CPT

## 2022-01-25 RX ORDER — HYDROCHLOROTHIAZIDE 25 MG/1
TABLET ORAL
Qty: 90 TABLET | Refills: 1 | Status: SHIPPED | OUTPATIENT
Start: 2022-01-25 | End: 2022-07-28 | Stop reason: SDUPTHER

## 2022-01-25 RX ORDER — METHYLPREDNISOLONE 4 MG/1
TABLET ORAL
Qty: 1 KIT | Refills: 0 | Status: SHIPPED | OUTPATIENT
Start: 2022-01-25 | End: 2022-01-31

## 2022-01-25 RX ORDER — PIMOZIDE 1 MG/1
TABLET ORAL
Qty: 120 TABLET | Refills: 0 | Status: SHIPPED | OUTPATIENT
Start: 2022-01-25 | End: 2022-07-28 | Stop reason: SDUPTHER

## 2022-01-25 RX ORDER — ATORVASTATIN CALCIUM 20 MG/1
20 TABLET, FILM COATED ORAL DAILY
Qty: 90 TABLET | Refills: 1 | Status: SHIPPED | OUTPATIENT
Start: 2022-01-25 | End: 2022-01-26 | Stop reason: DRUGHIGH

## 2022-01-25 RX ORDER — LOSARTAN POTASSIUM AND HYDROCHLOROTHIAZIDE 25; 100 MG/1; MG/1
TABLET ORAL
Qty: 90 TABLET | Refills: 1 | Status: SHIPPED | OUTPATIENT
Start: 2022-01-25 | End: 2022-07-28 | Stop reason: SDUPTHER

## 2022-01-25 RX ORDER — OMEPRAZOLE 20 MG/1
CAPSULE, DELAYED RELEASE ORAL
Qty: 90 CAPSULE | Refills: 1 | Status: SHIPPED | OUTPATIENT
Start: 2022-01-25 | End: 2022-07-28 | Stop reason: SDUPTHER

## 2022-01-25 SDOH — ECONOMIC STABILITY: FOOD INSECURITY: WITHIN THE PAST 12 MONTHS, THE FOOD YOU BOUGHT JUST DIDN'T LAST AND YOU DIDN'T HAVE MONEY TO GET MORE.: NEVER TRUE

## 2022-01-25 SDOH — ECONOMIC STABILITY: FOOD INSECURITY: WITHIN THE PAST 12 MONTHS, YOU WORRIED THAT YOUR FOOD WOULD RUN OUT BEFORE YOU GOT MONEY TO BUY MORE.: NEVER TRUE

## 2022-01-25 ASSESSMENT — SOCIAL DETERMINANTS OF HEALTH (SDOH): HOW HARD IS IT FOR YOU TO PAY FOR THE VERY BASICS LIKE FOOD, HOUSING, MEDICAL CARE, AND HEATING?: NOT HARD AT ALL

## 2022-01-25 NOTE — PROGRESS NOTES
Patient declines Hep B vaccines, Hep C screen, Colonoscopy, Covid Booster,  Shingles, Tdap, and Lung CT. Declines Diabetic foot exam today as well.

## 2022-01-25 NOTE — PROGRESS NOTES
[facility] [address], [place], [address]                        Telephone [phone]             Fax [phone]       Bassem Hernandez  :  1965  Age:  55 y.o. MRN:  [id number]  Date of visit:  2022       Assessment and Plan:    1. Acute midline low back pain with right-sided sciatica  - methylPREDNISolone (MEDROL, CORY,) 4 MG tablet; Take by mouth as directed per instructions on dose pack. Take with food. Dispense: 1 kit; Refill: 0    He was advised to follow up if symptoms worsen or do not resolve. 2. Essential hypertension  His blood pressure is elevated today. (BP: (!) 148/90)  He states that he forgot to take his blood pressure medication today. He was advised to continue current medications. Hydrochlorothiazide and Losartan-Hydrochlorothiazide were refilled:   - hydroCHLOROthiazide (HYDRODIURIL) 25 MG tablet; TAKE 1 TABLET DAILY  Dispense: 90 tablet; Refill: 1  - losartan-hydroCHLOROthiazide (HYZAAR) 100-25 MG per tablet; TAKE 1 TABLET DAILY  Dispense: 90 tablet; Refill: 1    - Comprehensive Metabolic Panel; Future was ordered to be done in 4 months. 3. Type 2 diabetes mellitus without complication, without long-term current use of insulin ([facility] [address]  He has orders for a HgbA1c to be done now. He was encouraged to have this done today. Labs were also ordered to be done before his next office visit with me in 4 months:  - Hemoglobin A1C; Future  - Microalbumin, Ur; Future    4. Mixed hyperlipidemia  He has orders for a lipid panel to be done now. He was encouraged to have this done today. - Lipid Panel; Future was also ordered to be done before his next office visit with me in 4 months. 5. Tourette's syndrome  He is doing well with Orap; this was refilled:  - pimozide (ORAP) 1 MG tablet; TAKE 2 TABLETS TWICE A DAY  Dispense: 120 tablet; Refill: 0    6.  Gastroesophageal reflux disease, unspecified whether esophagitis present  He is doing well with his current dose of Omeprazole; this was refilled:  - omeprazole (PRILOSEC) 20 MG delayed release capsule; TAKE 1 CAPSULE DAILY  Dispense: 90 capsule; Refill: 1    7. Routine health maintenance  Health maintenance was reviewed with the patient. He has received one dose of the [employer] Covid-19 vaccine. He also had Covid-19 in November 2021. He was advised that a second dose of a Covid-19 vaccine is recommended 2 months after the first dose. He was also advised that an mRNA Covid vaccine is recommended for the second dose. He has received an influenza vaccine this influenza season. Colonoscopy was recommended. He declined. Hepatitis C screening was recommended and declined. Hepatitis B vaccination was recommended and declined. Shingrix and Tdap were recommended and declined. Low-dose CT for lung cancer screening was recommended and declined. There is no indication for Prevnar-13 at this time. Follow up instructions were given to the patient:  Return in about 4 months (around 5/25/2022) for diabetes, hypertension, hyperlipidemia. Subjective:    Salvador Lacy is a 55 y.o. male who presents to [place] [address] today (1/25/2022) for follow up/evaluation of:  Pain (ED on 1/24/22 for right hip/lower back pain. Pain continues today, but is slightly improved. Patient reports pain as 6/10 and is dull and intermittent stabbing pain. Reports pain started on 1/23/22. Is seeing a chiropractor.) and 6 Month Follow-Up (hypertension, hyperlipidemia, diabetes)    He is here today for follow up after an ED visit yesterday due to back pain. He also missed his appointment with me scheduled on 12/22/2021. He requests refills of medications today. He was seen at [facility] ED on 1/24/2022 due to pain in the lower back and right hip. He states that he has been having pain \"for a while. \"  The pain became suddenly worse after he sneezed when he was bending over. He called an ambulance to take him to the ER. He received an injection of Morphine. He had a CT of the lumbar spine that showed: Impression   1. No acute osseous abnormality. 2. Mild degenerative changes. 3. Limited evaluation of disc levels in the absence of intrathecal contrast.   Disc bulges at L4-5 and L5-S1 with mild L4-5 central canal narrowing. Norco was prescribed. He went to the chiropractor after the ED visit yesterday, which helped somewhat. He continues to have low back pain that radiates into the right leg. He has received one dose of the [employer] Covid-19 vaccine. He also had Covid-19 in November 2020. He has the following problem list:  Patient Active Problem List   Diagnosis    Essential hypertension    Tourette's syndrome    Gastroesophageal reflux disease    Tobacco abuse    Chronic pain of left ankle    Type 2 diabetes mellitus without complication (HCC)    Hypertriglyceridemia    Skin ulcer of toe of left foot with necrosis of bone ([address]    History of 2019 novel coronavirus disease (COVID-19)        Current medications are:  Outpatient Medications Marked as Taking for the 1/25/22 encounter (Office Visit) with Martin Richards MD   Medication Sig Dispense Refill    HYDROcodone-acetaminophen (NORCO) 5-325 MG per tablet Take 1 tablet by mouth every 6 hours as needed for Pain for up to 3 days. Intended supply: 3 days.  Take lowest dose possible to manage pain 12 tablet 0    pimozide (ORAP) 1 MG tablet TAKE 2 TABLETS TWICE A  tablet 0    omeprazole (PRILOSEC) 20 MG delayed release capsule TAKE 1 CAPSULE DAILY 90 capsule 1    hydroCHLOROthiazide (HYDRODIURIL) 25 MG tablet TAKE 1 TABLET DAILY 90 tablet 1    losartan-hydroCHLOROthiazide (HYZAAR) 100-25 MG per tablet TAKE 1 TABLET DAILY 90 tablet 1    atorvastatin (LIPITOR) 20 MG tablet Take 1 tablet by mouth daily 90 tablet 1    Diabetic Shoe MISC by Does not apply route Dispense DM shoe and insoles. custom accomodative offloading insole with toe filler L side. Amputated great toe of left foot ([facility] [address]  (primary encounter diagnosis)  Equinus deformity of both feet  Diabetic polyneuropathy associated with type 2 diabetes mellitus ([facility] [address] 1 each 0       He is allergic to chantix [varenicline]. He is a smoker. He  reports that he has been smoking cigarettes. He started smoking about 42 years ago. He has a 30.00 pack-year smoking history. He quit smokeless tobacco use about 34 years ago. His smokeless tobacco use included snuff. Objective:    Vitals:    01/25/22 1349 01/25/22 1426   BP: (!) 144/90 (!) 148/90   Site: Right Upper Arm Right Upper Arm   Position: Sitting Sitting   Cuff Size: Large Adult Large Adult   Pulse: 95    Temp: 97.3 °F (36.3 °C)    TempSrc: Temporal    SpO2: 99%    Weight: 229 lb 6.4 oz (104.1 kg)    Height: 6' 1\" (1.854 m)      Body mass index is 30.27 kg/m². Obese male, healthy-appearing, alert, cooperative and in no acute distress. Neck supple. No adenopathy. Thyroid symmetric, normal size. Chest:  Normal expansion. Clear to auscultation. No rales, rhonchi, or wheezing. Heart sounds are normal.  Regular rate and rhythm without murmur, gallop or rub. Lower extremities have no edema. He has had no recent labs.         (Please note that portions of this note were completed with a voice-recognition program. Efforts were made to edit the dictation but occasionally words are mis-transcribed.)

## 2022-01-26 ENCOUNTER — TELEPHONE (OUTPATIENT)
Dept: FAMILY MEDICINE CLINIC | Age: 57
End: 2022-01-26

## 2022-01-26 DIAGNOSIS — E78.2 MIXED HYPERLIPIDEMIA: Primary | ICD-10-CM

## 2022-01-26 LAB
ESTIMATED AVERAGE GLUCOSE: 137 MG/DL
HBA1C MFR BLD: 6.4 % (ref 4–6)

## 2022-01-26 RX ORDER — ATORVASTATIN CALCIUM 40 MG/1
40 TABLET, FILM COATED ORAL DAILY
Qty: 90 TABLET | Refills: 1 | Status: SHIPPED | OUTPATIENT
Start: 2022-01-26 | End: 2022-07-28 | Stop reason: SDUPTHER

## 2022-01-26 NOTE — TELEPHONE ENCOUNTER
----- Message from Umm Hendricks MD sent at 1/26/2022  3:34 PM EST -----  Please advise Nica Barrow that his triglycerides are elevated. The rest of the labs are at goal.  I recommend increasing the dose of Lipitor from 20 mg daily to 40 mg daily. Please pend this to his pharmacy.

## 2022-02-01 DIAGNOSIS — G89.29 CHRONIC PAIN OF LEFT ANKLE: ICD-10-CM

## 2022-02-01 DIAGNOSIS — M25.572 CHRONIC PAIN OF LEFT ANKLE: ICD-10-CM

## 2022-02-01 DIAGNOSIS — M54.31 RIGHT SIDED SCIATICA: ICD-10-CM

## 2022-02-01 RX ORDER — HYDROCODONE BITARTRATE AND ACETAMINOPHEN 5; 325 MG/1; MG/1
1 TABLET ORAL EVERY 6 HOURS PRN
Qty: 12 TABLET | Refills: 0 | Status: CANCELLED | OUTPATIENT
Start: 2022-02-01 | End: 2022-02-04

## 2022-02-02 RX ORDER — HYDROCODONE BITARTRATE AND ACETAMINOPHEN 5; 325 MG/1; MG/1
1 TABLET ORAL EVERY 6 HOURS PRN
Qty: 30 TABLET | Refills: 0 | Status: SHIPPED | OUTPATIENT
Start: 2022-02-02 | End: 2022-03-30 | Stop reason: SDUPTHER

## 2022-02-02 NOTE — TELEPHONE ENCOUNTER
Controlled substances monitoring: No signs of potential drug abuse or diversion identified when the [place], [place], and [place] was reviewed today. The activity on the report was consistent with the treatment plan. Please advise the patient that he will need an appointment if he needs the next refill in less than 30 days.

## 2022-02-02 NOTE — TELEPHONE ENCOUNTER
Shannan Christensen called requesting a refill of the below medication which has been pended for you: [place], and [place] reviewed. NORCO 5-325 mg last filled 1/13/22 #15/7 days AND 1/24/22 #12/3 days (from ER)    Requested Prescriptions     Pending Prescriptions Disp Refills    HYDROcodone-acetaminophen (NORCO) 5-325 MG per tablet 12 tablet 0     Sig: Take 1 tablet by mouth every 6 hours as needed for Pain for up to 3 days. Intended supply: 3 days. Take lowest dose possible to manage pain       Last Appointment Date: 1/25/2022  Next Appointment Date: 5/25/2022    Allergies   Allergen Reactions    Chantix [Varenicline] Other (See Comments)     Unusual dreams.

## 2022-02-21 DIAGNOSIS — F95.2 TOURETTE'S SYNDROME: ICD-10-CM

## 2022-02-21 DIAGNOSIS — I10 ESSENTIAL HYPERTENSION: ICD-10-CM

## 2022-02-22 RX ORDER — PIMOZIDE 1 MG/1
TABLET ORAL
Qty: 360 TABLET | Refills: 1 | OUTPATIENT
Start: 2022-02-22

## 2022-02-22 RX ORDER — LOSARTAN POTASSIUM AND HYDROCHLOROTHIAZIDE 25; 100 MG/1; MG/1
TABLET ORAL
Qty: 90 TABLET | Refills: 1 | OUTPATIENT
Start: 2022-02-22

## 2022-03-30 DIAGNOSIS — G89.29 CHRONIC PAIN OF LEFT ANKLE: Primary | ICD-10-CM

## 2022-03-30 DIAGNOSIS — M25.572 CHRONIC PAIN OF LEFT ANKLE: Primary | ICD-10-CM

## 2022-03-30 RX ORDER — HYDROCODONE BITARTRATE AND ACETAMINOPHEN 5; 325 MG/1; MG/1
1 TABLET ORAL EVERY 6 HOURS PRN
Qty: 30 TABLET | Refills: 0 | Status: SHIPPED | OUTPATIENT
Start: 2022-03-30 | End: 2022-05-03 | Stop reason: SDUPTHER

## 2022-03-30 NOTE — TELEPHONE ENCOUNTER
Prudence Genna called requesting a refill of the below medication which has been pended for you:     [place] and [place]. NORCO 5-325 mg last filled 2/2/22 #30/7 days. Requested Prescriptions     Pending Prescriptions Disp Refills    HYDROcodone-acetaminophen (NORCO) 5-325 MG per tablet 30 tablet 0     Sig: Take 1 tablet by mouth every 6 hours as needed for Pain for up to 8 days. Last Appointment Date: 1/25/2022  Next Appointment Date: 4/1/22    Patient is out of medication    Allergies   Allergen Reactions    Chantix [Varenicline] Other (See Comments)     Unusual dreams.

## 2022-04-01 ENCOUNTER — OFFICE VISIT (OUTPATIENT)
Dept: FAMILY MEDICINE CLINIC | Age: 57
End: 2022-04-01
Payer: COMMERCIAL

## 2022-04-01 VITALS
WEIGHT: 246.8 LBS | HEART RATE: 84 BPM | HEIGHT: 73 IN | SYSTOLIC BLOOD PRESSURE: 130 MMHG | TEMPERATURE: 97.7 F | BODY MASS INDEX: 32.71 KG/M2 | OXYGEN SATURATION: 96 % | DIASTOLIC BLOOD PRESSURE: 84 MMHG

## 2022-04-01 DIAGNOSIS — M25.572 CHRONIC PAIN OF LEFT ANKLE: Primary | ICD-10-CM

## 2022-04-01 DIAGNOSIS — G89.29 CHRONIC PAIN OF LEFT ANKLE: Primary | ICD-10-CM

## 2022-04-01 DIAGNOSIS — U09.9 PERSISTENT FATIGUE AFTER COVID-19: ICD-10-CM

## 2022-04-01 DIAGNOSIS — R53.83 PERSISTENT FATIGUE AFTER COVID-19: ICD-10-CM

## 2022-04-01 PROCEDURE — 99213 OFFICE O/P EST LOW 20 MIN: CPT | Performed by: FAMILY MEDICINE

## 2022-04-01 RX ORDER — OMEPRAZOLE 20 MG/1
20 TABLET, DELAYED RELEASE ORAL DAILY
COMMUNITY
End: 2022-04-01

## 2022-04-01 NOTE — PROGRESS NOTES
[address]                        Telephone [phone]             Fax [phone]       Liliana Jennings  :  1965  Age:  55 y.o. MRN:  [id number]  Date of visit:  2022       Assessment and Plan:    1. Chronic pain of left ankle  Controlled substances monitoring: No signs of potential drug abuse or diversion identified when the [place] from [place], [place], and [place] was reviewed today. The activity on the report was consistent with the treatment plan. Medication agreement was signed today. Macy Alvarez was refilled 3/30/2022. He does not need a refill today. He was advised to call when the next refill is needed, and return in 3 months for re-evaluation. 2. Persistent fatigue after COVID-19  I discussed with the patient that these symptoms are not uncommon after Covid-19 infection. I discussed referral to a [facility] Covid clinic. I also advised the patient that some people have improvement in long Covid symptoms after receiving a Covid vaccination. 3.  Routine health maintenance  Health maintenance was reviewed with the patient. He has received one dose of the [employer] Covid-19 vaccine. He was advised that an additional dose of the Covid vaccine is recommended. I recommended that he receive an mRNA vaccine. Chest CT for lung cancer screening was recommended and declined. Colonoscopy was recommended. He plans to complete a Cologuard kit. Hepatitis C screening was recommended and declined. Hepatitis B vaccination, Shingrix, and Tdap were recommended and declined. Pneumonia vaccination is up to date. Follow up instructions were given to the patient:  Return in about 3 months (around 2022) for diabetes, hyperlipidemia.        Subjective:    Liliana Jennings is a 55 y.o. male who presents to [facility] today (2022) for follow up/evaluation of:  Medication Refill (Medication Contract for Norco) and Other (Patient reports having Covid in 11/2020, continues with loss of taste, smell, fatigue. )      He is here today for follow up of left foot and ankle pain. He states that he takes Norco every night before bed. He reports pain in the left foot that is worse after working all day. Yuki Fontana was refilled 3/30/2022. He had Covid-19 in November 2020. He states that he has had continued fatigue since then. He also continues to have an altered sense of smell since he had Covid-19. He has received one dose of the [employer] Covid-19 vaccine. He also had Covid-19 in November 2020. Immunization history was reviewed:  Immunization History   Administered Date(s) Administered    COVID-19, J&J, PF, 0.5 mL 07/08/2021    Influenza, Quadv, IM, (6 mo and older Fluzone, Flulaval, Fluarix and 3 yrs and older Afluria) 08/30/2017    Influenza, Dulcy Cocks, IM, PF (6 mo and older Fluzone, Flulaval, Fluarix, and 3 yrs and older Afluria) 11/08/2018, 11/13/2019, 10/21/2020, 09/16/2021    Pneumococcal Polysaccharide ([id number]) 08/30/2017          He has the following problem list:  Patient Active Problem List   Diagnosis    Essential hypertension    Tourette's syndrome    Gastroesophageal reflux disease    Tobacco abuse    Chronic pain of left ankle    Type 2 diabetes mellitus without complication ([address]    Hypertriglyceridemia    Skin 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  30 tablet 0    atorvastatin (LIPITOR) 40 MG tablet Take 1 tablet by mouth daily 90 tablet 1    pimozide (ORAP) 1 MG tablet TAKE 2 TABLETS TWICE A  tablet 0    omeprazole (PRILOSEC) 20 MG delayed release capsule TAKE 1 CAPSULE DAILY 90 capsule 1    hydroCHLOROthiazide (HYDRODIURIL) 25 MG tablet TAKE 1 TABLET DAILY 90 tablet 1    losartan-hydroCHLOROthiazide (HYZAAR) 100-25 MG per tablet TAKE 1 TABLET DAILY 90 tablet 1    Diabetic Shoe MISC by Does not apply route Dispense DM shoe and insoles. custom accomodative offloading insole with toe filler L side. Amputated great toe of left foot ([facility] [address]  (primary encounter diagnosis)  Equinus deformity of both feet  Diabetic polyneuropathy associated with type 2 diabetes mellitus ([facility] [address] 1 each 0       He is allergic to chantix [varenicline]. He is a smoker. He  reports that he has been smoking cigarettes. He started smoking about 42 years ago. He has a 30.00 pack-year smoking history. He quit smokeless tobacco use about 34 years ago. His smokeless tobacco use included snuff. Objective:    Vitals:    04/01/22 1153   BP: 130/84   Site: Right Upper Arm   Position: Sitting   Cuff Size: Large Adult   Pulse: 84   Temp: 97.7 °F (36.5 °C)   TempSrc: Temporal   SpO2: 96%   Weight: 246 lb 12.8 oz (111.9 kg)   Height: 6' 1\" (1.854 m)     Body mass index is 32.56 kg/m². Obese male, healthy-appearing, alert, cooperative and in no acute distress. Neck supple. No adenopathy. Thyroid symmetric, normal size. Chest:  Normal expansion. Clear to auscultation. No rales, rhonchi, or wheezing. Heart sounds are normal.  Regular rate and rhythm without murmur, gallop or rub. Lower extremities have no edema.           (Please note that portions of this note were completed with a voice-recognition program. Efforts were made to edit the dictation but occasionally words are mis-transcribed.)

## 2022-04-01 NOTE — PROGRESS NOTES
Patient declines Hep C, Shingles, Hep B, Tdap, Covid Booster, Low dose CT of chest.    Agreeable to diabetic foot exam.    Agreeable to Cologuard. Patient has paperwork at home to start this process.

## 2022-05-03 ENCOUNTER — TELEPHONE (OUTPATIENT)
Dept: FAMILY MEDICINE CLINIC | Age: 57
End: 2022-05-03

## 2022-05-03 DIAGNOSIS — G89.29 CHRONIC PAIN OF LEFT ANKLE: ICD-10-CM

## 2022-05-03 DIAGNOSIS — M25.572 CHRONIC PAIN OF LEFT ANKLE: ICD-10-CM

## 2022-05-03 RX ORDER — HYDROCODONE BITARTRATE AND ACETAMINOPHEN 5; 325 MG/1; MG/1
1 TABLET ORAL EVERY 6 HOURS PRN
Qty: 30 TABLET | Refills: 0 | Status: SHIPPED | OUTPATIENT
Start: 2022-05-03 | End: 2022-06-07 | Stop reason: SDUPTHER

## 2022-05-03 NOTE — TELEPHONE ENCOUNTER
----- Message from David Carbone sent at 5/3/2022  1:47 PM EDT -----  Subject: Refill Request    QUESTIONS  Name of Medication? HYDROcodone-acetaminophen (NORCO) 5-325 MG per tablet  Patient-reported dosage and instructions? 1 PO Daily  How many days do you have left? 0  Preferred Pharmacy? [id number] phone number (if available)? [phone]  Additional Information for Provider? Please refill  ---------------------------------------------------------------------------  --------------  CALL BACK INFO  What is the best way for the office to contact you? OK to leave message on   voicemail  Preferred Call Back Phone Number? [phone]  ---------------------------------------------------------------------------  --------------  SCRIPT ANSWERS  Relationship to Patient?  Self

## 2022-06-07 DIAGNOSIS — G89.29 CHRONIC PAIN OF LEFT ANKLE: ICD-10-CM

## 2022-06-07 DIAGNOSIS — M25.572 CHRONIC PAIN OF LEFT ANKLE: ICD-10-CM

## 2022-06-07 RX ORDER — HYDROCODONE BITARTRATE AND ACETAMINOPHEN 5; 325 MG/1; MG/1
1 TABLET ORAL EVERY 6 HOURS PRN
Qty: 30 TABLET | Refills: 0 | Status: SHIPPED | OUTPATIENT
Start: 2022-06-07 | End: 2022-07-12 | Stop reason: SDUPTHER

## 2022-06-07 NOTE — TELEPHONE ENCOUNTER
Lucho Nunez called requesting a refill of the below medication which has been pended for you: [place] from [place], and [place] reviewed. NORCO 5-325 mg last filled 5/4/22 #30/7 days. Requested Prescriptions     Pending Prescriptions Disp Refills    HYDROcodone-acetaminophen (NORCO) 5-325 MG per tablet 30 tablet 0     Sig: Take 1 tablet by mouth every 6 hours as needed for Pain for up to 8 days. Last Appointment Date: 4/1/2022  Next Appointment Date: 7/21/2022    Allergies   Allergen Reactions    Chantix [Varenicline] Other (See Comments)     Unusual dreams.

## 2022-07-12 DIAGNOSIS — M25.572 CHRONIC PAIN OF LEFT ANKLE: Primary | ICD-10-CM

## 2022-07-12 DIAGNOSIS — G89.29 CHRONIC PAIN OF LEFT ANKLE: Primary | ICD-10-CM

## 2022-07-12 NOTE — TELEPHONE ENCOUNTER
Coni Hayes called requesting a refill of the below medication which has been pended for you:     Requested Prescriptions     Pending Prescriptions Disp Refills    HYDROcodone-acetaminophen (NORCO) 5-325 MG per tablet 30 tablet 0     Sig: Take 1 tablet by mouth every 6 hours as needed for Pain for up to 8 days. Last Appointment Date: 4/1/2022  Next Appointment Date: Visit date not found (VM box full)    Allergies   Allergen Reactions    Chantix [Varenicline] Other (See Comments)     Unusual dreams.

## 2022-07-14 DIAGNOSIS — M25.572 CHRONIC PAIN OF LEFT ANKLE: ICD-10-CM

## 2022-07-14 DIAGNOSIS — G89.29 CHRONIC PAIN OF LEFT ANKLE: ICD-10-CM

## 2022-07-14 RX ORDER — HYDROCODONE BITARTRATE AND ACETAMINOPHEN 5; 325 MG/1; MG/1
TABLET ORAL
Qty: 30 TABLET | Refills: 0 | OUTPATIENT
Start: 2022-07-14

## 2022-07-14 RX ORDER — HYDROCODONE BITARTRATE AND ACETAMINOPHEN 5; 325 MG/1; MG/1
1 TABLET ORAL EVERY 6 HOURS PRN
Qty: 30 TABLET | Refills: 0 | Status: SHIPPED | OUTPATIENT
Start: 2022-07-14 | End: 2022-08-17 | Stop reason: SDUPTHER

## 2022-07-14 NOTE — TELEPHONE ENCOUNTER
Sarah Holbrook called requesting a refill of the below medication which has been pended for you:     [place], and [place]. Norco 5-325mg refilled last 6/8/22 #30/ 7 tablet    Called patient and set up follow up appt. Patient's initial appt had to be moved due to RR out of office. Patient requesting refill until follow up appointment. Requested Prescriptions     Pending Prescriptions Disp Refills    HYDROcodone-acetaminophen (NORCO) 5-325 MG per tablet 30 tablet 0     Sig: Take 1 tablet by mouth every 6 hours as needed for Pain for up to 8 days. Last Appointment Date: 4/1/2022  Next Appointment Date: 7/28/2022 (Soonest appt)    Allergies   Allergen Reactions    Chantix [Varenicline] Other (See Comments)     Unusual dreams.

## 2022-07-25 DIAGNOSIS — F95.2 TOURETTE'S SYNDROME: ICD-10-CM

## 2022-07-28 ENCOUNTER — TELEPHONE (OUTPATIENT)
Dept: FAMILY MEDICINE CLINIC | Age: 57
End: 2022-07-28

## 2022-07-28 DIAGNOSIS — K21.9 GASTROESOPHAGEAL REFLUX DISEASE, UNSPECIFIED WHETHER ESOPHAGITIS PRESENT: ICD-10-CM

## 2022-07-28 DIAGNOSIS — E78.2 MIXED HYPERLIPIDEMIA: ICD-10-CM

## 2022-07-28 DIAGNOSIS — F95.2 TOURETTE'S SYNDROME: ICD-10-CM

## 2022-07-28 DIAGNOSIS — I10 ESSENTIAL HYPERTENSION: ICD-10-CM

## 2022-07-28 RX ORDER — PIMOZIDE 1 MG/1
TABLET ORAL
Qty: 120 TABLET | Refills: 0 | Status: SHIPPED | OUTPATIENT
Start: 2022-07-28 | End: 2022-08-24 | Stop reason: SDUPTHER

## 2022-07-28 RX ORDER — LOSARTAN POTASSIUM AND HYDROCHLOROTHIAZIDE 25; 100 MG/1; MG/1
TABLET ORAL
Qty: 90 TABLET | Refills: 0 | Status: SHIPPED | OUTPATIENT
Start: 2022-07-28 | End: 2022-08-24 | Stop reason: SDUPTHER

## 2022-07-28 RX ORDER — HYDROCHLOROTHIAZIDE 25 MG/1
TABLET ORAL
Qty: 90 TABLET | Refills: 0 | Status: SHIPPED | OUTPATIENT
Start: 2022-07-28 | End: 2022-08-24 | Stop reason: SDUPTHER

## 2022-07-28 RX ORDER — OMEPRAZOLE 20 MG/1
CAPSULE, DELAYED RELEASE ORAL
Qty: 90 CAPSULE | Refills: 0 | Status: SHIPPED | OUTPATIENT
Start: 2022-07-28 | End: 2022-08-24 | Stop reason: SDUPTHER

## 2022-07-28 RX ORDER — ATORVASTATIN CALCIUM 40 MG/1
40 TABLET, FILM COATED ORAL DAILY
Qty: 90 TABLET | Refills: 0 | Status: SHIPPED | OUTPATIENT
Start: 2022-07-28 | End: 2022-08-24 | Stop reason: SDUPTHER

## 2022-07-28 NOTE — TELEPHONE ENCOUNTER
Attempted to call patient to discuss coming in sooner for an appt and medication refills. VM is full and is not accepting messages. Was going to discuss patient coming in during an Urgent Care day. Next day would be 8/10/22. Awaiting return call.

## 2022-07-28 NOTE — TELEPHONE ENCOUNTER
The following medications were refilled x 90 days at [facility]:    - atorvastatin (LIPITOR) 40 MG tablet; Take 1 tablet by mouth in the morning. Dispense: 90 tablet; Refill: 0    - pimozide (ORAP) 1 MG tablet; TAKE 2 TABLETS TWICE A DAY  Dispense: 120 tablet; Refill: 0    - omeprazole (PRILOSEC) 20 MG delayed release capsule; TAKE 1 CAPSULE DAILY  Dispense: 90 capsule; Refill: 0    - hydroCHLOROthiazide (HYDRODIURIL) 25 MG tablet; TAKE 1 TABLET DAILY  Dispense: 90 tablet; Refill: 0  - losartan-hydroCHLOROthiazide (HYZAAR) 100-25 MG per tablet; TAKE 1 TABLET DAILY  Dispense: 90 tablet; Refill: 0       He will need an appointment before he receives any additional refills. He can come to see me at Urgent Care if he prefers. He has labs ordered that should be done before his appointment.

## 2022-07-28 NOTE — TELEPHONE ENCOUNTER
PT states he is out of all his medications and will need refills on all med's and he could not stay any longer to wait for his appointment  today due to doc running late. His appointment was rescheduled until October 26,2022. His Rx is mail order [facility].

## 2022-07-29 RX ORDER — PIMOZIDE 1 MG/1
TABLET ORAL
Qty: 360 TABLET | Refills: 1 | OUTPATIENT
Start: 2022-07-29

## 2022-08-15 DIAGNOSIS — M25.572 CHRONIC PAIN OF LEFT ANKLE: Primary | ICD-10-CM

## 2022-08-15 DIAGNOSIS — F95.2 TOURETTE'S SYNDROME: ICD-10-CM

## 2022-08-15 DIAGNOSIS — G89.29 CHRONIC PAIN OF LEFT ANKLE: Primary | ICD-10-CM

## 2022-08-15 NOTE — TELEPHONE ENCOUNTER
Pt calling back stating he has an appt in October due to his last appt he had waited approx 40 min and staff had came out and stated she was running further behind and people were welcome to reschedule, so pt rescheduled that appt until October, can you fill meds until then or do you want to work in sooner.

## 2022-08-15 NOTE — TELEPHONE ENCOUNTER
Patient only received a 30 day supply at last refill request. Needs 90 day supply sent to mail order pharmacy.

## 2022-08-17 RX ORDER — HYDROCODONE BITARTRATE AND ACETAMINOPHEN 5; 325 MG/1; MG/1
1 TABLET ORAL EVERY 6 HOURS PRN
Qty: 7 TABLET | Refills: 0 | Status: SHIPPED | OUTPATIENT
Start: 2022-08-17 | End: 2022-08-24 | Stop reason: SDUPTHER

## 2022-08-17 RX ORDER — PIMOZIDE 1 MG/1
TABLET ORAL
Qty: 120 TABLET | Refills: 0 | OUTPATIENT
Start: 2022-08-17

## 2022-08-17 NOTE — TELEPHONE ENCOUNTER
Sarah Holbrook called requesting a refill of the below medication which has been pended for you: [place] from [place], and [place] reviewed. Norco 5-325 mg last filled 7/14/22- #30/7 days. Patient aware refill may be denied until seen on 8/24/22    Requested Prescriptions     Pending Prescriptions Disp Refills    HYDROcodone-acetaminophen (NORCO) 5-325 MG per tablet 7 tablet 0     Sig: Take 1 tablet by mouth every 6 hours as needed for Pain for up to 7 days. Do not fill after 9/17/22       Last Appointment Date: 4/1/2022  Next Appointment Date: 8/24/2022    Allergies   Allergen Reactions    Chantix [Varenicline] Other (See Comments)     Unusual dreams.

## 2022-08-22 DIAGNOSIS — F95.2 TOURETTE'S SYNDROME: ICD-10-CM

## 2022-08-22 NOTE — TELEPHONE ENCOUNTER
Criss Foreman called requesting a refill of the below medication which has been pended for you:     Requested Prescriptions     Pending Prescriptions Disp Refills    pimozide (ORAP) 1 MG tablet [Pharmacy Med Name: PIMOZIDE TAB 1MG] 360 tablet 0     Sig: TAKE 2 TABLETS TWICE A DAY       Last Appointment Date: 4/1/2022  Next Appointment Date: 8/24/2022    Allergies   Allergen Reactions    Chantix [Varenicline] Other (See Comments)     Unusual dreams.

## 2022-08-23 ENCOUNTER — HOSPITAL ENCOUNTER (OUTPATIENT)
Dept: LAB | Age: 57
Discharge: HOME | End: 2022-08-23
Payer: COMMERCIAL

## 2022-08-23 DIAGNOSIS — E78.2 MIXED HYPERLIPIDEMIA: ICD-10-CM

## 2022-08-23 DIAGNOSIS — I10 ESSENTIAL HYPERTENSION: ICD-10-CM

## 2022-08-23 DIAGNOSIS — E11.9 TYPE 2 DIABETES MELLITUS WITHOUT COMPLICATION, WITHOUT LONG-TERM CURRENT USE OF INSULIN (HCC): ICD-10-CM

## 2022-08-23 LAB
ALBUMIN SERPL-MCNC: 4.3 G/DL (ref 3.5–5.2)
ALBUMIN/GLOBULIN RATIO: 1.3 (ref 1–2.5)
ALP BLD-CCNC: 99 U/L (ref 40–129)
ALT SERPL-CCNC: 10 U/L (ref 5–41)
ANION GAP SERPL CALCULATED.3IONS-SCNC: 9 MMOL/L (ref 9–17)
AST SERPL-CCNC: 15 U/L
BILIRUB SERPL-MCNC: 0.64 MG/DL (ref 0.3–1.2)
BUN BLDV-MCNC: 9 MG/DL (ref 6–20)
BUN/CREAT BLD: 12 (ref 9–20)
CALCIUM SERPL-MCNC: 9.5 MG/DL (ref 8.6–10.4)
CHLORIDE BLD-SCNC: 98 MMOL/L (ref 98–107)
CHOLESTEROL/HDL RATIO: 4.8
CHOLESTEROL: 138 MG/DL
CO2: 30 MMOL/L (ref 20–31)
CREAT SERPL-MCNC: 0.75 MG/DL (ref 0.7–1.2)
CREATININE URINE: 56.6 MG/DL (ref 39–259)
GFR AFRICAN AMERICAN: >60 ML/MIN
GFR NON-AFRICAN AMERICAN: >60 ML/MIN
GFR SERPL CREATININE-BSD FRML MDRD: ABNORMAL ML/MIN/{1.73_M2}
GLUCOSE BLD-MCNC: 143 MG/DL (ref 70–99)
HDLC SERPL-MCNC: 29 MG/DL
LDL CHOLESTEROL: 63 MG/DL (ref 0–130)
MICROALBUMIN/CREAT 24H UR: <12 MG/L
MICROALBUMIN/CREAT UR-RTO: NORMAL MCG/MG CREAT
POTASSIUM SERPL-SCNC: 4.4 MMOL/L (ref 3.7–5.3)
SODIUM BLD-SCNC: 137 MMOL/L (ref 135–144)
TOTAL PROTEIN: 7.5 G/DL (ref 6.4–8.3)
TRIGL SERPL-MCNC: 231 MG/DL

## 2022-08-23 PROCEDURE — 83036 HEMOGLOBIN GLYCOSYLATED A1C: CPT

## 2022-08-23 PROCEDURE — 80053 COMPREHEN METABOLIC PANEL: CPT

## 2022-08-23 PROCEDURE — 82570 ASSAY OF URINE CREATININE: CPT

## 2022-08-23 PROCEDURE — 82043 UR ALBUMIN QUANTITATIVE: CPT

## 2022-08-23 PROCEDURE — 36415 COLL VENOUS BLD VENIPUNCTURE: CPT

## 2022-08-23 PROCEDURE — 80061 LIPID PANEL: CPT

## 2022-08-24 ENCOUNTER — OFFICE VISIT (OUTPATIENT)
Dept: FAMILY MEDICINE CLINIC | Age: 57
End: 2022-08-24
Payer: COMMERCIAL

## 2022-08-24 VITALS
SYSTOLIC BLOOD PRESSURE: 136 MMHG | WEIGHT: 242.8 LBS | DIASTOLIC BLOOD PRESSURE: 84 MMHG | HEIGHT: 73 IN | TEMPERATURE: 97.8 F | BODY MASS INDEX: 32.18 KG/M2 | OXYGEN SATURATION: 98 % | HEART RATE: 85 BPM

## 2022-08-24 DIAGNOSIS — I10 ESSENTIAL HYPERTENSION: ICD-10-CM

## 2022-08-24 DIAGNOSIS — F95.2 TOURETTE'S SYNDROME: ICD-10-CM

## 2022-08-24 DIAGNOSIS — M79.604 BILATERAL LEG PAIN: ICD-10-CM

## 2022-08-24 DIAGNOSIS — U09.9 PERSISTENT FATIGUE AFTER COVID-19: ICD-10-CM

## 2022-08-24 DIAGNOSIS — E78.2 MIXED HYPERLIPIDEMIA: ICD-10-CM

## 2022-08-24 DIAGNOSIS — M25.572 CHRONIC PAIN OF LEFT ANKLE: ICD-10-CM

## 2022-08-24 DIAGNOSIS — G89.29 CHRONIC PAIN OF LEFT ANKLE: ICD-10-CM

## 2022-08-24 DIAGNOSIS — K21.9 GASTROESOPHAGEAL REFLUX DISEASE, UNSPECIFIED WHETHER ESOPHAGITIS PRESENT: ICD-10-CM

## 2022-08-24 DIAGNOSIS — E11.9 TYPE 2 DIABETES MELLITUS WITHOUT COMPLICATION, WITHOUT LONG-TERM CURRENT USE OF INSULIN (HCC): Primary | ICD-10-CM

## 2022-08-24 DIAGNOSIS — R53.83 PERSISTENT FATIGUE AFTER COVID-19: ICD-10-CM

## 2022-08-24 DIAGNOSIS — M79.605 BILATERAL LEG PAIN: ICD-10-CM

## 2022-08-24 LAB
ESTIMATED AVERAGE GLUCOSE: 137 MG/DL
HBA1C MFR BLD: 6.4 % (ref 4–6)

## 2022-08-24 PROCEDURE — 3044F HG A1C LEVEL LT 7.0%: CPT | Performed by: FAMILY MEDICINE

## 2022-08-24 PROCEDURE — 99214 OFFICE O/P EST MOD 30 MIN: CPT | Performed by: FAMILY MEDICINE

## 2022-08-24 RX ORDER — PIMOZIDE 1 MG/1
TABLET ORAL
Qty: 180 TABLET | Refills: 1 | Status: SHIPPED | OUTPATIENT
Start: 2022-08-24

## 2022-08-24 RX ORDER — PIMOZIDE 1 MG/1
TABLET ORAL
Qty: 360 TABLET | Refills: 0 | OUTPATIENT
Start: 2022-08-24

## 2022-08-24 RX ORDER — PIMOZIDE 1 MG/1
TABLET ORAL
Qty: 180 TABLET | Refills: 1 | Status: SHIPPED | OUTPATIENT
Start: 2022-08-24 | End: 2022-08-24 | Stop reason: SDUPTHER

## 2022-08-24 RX ORDER — HYDROCODONE BITARTRATE AND ACETAMINOPHEN 5; 325 MG/1; MG/1
1 TABLET ORAL EVERY 6 HOURS PRN
Qty: 30 TABLET | Refills: 0 | Status: SHIPPED | OUTPATIENT
Start: 2022-08-24 | End: 2022-09-28 | Stop reason: SDUPTHER

## 2022-08-24 RX ORDER — OMEPRAZOLE 20 MG/1
CAPSULE, DELAYED RELEASE ORAL
Qty: 90 CAPSULE | Refills: 1 | Status: SHIPPED | OUTPATIENT
Start: 2022-08-24

## 2022-08-24 RX ORDER — ATORVASTATIN CALCIUM 40 MG/1
40 TABLET, FILM COATED ORAL DAILY
Qty: 90 TABLET | Refills: 1 | Status: SHIPPED | OUTPATIENT
Start: 2022-08-24

## 2022-08-24 RX ORDER — LOSARTAN POTASSIUM AND HYDROCHLOROTHIAZIDE 25; 100 MG/1; MG/1
TABLET ORAL
Qty: 90 TABLET | Refills: 1 | Status: SHIPPED | OUTPATIENT
Start: 2022-08-24

## 2022-08-24 RX ORDER — HYDROCHLOROTHIAZIDE 25 MG/1
TABLET ORAL
Qty: 90 TABLET | Refills: 1 | Status: SHIPPED | OUTPATIENT
Start: 2022-08-24

## 2022-08-24 ASSESSMENT — PATIENT HEALTH QUESTIONNAIRE - PHQ9
SUM OF ALL RESPONSES TO PHQ QUESTIONS 1-9: 0
7. TROUBLE CONCENTRATING ON THINGS, SUCH AS READING THE NEWSPAPER OR WATCHING TELEVISION: 0
5. POOR APPETITE OR OVEREATING: 0
SUM OF ALL RESPONSES TO PHQ9 QUESTIONS 1 & 2: 0
2. FEELING DOWN, DEPRESSED OR HOPELESS: 0
SUM OF ALL RESPONSES TO PHQ QUESTIONS 1-9: 0
10. IF YOU CHECKED OFF ANY PROBLEMS, HOW DIFFICULT HAVE THESE PROBLEMS MADE IT FOR YOU TO DO YOUR WORK, TAKE CARE OF THINGS AT HOME, OR GET ALONG WITH OTHER PEOPLE: 0
4. FEELING TIRED OR HAVING LITTLE ENERGY: 0
6. FEELING BAD ABOUT YOURSELF - OR THAT YOU ARE A FAILURE OR HAVE LET YOURSELF OR YOUR FAMILY DOWN: 0
9. THOUGHTS THAT YOU WOULD BE BETTER OFF DEAD, OR OF HURTING YOURSELF: 0
3. TROUBLE FALLING OR STAYING ASLEEP: 0
SUM OF ALL RESPONSES TO PHQ QUESTIONS 1-9: 0
8. MOVING OR SPEAKING SO SLOWLY THAT OTHER PEOPLE COULD HAVE NOTICED. OR THE OPPOSITE, BEING SO FIGETY OR RESTLESS THAT YOU HAVE BEEN MOVING AROUND A LOT MORE THAN USUAL: 0
SUM OF ALL RESPONSES TO PHQ QUESTIONS 1-9: 0
1. LITTLE INTEREST OR PLEASURE IN DOING THINGS: 0

## 2022-08-24 NOTE — PROGRESS NOTES
Agreeable to foot exam  Declines covid booster, shingles, hep b, pneumo, Tdap, Colonsocopy, CT lung screen, Hep C screen    Reports having an eye exam last week at work. [place] for [place], [place]. Norco 5-325mg last filled on 8/18/2022 #7/2days. Med pended.

## 2022-08-24 NOTE — TELEPHONE ENCOUNTER
Coni Hayes called requesting a refill of the below medication which has been pended for you:     Requesting refills to be sent to [address] [place] [address]. Requested Prescriptions     Pending Prescriptions Disp Refills    pimozide (ORAP) 1 MG tablet 180 tablet 1     Sig: TAKE 2 TABLETS TWICE A DAY    atorvastatin (LIPITOR) 40 MG tablet 90 tablet 1     Sig: Take 1 tablet by mouth daily    omeprazole (PRILOSEC) 20 MG delayed release capsule 90 capsule 1     Sig: TAKE 1 CAPSULE DAILY    hydroCHLOROthiazide (HYDRODIURIL) 25 MG tablet 90 tablet 1     Sig: TAKE 1 TABLET DAILY    losartan-hydroCHLOROthiazide (HYZAAR) 100-25 MG per tablet 90 tablet 1     Sig: TAKE 1 TABLET DAILY       Last Appointment Date: 8/24/2022  Next Appointment Date: 12/8/2022    Allergies   Allergen Reactions    Chantix [Varenicline] Other (See Comments)     Unusual dreams.

## 2022-08-24 NOTE — PATIENT INSTRUCTIONS
Remember to get a flu vaccine in the fall! The flu vaccine typically becomes available in September or October. An additional dose of a Covid vaccine is also recommended this fall. An updated version of the Covid vaccine should be available soon that may give better protection against variants of the Covid virus. Quitting Tobacco: Care Instructions  Overview     People who use tobacco crave the nicotine in tobacco. Giving it up is much harder than simply changing a habit. Your body has to stop craving the nicotine. It is hard to quit, but you can do it. There are many tools thatpeople use to quit. You may find that combining tools works best for you. There are several steps to quitting. Your doctor will help you set up the plan that best meets your needs. You may want to attend a tobacco-cessation program to help you quit. When you choose a program, look for one that has proven success. Ask your doctor for ideas. You will greatly increase your chances of success if you take medicine as well as get counseling or join a cessationprogram.  Some of the changes you feel when you first quit tobacco are uncomfortable. Your body will miss the nicotine at first, and you may feel short-tempered and grumpy. You may have trouble sleeping or concentrating. Medicine can help you deal with these symptoms. You may struggle with changing your habits. And theurge to use tobacco may continue for a time. This may be a lot to deal with, but keep at it. You will feel better. Follow-up care is a key part of your treatment and safety. Be sure to make and go to all appointments, and call your doctor if you are having problems. It's also a good idea to know your test results and keep alist of the medicines you take. How can you care for yourself at home? Get support. You have a better chance of quitting if you have help and support. Ask your family, friends, and coworkers for support.   Join a support group, such as Nicotine Anonymous, for people who are trying to quit using tobacco.  Consider signing up for a tobacco cessation program, such as the American Lung Association's Freedom from Smoking program.  Get text messaging support. Go to the website at www.smokefree. gov to sign up for the [facility] program.  After you quit, do not use tobacco again, not even once. Get rid of all tobacco products and anything that reminds you of tobacco, like ashtrays, spit cups, and lighters. If you smoke, clean your house and your clothes to get rid of the smell. Learn how to live without tobacco. Think about ways you can avoid those things that make you reach for tobacco.  Avoid situations that put you at greatest risk. For some people, it's hard to have a drink with friends or a coffee break with coworkers without using tobacco.  Change your daily routine. Take a different route to work, or eat a meal in a different place. Try to cut down on stress. Calm yourself or release tension by doing an activity you enjoy, such as reading a book, taking a hot bath, or gardening. Learn about treatments that can help you quit. Talk to your doctor or pharmacist about nicotine replacement therapy. Nicotine replacement products help you slowly reduce the amount of nicotine you need. They can help you deal with cravings and withdrawal symptoms. These products include nicotine patches, gum, lozenges, nasal spray, and inhalers. Most are available without a prescription. Ask your doctor about varenicline (Chantix) or bupropion SR. These prescription medicines can help reduce withdrawal symptoms. They don't contain nicotine. Eat a healthy diet, and get regular exercise. Having healthy habits will help your body move past its craving for nicotine. Be prepared to keep trying. Most people aren't successful the first few times they try to quit. Don't give up if you use tobacco again.  Make a list of things you learned, and think about when you want to try again. Where can you learn more? Go to https://chpepiceweb.[employer]. org and sign in to your [employer] account. Enter U054 in the [facility] box to learn more about \"Quitting Tobacco: Care Instructions. \"     If you do not have an account, please click on the \"Sign Up Now\" link. Current as of: November 8, 2021               Content Version: 13.3  © 2006-2022 [employer]wise[employer]. Care instructions adapted under license by [facility] ([facility]). If you have questions about a medical condition or this instruction, always ask your healthcare professional. [address] any warranty or liability for your use of this information.

## 2022-08-24 NOTE — PROGRESS NOTES
[address]             [address]                        Telephone [phone]             Fax [phone]       Estrellita Gibbons  :  1965  Age:  55 y.o. MRN:  [id number]  Date of visit:  2022       Assessment and Plan:    1. Type 2 diabetes mellitus without complication, without long-term current use of insulin (HCC)  His HgbA1c was 6.4 %, which is at goal.   He was advised to continue his current regimen. - Hemoglobin A1C; Future was ordered to be done in 6 months. 2. Mixed hyperlipidemia  His lipid profile was at goal except for elevated triglycerides on his recent lab work. He is tolerating Lipitor well. He states that he does not need a refill today. Labs were ordered to be done in 6 months:  - Comprehensive Metabolic Panel; Future  - Lipid Panel; Future    3. Tourette's syndrome  He is doing well with Orap; this was refilled:    pimozide (ORAP) 1 MG tablet (Discontinued)                  TAKE 2 TABLETS TWICE A DAY, Disp-180 tablet, R-1  Normal     4. Chronic pain of left ankle  Controlled substances monitoring: No signs of potential drug abuse or diversion identified when the [place]AR[place] from [place], [place], and [place] was reviewed today. The activity on the report was consistent with the treatment plan. Norco was refilled:  - HYDROcodone-acetaminophen (NORCO) 5-325 MG per tablet; Take 1 tablet by mouth every 6 hours as needed for Pain for up to 30 days. Do not fill after 22  Dispense: 30 tablet; Refill: 0    A controlled medication agreement was signed. He was advised to return in 3 months for re-evaluation. 5. Bilateral leg pain  Neuropathy vs. other  - EMG; Future was orderd.     6. Tobacco use  Tobacco Cessation Counseling: Patient advised about behavior change, including information about personal health harms, usage of appropriate cessation measures and benefits of cessation. Time spent (minutes): 4    Printed information regarding Quitting Tobacco was provided to the patient with the after visit summary. .      7. Persistent fatigue after Covid-19  I advised the patient that some people have improvement in long Covid symptoms after receiving a Covid vaccine. I also suggested follow up at a long Covid clinic. 8.  Routine health maintenance  Health maintenance was reviewed with the patient. He has received one dose of the [employer] Covid-19 vaccine. He was advised that an additional dose of a Covid vaccine is recommended this fall. Annual influenza vaccine was recommended. Pneumonia vaccination was recommended. Hepatitis B vaccination was recommended. Colonoscopy was recommended. Tdap was recommended. Hepatitis C screening was recommended. Follow up instructions were given to the patient:  Return in about 3 months (around 11/24/2022) for foot pain, bilateral leg pain. Subjective:    Deejay Philip is a 55 y.o. male who presents to [place] [address] today (8/24/2022) for follow up/evaluation of:  Foot Pain (Patient reports chronic pain in left leg from previous surgeries to the left leg and foot. Here for 3 month visit/med refill), Hyperlipidemia, Hypertension, Diabetes, and Fatigue (Patient reports long term covid symptom. Reports its hard to work due the fatigue and chronic pain.)      He is here today for follow up of type 2 diabetes, hypertension, and left ankle pain. He states that he has had increase pain in both legs recently. He states that he has to stand on a concrete floor for 10 hours daily at work, and this has been getting more difficult for him. He also reports that he has had fatigue since he had Covid. He states that he is smoking less than a pack a day. He is tolerating his medications well. He has received one dose of the [employer] Covid-19 vaccine.   He also had Covid in December 2020. Immunization history was reviewed:  Immunization History   Administered Date(s) Administered    COVID-19, J&J, (age 18y+), IM, 0.5 mL 07/08/2021    Influenza, AFLURIA, FLUZONE (age 1 y+), MDV 08/30/2017    Influenza, FLUARIX, FLULAVAL, (age 10 mo+) AND AFLURIA, FLUZONE (age 1 y+), PF 11/08/2018, 11/13/2019, 10/21/2020, 09/16/2021    Pneumococcal Polysaccharide ([id number]) 08/30/2017          He has the following problem list:  Patient Active Problem List   Diagnosis    Essential hypertension    Tourette's syndrome    Gastroesophageal reflux disease    Tobacco abuse    Chronic pain of left ankle    Type 2 diabetes mellitus without complication (HCC)    Hypertriglyceridemia    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 Do not fill after 9/17/22 7 tablet 0    atorvastatin (LIPITOR) 40 MG tablet Take 1 tablet by mouth in the morning. 90 tablet 0    pimozide (ORAP) 1 MG tablet TAKE 2 TABLETS TWICE A  tablet 0    omeprazole (PRILOSEC) 20 MG delayed release capsule TAKE 1 CAPSULE DAILY 90 capsule 0    hydroCHLOROthiazide (HYDRODIURIL) 25 MG tablet TAKE 1 TABLET DAILY 90 tablet 0    losartan-hydroCHLOROthiazide (HYZAAR) 100-25 MG per tablet TAKE 1 TABLET DAILY 90 tablet 0    Diabetic Shoe MISC by Does not apply route Dispense DM shoe and insoles. custom accomodative offloading insole with toe filler L side.      Amputated great toe of left foot ([facility]  (primary encounter diagnosis)  Equinus deformity of both feet  Diabetic polyneuropathy associated with type 2 diabetes mellitus ([facility] 1 each 0       He is allergic to chantix [varenicline]. He is a smoker. He  reports that he has been smoking cigarettes. He started smoking about 42 years ago. He has a 30.00 pack-year smoking history. He quit smokeless tobacco use about 34 years ago. His smokeless tobacco use included snuff. Objective:    Vitals:    08/24/22 1212   BP: 136/84   Site: Right Upper Arm   Position: Sitting   Cuff Size: Large Adult   Pulse: 85   Temp: 97.8 °F (36.6 °C)   TempSrc: Temporal   SpO2: 98%   Weight: 242 lb 12.8 oz (110.1 kg)   Height: 6' 1\" (1.854 m)     Body mass index is 32.03 kg/m². Obese male, healthy-appearing, alert, cooperative, and in no acute distress. Neck supple. No adenopathy. Thyroid symmetric, normal size. Chest:  Normal expansion. Clear to auscultation. No rales, rhonchi, or wheezing. Heart sounds are normal.  Regular rate and rhythm without murmur, gallop or rub. Lower extremities have no edema. The left great toe has been amputated. There is decreased sensation to light touch in the feet bilaterally. Affect is flat. Thought processes are logical. There is no evidence of paranoia or delusions. Responses to questions are appropriate. Dress and grooming are appropriate.      Results of labs done 8/23/2022 were reviewed with the patient:   Hospital Outpatient Visit on 08/23/2022   Component Date Value Ref Range Status    Glucose 08/23/2022 143 (A) 70 - 99 mg/dL Final    BUN 08/23/2022 9  6 - 20 mg/dL Final    Creatinine 08/23/2022 0.75  0.70 - 1.20 mg/dL Final    Bun/Cre Ratio 08/23/2022 12  9 - 20 Final    Calcium 08/23/2022 9.5  8.6 - 10.4 mg/dL Final    Sodium 08/23/2022 137  135 - 144 mmol/L Final    Potassium 08/23/2022 4.4  3.7 - 5.3 mmol/L Final    Chloride 08/23/2022 98  98 - 107 mmol/L Final    CO2 08/23/2022 30  20 - 31 mmol/L Final    Anion Gap 08/23/2022 9  9 - 17 mmol/L Final    Alkaline Phosphatase 08/23/2022 99  40 - 129 U/L Final    ALT 08/23/2022 10  5 - 41 U/L Final    AST 08/23/2022 15  <40

## 2022-09-26 DIAGNOSIS — I10 ESSENTIAL HYPERTENSION: ICD-10-CM

## 2022-09-26 RX ORDER — LOSARTAN POTASSIUM AND HYDROCHLOROTHIAZIDE 25; 100 MG/1; MG/1
TABLET ORAL
Qty: 90 TABLET | Refills: 1 | OUTPATIENT
Start: 2022-09-26

## 2022-09-28 DIAGNOSIS — G89.29 CHRONIC PAIN OF LEFT ANKLE: Primary | ICD-10-CM

## 2022-09-28 DIAGNOSIS — M25.572 CHRONIC PAIN OF LEFT ANKLE: Primary | ICD-10-CM

## 2022-09-28 RX ORDER — HYDROCODONE BITARTRATE AND ACETAMINOPHEN 5; 325 MG/1; MG/1
1 TABLET ORAL EVERY 6 HOURS PRN
Qty: 30 TABLET | Refills: 0 | Status: SHIPPED | OUTPATIENT
Start: 2022-09-28 | End: 2022-11-02 | Stop reason: SDUPTHER

## 2022-09-28 NOTE — TELEPHONE ENCOUNTER
Jimbo Montero called requesting a refill of the below medication which has been pended for you:     [place] for [place], [place], and [place] reviewed. Norco 5/325mg last filled on 8/24/2022 #30/7days. Requested Prescriptions     Pending Prescriptions Disp Refills    HYDROcodone-acetaminophen (NORCO) 5-325 MG per tablet 30 tablet 0     Sig: Take 1 tablet by mouth every 6 hours as needed for Pain for up to 30 days. Last Appointment Date: 8/24/2022  Next Appointment Date: 12/8/2022    Allergies   Allergen Reactions    Chantix [Varenicline] Other (See Comments)     Unusual dreams.

## 2022-11-02 DIAGNOSIS — G89.29 CHRONIC PAIN OF LEFT ANKLE: ICD-10-CM

## 2022-11-02 DIAGNOSIS — M25.572 CHRONIC PAIN OF LEFT ANKLE: ICD-10-CM

## 2022-11-02 RX ORDER — HYDROCODONE BITARTRATE AND ACETAMINOPHEN 5; 325 MG/1; MG/1
1 TABLET ORAL EVERY 6 HOURS PRN
Qty: 30 TABLET | Refills: 0 | Status: SHIPPED | OUTPATIENT
Start: 2022-11-02 | End: 2022-12-02

## 2022-11-02 NOTE — TELEPHONE ENCOUNTER
Meir Duncan called requesting a refill of the below medication which has been pended for you:     OARRS reviewed for [place], [address] [place] [address], and [place]. Norco 5-325mg last filled on 9/29/2022 #30/7days. Requested Prescriptions     Pending Prescriptions Disp Refills    HYDROcodone-acetaminophen (NORCO) 5-325 MG per tablet 30 tablet 0     Sig: Take 1 tablet by mouth every 6 hours as needed for Pain for up to 30 days. Last Appointment Date: 8/24/2022  Next Appointment Date: 12/8/2022    Allergies   Allergen Reactions    Chantix [Varenicline] Other (See Comments)     Unusual dreams.

## 2022-12-06 ENCOUNTER — HOSPITAL ENCOUNTER (OUTPATIENT)
Dept: NEUROLOGY | Age: 57
Discharge: HOME | End: 2022-12-06
Payer: COMMERCIAL

## 2022-12-06 DIAGNOSIS — M79.604 BILATERAL LEG PAIN: ICD-10-CM

## 2022-12-06 DIAGNOSIS — M79.605 BILATERAL LEG PAIN: ICD-10-CM

## 2022-12-06 PROCEDURE — 95886 MUSC TEST DONE W/N TEST COMP: CPT

## 2022-12-06 PROCEDURE — 95912 NRV CNDJ TEST 11-12 STUDIES: CPT

## 2022-12-06 NOTE — PROCEDURES
[address]                 [address] [place] [id number]                        EMG/NERVE CONDUCTION STUDIES REPORT      PATIENT NAME: Macario Major                        :        1965  MED REC NO:   [id number]                             ROOM:  ACCOUNT NO:   [de-identified]                           ADMIT DATE: 2022      PROVIDER:     Maxwell Muse MD      DATE OF EM2022    TECHNICAL SUMMARY:  The nature, purpose, goals, expectations and process  involved in the procedures of nerve conduction studies and needle  electromyography were reviewed, discussed, explained and verbal consent  was obtained from the patient. The patient's questions were answered  with reference to the above processes and procedures. There were no significant technical difficulties encountered during this  study and nerve conduction studies were performed under temperature  monitoring. CLINICAL DATA:      The patient is 65years old with a history of numbness,  tingling, paresthesias involving both feet, legs. The patient has known  history of type 2 diabetes for the last 2 years. The patient has  history of lower back pain. The patient has amputation of the great toe  on the left side. The patient also has previous history of DVT. The  patient complains of pain involving the both legs. The purpose of the study is to evaluate for mononeuropathy,  radiculopathy, peripheral polyneuropathy, myopathy. SUMMARY:      The sensory nerve action potentials of the right and left  sural and superficial peroneal nerves were not recordable. Compound muscle action potentials of the right and left peroneal nerves  were not recordable bilaterally. Peroneal motor conduction studies recorded at tibialis anterior shows  normal amplitude, prolonged distal latencies, low conduction velocities.     Tibial motor nerve conduction study shows significantly very low  amplitude on the right side, nonrecordable on the left side. Proximal conductions as measured by the F responses were not recordable  in both peroneal and tibial nerves. Tibial H responses were not  recordable bilaterally. Nerve  Conductions   Results  Were  Personally  Reviewed and  Analysed. Abnormal  Nerve  Conductions  Were  Personally  Repeated,  Verified, reconfirmed  And  Updated as needed  appropriately. Please    See   Wave  Forms   And    Details  Of     Nerve  Conduction   Studies   For  Additional  Information             Extensive   Needle  Electromyography  Was personally  Performed  In  Both         Lower  Extremities    In  The  Following  Muscles :      Gluteus  Medius,   Vastus  Lateralis,  Internal  Hamstring,    External  Hamstring,   Anterior Tibialis,  Medial  Gastrocnemius,          Extensive  Needle  Electromyography  Shows       A) Normal  insertional  Activity. There  Is  Absence  Of   P  Waves,  Fibrillations,  Fasciculations and        Other  Spontaneous   Activity. B) Voluntary  Motor unit  Potentials    Show :    Normal  Effort. Decreased   Recruitment, Increased amplitude &  Duration. Polyphasic features  Noted  In  The  Above  Examined  muscles              IMPRESSION:      1. There is electrodiagnostic evidence of severe, sensory motor,        peripheral polyneuropathy involving examined both lower extremities. 2.  There is electrodiagnostic evidence of chronic L4-L5 and L5-S1        radiculopathy bilaterally. No active denervation changes noted. 3.  There is no definite electrodiagnostic evidence of inflammatory         myopathy involving examined both lower extremities. Further clinical correlation and followup recommended. Vianca Polk MD, [address] [place] [address]     Board Certified in Neurology  & in  [address] [address] [place] [address] W of Psychiatry and Neurology ([employer]).          D: 12/06/2022 11:51:36       T: 12/06/2022 12:26:43     [email or web address]  Job#: [id number]     Doc#: [id number]    CC:  Isaiah Lerma

## 2022-12-06 NOTE — PROGRESS NOTES
EMG/NCS Bilateral    lower Completed    PCP: Joseph Ormond, MD    Ordering: Isaiah Lerma MD    Interpreting Physician: Mundo Markham.  Nessa Carroll, Neurologist    Technician: Елена Garrett RN

## 2022-12-08 ENCOUNTER — OFFICE VISIT (OUTPATIENT)
Dept: FAMILY MEDICINE CLINIC | Age: 57
End: 2022-12-08

## 2022-12-08 VITALS
WEIGHT: 243 LBS | DIASTOLIC BLOOD PRESSURE: 84 MMHG | HEART RATE: 77 BPM | HEIGHT: 73 IN | SYSTOLIC BLOOD PRESSURE: 144 MMHG | OXYGEN SATURATION: 97 % | BODY MASS INDEX: 32.2 KG/M2 | TEMPERATURE: 97.6 F

## 2022-12-08 DIAGNOSIS — G63 POLYNEUROPATHY ASSOCIATED WITH UNDERLYING DISEASE (HCC): ICD-10-CM

## 2022-12-08 DIAGNOSIS — Z23 NEED FOR INFLUENZA VACCINATION: ICD-10-CM

## 2022-12-08 DIAGNOSIS — M25.572 CHRONIC PAIN OF LEFT ANKLE: Primary | ICD-10-CM

## 2022-12-08 DIAGNOSIS — G89.29 CHRONIC PAIN OF LEFT ANKLE: Primary | ICD-10-CM

## 2022-12-08 DIAGNOSIS — Z87.891 PERSONAL HISTORY OF TOBACCO USE, PRESENTING HAZARDS TO HEALTH: ICD-10-CM

## 2022-12-08 RX ORDER — HYDROCODONE BITARTRATE AND ACETAMINOPHEN 5; 325 MG/1; MG/1
1 TABLET ORAL EVERY 6 HOURS PRN
Qty: 30 TABLET | Refills: 0 | Status: SHIPPED | OUTPATIENT
Start: 2022-12-08 | End: 2023-01-07

## 2022-12-08 RX ORDER — OMEPRAZOLE 20 MG/1
CAPSULE, DELAYED RELEASE ORAL
Qty: 90 CAPSULE | Refills: 1 | Status: CANCELLED | OUTPATIENT
Start: 2022-12-08

## 2022-12-08 RX ORDER — PIMOZIDE 1 MG/1
TABLET ORAL
Qty: 180 TABLET | Refills: 1 | Status: CANCELLED | OUTPATIENT
Start: 2022-12-08

## 2022-12-08 RX ORDER — LOSARTAN POTASSIUM AND HYDROCHLOROTHIAZIDE 25; 100 MG/1; MG/1
TABLET ORAL
Qty: 90 TABLET | Refills: 1 | Status: CANCELLED | OUTPATIENT
Start: 2022-12-08

## 2022-12-08 RX ORDER — DULOXETIN HYDROCHLORIDE 30 MG/1
30 CAPSULE, DELAYED RELEASE ORAL DAILY
Qty: 30 CAPSULE | Refills: 3 | Status: SHIPPED | OUTPATIENT
Start: 2022-12-08

## 2022-12-08 RX ORDER — ATORVASTATIN CALCIUM 40 MG/1
40 TABLET, FILM COATED ORAL DAILY
Qty: 90 TABLET | Refills: 1 | Status: CANCELLED | OUTPATIENT
Start: 2022-12-08

## 2022-12-08 NOTE — PROGRESS NOTES
[address] [facility] [address], [address] [place] [address] Drive                        Telephone [phone]             Fax [phone]       Renold Galeazzi  :  1965  Age:  65 y.o. MRN:  [id number]  Date of visit:  2022       Assessment and Plan:    1. Chronic pain of left ankle  I discussed referral to pain management. He declined. Controlled substances monitoring: No signs of potential drug abuse or diversion identified when the OAR[place] report from [place], [place], and [place] was reviewed today. The activity on the report was consistent with the treatment plan. Norco was refilled:  - HYDROcodone-acetaminophen (NORCO) 5-325 MG per tablet; Take 1 tablet by mouth every 6 hours as needed for Pain for up to 30 days. Dispense: 30 tablet; Refill: 0    2. Polyneuropathy associated with underlying disease ([facility]  I discussed the results of the EMG with the patient. I discussed referral to pain management and/or neurology. He declined. Cymbalta was prescribed:  - DULoxetine (CYMBALTA) 30 MG extended release capsule; Take 1 capsule by mouth daily  Dispense: 30 capsule; Refill: 3    3. Personal history of tobacco use, presenting hazards to health  Tobacco Cessation Counseling: Patient advised about behavior change, including information about personal health harms, usage of appropriate cessation measures and benefits of cessation. Time spent (minutes): 4    Printed information regarding Quitting Tobacco was provided to the patient with the after visit summary. 4.  Routine health maintenance  Health maintenance was reviewed with the patient. He has received one dose of the [employer] Covid-19 vaccine. He was advised that the updated Covid vaccine is recommended. Annual influenza vaccine was recommended and ordered. Tdap and Shingrix were recommended. Hepatitis B vaccination was recommended. Colonoscopy was recommended. Hepatitis C screening was recommended. Lung cancer screening was recommended. Follow up instructions were given to the patient:  Return in about 3 months (around 3/8/2023) for diabetes, hypertension, neuropathy, foot pain. Subjective:    Sal Kenyon is a 65 y.o. male who presents to [place] [address] today (12/8/2022) for follow up/evaluation of:  Leg Pain (3 month follow up for chronic left leg pain. Patient take Norco and voices that it does help with pain, but does not effective throughout the whole day. Has EMG results to discuss. ), Hyperlipidemia, and Hypertension      He is here today for follow up of left foot pain. He has also had increasing pain in both legs. He states that the pain is worse after he has been working. He is considering pursuing disability due to the pain. He had an EMG earlier this week. He has received one dose of the [employer] Covid-19 vaccine.      {   Internal Administration   First Dose COVID-19, J&J, (age 18y+), IM, 0.5 mL  07/08/2021   Second Dose           Last COVID Lab No results found for: SARS-COV-2, SARS-COV-2 RNA, SARS-COV-2, SARS-COV-2, SARS-COV-2 BY PCR, SARS-COV-2, SARS-COV-2, SARS-COV-2      (Optional):[id number]}     Immunization history was reviewed:  Immunization History   Administered Date(s) Administered    COVID-19, J&J, (age 18y+), IM, 0.5 mL 07/08/2021    Influenza, AFLURIA (age 1 yrs+), FLUZONE, (age 10 mo+), MDV, 0.5mL 08/30/2017    Influenza, FLUARIX, FLULAVAL, FLUZONE (age 10 mo+) AND AFLURIA, (age 1 y+), PF, 0.5mL 11/08/2018, 11/13/2019, 10/21/2020, 09/16/2021    Pneumococcal Polysaccharide ([id number]) 08/30/2017          He has the following problem list:  Patient Active Problem List   Diagnosis    Essential hypertension    Tourette's syndrome    Gastroesophageal reflux disease    Tobacco abuse    Chronic pain of left ankle    Type 2 diabetes mellitus without complication ([facility] Hypertriglyceridemia    Skin ulcer of toe of left foot with necrosis of bone ([facility]    History of 2019 novel coronavirus disease (COVID-19)    Mixed hyperlipidemia        Current medications are:  Outpatient Medications Marked as Taking for the 12/8/22 encounter (Office Visit) with Tamica Figueredo MD   Medication Sig Dispense Refill    HYDROcodone-acetaminophen (NORCO) 5-325 MG per tablet Take 1 tablet by mouth every 6 hours as needed for Pain for up to 30 days. 30 tablet 0    pimozide (ORAP) 1 MG tablet TAKE 2 TABLETS TWICE A  tablet 1    atorvastatin (LIPITOR) 40 MG tablet Take 1 tablet by mouth daily 90 tablet 1    omeprazole (PRILOSEC) 20 MG delayed release capsule TAKE 1 CAPSULE DAILY 90 capsule 1    hydroCHLOROthiazide (HYDRODIURIL) 25 MG tablet TAKE 1 TABLET DAILY 90 tablet 1    losartan-hydroCHLOROthiazide (HYZAAR) 100-25 MG per tablet TAKE 1 TABLET DAILY 90 tablet 1    Diabetic Shoe MISC by Does not apply route Dispense DM shoe and insoles. custom accomodative offloading insole with toe filler L side. Amputated great toe of left foot ([facility]  (primary encounter diagnosis)  Equinus deformity of both feet  Diabetic polyneuropathy associated with type 2 diabetes mellitus ([facility] 1 each 0       He is allergic to chantix [varenicline]. He is a smoker. He  reports that he has been smoking cigarettes. He started smoking about 42 years ago. He has a 30.00 pack-year smoking history. He quit smokeless tobacco use about 35 years ago. His smokeless tobacco use included snuff. Objective:    Vitals:    12/08/22 1310 12/08/22 1315   BP: (!) 142/84 (!) 144/84   Site: Right Upper Arm Right Upper Arm   Position: Sitting Sitting   Cuff Size: Large Adult Large Adult   Pulse: 77    Temp: 97.6 °F (36.4 °C)    TempSrc: Temporal    SpO2: 97%    Weight: 243 lb (110.2 kg)    Height: 6' 1\" (1.854 m)      Body mass index is 32.06 kg/m².       Well-nourished, well-developed male with obesity, healthy-appearing, alert, cooperative, and in no acute distress. Neck supple. No adenopathy. Thyroid symmetric, normal size. Chest:  Normal expansion. Clear to auscultation. No rales, rhonchi, or wheezing. Heart sounds are normal.  Regular rate and rhythm without murmur, gallop or rub. Lower extremities have trace edema. Results of the EMG done 12/6/2022 were reviewed with the patient:   IMPRESSION:        1. There is electrodiagnostic evidence of severe, sensory motor,         peripheral polyneuropathy involving examined both lower extremities. 2.  There is electrodiagnostic evidence of chronic L4-L5 and L5-S1         radiculopathy bilaterally. No active denervation changes noted. 3.  There is no definite electrodiagnostic evidence of inflammatory          myopathy involving examined both lower extremities. Further clinical correlation and followup recommended.              (Please note that portions of this note were completed with a voice-recognition program. Efforts were made to edit the dictation but occasionally words are mis-transcribed.)

## 2022-12-08 NOTE — PROGRESS NOTES
Agreeable flu    Declines Tdap, Shingles, Colonoscopy, CT lung screen, Hep B vaccine, Hep C screen, Covid booster. Declines annual diabetic eye exam.    Patient reports he needs all meds refilled. OAR[place] for [place], [place], and [place].  Norco 5/325mg last filled on 11/2/2022 #30/8days

## 2022-12-08 NOTE — PATIENT INSTRUCTIONS
Quitting Tobacco: Care Instructions  Quitting tobacco is much harder than simply changing a habit. Nicotine cravings make it hard to quit, but you can do it. Your doctor will help you set up the plan that best meets your needs. You will miss the nicotine at first. You may feel short-tempered and grumpy. You may have trouble sleeping or thinking clearly. The urge to use tobacco may continue for a time. Combining tools can raise your chances of success. You can use medicine along with counseling. And you can join a quit-tobacco program, such as the American Lung Association's Freedom from Smoking program.    Get support. Reach out to family and friends, and find a counselor to help you quit. Join a support group, such as Nicotine Anonymous. Go to www.smokefree. gov to sign up for text messaging support. Talk to your doctor or pharmacist about medicines that can help you quit. Medicines can help with cravings and withdrawal symptoms. There are several over-the-counter choices, such as nicotine patches, gum, and lozenges. After you quit, do not use tobacco again, not even once. Get rid of all tobacco products and anything that reminds you of tobacco, such as ashtrays. Avoid things that make you reach for tobacco.  Change your daily routine. Take a different route to work, or eat a meal in a different place. Try to cut down on stress. Find ways to calm yourself, such as taking a hot bath or doing deep breathing exercises. Eat a healthy diet, and get regular exercise. Having healthy habits may help you quit using tobacco.     Don't give up on quitting if you use tobacco again. Most people quit and restart a few times before they quit for good. Follow-up care is a key part of your treatment and safety. Be sure to make and go to all appointments, and call your doctor if you are having problems. It's also a good idea to know your test results and keep a list of the medicines you take.   Where can you learn more? Go to https://chpepiceweb.healthPomogatel. org and sign in to your [employer] account. Enter T832 in the [employer] box to learn more about \"Quitting Tobacco: Care Instructions. \"     If you do not have an account, please click on the \"Sign Up Now\" link. Current as of: November 8, 2021               Content Version: 13.4  © 1359-2767 Healthwise, Incorporated. Care instructions adapted under license by [facility] ([facility]). If you have questions about a medical condition or this instruction, always ask your healthcare professional. [address] any warranty or liability for your use of this information.

## 2022-12-16 DIAGNOSIS — F95.2 TOURETTE'S SYNDROME: ICD-10-CM

## 2022-12-16 RX ORDER — PIMOZIDE 1 MG/1
TABLET ORAL
Qty: 360 TABLET | Refills: 0 | Status: SHIPPED | OUTPATIENT
Start: 2022-12-16

## 2022-12-16 NOTE — TELEPHONE ENCOUNTER
Remy Strong called requesting a refill of the below medication which has been pended for you:     Dr. Calvin Meredith is out of the office. Confirmed that patient wants this sent to a mail in pharmacy. Requested Prescriptions     Pending Prescriptions Disp Refills    pimozide (ORAP) 1 MG tablet 360 tablet 0     Sig: TAKE 2 TABLETS TWICE A DAY       Last Appointment Date: 12/8/2022  Next Appointment Date: 3/14/2023    Allergies   Allergen Reactions    Chantix [Varenicline] Other (See Comments)     Unusual dreams.

## 2023-01-05 DIAGNOSIS — G89.29 CHRONIC PAIN OF LEFT ANKLE: ICD-10-CM

## 2023-01-05 DIAGNOSIS — M25.572 CHRONIC PAIN OF LEFT ANKLE: ICD-10-CM

## 2023-01-06 DIAGNOSIS — G89.29 CHRONIC PAIN OF LEFT ANKLE: ICD-10-CM

## 2023-01-06 DIAGNOSIS — M25.572 CHRONIC PAIN OF LEFT ANKLE: ICD-10-CM

## 2023-01-06 RX ORDER — HYDROCODONE BITARTRATE AND ACETAMINOPHEN 5; 325 MG/1; MG/1
TABLET ORAL
Qty: 30 TABLET | Refills: 0 | Status: SHIPPED | OUTPATIENT
Start: 2023-01-06 | End: 2023-01-13

## 2023-01-06 RX ORDER — HYDROCODONE BITARTRATE AND ACETAMINOPHEN 5; 325 MG/1; MG/1
1 TABLET ORAL EVERY 6 HOURS PRN
Qty: 30 TABLET | Refills: 0 | OUTPATIENT
Start: 2023-01-06 | End: 2023-02-05

## 2023-01-06 NOTE — TELEPHONE ENCOUNTER
Ed Michelle called requesting a refill of the below medication which has been pended for you:     Requested Prescriptions     Pending Prescriptions Disp Refills    HYDROcodone-acetaminophen ([address]) 5-325 MG per tablet [Pharmacy Med Name: Carnell Aase TABS] 30 tablet 0     Sig: TAKE ONE TABLET BY MOUTH EVERY 6 HOURS AS NEEDED FOR PAIN FOR UP TO 30 DAYS; REDUCE DOSES TAKEN AS PAIN BECOMES MANAGEABLE. Last Appointment Date: 12/8/2022  Next Appointment Date: 3/14/2023    Allergies   Allergen Reactions    Chantix [Varenicline] Other (See Comments)     Unusual dreams.

## 2023-01-06 NOTE — TELEPHONE ENCOUNTER
Controlled Substance Monitoring:    Acute and Chronic Pain Monitoring:   RX Monitoring 1/6/2023   Attestation -   Periodic Controlled Substance Monitoring Possible medication side effects, risk of tolerance/dependence & alternative treatments discussed.

## 2023-02-08 DIAGNOSIS — M25.572 CHRONIC PAIN OF LEFT ANKLE: Primary | ICD-10-CM

## 2023-02-08 DIAGNOSIS — G89.29 CHRONIC PAIN OF LEFT ANKLE: Primary | ICD-10-CM

## 2023-02-08 RX ORDER — HYDROCODONE BITARTRATE AND ACETAMINOPHEN 5; 325 MG/1; MG/1
1 TABLET ORAL EVERY 6 HOURS PRN
Qty: 30 TABLET | Refills: 0 | Status: SHIPPED | OUTPATIENT
Start: 2023-02-08 | End: 2023-03-10

## 2023-02-08 NOTE — TELEPHONE ENCOUNTER
Buddy Jones called requesting a refill of the below medication which has been pended for you: O[place] from [place], and [place] reviewed. NORCO 5-325 mg last filled 1/6/23 #30/7 days    Requested Prescriptions     Pending Prescriptions Disp Refills    HYDROcodone-acetaminophen (NORCO) 5-325 MG per tablet 30 tablet 0     Sig: Take 1 tablet by mouth every 6 hours as needed for Pain for up to 30 days. Last Appointment Date: 12/8/2022  Next Appointment Date: 3/14/2023    Allergies   Allergen Reactions    Chantix [Varenicline] Other (See Comments)     Unusual dreams.

## 2023-03-07 DIAGNOSIS — F95.2 TOURETTE'S SYNDROME: ICD-10-CM

## 2023-03-08 RX ORDER — PIMOZIDE 1 MG/1
TABLET ORAL
Qty: 360 TABLET | Refills: 0 | Status: SHIPPED | OUTPATIENT
Start: 2023-03-08

## 2023-03-08 NOTE — TELEPHONE ENCOUNTER
Evelyn Sargent called requesting a refill of the below medication which has been pended for you:     Requested Prescriptions     Pending Prescriptions Disp Refills    pimozide (ORAP) 1 MG tablet [Pharmacy Med Name: PIMOZIDE TAB 1MG] 360 tablet 0     Sig: TAKE 2 TABLETS TWICE A DAY       Last Appointment Date: 12/8/22  Next Appointment Date: 3/14/23    Allergies   Allergen Reactions    Chantix [Varenicline] Other (See Comments)     Unusual dreams.

## 2023-03-14 ENCOUNTER — OFFICE VISIT (OUTPATIENT)
Dept: FAMILY MEDICINE CLINIC | Age: 58
End: 2023-03-14
Payer: COMMERCIAL

## 2023-03-14 VITALS
WEIGHT: 244 LBS | OXYGEN SATURATION: 98 % | HEIGHT: 73 IN | SYSTOLIC BLOOD PRESSURE: 140 MMHG | DIASTOLIC BLOOD PRESSURE: 90 MMHG | HEART RATE: 88 BPM | BODY MASS INDEX: 32.34 KG/M2

## 2023-03-14 DIAGNOSIS — M25.572 CHRONIC PAIN OF LEFT ANKLE: ICD-10-CM

## 2023-03-14 DIAGNOSIS — I10 ESSENTIAL HYPERTENSION: ICD-10-CM

## 2023-03-14 DIAGNOSIS — F11.20 OPIOID DEPENDENCE WITH CURRENT USE (HCC): ICD-10-CM

## 2023-03-14 DIAGNOSIS — G89.29 CHRONIC PAIN OF LEFT ANKLE: ICD-10-CM

## 2023-03-14 DIAGNOSIS — E11.9 TYPE 2 DIABETES MELLITUS WITHOUT COMPLICATION, WITHOUT LONG-TERM CURRENT USE OF INSULIN (HCC): Primary | ICD-10-CM

## 2023-03-14 DIAGNOSIS — K21.9 GASTROESOPHAGEAL REFLUX DISEASE, UNSPECIFIED WHETHER ESOPHAGITIS PRESENT: ICD-10-CM

## 2023-03-14 DIAGNOSIS — E78.2 MIXED HYPERLIPIDEMIA: ICD-10-CM

## 2023-03-14 DIAGNOSIS — F95.2 TOURETTE'S SYNDROME: ICD-10-CM

## 2023-03-14 PROCEDURE — 3077F SYST BP >= 140 MM HG: CPT | Performed by: FAMILY MEDICINE

## 2023-03-14 PROCEDURE — 3080F DIAST BP >= 90 MM HG: CPT | Performed by: FAMILY MEDICINE

## 2023-03-14 PROCEDURE — 99214 OFFICE O/P EST MOD 30 MIN: CPT | Performed by: FAMILY MEDICINE

## 2023-03-14 RX ORDER — LOSARTAN POTASSIUM AND HYDROCHLOROTHIAZIDE 25; 100 MG/1; MG/1
TABLET ORAL
Qty: 90 TABLET | Refills: 1 | Status: SHIPPED | OUTPATIENT
Start: 2023-03-14

## 2023-03-14 RX ORDER — ATORVASTATIN CALCIUM 40 MG/1
40 TABLET, FILM COATED ORAL DAILY
Qty: 90 TABLET | Refills: 1 | Status: SHIPPED | OUTPATIENT
Start: 2023-03-14

## 2023-03-14 RX ORDER — HYDROCODONE BITARTRATE AND ACETAMINOPHEN 5; 325 MG/1; MG/1
1 TABLET ORAL EVERY 6 HOURS PRN
Qty: 30 TABLET | Refills: 0 | Status: SHIPPED | OUTPATIENT
Start: 2023-03-14 | End: 2023-04-13

## 2023-03-14 RX ORDER — OMEPRAZOLE 20 MG/1
CAPSULE, DELAYED RELEASE ORAL
Qty: 90 CAPSULE | Refills: 1 | Status: SHIPPED | OUTPATIENT
Start: 2023-03-14

## 2023-03-14 RX ORDER — HYDROCHLOROTHIAZIDE 25 MG/1
TABLET ORAL
Qty: 90 TABLET | Refills: 1 | Status: SHIPPED | OUTPATIENT
Start: 2023-03-14

## 2023-03-14 RX ORDER — PIMOZIDE 1 MG/1
TABLET ORAL
Qty: 360 TABLET | Refills: 1 | Status: SHIPPED | OUTPATIENT
Start: 2023-03-14

## 2023-03-14 SDOH — ECONOMIC STABILITY: FOOD INSECURITY: WITHIN THE PAST 12 MONTHS, YOU WORRIED THAT YOUR FOOD WOULD RUN OUT BEFORE YOU GOT MONEY TO BUY MORE.: PATIENT DECLINED

## 2023-03-14 SDOH — ECONOMIC STABILITY: FOOD INSECURITY: WITHIN THE PAST 12 MONTHS, THE FOOD YOU BOUGHT JUST DIDN'T LAST AND YOU DIDN'T HAVE MONEY TO GET MORE.: PATIENT DECLINED

## 2023-03-14 SDOH — ECONOMIC STABILITY: INCOME INSECURITY: HOW HARD IS IT FOR YOU TO PAY FOR THE VERY BASICS LIKE FOOD, HOUSING, MEDICAL CARE, AND HEATING?: PATIENT DECLINED

## 2023-03-14 SDOH — ECONOMIC STABILITY: HOUSING INSECURITY
IN THE LAST 12 MONTHS, WAS THERE A TIME WHEN YOU DID NOT HAVE A STEADY PLACE TO SLEEP OR SLEPT IN A SHELTER (INCLUDING NOW)?: PATIENT REFUSED

## 2023-03-14 ASSESSMENT — PATIENT HEALTH QUESTIONNAIRE - PHQ9
SUM OF ALL RESPONSES TO PHQ9 QUESTIONS 1 & 2: 0
SUM OF ALL RESPONSES TO PHQ QUESTIONS 1-9: 0
2. FEELING DOWN, DEPRESSED OR HOPELESS: 0
SUM OF ALL RESPONSES TO PHQ QUESTIONS 1-9: 0
1. LITTLE INTEREST OR PLEASURE IN DOING THINGS: 0

## 2023-03-14 NOTE — PROGRESS NOTES
[facility]IN[facility] [address], [address]                        Telephone [phone]             Fax [phone]       Yunior Nunez  :  1965  Age:  65 y.o. MRN:  [id number]  Date of visit:  3/14/2023       Assessment and Plan:    1. Type 2 diabetes mellitus without complication, without long-term current use of insulin ([facility] [address] [facility]  He has orders for an HgbA1c. He was encouraged to have this done soon. Labs were also ordered to be done in 6 months:    - Microalbumin, Ur; Future  - Comprehensive Metabolic Panel; Future  - Hemoglobin A1C; Future    2. Essential hypertension  His blood pressure is marginally-controlled today. (BP: (!) 140/90)   He was advised to continue current medications. Hydrochlorothiazide and Losartan-Hydrochlorothiazide were refilled:   - hydroCHLOROthiazide (HYDRODIURIL) 25 MG tablet; TAKE 1 TABLET DAILY  Dispense: 90 tablet; Refill: 1  - losartan-hydroCHLOROthiazide (HYZAAR) 100-25 MG per tablet; TAKE 1 TABLET DAILY  Dispense: 90 tablet; Refill: 1    3. Mixed hyperlipidemia  He has orders for a lipid panel. He was encouraged to have this done soon. He will be contacted when the results are available. He is tolerating Lipitor well; this was refilled:  - atorvastatin (LIPITOR) 40 MG tablet; Take 1 tablet by mouth daily  Dispense: 90 tablet; Refill: 1    - Lipid Panel; Future was also ordered to be done in 6 months. 4. Tourette's syndrome  Orap was refilled:  - pimozide (ORAP) 1 MG tablet; TAKE 2 TABLETS TWICE A DAY  Dispense: 360 tablet; Refill: 1    5. Gastroesophageal reflux disease, unspecified whether esophagitis present  Omeprazole was refilled:  - omeprazole (PRILOSEC) 20 MG delayed release capsule; TAKE 1 CAPSULE DAILY  Dispense: 90 capsule; Refill: 1    6. Chronic pain of left ankle  7.  Opioid dependence with current use ([facility] [facility]  Controlled substances monitoring: No signs of potential drug abuse or diversion identified when the [place] report from [place], [place], and [place] was reviewed today. The activity on the report was consistent with the treatment plan. Norco was refilled:  - HYDROcodone-acetaminophen (NORCO) 5-325 MG per tablet; Take 1 tablet by mouth every 6 hours as needed for Pain for up to 30 days. Dispense: 30 tablet; Refill: 0    He was advised to return in 3 month for re-evaluation, or sooner prn. Subjective:    Ganesh Mcginnis is a 65 y.o. male who presents to [place] [address] today (3/14/2023) for follow up/evaluation of:  Diabetes, Hypertension, and Pain (Chronic left ankle/foot  pain)      He states that he has been feeling about the same. He continues to have pain in the left foot, especially after working. He is tolerating his medications well. He has not had the labs drawn that were ordered to be done before today's visit. He requests refills of Norco and his other medications. He has received one dose of the [employer] Covid-19 vaccine. He also had Covid in January 2023.        Immunization history was reviewed:  Immunization History   Administered Date(s) Administered    COVID-19, J&J, (age 18y+), IM, 0.5 mL 07/08/2021    Influenza, AFLURIA (age 1 yrs+), FLUZONE, (age 10 mo+), MDV, 0.5mL 08/30/2017    Influenza, FLUARIX, FLULAVAL, FLUZONE (age 10 mo+) AND AFLURIA, (age 1 y+), PF, 0.5mL 11/08/2018, 11/13/2019, 10/21/2020, 09/16/2021, 12/08/2022    Pneumococcal Polysaccharide ([id number]) 08/30/2017          He has the following problem list:  Patient Active Problem List   Diagnosis    Essential hypertension    Tourette's syndrome    Gastroesophageal reflux disease    Tobacco abuse    Chronic pain of left ankle    Type 2 diabetes mellitus without complication (HCC)    Hypertriglyceridemia    Skin ulcer of toe of left foot with necrosis of bone ([facility] [address] [facility]    History of 2019 novel coronavirus disease (COVID-19)    Mixed hyperlipidemia        Current medications are:  Outpatient Medications Marked as Taking for the 3/14/23 encounter (Office Visit) with Edison Meneses MD   Medication Sig Dispense Refill    pimozide (ORAP) 1 MG tablet TAKE 2 TABLETS TWICE A  tablet 0    HYDROcodone-acetaminophen (NORCO) 5-325 MG per tablet Take 1 tablet by mouth every 6 hours as needed for Pain for up to 30 days. 30 tablet 0    atorvastatin (LIPITOR) 40 MG tablet Take 1 tablet by mouth daily 90 tablet 1    omeprazole (PRILOSEC) 20 MG delayed release capsule TAKE 1 CAPSULE DAILY 90 capsule 1    hydroCHLOROthiazide (HYDRODIURIL) 25 MG tablet TAKE 1 TABLET DAILY 90 tablet 1    losartan-hydroCHLOROthiazide (HYZAAR) 100-25 MG per tablet TAKE 1 TABLET DAILY 90 tablet 1    Diabetic Shoe MISC by Does not apply route Dispense DM shoe and insoles. custom accomodative offloading insole with toe filler L side. Amputated great toe of left foot ([facility] [address] [facility]  (primary encounter diagnosis)  Equinus deformity of both feet  Diabetic polyneuropathy associated with type 2 diabetes mellitus ([facility] [address] [facility] 1 each 0       He is allergic to chantix [varenicline]. He is a smoker. He  reports that he has been smoking cigarettes. He started smoking about 43 years ago. He has a 30.00 pack-year smoking history. He quit smokeless tobacco use about 35 years ago. His smokeless tobacco use included snuff. Objective:    Vitals:    03/14/23 1313   BP: (!) 140/90   Site: Right Upper Arm   Position: Sitting   Cuff Size: Large Adult   Pulse: 88   SpO2: 98%   Weight: 244 lb (110.7 kg)   Height: 6' 1\" (1.854 m)     Body mass index is 32.19 kg/m². Well-nourished, well-developed male with obesity, healthy-appearing, alert, cooperative, and in no acute distress. Neck supple. No adenopathy. Thyroid symmetric, normal size. Chest:  Normal expansion. Clear to auscultation. No rales, rhonchi, or wheezing.   Heart sounds are normal.  Regular rate and rhythm without murmur, gallop or rub.  Lower extremities have no edema. He has had no recent labs.                (Please note that portions of this note were completed with a voice-recognition program. Efforts were made to edit the dictation but occasionally words are mis-transcribed.)

## 2023-03-14 NOTE — PROGRESS NOTES
Patient declines retinal exam, hepatitis c screen, hepatitis b vaccine, tdap vaccine, colonoscopy, shingles vaccine, low dose CT lung screen, covid vaccine

## 2023-04-27 DIAGNOSIS — M25.572 CHRONIC PAIN OF LEFT ANKLE: Primary | ICD-10-CM

## 2023-04-27 DIAGNOSIS — G89.29 CHRONIC PAIN OF LEFT ANKLE: Primary | ICD-10-CM

## 2023-04-27 RX ORDER — HYDROCODONE BITARTRATE AND ACETAMINOPHEN 5; 325 MG/1; MG/1
1 TABLET ORAL EVERY 6 HOURS PRN
Qty: 30 TABLET | Refills: 0 | Status: SHIPPED | OUTPATIENT
Start: 2023-04-27 | End: 2023-05-27

## 2023-04-27 NOTE — TELEPHONE ENCOUNTER
Steve Shelter called requesting a refill of the below medication which has been pended for you:   O[place] from [place], and [place] reviewed. NORCO 5-325 mg last filled 3/14/23 #30/7 days    Requested Prescriptions     Pending Prescriptions Disp Refills    HYDROcodone-acetaminophen (NORCO) 5-325 MG per tablet 30 tablet 0     Sig: Take 1 tablet by mouth every 6 hours as needed for Pain for up to 30 days. Last Appointment Date: 3/14/2023  Next Appointment Date: 6/27/2023    Allergies   Allergen Reactions    Chantix [Varenicline] Other (See Comments)     Unusual dreams.

## 2023-05-14 NOTE — PROGRESS NOTES
Alcohol use 0.0 oz/week      Comment: occasional       Review of Systems: All 12 systems reviewed and pertinent positives noted above. Lower Extremity Physical Examination:     Vitals:   Vitals:    03/05/18 0855   BP: 138/80   Pulse: 68   Temp: 97.3 °F (36.3 °C)     General: AAO x 3 in NAD. Vascular: DP and PT pulses palpable 2/4, bilateral.  CFT <3 seconds, bilateral.  Hair growth present to the level of the digits, bilateral.  Edema absent, bilateral.  Varicosities absent, bilateral. Erythema absent, bilateral. Distal Rubor absent bilateral.  Temperature within normal limits bilateral. Hyperpigmentation absent bilateral. No atrophic skin. Neurological: Sensation intact to light touch to level of digits, bilateral.  Protective sensation intact 10/10 sites via 5.07/10g Sedgwick-Fiona Monofilament, bilateral.  negative Tinel's, bilateral.  negative Valleix sign, bilateral.  Vibratory intact bilateral.  Reflexes Decreased bilateral.  Paresthesias negative. Dysthesias negative. Sharp/dull intact bilateral.    Musculoskeletal: Muscle strength 5/5, bilateral.  Pain absent upon palpation bilateral. Normal medial longitudinal arch, bilateral.  Ankle ROM decreased,bilateral.  1st MPJ ROM decreased bilateral.  Dorsally contracted digits absent. No other foot deformities. Integument: Warm, dry, supple, Bilateral.  Open lesion present, Left. Ulcer sub L hallux IPJ, callous rim, healthy red granular base, no probing no undermining no malodor. No surrounding signs of infx. Interdigital maceration absent to web spaces , Bilateral.  Fissures absent, Bilateral. Hyperkeratotic tissue is present.     Wound 03/05/18 Left Great Toe (Active)   Wound Cleansed Rinsed/Irrigated with saline 3/5/2018  9:01 AM   Wound Length (cm) 2 cm 3/5/2018  9:01 AM   Wound Width (cm) 1 cm 3/5/2018  9:01 AM   Wound Depth (cm)  0.5 3/5/2018  9:01 AM   Calculated Wound Size (cm^2) (l*w) 2 cm^2 3/5/2018  9:01 AM   Wound Assessment Pink
82

## 2023-06-05 DIAGNOSIS — M25.572 CHRONIC PAIN OF LEFT ANKLE: ICD-10-CM

## 2023-06-05 DIAGNOSIS — G89.29 CHRONIC PAIN OF LEFT ANKLE: ICD-10-CM

## 2023-06-05 RX ORDER — HYDROCODONE BITARTRATE AND ACETAMINOPHEN 5; 325 MG/1; MG/1
1 TABLET ORAL EVERY 6 HOURS PRN
Qty: 30 TABLET | Refills: 0 | Status: SHIPPED | OUTPATIENT
Start: 2023-06-05 | End: 2023-07-05

## 2023-06-05 NOTE — TELEPHONE ENCOUNTER
Patient is out of medication and is requesting refill today. Pharmacy closes at 5pm so needs filled before that.

## 2023-06-05 NOTE — TELEPHONE ENCOUNTER
Medication refilled    Controlled Substance Monitoring:    Acute and Chronic Pain Monitoring:   RX Monitoring 6/5/2023   Attestation -   Periodic Controlled Substance Monitoring No signs of potential drug abuse or diversion identified.;Obtaining appropriate analgesic effect of treatment.

## 2023-06-19 ENCOUNTER — HOSPITAL ENCOUNTER (OUTPATIENT)
Age: 58
Discharge: HOME | End: 2023-06-19
Payer: COMMERCIAL

## 2023-06-19 DIAGNOSIS — E78.2 MIXED HYPERLIPIDEMIA: ICD-10-CM

## 2023-06-19 DIAGNOSIS — E11.9 TYPE 2 DIABETES MELLITUS WITHOUT COMPLICATION, WITHOUT LONG-TERM CURRENT USE OF INSULIN (HCC): ICD-10-CM

## 2023-06-19 LAB
ALBUMIN SERPL-MCNC: 4.2 G/DL (ref 3.5–5.2)
ALBUMIN/GLOB SERPL: 1.4 {RATIO} (ref 1–2.5)
ALP SERPL-CCNC: 95 U/L (ref 40–129)
ALT SERPL-CCNC: 7 U/L (ref 5–41)
ANION GAP SERPL CALCULATED.3IONS-SCNC: 11 MMOL/L (ref 9–17)
AST SERPL-CCNC: 12 U/L
BILIRUB SERPL-MCNC: 0.7 MG/DL (ref 0.3–1.2)
BUN SERPL-MCNC: 8 MG/DL (ref 6–20)
BUN/CREAT SERPL: 11 (ref 9–20)
CALCIUM SERPL-MCNC: 9.4 MG/DL (ref 8.6–10.4)
CHLORIDE SERPL-SCNC: 100 MMOL/L (ref 98–107)
CHOLEST SERPL-MCNC: 120 MG/DL
CHOLESTEROL/HDL RATIO: 4.8
CO2 SERPL-SCNC: 30 MMOL/L (ref 20–31)
CREAT SERPL-MCNC: 0.74 MG/DL (ref 0.7–1.2)
GFR SERPL CREATININE-BSD FRML MDRD: >60 ML/MIN/1.73M2
GLUCOSE SERPL-MCNC: 146 MG/DL (ref 70–99)
HDLC SERPL-MCNC: 25 MG/DL
LDLC SERPL CALC-MCNC: 37 MG/DL (ref 0–130)
POTASSIUM SERPL-SCNC: 4.1 MMOL/L (ref 3.7–5.3)
PROT SERPL-MCNC: 7.2 G/DL (ref 6.4–8.3)
SODIUM SERPL-SCNC: 141 MMOL/L (ref 135–144)
TRIGL SERPL-MCNC: 289 MG/DL

## 2023-06-19 PROCEDURE — 83036 HEMOGLOBIN GLYCOSYLATED A1C: CPT

## 2023-06-19 PROCEDURE — 80061 LIPID PANEL: CPT

## 2023-06-19 PROCEDURE — 36415 COLL VENOUS BLD VENIPUNCTURE: CPT

## 2023-06-19 PROCEDURE — 80053 COMPREHEN METABOLIC PANEL: CPT

## 2023-06-20 LAB
EST. AVERAGE GLUCOSE BLD GHB EST-MCNC: 140 MG/DL
HBA1C MFR BLD: 6.5 % (ref 4–6)

## 2023-06-27 ENCOUNTER — OFFICE VISIT (OUTPATIENT)
Dept: FAMILY MEDICINE CLINIC | Age: 58
End: 2023-06-27
Payer: COMMERCIAL

## 2023-06-27 VITALS
DIASTOLIC BLOOD PRESSURE: 82 MMHG | WEIGHT: 235 LBS | BODY MASS INDEX: 31.14 KG/M2 | OXYGEN SATURATION: 97 % | HEART RATE: 82 BPM | SYSTOLIC BLOOD PRESSURE: 132 MMHG | HEIGHT: 73 IN

## 2023-06-27 DIAGNOSIS — E78.2 MIXED HYPERLIPIDEMIA: ICD-10-CM

## 2023-06-27 DIAGNOSIS — M25.572 CHRONIC PAIN OF LEFT ANKLE: ICD-10-CM

## 2023-06-27 DIAGNOSIS — I10 ESSENTIAL HYPERTENSION: ICD-10-CM

## 2023-06-27 DIAGNOSIS — G89.29 CHRONIC PAIN OF LEFT ANKLE: ICD-10-CM

## 2023-06-27 DIAGNOSIS — E11.40 TYPE 2 DIABETES MELLITUS WITH DIABETIC NEUROPATHY, WITHOUT LONG-TERM CURRENT USE OF INSULIN (HCC): ICD-10-CM

## 2023-06-27 DIAGNOSIS — G63 POLYNEUROPATHY ASSOCIATED WITH UNDERLYING DISEASE (HCC): Primary | ICD-10-CM

## 2023-06-27 PROCEDURE — 99214 OFFICE O/P EST MOD 30 MIN: CPT | Performed by: FAMILY MEDICINE

## 2023-06-27 PROCEDURE — 3044F HG A1C LEVEL LT 7.0%: CPT | Performed by: FAMILY MEDICINE

## 2023-06-27 PROCEDURE — 3075F SYST BP GE 130 - 139MM HG: CPT | Performed by: FAMILY MEDICINE

## 2023-06-27 PROCEDURE — 3079F DIAST BP 80-89 MM HG: CPT | Performed by: FAMILY MEDICINE

## 2023-06-27 RX ORDER — HYDROCODONE BITARTRATE AND ACETAMINOPHEN 5; 325 MG/1; MG/1
1 TABLET ORAL EVERY 6 HOURS PRN
Qty: 30 TABLET | Refills: 0 | Status: SHIPPED | OUTPATIENT
Start: 2023-06-27 | End: 2023-07-27

## 2023-06-27 RX ORDER — PREGABALIN 75 MG/1
75 CAPSULE ORAL 2 TIMES DAILY
Qty: 60 CAPSULE | Refills: 0 | Status: SHIPPED | OUTPATIENT
Start: 2023-06-27 | End: 2023-07-27

## 2023-08-01 DIAGNOSIS — M25.572 CHRONIC PAIN OF LEFT ANKLE: ICD-10-CM

## 2023-08-01 DIAGNOSIS — G89.29 CHRONIC PAIN OF LEFT ANKLE: ICD-10-CM

## 2023-08-01 RX ORDER — HYDROCODONE BITARTRATE AND ACETAMINOPHEN 5; 325 MG/1; MG/1
1 TABLET ORAL EVERY 6 HOURS PRN
Qty: 30 TABLET | Refills: 0 | Status: SHIPPED | OUTPATIENT
Start: 2023-08-01 | End: 2023-08-31

## 2023-08-01 NOTE — TELEPHONE ENCOUNTER
Maci December called requesting a refill of the below medication which has been pended for you:     OARRS from [place], [place], and [place] reviewed. NORCO 5-325 mg last filled 6/27/23 #30/7 days    Dr. Romy Lott is out of the office    Requested Prescriptions     Pending Prescriptions Disp Refills    HYDROcodone-acetaminophen (NORCO) 5-325 MG per tablet 30 tablet 0     Sig: Take 1 tablet by mouth every 6 hours as needed for Pain for up to 30 days. Last Appointment Date: 6/27/2023  Next Appointment Date: 9/28/2023    Allergies   Allergen Reactions    Chantix [Varenicline] Other (See Comments)     Unusual dreams.

## 2023-08-18 NOTE — PATIENT INSTRUCTIONS
Apply bactroban to wound daily and cover with dry dressing. Wear Offloading device during the day.
oriented to person, place and time

## 2023-08-28 DIAGNOSIS — I10 ESSENTIAL HYPERTENSION: ICD-10-CM

## 2023-08-29 RX ORDER — HYDROCHLOROTHIAZIDE 25 MG/1
TABLET ORAL
Qty: 90 TABLET | Refills: 0 | Status: SHIPPED | OUTPATIENT
Start: 2023-08-29

## 2023-08-31 DIAGNOSIS — M25.572 CHRONIC PAIN OF LEFT ANKLE: ICD-10-CM

## 2023-08-31 DIAGNOSIS — G89.29 CHRONIC PAIN OF LEFT ANKLE: ICD-10-CM

## 2023-08-31 RX ORDER — HYDROCODONE BITARTRATE AND ACETAMINOPHEN 5; 325 MG/1; MG/1
1 TABLET ORAL EVERY 6 HOURS PRN
Qty: 30 TABLET | Refills: 0 | Status: SHIPPED | OUTPATIENT
Start: 2023-08-31 | End: 2023-09-30

## 2023-08-31 NOTE — TELEPHONE ENCOUNTER
Genevieve Raza called requesting a refill of the below medication which has been pended for you:   OARRS from [place], [place], and [place] reviewed. NORCO 5-325 mg last filled 8/2/23 #30/7 days. Dr. Natacha Sher is out of the office    Requested Prescriptions     Pending Prescriptions Disp Refills    HYDROcodone-acetaminophen (NORCO) 5-325 MG per tablet 30 tablet 0     Sig: Take 1 tablet by mouth every 6 hours as needed for Pain for up to 30 days. Last Appointment Date: 6/27/2023  Next Appointment Date: 9/28/2023    Allergies   Allergen Reactions    Chantix [Varenicline] Other (See Comments)     Unusual dreams.

## 2023-09-28 ENCOUNTER — OFFICE VISIT (OUTPATIENT)
Dept: FAMILY MEDICINE CLINIC | Age: 58
End: 2023-09-28
Payer: COMMERCIAL

## 2023-09-28 VITALS
RESPIRATION RATE: 16 BRPM | OXYGEN SATURATION: 99 % | BODY MASS INDEX: 32.63 KG/M2 | HEART RATE: 83 BPM | WEIGHT: 246.2 LBS | DIASTOLIC BLOOD PRESSURE: 110 MMHG | TEMPERATURE: 98.1 F | HEIGHT: 73 IN | SYSTOLIC BLOOD PRESSURE: 180 MMHG

## 2023-09-28 DIAGNOSIS — I10 ESSENTIAL HYPERTENSION: ICD-10-CM

## 2023-09-28 DIAGNOSIS — E78.2 MIXED HYPERLIPIDEMIA: ICD-10-CM

## 2023-09-28 DIAGNOSIS — M25.572 CHRONIC PAIN OF LEFT ANKLE: ICD-10-CM

## 2023-09-28 DIAGNOSIS — E11.40 TYPE 2 DIABETES MELLITUS WITH DIABETIC NEUROPATHY, WITHOUT LONG-TERM CURRENT USE OF INSULIN (HCC): Primary | ICD-10-CM

## 2023-09-28 DIAGNOSIS — Z23 NEED FOR INFLUENZA VACCINATION: ICD-10-CM

## 2023-09-28 DIAGNOSIS — G89.29 CHRONIC PAIN OF LEFT ANKLE: ICD-10-CM

## 2023-09-28 PROCEDURE — 99214 OFFICE O/P EST MOD 30 MIN: CPT | Performed by: FAMILY MEDICINE

## 2023-09-28 PROCEDURE — 3078F DIAST BP <80 MM HG: CPT | Performed by: FAMILY MEDICINE

## 2023-09-28 PROCEDURE — 90471 IMMUNIZATION ADMIN: CPT | Performed by: FAMILY MEDICINE

## 2023-09-28 PROCEDURE — 90694 VACC AIIV4 NO PRSRV 0.5ML IM: CPT | Performed by: FAMILY MEDICINE

## 2023-09-28 PROCEDURE — 3074F SYST BP LT 130 MM HG: CPT | Performed by: FAMILY MEDICINE

## 2023-09-28 PROCEDURE — 3044F HG A1C LEVEL LT 7.0%: CPT | Performed by: FAMILY MEDICINE

## 2023-09-28 RX ORDER — AMLODIPINE BESYLATE 5 MG/1
5 TABLET ORAL DAILY
Qty: 90 TABLET | Refills: 0 | Status: SHIPPED | OUTPATIENT
Start: 2023-09-28

## 2023-09-28 RX ORDER — HYDROCODONE BITARTRATE AND ACETAMINOPHEN 5; 325 MG/1; MG/1
1 TABLET ORAL EVERY 6 HOURS PRN
Qty: 30 TABLET | Refills: 0 | Status: SHIPPED | OUTPATIENT
Start: 2023-09-28 | End: 2023-10-28

## 2023-09-28 NOTE — PROGRESS NOTES
[facility]             [address], [address], [address]                        Telephone [phone]             Fax [phone]       Saniya Armendariz  :  1965  Age:  65 y.o. MRN:  [id number]  Date of visit:  2023       Assessment and Plan:    1. Type 2 diabetes mellitus with diabetic neuropathy, without long-term current use of insulin (HCC)  He has orders for a HgbA1c and comprehensive panel. He will be contacted when the results are available. - Comprehensive Metabolic Panel; Future was also ordered to be done prior to his return visit in 3 months. 2. Essential hypertension  His blood pressure is significantly elevated today. (BP: (!) 180/110)   He was advised to continue current medications, and Amlodipine was also added:  - amLODIPine (NORVASC) 5 MG tablet; Take 1 tablet by mouth daily  Dispense: 90 tablet; Refill: 0    He was advised to monitor his blood pressure outside of the office. He states that he can get his blood pressure checked at work. Printed information regarding Elevated Blood Pressure was provided to the patient with the after visit summary. 3. Mixed hyperlipidemia  He has orders for a lipid panel. He will be contacted when the results are available. He is tolerating Lipitor well; He states that he does not need a refill today. 4. Chronic pain of left ankle  Controlled substances monitoring: No signs of potential drug abuse or diversion identified when the OARRS report from [place], [place], and [place] was reviewed today. The activity on the report was consistent with the treatment plan. Norco was refilled:  - HYDROcodone-acetaminophen (NORCO) 5-325 MG per tablet; Take 1 tablet by mouth every 6 hours as needed for Pain for up to 30 days. Dispense: 30 tablet; Refill: 0    5. Need for influenza vaccination  Influenza vaccination was recommended and ordered.   - Influenza, FLUAD, (age 72 y+), IM,

## 2023-09-28 NOTE — PROGRESS NOTES
Patient dose not want the Hepatitis B vaccine, th Hepatitis C Scree, the Tdap vaccine, the Colonoscopy, the shingles vaccine, the Low dose CT lung screen, the pneumococcal, and COVID-19 vaccines today.

## 2023-09-28 NOTE — PATIENT INSTRUCTIONS
Monitor your blood pressure outside of the office. Please call if your blood pressure does not improve after beginning the new medication.

## 2023-10-05 DIAGNOSIS — K21.9 GASTROESOPHAGEAL REFLUX DISEASE, UNSPECIFIED WHETHER ESOPHAGITIS PRESENT: ICD-10-CM

## 2023-10-05 DIAGNOSIS — E78.2 MIXED HYPERLIPIDEMIA: ICD-10-CM

## 2023-10-05 RX ORDER — OMEPRAZOLE 20 MG/1
CAPSULE, DELAYED RELEASE ORAL
Qty: 90 CAPSULE | Refills: 0 | Status: SHIPPED | OUTPATIENT
Start: 2023-10-05

## 2023-10-05 RX ORDER — ATORVASTATIN CALCIUM 40 MG/1
40 TABLET, FILM COATED ORAL DAILY
Qty: 90 TABLET | Refills: 0 | Status: SHIPPED | OUTPATIENT
Start: 2023-10-05

## 2023-10-05 NOTE — TELEPHONE ENCOUNTER
Chris Males called requesting a refill of the below medication which has been pended for you:     Requested Prescriptions     Pending Prescriptions Disp Refills    atorvastatin (LIPITOR) 40 MG tablet [Pharmacy Med Name: ATORVASTATIN TAB 40MG] 90 tablet 1     Sig: TAKE 1 TABLET DAILY    omeprazole (PRILOSEC) 20 MG delayed release capsule [Pharmacy Med Name: OMEPRAZOL RX CAP 20MG] 90 capsule 1     Sig: TAKE 1 CAPSULE DAILY       Last Appointment Date: 9/28/2023  Next Appointment Date: 1/2/2024    Allergies   Allergen Reactions    Chantix [Varenicline] Other (See Comments)     Unusual dreams.

## 2023-10-11 DIAGNOSIS — F95.2 TOURETTE'S SYNDROME: ICD-10-CM

## 2023-10-12 RX ORDER — PIMOZIDE 1 MG/1
TABLET ORAL
Qty: 360 TABLET | Refills: 0 | Status: SHIPPED | OUTPATIENT
Start: 2023-10-12

## 2023-10-12 NOTE — TELEPHONE ENCOUNTER
Florencia Patterson called requesting a refill of the below medication which has been pended for you:     Requested Prescriptions     Pending Prescriptions Disp Refills    pimozide (ORAP) 1 MG tablet [Pharmacy Med Name: PIMOZIDE TAB 1MG] 360 tablet 0     Sig: TAKE 2 TABLETS TWICE A DAY       Last Appointment Date: 9/28/2023  Next Appointment Date: 1/2/2024    Allergies   Allergen Reactions    Chantix [Varenicline] Other (See Comments)     Unusual dreams.

## 2023-10-25 DIAGNOSIS — G89.29 CHRONIC PAIN OF LEFT ANKLE: Primary | ICD-10-CM

## 2023-10-25 DIAGNOSIS — M25.572 CHRONIC PAIN OF LEFT ANKLE: Primary | ICD-10-CM

## 2023-10-25 RX ORDER — HYDROCODONE BITARTRATE AND ACETAMINOPHEN 5; 325 MG/1; MG/1
1 TABLET ORAL EVERY 6 HOURS PRN
Qty: 30 TABLET | Refills: 0 | Status: SHIPPED | OUTPATIENT
Start: 2023-10-25 | End: 2023-11-24

## 2023-10-25 NOTE — TELEPHONE ENCOUNTER
Chema Malone called requesting a refill of the below medication which has been pended for you:     OARRS from [place], and [place] reviewed. NORCO 5-325 mg last filled 9/28/23 #30/7 days    Requested Prescriptions     Pending Prescriptions Disp Refills    HYDROcodone-acetaminophen (NORCO) 5-325 MG per tablet 30 tablet 0     Sig: Take 1 tablet by mouth every 6 hours as needed for Pain for up to 30 days. Last Appointment Date: 9/28/2023  Next Appointment Date: 1/2/2024    Allergies   Allergen Reactions    Chantix [Varenicline] Other (See Comments)     Unusual dreams.

## 2023-10-25 NOTE — TELEPHONE ENCOUNTER
Controlled substances monitoring: No signs of potential drug abuse or diversion identified when the OARRS report from [address] [place] [address] was reviewed today. The activity on the report was consistent with the treatment plan.

## 2023-11-26 DIAGNOSIS — I10 ESSENTIAL HYPERTENSION: ICD-10-CM

## 2023-11-27 DIAGNOSIS — M25.572 CHRONIC PAIN OF LEFT ANKLE: Primary | ICD-10-CM

## 2023-11-27 DIAGNOSIS — G89.29 CHRONIC PAIN OF LEFT ANKLE: Primary | ICD-10-CM

## 2023-11-28 DIAGNOSIS — M25.572 CHRONIC PAIN OF LEFT ANKLE: ICD-10-CM

## 2023-11-28 DIAGNOSIS — G89.29 CHRONIC PAIN OF LEFT ANKLE: ICD-10-CM

## 2023-11-28 RX ORDER — HYDROCHLOROTHIAZIDE 25 MG/1
TABLET ORAL
Qty: 90 TABLET | Refills: 0 | Status: SHIPPED | OUTPATIENT
Start: 2023-11-28

## 2023-11-28 RX ORDER — HYDROCODONE BITARTRATE AND ACETAMINOPHEN 5; 325 MG/1; MG/1
TABLET ORAL
Qty: 30 TABLET | Refills: 0 | OUTPATIENT
Start: 2023-11-28

## 2023-11-28 RX ORDER — LOSARTAN POTASSIUM AND HYDROCHLOROTHIAZIDE 25; 100 MG/1; MG/1
TABLET ORAL
Qty: 90 TABLET | Refills: 0 | Status: SHIPPED | OUTPATIENT
Start: 2023-11-28

## 2023-11-28 RX ORDER — HYDROCODONE BITARTRATE AND ACETAMINOPHEN 5; 325 MG/1; MG/1
1 TABLET ORAL EVERY 6 HOURS PRN
Qty: 30 TABLET | Refills: 0 | Status: SHIPPED | OUTPATIENT
Start: 2023-11-28 | End: 2023-12-28

## 2023-11-28 NOTE — TELEPHONE ENCOUNTER
Thelma Dinh called requesting a refill of the below medication which has been pended for you:     Requested Prescriptions     Pending Prescriptions Disp Refills    losartan-hydroCHLOROthiazide (HYZAAR) 100-25 MG per tablet [Pharmacy Med Name: LOSARTAN/HCT -25] 90 tablet 0     Sig: TAKE 1 TABLET DAILY    hydroCHLOROthiazide (HYDRODIURIL) 25 MG tablet [Pharmacy Med Name: HYDROCHLOROT TAB 25MG] 90 tablet 0     Sig: TAKE 1 TABLET DAILY       Last Appointment Date: 9/28/2023  Next Appointment Date: 1/20/24    Allergies   Allergen Reactions    Chantix [Varenicline] Other (See Comments)     Unusual dreams.

## 2023-11-28 NOTE — TELEPHONE ENCOUNTER
Per OARRS, last fill 10/26/23, quantity 30 for 7 days. Ulice December called requesting a refill of the below medication which has been pended for you:     Requested Prescriptions     Pending Prescriptions Disp Refills    HYDROcodone-acetaminophen (NORCO) 5-325 MG per tablet 30 tablet 0     Sig: Take 1 tablet by mouth every 6 hours as needed for Pain for up to 30 days. Last Appointment Date: 9/28/2023  Next Appointment Date: 01/02/24  Allergies   Allergen Reactions    Chantix [Varenicline] Other (See Comments)     Unusual dreams.

## 2023-12-09 DIAGNOSIS — I10 ESSENTIAL HYPERTENSION: ICD-10-CM

## 2023-12-11 RX ORDER — AMLODIPINE BESYLATE 5 MG/1
5 TABLET ORAL DAILY
Qty: 90 TABLET | Refills: 0 | OUTPATIENT
Start: 2023-12-11

## 2023-12-23 DIAGNOSIS — F95.2 TOURETTE'S SYNDROME: ICD-10-CM

## 2023-12-26 DIAGNOSIS — M25.572 CHRONIC PAIN OF LEFT ANKLE: Primary | ICD-10-CM

## 2023-12-26 DIAGNOSIS — G89.29 CHRONIC PAIN OF LEFT ANKLE: Primary | ICD-10-CM

## 2023-12-26 RX ORDER — PIMOZIDE 1 MG/1
TABLET ORAL
Qty: 360 TABLET | Refills: 0 | Status: SHIPPED | OUTPATIENT
Start: 2023-12-26

## 2023-12-26 RX ORDER — HYDROCODONE BITARTRATE AND ACETAMINOPHEN 5; 325 MG/1; MG/1
1 TABLET ORAL EVERY 6 HOURS PRN
Qty: 30 TABLET | Refills: 0 | Status: SHIPPED | OUTPATIENT
Start: 2023-12-26 | End: 2024-01-25

## 2023-12-26 NOTE — TELEPHONE ENCOUNTER
Virlinda Halsted called requesting a refill of the below medication which has been pended for you:     Requested Prescriptions     Pending Prescriptions Disp Refills    pimozide (ORAP) 1 MG tablet [Pharmacy Med Name: PIMOZIDE TAB 1MG] 360 tablet 0     Sig: TAKE 2 TABLETS TWICE A DAY       Last Appointment Date: 9/28/2023  Next Appointment Date: 1/2/2024    Allergies   Allergen Reactions    Chantix [Varenicline] Other (See Comments)     Unusual dreams.

## 2023-12-26 NOTE — TELEPHONE ENCOUNTER
Controlled substances monitoring: No signs of potential drug abuse or diversion identified when the OARRS report from [address] [place] [address] was reviewed today. The activity on the report was consistent with the treatment plan.

## 2023-12-26 NOTE — TELEPHONE ENCOUNTER
Anneliese Rojo called requesting a refill of the below medication which has been pended for you:     OARRS from [place], [place], and [place] reviewed. NORCO 5-325 mg last filled 10/26/23 #30/7days    Requested Prescriptions     Pending Prescriptions Disp Refills    HYDROcodone-acetaminophen (NORCO) 5-325 MG per tablet 30 tablet 0     Sig: Take 1 tablet by mouth every 6 hours as needed for Pain for up to 30 days. Last Appointment Date: 9/28/2023  Next Appointment Date: 1/2/2024    Allergies   Allergen Reactions    Chantix [Varenicline] Other (See Comments)     Unusual dreams.

## 2023-12-27 DIAGNOSIS — K21.9 GASTROESOPHAGEAL REFLUX DISEASE, UNSPECIFIED WHETHER ESOPHAGITIS PRESENT: ICD-10-CM

## 2023-12-27 DIAGNOSIS — E78.2 MIXED HYPERLIPIDEMIA: ICD-10-CM

## 2023-12-27 RX ORDER — ATORVASTATIN CALCIUM 40 MG/1
40 TABLET, FILM COATED ORAL DAILY
Qty: 90 TABLET | Refills: 0 | Status: SHIPPED | OUTPATIENT
Start: 2023-12-27

## 2023-12-27 RX ORDER — OMEPRAZOLE 20 MG/1
CAPSULE, DELAYED RELEASE ORAL
Qty: 90 CAPSULE | Refills: 0 | Status: SHIPPED | OUTPATIENT
Start: 2023-12-27

## 2023-12-27 NOTE — TELEPHONE ENCOUNTER
Em Jalloh called requesting a refill of the below medication which has been pended for you:     Requested Prescriptions     Pending Prescriptions Disp Refills    omeprazole (PRILOSEC) 20 MG delayed release capsule [Pharmacy Med Name: OMEPRAZOL RX CAP 20MG] 90 capsule 0     Sig: TAKE 1 CAPSULE DAILY    atorvastatin (LIPITOR) 40 MG tablet [Pharmacy Med Name: ATORVASTATIN TAB 40MG] 90 tablet 0     Sig: TAKE 1 TABLET DAILY       Last Appointment Date: 9/28/2023  Next Appointment Date: 1/2/2024    Allergies   Allergen Reactions    Chantix [Varenicline] Other (See Comments)     Unusual dreams.

## 2024-01-02 ENCOUNTER — OFFICE VISIT (OUTPATIENT)
Dept: FAMILY MEDICINE CLINIC | Age: 59
End: 2024-01-02
Payer: COMMERCIAL

## 2024-01-02 VITALS
BODY MASS INDEX: 32.6 KG/M2 | SYSTOLIC BLOOD PRESSURE: 138 MMHG | DIASTOLIC BLOOD PRESSURE: 88 MMHG | HEART RATE: 87 BPM | TEMPERATURE: 98.1 F | HEIGHT: 73 IN | OXYGEN SATURATION: 97 % | WEIGHT: 246 LBS

## 2024-01-02 DIAGNOSIS — I10 ESSENTIAL HYPERTENSION: ICD-10-CM

## 2024-01-02 DIAGNOSIS — E78.2 MIXED HYPERLIPIDEMIA: ICD-10-CM

## 2024-01-02 DIAGNOSIS — M25.572 CHRONIC PAIN OF LEFT ANKLE: ICD-10-CM

## 2024-01-02 DIAGNOSIS — E11.40 TYPE 2 DIABETES MELLITUS WITH DIABETIC NEUROPATHY, WITHOUT LONG-TERM CURRENT USE OF INSULIN (HCC): Primary | ICD-10-CM

## 2024-01-02 DIAGNOSIS — G89.29 CHRONIC PAIN OF LEFT ANKLE: ICD-10-CM

## 2024-01-02 PROCEDURE — 3075F SYST BP GE 130 - 139MM HG: CPT | Performed by: FAMILY MEDICINE

## 2024-01-02 PROCEDURE — 99214 OFFICE O/P EST MOD 30 MIN: CPT | Performed by: FAMILY MEDICINE

## 2024-01-02 PROCEDURE — 3079F DIAST BP 80-89 MM HG: CPT | Performed by: FAMILY MEDICINE

## 2024-01-02 RX ORDER — AMLODIPINE BESYLATE 5 MG/1
5 TABLET ORAL DAILY
Qty: 90 TABLET | Refills: 0 | Status: SHIPPED | OUTPATIENT
Start: 2024-01-02

## 2024-01-02 RX ORDER — LOSARTAN POTASSIUM AND HYDROCHLOROTHIAZIDE 25; 100 MG/1; MG/1
1 TABLET ORAL DAILY
Qty: 90 TABLET | Refills: 0 | Status: SHIPPED | OUTPATIENT
Start: 2024-01-02

## 2024-01-02 RX ORDER — HYDROCHLOROTHIAZIDE 25 MG/1
25 TABLET ORAL DAILY
Qty: 90 TABLET | Refills: 0 | Status: SHIPPED | OUTPATIENT
Start: 2024-01-02

## 2024-01-02 ASSESSMENT — PATIENT HEALTH QUESTIONNAIRE - PHQ9
SUM OF ALL RESPONSES TO PHQ9 QUESTIONS 1 & 2: 0
1. LITTLE INTEREST OR PLEASURE IN DOING THINGS: 0
SUM OF ALL RESPONSES TO PHQ QUESTIONS 1-9: 0
SUM OF ALL RESPONSES TO PHQ QUESTIONS 1-9: 0
2. FEELING DOWN, DEPRESSED OR HOPELESS: 0
SUM OF ALL RESPONSES TO PHQ QUESTIONS 1-9: 0
SUM OF ALL RESPONSES TO PHQ QUESTIONS 1-9: 0

## 2024-01-02 NOTE — PROGRESS NOTES
[facility]             [address] [place] [address]                        Telephone [phone]             Fax [phone]       Dylan Dolan  :  1965  Age:  58 y.o.   MRN:  [id number]  Date of visit:  2024       Assessment and Plan:    1. Chronic pain of left ankle  Controlled substances monitoring: No signs of potential drug abuse or diversion identified when the OARRS report from [place], [place], and [place] was reviewed today.  The activity on the report was consistent with the treatment plan.  Norco was last refilled 2023    2. Type 2 diabetes mellitus with diabetic neuropathy, without long-term current use of insulin (HCC)  He has orders for a HgbA1c and comprehensive panel.  He will be contacted when the results are available.     3. Essential hypertension  His blood pressure is adequately-controlled today.  (BP: 138/88)   He was advised to continue current medications.  Amlodipine, Hydrochlorothiazide, and Losartan-Hydrochlorothiazide were refilled:   - amLODIPine (NORVASC) 5 MG tablet; Take 1 tablet by mouth daily  Dispense: 90 tablet; Refill: 0  - hydroCHLOROthiazide (HYDRODIURIL) 25 MG tablet; Take 1 tablet by mouth daily  Dispense: 90 tablet; Refill: 0  - losartan-hydroCHLOROthiazide (HYZAAR) 100-25 MG per tablet; Take 1 tablet by mouth daily  Dispense: 90 tablet; Refill: 0    4. Mixed hyperlipidemia  He has orders for a lipid panel.  He will be contacted when the results are available.    He is tolerating Lipitor well.  He states that he does not need a refill today.     5.  Routine health maintenance  Health maintenance was reviewed with the patient.   He was advised that there is an updated 7128-8830 Covid vaccine. Covid vaccination was recommended.  Annual influenza vaccine was recommended.  Pneumonia vaccination was recommended.   Shingrix was recommended.   Tdap was recommended.   Hepatitis B vaccination was

## 2024-01-31 DIAGNOSIS — M25.572 CHRONIC PAIN OF LEFT ANKLE: Primary | ICD-10-CM

## 2024-01-31 DIAGNOSIS — G89.29 CHRONIC PAIN OF LEFT ANKLE: Primary | ICD-10-CM

## 2024-01-31 RX ORDER — HYDROCODONE BITARTRATE AND ACETAMINOPHEN 5; 325 MG/1; MG/1
1 TABLET ORAL EVERY 6 HOURS PRN
Qty: 30 TABLET | Refills: 0 | Status: SHIPPED | OUTPATIENT
Start: 2024-01-31 | End: 2024-03-01

## 2024-01-31 NOTE — TELEPHONE ENCOUNTER
Dylan called requesting a refill of the below medication which has been pended for you: OARRS from [place], [place], and [place] reviewed. NORCO 5-325 mg last filled 12/26/23 #30/7 days    Requested Prescriptions     Pending Prescriptions Disp Refills    HYDROcodone-acetaminophen (NORCO) 5-325 MG per tablet 30 tablet 0     Sig: Take 1 tablet by mouth every 6 hours as needed for Pain for up to 30 days.       Last Appointment Date: 1/2/2024  Next Appointment Date: 4/2/2024    Allergies   Allergen Reactions    Chantix [Varenicline] Other (See Comments)     Unusual dreams.

## 2024-01-31 NOTE — TELEPHONE ENCOUNTER
Controlled substances monitoring: No signs of potential drug abuse or diversion identified when the OARRS report from [place], [place], and [place] was reviewed today.  The activity on the report was consistent with the treatment plan.

## 2024-03-06 DIAGNOSIS — G89.29 CHRONIC PAIN OF LEFT ANKLE: Primary | ICD-10-CM

## 2024-03-06 DIAGNOSIS — M25.572 CHRONIC PAIN OF LEFT ANKLE: Primary | ICD-10-CM

## 2024-03-06 RX ORDER — HYDROCODONE BITARTRATE AND ACETAMINOPHEN 5; 325 MG/1; MG/1
1 TABLET ORAL EVERY 6 HOURS PRN
Qty: 30 TABLET | Refills: 0 | Status: SHIPPED | OUTPATIENT
Start: 2024-03-06 | End: 2024-04-05

## 2024-03-06 NOTE — TELEPHONE ENCOUNTER
Dylan called requesting a refill of the below medication which has been pended for you:     OARRS from [place], [place], and [place] reviewed. NORCO 5-325 mg last filled 2/1/24 #30/7 days    Requested Prescriptions     Pending Prescriptions Disp Refills    HYDROcodone-acetaminophen (NORCO) 5-325 MG per tablet 30 tablet 0     Sig: Take 1 tablet by mouth every 6 hours as needed for Pain for up to 30 days.       Last Appointment Date: 1/2/2024  Next Appointment Date: 4/2/2024    Allergies   Allergen Reactions    Chantix [Varenicline] Other (See Comments)     Unusual dreams.

## 2024-03-07 DIAGNOSIS — F95.2 TOURETTE'S SYNDROME: ICD-10-CM

## 2024-03-08 RX ORDER — PIMOZIDE 1 MG/1
TABLET ORAL
Qty: 360 TABLET | Refills: 0 | Status: SHIPPED | OUTPATIENT
Start: 2024-03-08

## 2024-03-14 DIAGNOSIS — I10 ESSENTIAL HYPERTENSION: ICD-10-CM

## 2024-03-14 RX ORDER — AMLODIPINE BESYLATE 5 MG/1
5 TABLET ORAL DAILY
Qty: 90 TABLET | Refills: 0 | Status: SHIPPED | OUTPATIENT
Start: 2024-03-14

## 2024-03-14 NOTE — TELEPHONE ENCOUNTER
Dylan called requesting a refill of the below medication which has been pended for you:     Requested Prescriptions     Pending Prescriptions Disp Refills    amLODIPine (NORVASC) 5 MG tablet [Pharmacy Med Name: AMLODIPINE TAB 5MG] 90 tablet 0     Sig: TAKE 1 TABLET DAILY       Last Appointment Date: 1/2/2024  Next Appointment Date: 4/2/2024    Allergies   Allergen Reactions    Chantix [Varenicline] Other (See Comments)     Unusual dreams.

## 2024-03-19 DIAGNOSIS — E78.2 MIXED HYPERLIPIDEMIA: ICD-10-CM

## 2024-03-19 DIAGNOSIS — K21.9 GASTROESOPHAGEAL REFLUX DISEASE, UNSPECIFIED WHETHER ESOPHAGITIS PRESENT: ICD-10-CM

## 2024-03-19 RX ORDER — OMEPRAZOLE 20 MG/1
CAPSULE, DELAYED RELEASE ORAL
Qty: 90 CAPSULE | Refills: 0 | Status: SHIPPED | OUTPATIENT
Start: 2024-03-19

## 2024-03-19 RX ORDER — ATORVASTATIN CALCIUM 40 MG/1
40 TABLET, FILM COATED ORAL DAILY
Qty: 90 TABLET | Refills: 0 | Status: SHIPPED | OUTPATIENT
Start: 2024-03-19

## 2024-03-19 NOTE — TELEPHONE ENCOUNTER
Dylan called requesting a refill of the below medication which has been pended for you:     Requested Prescriptions     Pending Prescriptions Disp Refills    atorvastatin (LIPITOR) 40 MG tablet [Pharmacy Med Name: ATORVASTATIN TAB 40MG] 90 tablet 0     Sig: TAKE 1 TABLET DAILY    omeprazole (PRILOSEC) 20 MG delayed release capsule [Pharmacy Med Name: OMEPRAZOL RX CAP 20MG] 90 capsule 0     Sig: TAKE 1 CAPSULE DAILY       Last Appointment Date: 1/2/2024  Next Appointment Date: 4/2/2024    Allergies   Allergen Reactions    Chantix [Varenicline] Other (See Comments)     Unusual dreams.

## 2024-04-02 ENCOUNTER — OFFICE VISIT (OUTPATIENT)
Dept: FAMILY MEDICINE CLINIC | Age: 59
End: 2024-04-02
Payer: COMMERCIAL

## 2024-04-02 VITALS
SYSTOLIC BLOOD PRESSURE: 136 MMHG | OXYGEN SATURATION: 97 % | HEART RATE: 80 BPM | BODY MASS INDEX: 32.34 KG/M2 | DIASTOLIC BLOOD PRESSURE: 82 MMHG | WEIGHT: 244 LBS | HEIGHT: 73 IN

## 2024-04-02 DIAGNOSIS — F95.2 TOURETTE'S SYNDROME: ICD-10-CM

## 2024-04-02 DIAGNOSIS — E78.2 MIXED HYPERLIPIDEMIA: ICD-10-CM

## 2024-04-02 DIAGNOSIS — G89.29 CHRONIC PAIN OF LEFT ANKLE: Primary | ICD-10-CM

## 2024-04-02 DIAGNOSIS — I10 ESSENTIAL HYPERTENSION: ICD-10-CM

## 2024-04-02 DIAGNOSIS — F11.20 OPIOID DEPENDENCE WITH CURRENT USE (HCC): ICD-10-CM

## 2024-04-02 DIAGNOSIS — M25.572 CHRONIC PAIN OF LEFT ANKLE: Primary | ICD-10-CM

## 2024-04-02 DIAGNOSIS — G63 POLYNEUROPATHY ASSOCIATED WITH UNDERLYING DISEASE (HCC): ICD-10-CM

## 2024-04-02 DIAGNOSIS — E11.40 TYPE 2 DIABETES MELLITUS WITH DIABETIC NEUROPATHY, WITHOUT LONG-TERM CURRENT USE OF INSULIN (HCC): ICD-10-CM

## 2024-04-02 PROCEDURE — 99214 OFFICE O/P EST MOD 30 MIN: CPT | Performed by: FAMILY MEDICINE

## 2024-04-02 PROCEDURE — 3079F DIAST BP 80-89 MM HG: CPT | Performed by: FAMILY MEDICINE

## 2024-04-02 PROCEDURE — 3075F SYST BP GE 130 - 139MM HG: CPT | Performed by: FAMILY MEDICINE

## 2024-04-02 RX ORDER — LOSARTAN POTASSIUM AND HYDROCHLOROTHIAZIDE 25; 100 MG/1; MG/1
1 TABLET ORAL DAILY
Qty: 90 TABLET | Refills: 0 | Status: SHIPPED | OUTPATIENT
Start: 2024-04-02

## 2024-04-02 RX ORDER — HYDROCODONE BITARTRATE AND ACETAMINOPHEN 5; 325 MG/1; MG/1
1 TABLET ORAL EVERY 6 HOURS PRN
Qty: 30 TABLET | Refills: 0 | Status: SHIPPED | OUTPATIENT
Start: 2024-04-02 | End: 2024-05-02

## 2024-04-02 RX ORDER — ATORVASTATIN CALCIUM 40 MG/1
40 TABLET, FILM COATED ORAL DAILY
Qty: 90 TABLET | Refills: 0 | Status: SHIPPED | OUTPATIENT
Start: 2024-04-02

## 2024-04-02 RX ORDER — PIMOZIDE 1 MG/1
2 TABLET ORAL 2 TIMES DAILY
Qty: 360 TABLET | Refills: 0 | Status: SHIPPED | OUTPATIENT
Start: 2024-04-02

## 2024-04-02 RX ORDER — AMLODIPINE BESYLATE 5 MG/1
5 TABLET ORAL DAILY
Qty: 90 TABLET | Refills: 0 | Status: SHIPPED | OUTPATIENT
Start: 2024-04-02

## 2024-04-02 RX ORDER — HYDROCHLOROTHIAZIDE 25 MG/1
25 TABLET ORAL DAILY
Qty: 90 TABLET | Refills: 0 | Status: SHIPPED | OUTPATIENT
Start: 2024-04-02

## 2024-04-02 SDOH — ECONOMIC STABILITY: HOUSING INSECURITY
IN THE LAST 12 MONTHS, WAS THERE A TIME WHEN YOU DID NOT HAVE A STEADY PLACE TO SLEEP OR SLEPT IN A SHELTER (INCLUDING NOW)?: PATIENT DECLINED

## 2024-04-02 SDOH — ECONOMIC STABILITY: FOOD INSECURITY: WITHIN THE PAST 12 MONTHS, THE FOOD YOU BOUGHT JUST DIDN'T LAST AND YOU DIDN'T HAVE MONEY TO GET MORE.: PATIENT DECLINED

## 2024-04-02 SDOH — ECONOMIC STABILITY: FOOD INSECURITY: WITHIN THE PAST 12 MONTHS, YOU WORRIED THAT YOUR FOOD WOULD RUN OUT BEFORE YOU GOT MONEY TO BUY MORE.: PATIENT DECLINED

## 2024-04-02 SDOH — ECONOMIC STABILITY: INCOME INSECURITY: HOW HARD IS IT FOR YOU TO PAY FOR THE VERY BASICS LIKE FOOD, HOUSING, MEDICAL CARE, AND HEATING?: PATIENT DECLINED

## 2024-04-02 NOTE — PROGRESS NOTES
12/08/2022    Pneumococcal, PPSV23, PNEUMOVAX 23, (age 2y+), SC/IM, 0.5mL 08/30/2017          He has the following problem list:  Patient Active Problem List   Diagnosis    Essential hypertension    Tourette's syndrome    Gastroesophageal reflux disease    Tobacco abuse    Chronic pain of left ankle    Type 2 diabetes mellitus without complication ([facility])    Hypertriglyceridemia    Skin ulcer of toe of left foot with necrosis of bone ([facility])    History of 2019 novel coronavirus disease (COVID-19)    Mixed hyperlipidemia    Opioid dependence with current use ([facility])    Type 2 diabetes mellitus with diabetic neuropathy        Current medications are:  Outpatient Medications Marked as Taking for the 4/2/24 encounter (Office Visit) with Alley Vila MD   Medication Sig Dispense Refill    atorvastatin (LIPITOR) 40 MG tablet TAKE 1 TABLET DAILY 90 tablet 0    omeprazole (PRILOSEC) 20 MG delayed release capsule TAKE 1 CAPSULE DAILY 90 capsule 0    amLODIPine (NORVASC) 5 MG tablet TAKE 1 TABLET DAILY 90 tablet 0    pimozide (ORAP) 1 MG tablet TAKE 2 TABLETS TWICE A  tablet 0    HYDROcodone-acetaminophen (NORCO) 5-325 MG per tablet Take 1 tablet by mouth every 6 hours as needed for Pain for up to 30 days. 30 tablet 0    hydroCHLOROthiazide (HYDRODIURIL) 25 MG tablet Take 1 tablet by mouth daily 90 tablet 0    losartan-hydroCHLOROthiazide (HYZAAR) 100-25 MG per tablet Take 1 tablet by mouth daily 90 tablet 0    Diabetic Shoe MISC by Does not apply route Dispense DM shoe and insoles.  custom accomodative offloading insole with toe filler L side.     Amputated great toe of left foot (HCC)  (primary encounter diagnosis)  Equinus deformity of both feet  Diabetic polyneuropathy associated with type 2 diabetes mellitus ([facility]) 1 each 0       He is allergic to chantix [varenicline].    He is a smoker.  He  reports that he has been smoking cigarettes. He started smoking about 44 years ago. He has a 44.3 pack-year smoking history. He

## 2024-05-07 DIAGNOSIS — M25.572 CHRONIC PAIN OF LEFT ANKLE: Primary | ICD-10-CM

## 2024-05-07 DIAGNOSIS — G89.29 CHRONIC PAIN OF LEFT ANKLE: Primary | ICD-10-CM

## 2024-05-07 RX ORDER — HYDROCODONE BITARTRATE AND ACETAMINOPHEN 5; 325 MG/1; MG/1
1 TABLET ORAL EVERY 6 HOURS PRN
Qty: 30 TABLET | Refills: 0 | Status: SHIPPED | OUTPATIENT
Start: 2024-05-07 | End: 2024-06-06

## 2024-05-07 RX ORDER — HYDROCODONE BITARTRATE AND ACETAMINOPHEN 5; 325 MG/1; MG/1
1 TABLET ORAL EVERY 6 HOURS PRN
Qty: 30 TABLET | Refills: 0 | Status: SHIPPED | OUTPATIENT
Start: 2024-06-07 | End: 2024-07-07

## 2024-05-07 NOTE — TELEPHONE ENCOUNTER
Controlled substances monitoring: No signs of potential drug abuse or diversion identified when the OAR[place] report from [place], [place], and [place] was reviewed today.  The activity on the report was consistent with the treatment plan.     Two prescriptions for [place] were sent to [facility].  One prescription is to be filled starting 5/7/2024, and the second prescription is to be filled starting 6/7/2024.

## 2024-05-07 NOTE — TELEPHONE ENCOUNTER
Dylan called requesting a refill of the below medication which has been pended for you:     OARRS from [place], [place], and [place] reviewed. NORCO 5-325 mg last filled 4/2/24 #30/7 days    Requested Prescriptions     Pending Prescriptions Disp Refills    HYDROcodone-acetaminophen (NORCO) 5-325 MG per tablet 30 tablet 0     Sig: Take 1 tablet by mouth every 6 hours as needed for Pain for up to 30 days.       Last Appointment Date: 4/2/2024  Next Appointment Date: 7/17/2024    Allergies   Allergen Reactions    Chantix [Varenicline] Other (See Comments)     Unusual dreams.

## 2024-06-06 DIAGNOSIS — M25.572 CHRONIC PAIN OF LEFT ANKLE: ICD-10-CM

## 2024-06-06 DIAGNOSIS — G89.29 CHRONIC PAIN OF LEFT ANKLE: ICD-10-CM

## 2024-06-06 RX ORDER — HYDROCODONE BITARTRATE AND ACETAMINOPHEN 5; 325 MG/1; MG/1
1 TABLET ORAL EVERY 6 HOURS PRN
Qty: 30 TABLET | Refills: 0 | Status: CANCELLED | OUTPATIENT
Start: 2024-06-06 | End: 2024-07-06

## 2024-06-06 NOTE — TELEPHONE ENCOUNTER
Dylan called requesting a refill of the below medication which has been pended for you:     O[place]RS from [place], [place], and [place] reviewed. NORCO 5-325 mg last filled 5/8/24 #30/7 days.    Dr. Vila out of the office     Requested Prescriptions     Pending Prescriptions Disp Refills    HYDROcodone-acetaminophen (NORCO) 5-325 MG per tablet 30 tablet 0     Sig: Take 1 tablet by mouth every 6 hours as needed for Pain for up to 30 days.       Last Appointment Date: 4/2/2024  Next Appointment Date: 7/17/2024    Allergies   Allergen Reactions    Chantix [Varenicline] Other (See Comments)     Unusual dreams.

## 2024-06-06 NOTE — TELEPHONE ENCOUNTER
Looks like there is one on file per review of med list at the pharmacy due to 6/7/24.  Check to make sure that one is available.

## 2024-06-07 NOTE — TELEPHONE ENCOUNTER
Spoke with clinic pharmacy. Medication is available for patient to fill.    Attempted to contact patient to make him aware. Voicemail full.

## 2024-06-24 DIAGNOSIS — K21.9 GASTROESOPHAGEAL REFLUX DISEASE, UNSPECIFIED WHETHER ESOPHAGITIS PRESENT: ICD-10-CM

## 2024-06-25 RX ORDER — OMEPRAZOLE 20 MG/1
CAPSULE, DELAYED RELEASE ORAL
Qty: 90 CAPSULE | Refills: 0 | Status: SHIPPED | OUTPATIENT
Start: 2024-06-25

## 2024-06-25 NOTE — TELEPHONE ENCOUNTER
Dylan called requesting a refill of the below medication which has been pended for you:     Requested Prescriptions     Pending Prescriptions Disp Refills    omeprazole (PRILOSEC) 20 MG delayed release capsule [Pharmacy Med Name: OMEPRAZOL RX CAP 20MG] 90 capsule 0     Sig: TAKE 1 CAPSULE DAILY       Last Appointment Date: 4/2/2024  Next Appointment Date: 7/17/2024    Allergies   Allergen Reactions    Chantix [Varenicline] Other (See Comments)     Unusual dreams.

## 2024-07-02 ENCOUNTER — OFFICE VISIT (OUTPATIENT)
Dept: PRIMARY CARE CLINIC | Age: 59
End: 2024-07-02
Payer: COMMERCIAL

## 2024-07-02 VITALS
HEART RATE: 94 BPM | WEIGHT: 238 LBS | BODY MASS INDEX: 31.4 KG/M2 | TEMPERATURE: 97.2 F | OXYGEN SATURATION: 98 % | SYSTOLIC BLOOD PRESSURE: 168 MMHG | DIASTOLIC BLOOD PRESSURE: 98 MMHG

## 2024-07-02 DIAGNOSIS — J04.0 LARYNGITIS: Primary | ICD-10-CM

## 2024-07-02 PROCEDURE — 99213 OFFICE O/P EST LOW 20 MIN: CPT

## 2024-07-02 PROCEDURE — 3077F SYST BP >= 140 MM HG: CPT

## 2024-07-02 PROCEDURE — 3080F DIAST BP >= 90 MM HG: CPT

## 2024-07-02 RX ORDER — AZITHROMYCIN 250 MG/1
TABLET, FILM COATED ORAL
Qty: 6 TABLET | Refills: 0 | Status: SHIPPED | OUTPATIENT
Start: 2024-07-02 | End: 2024-07-12

## 2024-07-02 RX ORDER — PREDNISONE 20 MG/1
20 TABLET ORAL 2 TIMES DAILY
Qty: 10 TABLET | Refills: 0 | Status: SHIPPED | OUTPATIENT
Start: 2024-07-02 | End: 2024-07-07

## 2024-07-02 ASSESSMENT — ENCOUNTER SYMPTOMS
SHORTNESS OF BREATH: 0
CHEST TIGHTNESS: 0
COUGH: 1
HOARSE VOICE: 1
SORE THROAT: 0
WHEEZING: 0
TROUBLE SWALLOWING: 0
DIFFICULTY BREATHING: 1

## 2024-07-02 ASSESSMENT — PATIENT HEALTH QUESTIONNAIRE - PHQ9
SUM OF ALL RESPONSES TO PHQ QUESTIONS 1-9: 0
SUM OF ALL RESPONSES TO PHQ QUESTIONS 1-9: 0
SUM OF ALL RESPONSES TO PHQ9 QUESTIONS 1 & 2: 0
2. FEELING DOWN, DEPRESSED OR HOPELESS: NOT AT ALL
SUM OF ALL RESPONSES TO PHQ QUESTIONS 1-9: 0
SUM OF ALL RESPONSES TO PHQ QUESTIONS 1-9: 0
1. LITTLE INTEREST OR PLEASURE IN DOING THINGS: NOT AT ALL

## 2024-07-02 NOTE — PATIENT INSTRUCTIONS
Azithromycin x 5 days  Prednisone x 5 days  Please return for re-evaluation and further workup if symptoms do not improve by day 4-5. Return sooner for acute concerns  Go to ER for chest pain, shortness of breathing, lip/tongue/throat swelling, difficulty swallowing, fevers > 102.5 not reduced with tylenol

## 2024-07-02 NOTE — PROGRESS NOTES
normal.         Behavior: Behavior normal.         Assessment and Plan      Diagnosis Orders   1. Laryngitis  predniSONE (DELTASONE) 20 MG tablet    azithromycin (ZITHROMAX) 250 MG tablet        Orders Placed This Encounter    predniSONE (DELTASONE) 20 MG tablet     Sig: Take 1 tablet by mouth 2 times daily for 5 days     Dispense:  10 tablet     Refill:  0    azithromycin (ZITHROMAX) 250 MG tablet     Simg on day 1 followed by 250mg on days 2 - 5     Dispense:  6 tablet     Refill:  0     Discussed laryngitis vs bronchitis. No fine wheezing noted on exam. Patient is very hoarse. Afebrile, VSS. Hypertensive in clinic. Denies headaches, chest pain, shortness of breath, vision changes.   Plan:   Azithromycin x 5 days  Prednisone x 5 days  Please return for re-evaluation and further workup if symptoms do not improve by day 4-5. Return sooner for acute concerns  Go to ER for chest pain, shortness of breathing, lip/tongue/throat swelling, difficulty swallowing, fevers > 102.5 not reduced with tylenol    Discussed exam, POCT findings, plan of care, and follow-up at length with patient.  Reviewed all prescribed and recommended medications, administration and side effects. Encouraged patient to follow up with PCP or return to the clinic for no improvement and or worsening of symptoms. All questions were answered and they verbalized understanding and were agreeable with the plan.     Follow up as needed.        Electronically signed by JOHN Santamaria CNP on 2024 at 5:33 PM

## 2024-07-08 ENCOUNTER — ANESTHESIA EVENT (OUTPATIENT)
Dept: OPERATING ROOM | Age: 59
End: 2024-07-08
Payer: COMMERCIAL

## 2024-07-08 ENCOUNTER — HOSPITAL ENCOUNTER (EMERGENCY)
Age: 59
Discharge: ANOTHER HOSPITAL | End: 2024-07-08
Attending: STUDENT IN AN ORGANIZED HEALTH CARE EDUCATION/TRAINING PROGRAM
Payer: COMMERCIAL

## 2024-07-08 ENCOUNTER — HOSPITAL ENCOUNTER (INPATIENT)
Age: 59
LOS: 9 days | Discharge: HOME HEALTH | End: 2024-07-17
Attending: INTERNAL MEDICINE | Admitting: INTERNAL MEDICINE
Payer: COMMERCIAL

## 2024-07-08 ENCOUNTER — APPOINTMENT (OUTPATIENT)
Dept: GENERAL RADIOLOGY | Age: 59
End: 2024-07-08
Attending: INTERNAL MEDICINE
Payer: COMMERCIAL

## 2024-07-08 ENCOUNTER — APPOINTMENT (OUTPATIENT)
Dept: CT IMAGING | Age: 59
End: 2024-07-08
Payer: COMMERCIAL

## 2024-07-08 VITALS
HEIGHT: 73 IN | BODY MASS INDEX: 31.81 KG/M2 | OXYGEN SATURATION: 94 % | TEMPERATURE: 98.1 F | HEART RATE: 99 BPM | SYSTOLIC BLOOD PRESSURE: 160 MMHG | WEIGHT: 240 LBS | RESPIRATION RATE: 14 BRPM | DIASTOLIC BLOOD PRESSURE: 113 MMHG

## 2024-07-08 DIAGNOSIS — R49.0 VOICE HOARSENESS: ICD-10-CM

## 2024-07-08 DIAGNOSIS — C32.0 MALIGNANT NEOPLASM OF VOCAL CORD (HCC): ICD-10-CM

## 2024-07-08 DIAGNOSIS — Z93.0 TRACHEOSTOMY IN PLACE (HCC): Primary | ICD-10-CM

## 2024-07-08 DIAGNOSIS — G63 POLYNEUROPATHY ASSOCIATED WITH UNDERLYING DISEASE (HCC): ICD-10-CM

## 2024-07-08 DIAGNOSIS — R06.1 INSPIRATORY STRIDOR: ICD-10-CM

## 2024-07-08 DIAGNOSIS — J38.3 VOCAL CORD MASS: Primary | ICD-10-CM

## 2024-07-08 DIAGNOSIS — J38.3 VOCAL CORD MASS: ICD-10-CM

## 2024-07-08 DIAGNOSIS — E87.1 HYPONATREMIA: ICD-10-CM

## 2024-07-08 PROBLEM — J38.00 VOCAL CORD PARALYSIS: Status: ACTIVE | Noted: 2024-07-08

## 2024-07-08 LAB
ALBUMIN SERPL-MCNC: 4.3 G/DL (ref 3.5–5.2)
ALBUMIN/GLOB SERPL: 1 {RATIO} (ref 1–2.5)
ALP SERPL-CCNC: 94 U/L (ref 40–129)
ALT SERPL-CCNC: 17 U/L (ref 10–50)
AMPHET UR QL SCN: NEGATIVE
ANION GAP SERPL CALCULATED.3IONS-SCNC: 11 MMOL/L (ref 9–17)
AST SERPL-CCNC: 19 U/L (ref 10–50)
BARBITURATES UR QL SCN: NEGATIVE
BASOPHILS # BLD: 0.03 K/UL (ref 0–0.2)
BASOPHILS NFR BLD: 0 % (ref 0–2)
BENZODIAZ UR QL: NEGATIVE
BILIRUB DIRECT SERPL-MCNC: 0.2 MG/DL (ref 0–0.2)
BILIRUB INDIRECT SERPL-MCNC: 0.6 MG/DL (ref 0–1)
BILIRUB SERPL-MCNC: 0.8 MG/DL (ref 0–1.2)
BODY TEMPERATURE: 37
BUN SERPL-MCNC: 13 MG/DL (ref 6–20)
BUN/CREAT SERPL: 16 (ref 9–20)
CA-I BLD-SCNC: 1.14 MMOL/L (ref 1.13–1.33)
CALCIUM SERPL-MCNC: 9.7 MG/DL (ref 8.6–10.4)
CANNABINOIDS UR QL SCN: NEGATIVE
CHLORIDE SERPL-SCNC: 88 MMOL/L (ref 98–107)
CO2 SERPL-SCNC: 32 MMOL/L (ref 20–31)
COCAINE UR QL SCN: NEGATIVE
COHGB MFR BLD: 4.8 % (ref 0–5)
CREAT SERPL-MCNC: 0.8 MG/DL (ref 0.7–1.2)
CRP SERPL HS-MCNC: <3 MG/L (ref 0–5)
EOSINOPHIL # BLD: 0.05 K/UL (ref 0–0.44)
EOSINOPHILS RELATIVE PERCENT: 0 % (ref 1–4)
ERYTHROCYTE [DISTWIDTH] IN BLOOD BY AUTOMATED COUNT: 12.2 % (ref 11.8–14.4)
FENTANYL UR QL: NEGATIVE
FIO2 ON VENT: ABNORMAL %
GFR, ESTIMATED: >90 ML/MIN/1.73M2
GLOBULIN SER CALC-MCNC: 3.1 G/DL
GLUCOSE BLD-MCNC: 255 MG/DL (ref 75–110)
GLUCOSE BLD-MCNC: 267 MG/DL (ref 75–110)
GLUCOSE BLD-MCNC: 300 MG/DL (ref 75–110)
GLUCOSE SERPL-MCNC: 188 MG/DL (ref 70–99)
HCO3 VENOUS: 28.9 MMOL/L (ref 24–30)
HCT VFR BLD AUTO: 43 % (ref 40.7–50.3)
HGB BLD-MCNC: 15.7 G/DL (ref 13–17)
IMM GRANULOCYTES # BLD AUTO: 0.12 K/UL (ref 0–0.3)
IMM GRANULOCYTES NFR BLD: 1 %
INR PPP: 1
L PNEUMO1 AG UR QL IA.RAPID: NEGATIVE
LACTIC ACID, WHOLE BLOOD: 4.4 MMOL/L (ref 0.7–2.1)
LYMPHOCYTES NFR BLD: 4.34 K/UL (ref 1.1–3.7)
LYMPHOCYTES RELATIVE PERCENT: 22 % (ref 24–43)
MAGNESIUM SERPL-MCNC: 1.7 MG/DL (ref 1.6–2.6)
MCH RBC QN AUTO: 32.4 PG (ref 25.2–33.5)
MCHC RBC AUTO-ENTMCNC: 36.5 G/DL (ref 25.2–33.5)
MCV RBC AUTO: 88.8 FL (ref 82.6–102.9)
METHADONE UR QL: NEGATIVE
MONOCYTES NFR BLD: 1.3 K/UL (ref 0.1–1.2)
MONOCYTES NFR BLD: 7 % (ref 3–12)
NEUTROPHILS NFR BLD: 70 % (ref 36–65)
NEUTS SEG NFR BLD: 14.19 K/UL (ref 1.5–8.1)
NRBC BLD-RTO: 0 PER 100 WBC
O2 SAT, VEN: 97.7 % (ref 60–85)
OPIATES UR QL SCN: NEGATIVE
OXYCODONE UR QL SCN: POSITIVE
PARTIAL THROMBOPLASTIN TIME: 29 SEC (ref 23–36.5)
PCO2 VENOUS: 43.5 MM HG (ref 39–55)
PCP UR QL SCN: NEGATIVE
PH VENOUS: 7.44 (ref 7.32–7.42)
PLATELET # BLD AUTO: 432 K/UL (ref 138–453)
PMV BLD AUTO: 8.4 FL (ref 8.1–13.5)
PO2 VENOUS: 94.1 MM HG (ref 30–50)
POSITIVE BASE EXCESS, VEN: 4.5 MMOL/L (ref 0–2)
POTASSIUM SERPL-SCNC: 3.7 MMOL/L (ref 3.7–5.3)
PROCALCITONIN SERPL-MCNC: 0.05 NG/ML (ref 0–0.09)
PROT SERPL-MCNC: 7.4 G/DL (ref 6.6–8.7)
PROTHROMBIN TIME: 13.1 SEC (ref 11.7–14.9)
RBC # BLD AUTO: 4.84 M/UL (ref 4.21–5.77)
SODIUM SERPL-SCNC: 131 MMOL/L (ref 135–144)
SPECIMEN SOURCE: NORMAL
STREP A, MOLECULAR: NEGATIVE
TEST INFORMATION: ABNORMAL
WBC OTHER # BLD: 20 K/UL (ref 3.5–11.3)

## 2024-07-08 PROCEDURE — 84145 PROCALCITONIN (PCT): CPT

## 2024-07-08 PROCEDURE — 71045 X-RAY EXAM CHEST 1 VIEW: CPT

## 2024-07-08 PROCEDURE — 82947 ASSAY GLUCOSE BLOOD QUANT: CPT

## 2024-07-08 PROCEDURE — 6370000000 HC RX 637 (ALT 250 FOR IP)

## 2024-07-08 PROCEDURE — 94640 AIRWAY INHALATION TREATMENT: CPT

## 2024-07-08 PROCEDURE — 99285 EMERGENCY DEPT VISIT HI MDM: CPT

## 2024-07-08 PROCEDURE — 94761 N-INVAS EAR/PLS OXIMETRY MLT: CPT

## 2024-07-08 PROCEDURE — 6360000002 HC RX W HCPCS

## 2024-07-08 PROCEDURE — 96375 TX/PRO/DX INJ NEW DRUG ADDON: CPT

## 2024-07-08 PROCEDURE — 82330 ASSAY OF CALCIUM: CPT

## 2024-07-08 PROCEDURE — 87040 BLOOD CULTURE FOR BACTERIA: CPT

## 2024-07-08 PROCEDURE — 83605 ASSAY OF LACTIC ACID: CPT

## 2024-07-08 PROCEDURE — 87449 NOS EACH ORGANISM AG IA: CPT

## 2024-07-08 PROCEDURE — 99222 1ST HOSP IP/OBS MODERATE 55: CPT | Performed by: STUDENT IN AN ORGANIZED HEALTH CARE EDUCATION/TRAINING PROGRAM

## 2024-07-08 PROCEDURE — 2000000000 HC ICU R&B

## 2024-07-08 PROCEDURE — 6360000002 HC RX W HCPCS: Performed by: STUDENT IN AN ORGANIZED HEALTH CARE EDUCATION/TRAINING PROGRAM

## 2024-07-08 PROCEDURE — 0CJY8ZZ INSPECTION OF MOUTH AND THROAT, VIA NATURAL OR ARTIFICIAL OPENING ENDOSCOPIC: ICD-10-PCS | Performed by: STUDENT IN AN ORGANIZED HEALTH CARE EDUCATION/TRAINING PROGRAM

## 2024-07-08 PROCEDURE — 82805 BLOOD GASES W/O2 SATURATION: CPT

## 2024-07-08 PROCEDURE — 85610 PROTHROMBIN TIME: CPT

## 2024-07-08 PROCEDURE — 86140 C-REACTIVE PROTEIN: CPT

## 2024-07-08 PROCEDURE — 2580000003 HC RX 258: Performed by: EMERGENCY MEDICINE

## 2024-07-08 PROCEDURE — 87205 SMEAR GRAM STAIN: CPT

## 2024-07-08 PROCEDURE — 87651 STREP A DNA AMP PROBE: CPT

## 2024-07-08 PROCEDURE — 96374 THER/PROPH/DIAG INJ IV PUSH: CPT

## 2024-07-08 PROCEDURE — 2580000003 HC RX 258

## 2024-07-08 PROCEDURE — 83735 ASSAY OF MAGNESIUM: CPT

## 2024-07-08 PROCEDURE — 6360000004 HC RX CONTRAST MEDICATION: Performed by: STUDENT IN AN ORGANIZED HEALTH CARE EDUCATION/TRAINING PROGRAM

## 2024-07-08 PROCEDURE — 99291 CRITICAL CARE FIRST HOUR: CPT | Performed by: INTERNAL MEDICINE

## 2024-07-08 PROCEDURE — 80076 HEPATIC FUNCTION PANEL: CPT

## 2024-07-08 PROCEDURE — 36415 COLL VENOUS BLD VENIPUNCTURE: CPT

## 2024-07-08 PROCEDURE — 6370000000 HC RX 637 (ALT 250 FOR IP): Performed by: STUDENT IN AN ORGANIZED HEALTH CARE EDUCATION/TRAINING PROGRAM

## 2024-07-08 PROCEDURE — 94664 DEMO&/EVAL PT USE INHALER: CPT

## 2024-07-08 PROCEDURE — 31575 DIAGNOSTIC LARYNGOSCOPY: CPT | Performed by: STUDENT IN AN ORGANIZED HEALTH CARE EDUCATION/TRAINING PROGRAM

## 2024-07-08 PROCEDURE — 80307 DRUG TEST PRSMV CHEM ANLYZR: CPT

## 2024-07-08 PROCEDURE — 80048 BASIC METABOLIC PNL TOTAL CA: CPT

## 2024-07-08 PROCEDURE — 87641 MR-STAPH DNA AMP PROBE: CPT

## 2024-07-08 PROCEDURE — 6370000000 HC RX 637 (ALT 250 FOR IP): Performed by: EMERGENCY MEDICINE

## 2024-07-08 PROCEDURE — 85730 THROMBOPLASTIN TIME PARTIAL: CPT

## 2024-07-08 PROCEDURE — 87070 CULTURE OTHR SPECIMN AEROBIC: CPT

## 2024-07-08 PROCEDURE — 2709999900 CT SOFT TISSUE NECK W CONTRAST

## 2024-07-08 PROCEDURE — 85025 COMPLETE CBC W/AUTO DIFF WBC: CPT

## 2024-07-08 PROCEDURE — 2500000003 HC RX 250 WO HCPCS: Performed by: EMERGENCY MEDICINE

## 2024-07-08 RX ORDER — ENOXAPARIN SODIUM 100 MG/ML
30 INJECTION SUBCUTANEOUS 2 TIMES DAILY
Status: DISCONTINUED | OUTPATIENT
Start: 2024-07-08 | End: 2024-07-08

## 2024-07-08 RX ORDER — MORPHINE SULFATE 2 MG/ML
1 INJECTION, SOLUTION INTRAMUSCULAR; INTRAVENOUS EVERY 4 HOURS PRN
Status: DISCONTINUED | OUTPATIENT
Start: 2024-07-08 | End: 2024-07-10

## 2024-07-08 RX ORDER — INSULIN LISPRO 100 [IU]/ML
0-4 INJECTION, SOLUTION INTRAVENOUS; SUBCUTANEOUS
Status: DISCONTINUED | OUTPATIENT
Start: 2024-07-08 | End: 2024-07-08

## 2024-07-08 RX ORDER — NICOTINE 21 MG/24HR
1 PATCH, TRANSDERMAL 24 HOURS TRANSDERMAL DAILY
Status: DISCONTINUED | OUTPATIENT
Start: 2024-07-08 | End: 2024-07-17 | Stop reason: HOSPADM

## 2024-07-08 RX ORDER — POTASSIUM CHLORIDE 29.8 MG/ML
20 INJECTION INTRAVENOUS PRN
Status: DISCONTINUED | OUTPATIENT
Start: 2024-07-08 | End: 2024-07-17 | Stop reason: HOSPADM

## 2024-07-08 RX ORDER — ACETAMINOPHEN 325 MG/1
650 TABLET ORAL EVERY 6 HOURS PRN
Status: DISCONTINUED | OUTPATIENT
Start: 2024-07-08 | End: 2024-07-17 | Stop reason: HOSPADM

## 2024-07-08 RX ORDER — ENOXAPARIN SODIUM 100 MG/ML
40 INJECTION SUBCUTANEOUS DAILY
Status: DISCONTINUED | OUTPATIENT
Start: 2024-07-08 | End: 2024-07-08

## 2024-07-08 RX ORDER — HYDRALAZINE HYDROCHLORIDE 20 MG/ML
10 INJECTION INTRAMUSCULAR; INTRAVENOUS EVERY 6 HOURS PRN
Status: DISCONTINUED | OUTPATIENT
Start: 2024-07-08 | End: 2024-07-17 | Stop reason: HOSPADM

## 2024-07-08 RX ORDER — DEXAMETHASONE SODIUM PHOSPHATE 10 MG/ML
10 INJECTION INTRAMUSCULAR; INTRAVENOUS ONCE
Status: COMPLETED | OUTPATIENT
Start: 2024-07-08 | End: 2024-07-08

## 2024-07-08 RX ORDER — HEPARIN SODIUM 5000 [USP'U]/ML
5000 INJECTION, SOLUTION INTRAVENOUS; SUBCUTANEOUS EVERY 8 HOURS SCHEDULED
Status: DISCONTINUED | OUTPATIENT
Start: 2024-07-08 | End: 2024-07-17 | Stop reason: HOSPADM

## 2024-07-08 RX ORDER — POLYETHYLENE GLYCOL 3350 17 G/17G
17 POWDER, FOR SOLUTION ORAL DAILY PRN
Status: DISCONTINUED | OUTPATIENT
Start: 2024-07-08 | End: 2024-07-17 | Stop reason: HOSPADM

## 2024-07-08 RX ORDER — GLUCAGON 1 MG/ML
1 KIT INJECTION PRN
Status: DISCONTINUED | OUTPATIENT
Start: 2024-07-08 | End: 2024-07-17 | Stop reason: HOSPADM

## 2024-07-08 RX ORDER — DEXTROSE MONOHYDRATE 100 MG/ML
INJECTION, SOLUTION INTRAVENOUS CONTINUOUS PRN
Status: DISCONTINUED | OUTPATIENT
Start: 2024-07-08 | End: 2024-07-17 | Stop reason: HOSPADM

## 2024-07-08 RX ORDER — FENTANYL CITRATE 50 UG/ML
INJECTION, SOLUTION INTRAMUSCULAR; INTRAVENOUS
Status: COMPLETED
Start: 2024-07-08 | End: 2024-07-08

## 2024-07-08 RX ORDER — FENTANYL CITRATE 50 UG/ML
25 INJECTION, SOLUTION INTRAMUSCULAR; INTRAVENOUS ONCE
Status: COMPLETED | OUTPATIENT
Start: 2024-07-08 | End: 2024-07-08

## 2024-07-08 RX ORDER — SODIUM CHLORIDE FOR INHALATION 0.9 %
3 VIAL, NEBULIZER (ML) INHALATION EVERY 4 HOURS PRN
Status: DISCONTINUED | OUTPATIENT
Start: 2024-07-08 | End: 2024-07-08 | Stop reason: HOSPADM

## 2024-07-08 RX ORDER — HYDROCODONE BITARTRATE AND ACETAMINOPHEN 5; 325 MG/1; MG/1
1 TABLET ORAL EVERY 6 HOURS PRN
COMMUNITY

## 2024-07-08 RX ORDER — SODIUM CHLORIDE 0.9 % (FLUSH) 0.9 %
5-40 SYRINGE (ML) INJECTION EVERY 12 HOURS SCHEDULED
Status: DISCONTINUED | OUTPATIENT
Start: 2024-07-08 | End: 2024-07-17 | Stop reason: HOSPADM

## 2024-07-08 RX ORDER — IPRATROPIUM BROMIDE AND ALBUTEROL SULFATE 2.5; .5 MG/3ML; MG/3ML
1 SOLUTION RESPIRATORY (INHALATION)
Status: DISCONTINUED | OUTPATIENT
Start: 2024-07-08 | End: 2024-07-17 | Stop reason: HOSPADM

## 2024-07-08 RX ORDER — LABETALOL HYDROCHLORIDE 5 MG/ML
10 INJECTION, SOLUTION INTRAVENOUS EVERY 6 HOURS PRN
Status: DISCONTINUED | OUTPATIENT
Start: 2024-07-08 | End: 2024-07-17 | Stop reason: HOSPADM

## 2024-07-08 RX ORDER — INSULIN LISPRO 100 [IU]/ML
0-4 INJECTION, SOLUTION INTRAVENOUS; SUBCUTANEOUS NIGHTLY
Status: DISCONTINUED | OUTPATIENT
Start: 2024-07-08 | End: 2024-07-08

## 2024-07-08 RX ORDER — LABETALOL HYDROCHLORIDE 5 MG/ML
20 INJECTION, SOLUTION INTRAVENOUS ONCE
Status: COMPLETED | OUTPATIENT
Start: 2024-07-08 | End: 2024-07-08

## 2024-07-08 RX ORDER — MORPHINE SULFATE 2 MG/ML
INJECTION, SOLUTION INTRAMUSCULAR; INTRAVENOUS
Status: COMPLETED
Start: 2024-07-08 | End: 2024-07-08

## 2024-07-08 RX ORDER — POTASSIUM CHLORIDE 7.45 MG/ML
10 INJECTION INTRAVENOUS PRN
Status: DISCONTINUED | OUTPATIENT
Start: 2024-07-08 | End: 2024-07-17 | Stop reason: HOSPADM

## 2024-07-08 RX ORDER — SODIUM CHLORIDE 9 MG/ML
INJECTION, SOLUTION INTRAVENOUS CONTINUOUS
Status: DISCONTINUED | OUTPATIENT
Start: 2024-07-08 | End: 2024-07-08 | Stop reason: HOSPADM

## 2024-07-08 RX ORDER — SODIUM CHLORIDE 0.9 % (FLUSH) 0.9 %
5-40 SYRINGE (ML) INJECTION PRN
Status: DISCONTINUED | OUTPATIENT
Start: 2024-07-08 | End: 2024-07-17 | Stop reason: HOSPADM

## 2024-07-08 RX ORDER — FENTANYL CITRATE 50 UG/ML
50 INJECTION, SOLUTION INTRAMUSCULAR; INTRAVENOUS EVERY 6 HOURS PRN
Status: DISCONTINUED | OUTPATIENT
Start: 2024-07-08 | End: 2024-07-10

## 2024-07-08 RX ORDER — BUDESONIDE AND FORMOTEROL FUMARATE DIHYDRATE 160; 4.5 UG/1; UG/1
2 AEROSOL RESPIRATORY (INHALATION)
Status: DISCONTINUED | OUTPATIENT
Start: 2024-07-08 | End: 2024-07-12

## 2024-07-08 RX ORDER — OXYCODONE HYDROCHLORIDE AND ACETAMINOPHEN 5; 325 MG/1; MG/1
1 TABLET ORAL ONCE
Status: COMPLETED | OUTPATIENT
Start: 2024-07-08 | End: 2024-07-08

## 2024-07-08 RX ORDER — FENTANYL CITRATE 50 UG/ML
25 INJECTION, SOLUTION INTRAMUSCULAR; INTRAVENOUS EVERY 6 HOURS PRN
Status: DISCONTINUED | OUTPATIENT
Start: 2024-07-08 | End: 2024-07-08

## 2024-07-08 RX ORDER — MAGNESIUM SULFATE IN WATER 40 MG/ML
2000 INJECTION, SOLUTION INTRAVENOUS PRN
Status: DISCONTINUED | OUTPATIENT
Start: 2024-07-08 | End: 2024-07-17 | Stop reason: HOSPADM

## 2024-07-08 RX ORDER — ONDANSETRON 4 MG/1
4 TABLET, ORALLY DISINTEGRATING ORAL EVERY 8 HOURS PRN
Status: DISCONTINUED | OUTPATIENT
Start: 2024-07-08 | End: 2024-07-17 | Stop reason: HOSPADM

## 2024-07-08 RX ORDER — ACETAMINOPHEN 650 MG/1
650 SUPPOSITORY RECTAL EVERY 6 HOURS PRN
Status: DISCONTINUED | OUTPATIENT
Start: 2024-07-08 | End: 2024-07-17 | Stop reason: HOSPADM

## 2024-07-08 RX ORDER — INSULIN LISPRO 100 [IU]/ML
0-8 INJECTION, SOLUTION INTRAVENOUS; SUBCUTANEOUS EVERY 4 HOURS
Status: DISCONTINUED | OUTPATIENT
Start: 2024-07-08 | End: 2024-07-17 | Stop reason: HOSPADM

## 2024-07-08 RX ORDER — SODIUM CHLORIDE 9 MG/ML
INJECTION, SOLUTION INTRAVENOUS CONTINUOUS
Status: DISCONTINUED | OUTPATIENT
Start: 2024-07-08 | End: 2024-07-12

## 2024-07-08 RX ORDER — ONDANSETRON 2 MG/ML
4 INJECTION INTRAMUSCULAR; INTRAVENOUS EVERY 6 HOURS PRN
Status: DISCONTINUED | OUTPATIENT
Start: 2024-07-08 | End: 2024-07-17 | Stop reason: HOSPADM

## 2024-07-08 RX ORDER — LIDOCAINE 4 G/G
1 PATCH TOPICAL DAILY
Status: COMPLETED | OUTPATIENT
Start: 2024-07-08 | End: 2024-07-10

## 2024-07-08 RX ORDER — DILTIAZEM HYDROCHLORIDE 5 MG/ML
10 INJECTION INTRAVENOUS ONCE
Status: COMPLETED | OUTPATIENT
Start: 2024-07-08 | End: 2024-07-08

## 2024-07-08 RX ORDER — SODIUM CHLORIDE 9 MG/ML
INJECTION, SOLUTION INTRAVENOUS PRN
Status: DISCONTINUED | OUTPATIENT
Start: 2024-07-08 | End: 2024-07-17 | Stop reason: HOSPADM

## 2024-07-08 RX ADMIN — WATER 40 MG: 1 INJECTION INTRAMUSCULAR; INTRAVENOUS; SUBCUTANEOUS at 13:40

## 2024-07-08 RX ADMIN — FENTANYL CITRATE 25 MCG: 50 INJECTION, SOLUTION INTRAMUSCULAR; INTRAVENOUS at 13:59

## 2024-07-08 RX ADMIN — Medication 1000 MG: at 14:04

## 2024-07-08 RX ADMIN — DILTIAZEM HYDROCHLORIDE 10 MG: 5 INJECTION, SOLUTION INTRAVENOUS at 09:58

## 2024-07-08 RX ADMIN — SODIUM CHLORIDE: 9 INJECTION, SOLUTION INTRAVENOUS at 12:24

## 2024-07-08 RX ADMIN — AZITHROMYCIN MONOHYDRATE 500 MG: 500 INJECTION, POWDER, LYOPHILIZED, FOR SOLUTION INTRAVENOUS at 13:58

## 2024-07-08 RX ADMIN — Medication 6 MG: at 21:41

## 2024-07-08 RX ADMIN — IPRATROPIUM BROMIDE AND ALBUTEROL SULFATE 1 DOSE: 2.5; .5 SOLUTION RESPIRATORY (INHALATION) at 20:26

## 2024-07-08 RX ADMIN — LABETALOL HYDROCHLORIDE 10 MG: 5 INJECTION, SOLUTION INTRAVENOUS at 15:22

## 2024-07-08 RX ADMIN — SODIUM CHLORIDE, PRESERVATIVE FREE 40 MG: 5 INJECTION INTRAVENOUS at 13:42

## 2024-07-08 RX ADMIN — IOPAMIDOL 75 ML: 755 INJECTION, SOLUTION INTRAVENOUS at 07:37

## 2024-07-08 RX ADMIN — Medication 3 ML: at 07:02

## 2024-07-08 RX ADMIN — DEXAMETHASONE SODIUM PHOSPHATE 10 MG: 10 INJECTION INTRAMUSCULAR; INTRAVENOUS at 07:07

## 2024-07-08 RX ADMIN — SODIUM CHLORIDE, PRESERVATIVE FREE 10 ML: 5 INJECTION INTRAVENOUS at 19:45

## 2024-07-08 RX ADMIN — OXYCODONE HYDROCHLORIDE AND ACETAMINOPHEN 1 TABLET: 5; 325 TABLET ORAL at 21:40

## 2024-07-08 RX ADMIN — RACEPINEPHRINE HYDROCHLORIDE 0.5 ML: 11.25 SOLUTION RESPIRATORY (INHALATION) at 07:02

## 2024-07-08 RX ADMIN — INSULIN LISPRO 3 UNITS: 100 INJECTION, SOLUTION INTRAVENOUS; SUBCUTANEOUS at 16:39

## 2024-07-08 RX ADMIN — FENTANYL CITRATE 25 MCG: 50 INJECTION, SOLUTION INTRAMUSCULAR; INTRAVENOUS at 15:33

## 2024-07-08 RX ADMIN — RACEPINEPHRINE HYDROCHLORIDE 11.25 MG: 11.25 SOLUTION RESPIRATORY (INHALATION) at 09:53

## 2024-07-08 RX ADMIN — SODIUM CHLORIDE: 9 INJECTION, SOLUTION INTRAVENOUS at 20:00

## 2024-07-08 RX ADMIN — MORPHINE SULFATE 1 MG: 2 INJECTION, SOLUTION INTRAMUSCULAR; INTRAVENOUS at 17:41

## 2024-07-08 RX ADMIN — LABETALOL HYDROCHLORIDE 20 MG: 5 INJECTION, SOLUTION INTRAVENOUS at 20:36

## 2024-07-08 RX ADMIN — Medication 3 ML: at 09:52

## 2024-07-08 RX ADMIN — BUDESONIDE AND FORMOTEROL FUMARATE DIHYDRATE 2 PUFF: 160; 4.5 AEROSOL RESPIRATORY (INHALATION) at 13:37

## 2024-07-08 RX ADMIN — FENTANYL CITRATE 50 MCG: 50 INJECTION, SOLUTION INTRAMUSCULAR; INTRAVENOUS at 22:22

## 2024-07-08 RX ADMIN — IPRATROPIUM BROMIDE AND ALBUTEROL SULFATE 1 DOSE: 2.5; .5 SOLUTION RESPIRATORY (INHALATION) at 16:23

## 2024-07-08 RX ADMIN — INSULIN LISPRO 2 UNITS: 100 INJECTION, SOLUTION INTRAVENOUS; SUBCUTANEOUS at 13:42

## 2024-07-08 RX ADMIN — SODIUM CHLORIDE: 9 INJECTION, SOLUTION INTRAVENOUS at 08:59

## 2024-07-08 RX ADMIN — BUDESONIDE AND FORMOTEROL FUMARATE DIHYDRATE 2 PUFF: 160; 4.5 AEROSOL RESPIRATORY (INHALATION) at 20:26

## 2024-07-08 RX ADMIN — INSULIN LISPRO 4 UNITS: 100 INJECTION, SOLUTION INTRAVENOUS; SUBCUTANEOUS at 19:45

## 2024-07-08 RX ADMIN — HYDRALAZINE HYDROCHLORIDE 10 MG: 20 INJECTION INTRAMUSCULAR; INTRAVENOUS at 17:08

## 2024-07-08 ASSESSMENT — ENCOUNTER SYMPTOMS
VOMITING: 0
SORE THROAT: 0
SHORTNESS OF BREATH: 0
VOICE CHANGE: 1
BLOOD IN STOOL: 0
NAUSEA: 0
FACIAL SWELLING: 0
WHEEZING: 0
DIARRHEA: 0
BACK PAIN: 0
ABDOMINAL PAIN: 0
COUGH: 0
STRIDOR: 1

## 2024-07-08 ASSESSMENT — PAIN SCALES - GENERAL
PAINLEVEL_OUTOF10: 7
PAINLEVEL_OUTOF10: 9
PAINLEVEL_OUTOF10: 5
PAINLEVEL_OUTOF10: 9
PAINLEVEL_OUTOF10: 7
PAINLEVEL_OUTOF10: 8
PAINLEVEL_OUTOF10: 7
PAINLEVEL_OUTOF10: 8
PAINLEVEL_OUTOF10: 7
PAINLEVEL_OUTOF10: 7
PAINLEVEL_OUTOF10: 8
PAINLEVEL_OUTOF10: 6
PAINLEVEL_OUTOF10: 7

## 2024-07-08 ASSESSMENT — PAIN DESCRIPTION - LOCATION
LOCATION: LEG
LOCATION: LEG;FOOT
LOCATION: LEG
LOCATION: LEG;FOOT

## 2024-07-08 ASSESSMENT — PAIN - FUNCTIONAL ASSESSMENT
PAIN_FUNCTIONAL_ASSESSMENT: NONE - DENIES PAIN
PAIN_FUNCTIONAL_ASSESSMENT: ACTIVITIES ARE NOT PREVENTED

## 2024-07-08 ASSESSMENT — PAIN DESCRIPTION - DESCRIPTORS: DESCRIPTORS: ACHING

## 2024-07-08 ASSESSMENT — PAIN DESCRIPTION - ORIENTATION
ORIENTATION: LEFT

## 2024-07-08 ASSESSMENT — PAIN DESCRIPTION - ONSET: ONSET: GRADUAL

## 2024-07-08 ASSESSMENT — PAIN DESCRIPTION - PAIN TYPE: TYPE: CHRONIC PAIN

## 2024-07-08 ASSESSMENT — PAIN DESCRIPTION - FREQUENCY: FREQUENCY: CONTINUOUS

## 2024-07-08 NOTE — ED NOTES
Call to give report to [facility]'s. Will call back when available.    Refill request for pantoprazole and carafate.  Last office visit/physical: 1-15-19  Last follow up/disposition: RTC in 6 months  Future appointment scheduled (FP)?  No   Last refill:  Pantoprazole 3-19-18 and carafate 8-20-18    Medication not on protocol.  Routing to Aurora Scott MD for authorization.

## 2024-07-08 NOTE — PLAN OF CARE
Problem: Respiratory - Adult  Goal: Achieves optimal ventilation and oxygenation  Outcome: Progressing   BRONCHOSPASM/BRONCHOCONSTRICTION     [x]         IMPROVE AERATION/BREATH SOUNDS  [x]   ADMINISTER BRONCHODILATOR THERAPY AS APPROPRIATE  [x]   ASSESS BREATH SOUNDS  [x]   IMPLEMENT AEROSOL/MDI PROTOCOL  [x]   PATIENT EDUCATION AS NEEDED

## 2024-07-08 NOTE — ED PROVIDER NOTES
[facility]  [address]  [place] [address]  Phone: [phone]        ADDENDUM:      Care of this patient was assumed from Dr. Real.  The patient was seen for Cough (Pt arrives c/o cough/trouble breathing for months, started with losing voice months ago, pt speaks in raspy whispering voice, audible upper airway sounds, resp easy/unlabored, seen at urgent care last week, given ATB and steroids felt better while on them, now out and having trouble again, voice never came back while feeling better)  .  The patient's initial evaluation and plan have been discussed with the prior provider who initially evaluated the patient.  Nursing Notes, Past Medical Hx, Past Surgical Hx, Allergies, were all reviewed.    PAST MEDICAL HISTORY    has a past medical history of Ankle pain, left, Gastroesophageal reflux disease, Hyperlipemia, Hypertension, Obesity, Tobacco abuse, and Tourette's syndrome.    SURGICAL HISTORY      has a past surgical history that includes Ankle fracture surgery (Left, 01/01/1999); [place] tooth extraction (Bilateral); and Toe amputation (Left, 03/29/2021).    CURRENT MEDICATIONS       Previous Medications    AMLODIPINE (NORVASC) 5 MG TABLET    Take 1 tablet by mouth daily    ATORVASTATIN (LIPITOR) 40 MG TABLET    Take 1 tablet by mouth daily    AZITHROMYCIN (ZITHROMAX) 250 MG TABLET    500mg on day 1 followed by 250mg on days 2 - 5    DIABETIC SHOE MISC    by Does not apply route Dispense DM shoe and insoles.  custom accomodative offloading insole with toe filler L side.     Amputated great toe of left foot (HCC)  (primary encounter diagnosis)  Equinus deformity of both feet  Diabetic polyneuropathy associated with type 2 diabetes mellitus (HCC)    HYDROCHLOROTHIAZIDE (HYDRODIURIL) 25 MG TABLET    Take 1 tablet by mouth daily    LOSARTAN-HYDROCHLOROTHIAZIDE (HYZAAR) 100-25 MG PER TABLET    Take 1 tablet by mouth daily    OMEPRAZOLE (PRILOSEC) 20 MG DELAYED RELEASE CAPSULE

## 2024-07-08 NOTE — PLAN OF CARE
Problem: Chronic Conditions and Co-morbidities  Goal: Patient's chronic conditions and co-morbidity symptoms are monitored and maintained or improved  Outcome: Progressing  Flowsheets (Taken 7/8/2024 1230)  Care Plan - Patient's Chronic Conditions and Co-Morbidity Symptoms are Monitored and Maintained or Improved: Monitor and assess patient's chronic conditions and comorbid symptoms for stability, deterioration, or improvement     Problem: Discharge Planning  Goal: Discharge to home or other facility with appropriate resources  Outcome: Progressing  Flowsheets (Taken 7/8/2024 1230)  Discharge to home or other facility with appropriate resources:   Arrange for needed discharge resources and transportation as appropriate   Identify barriers to discharge with patient and caregiver   Identify discharge learning needs (meds, wound care, etc)     Problem: Pain  Goal: Verbalizes/displays adequate comfort level or baseline comfort level  Outcome: Progressing     Problem: Safety - Adult  Goal: Free from fall injury  Outcome: Progressing

## 2024-07-08 NOTE — ED PROVIDER NOTES
[facility] ED  EMERGENCY DEPARTMENT ENCOUNTER      Pt Name: Dylan Dolan  MRN: [id number]  Birthdate 1965  Date of evaluation: 7/8/2024  Provider: Zachery Real DO    CHIEF COMPLAINT       Chief Complaint   Patient presents with    Cough     Pt arrives c/o cough/trouble breathing for months, started with losing voice months ago, pt speaks in raspy whispering voice, audible upper airway sounds, resp easy/unlabored, seen at urgent care last week, given ATB and steroids felt better while on them, now out and having trouble again, voice never came back while feeling better         HISTORY OF PRESENT ILLNESS   (Location/Symptom, Timing/Onset, Context/Setting, Quality, Duration, Modifying Factors, Severity)  Note limiting factors.   Dylan Dolan is a 58 y.o. male who presents to the emergency department with hoarse voice and difficulty breathing.  Patient states that for the past couple of months he has been having a hoarse voice and losing his voice and today has been having loud inspiratory sounds with breathing.  He states that last week he was given steroids and antibiotics by urgent care however his symptoms have been progressively getting worse.  Denies any fevers, chills, sweats, chest pain, abdominal pain, nausea, vomiting.    HPI    Nursing Notes were reviewed.    REVIEW OF SYSTEMS    (2-9 systems for level 4, 10 or more for level 5)     Review of Systems   Constitutional:  Negative for chills and fever.   HENT:  Positive for voice change. Negative for congestion, facial swelling and sore throat.    Eyes:  Negative for visual disturbance.   Respiratory:  Positive for stridor. Negative for cough, shortness of breath and wheezing.    Cardiovascular:  Negative for chest pain, palpitations and leg swelling.   Gastrointestinal:  Negative for abdominal pain, blood in stool, diarrhea, nausea and vomiting.   Genitourinary:  Negative for dysuria and hematuria.   Musculoskeletal:  Negative for back pain and

## 2024-07-08 NOTE — H&P
Critical Care - History and Physical Examination    Patient's name:  Dylan Dolan  Medical Record Number: [id number]  Patient's account/billing number: [id number]  Patient's YOB: 1965  Age: 58 y.o.  Date of Admission: 7/8/2024 12:25 PM  Reason of ICU admission:   Date of History and Physical Examination: 7/8/2024      Primary Care Physician: Alley Vila MD  Attending Physician: Dr. LETI Billingsley    Code Status: Full Code    Chief complaint: Shortness of breath, stridor, hoarseness of voice    Reason for ICU admission: Concern for respiratory decompensation      History Of Present Illness:   History was obtained from chart review and the patient.      Dylan Dolan is a 58 y.o. past medical history of hypertension, hyperlipidemia, GERD, obesity, smoker Tourette's syndrome.  Patient presented to [facility] emergency department with chief complaint of cough, trouble breathing and losing his voice.  This admission a progressive condition that has been ongoing for the last several months.  Patient was seen in urgent care last week and given azithromycin and steroids and felt better while on them.  Patient then reported to the emergency department today with worsening shortness of breath, cough and worsening voice changes.  Patient was found to have stridor.  Patient was initially given racemic epi and steroids in the emergency department.  CT of the neck revealed a nonspecific left vocal cord mass of 8 mm x 6 mm.  No cervical lymphadenopathy was noted.  Patient was then transferred to [facility] ICU for further evaluation and treatment.    On my initial evaluation the patient did not have any stridor, patient did have bilateral lower lobe wheezing, did endorse cough and sputum production however denied any fevers chills or night sweats.  Patient stated that he had smoked approximately 1 and half packs for many years and just recently quit.  Patient does have a family history of lung cancer

## 2024-07-08 NOTE — ANESTHESIA PRE PROCEDURE
Department of Anesthesiology  Preprocedure Note       Name:  Dylan Dolan   Age:  58 y.o.  :  1965                                          MRN:  [id number]         Date:  2024      Surgeon: Surgeon(s):  Rayo Jensen MD    Procedure: Procedure(s):  DIRECT LARYNGOSCOPY, BIOPSY (FROZEN SECTION, ENT TO PERFORM INTUBATION)  POSSIBLE TRACHEOTOMY    Medications prior to admission:   Prior to Admission medications    Medication Sig Start Date End Date Taking? Authorizing Provider   HYDROcodone-acetaminophen (NORCO) 5-325 MG per tablet Take 1 tablet by mouth every 6 hours as needed for Pain. Max Daily Amount: 4 tablets   Yes Provider, MD Dave   azithromycin (ZITHROMAX) 250 MG tablet 500mg on day 1 followed by 250mg on days 2 - 5  Patient taking differently: 500mg on day 1 followed by 250mg on days 2 - 5 completed 24  Danika Mishra, JOHN - CNP   omeprazole (PRILOSEC) 20 MG delayed release capsule TAKE 1 CAPSULE DAILY 24   Alley Vila MD   pimozide (ORAP) 1 MG tablet Take 2 tablets by mouth 2 times daily 24   Alley Vila MD   losartan-hydroCHLOROthiazide (HYZAAR) 100-25 MG per tablet Take 1 tablet by mouth daily 24   Alley Vila MD   atorvastatin (LIPITOR) 40 MG tablet Take 1 tablet by mouth daily 24   Alley Vila MD   amLODIPine (NORVASC) 5 MG tablet Take 1 tablet by mouth daily 24   Alley Vila MD   hydroCHLOROthiazide (HYDRODIURIL) 25 MG tablet Take 1 tablet by mouth daily 24   Alley Vila MD   Diabetic Shoe MISC by Does not apply route Dispense DM shoe and insoles.  custom accomodative offloading insole with toe filler L side.     Amputated great toe of left foot (HCC)  (primary encounter diagnosis)  Equinus deformity of both feet  Diabetic polyneuropathy associated with type 2 diabetes mellitus (HCC) 21   Ivan Banks DPM       Current medications:    Current Facility-Administered Medications   Medication Dose Route Frequency

## 2024-07-08 NOTE — CONSULTS
CONSULTING SERVICE: Otolaryngology-Head and Neck Surgery    Informant:   The history was obtained from the patient.    Chief Complaint:   His chief complaint is hoarseness, stridor    History of Present Illness:   Dylan Dolan is a 58 y.o. male seen in consultation at the request of the MICU on 7/8/2024.  Patient states that for the past 3 months he has noticed worsening hoarseness described as a rough and weak voice.  States that he had progressive worsening of voice until 1 week ago when this transitioned to breathiness, noisy breathing, and shortness of breath.  Patient states that swallowing has not been affected and is without dysphagia or odynophagia.  Tolerates both liquids and solids.      Patient started smoking at age 17 and active smoker.  1.5 packs/day  History of alcohol use, last drink 1 year ago.      Past Medical History:   Diagnosis Date    Ankle pain, left     chronic; s/p fracture in 1999    Gastroesophageal reflux disease     Hyperlipemia     Hypertension     Obesity     Tobacco abuse     Tourette's syndrome        Past Surgical History:   Procedure Laterality Date    ANKLE FRACTURE SURGERY Left 01/01/1999    TOE AMPUTATION Left 03/29/2021    left great toe, osteomyelitis, [facility], Dr. Kovacs    WISDOM TOOTH EXTRACTION Bilateral         Social History     Socioeconomic History    Marital status: Single     Spouse name: Not on file    Number of children: Not on file    Years of education: Not on file    Highest education level: Not on file   Occupational History    Not on file   Tobacco Use    Smoking status: Every Day     Current packs/day: 1.00     Average packs/day: 1 pack/day for 44.5 years (44.5 ttl pk-yrs)     Types: Cigarettes     Start date: 1/1/1980    Smokeless tobacco: Former     Types: Snuff     Quit date: 11/17/1987   Substance and Sexual Activity    Alcohol use: Yes     Alcohol/week: 0.0 standard drinks of alcohol     Comment: occasional    Drug use: No    Sexual  of this note with any questions regarding the transcription.      Rayo Jensen MD  Otolaryngology - Head and Neck Surgery  [facility] @ [facility]

## 2024-07-09 ENCOUNTER — ANESTHESIA (OUTPATIENT)
Dept: OPERATING ROOM | Age: 59
End: 2024-07-09
Payer: COMMERCIAL

## 2024-07-09 PROBLEM — F17.200 SMOKING ADDICTION: Status: ACTIVE | Noted: 2024-07-09

## 2024-07-09 PROBLEM — J96.01 ACUTE RESPIRATORY FAILURE WITH HYPOXIA (HCC): Status: ACTIVE | Noted: 2024-07-09

## 2024-07-09 PROBLEM — C32.0 MALIGNANT NEOPLASM OF VOCAL CORD (HCC): Status: ACTIVE | Noted: 2024-07-09

## 2024-07-09 LAB
ANION GAP SERPL CALCULATED.3IONS-SCNC: 16 MMOL/L (ref 9–16)
BASOPHILS # BLD: <0.03 K/UL (ref 0–0.2)
BASOPHILS NFR BLD: 0 % (ref 0–2)
BUN SERPL-MCNC: 19 MG/DL (ref 6–20)
CALCIUM SERPL-MCNC: 9.1 MG/DL (ref 8.6–10.4)
CHLORIDE SERPL-SCNC: 95 MMOL/L (ref 98–107)
CO2 SERPL-SCNC: 23 MMOL/L (ref 20–31)
CREAT SERPL-MCNC: 0.8 MG/DL (ref 0.7–1.2)
EOSINOPHIL # BLD: <0.03 K/UL (ref 0–0.44)
EOSINOPHILS RELATIVE PERCENT: 0 % (ref 1–4)
ERYTHROCYTE [DISTWIDTH] IN BLOOD BY AUTOMATED COUNT: 12.6 % (ref 11.8–14.4)
GFR, ESTIMATED: >90 ML/MIN/1.73M2
GLUCOSE BLD-MCNC: 154 MG/DL (ref 75–110)
GLUCOSE BLD-MCNC: 181 MG/DL (ref 75–110)
GLUCOSE BLD-MCNC: 192 MG/DL (ref 75–110)
GLUCOSE BLD-MCNC: 201 MG/DL (ref 75–110)
GLUCOSE BLD-MCNC: 212 MG/DL (ref 75–110)
GLUCOSE BLD-MCNC: 247 MG/DL (ref 75–110)
GLUCOSE SERPL-MCNC: 153 MG/DL (ref 74–99)
HCT VFR BLD AUTO: 42.4 % (ref 40.7–50.3)
HGB BLD-MCNC: 14.5 G/DL (ref 13–17)
IMM GRANULOCYTES # BLD AUTO: 0.1 K/UL (ref 0–0.3)
IMM GRANULOCYTES NFR BLD: 1 %
LYMPHOCYTES NFR BLD: 2.28 K/UL (ref 1.1–3.7)
LYMPHOCYTES RELATIVE PERCENT: 14 % (ref 24–43)
MCH RBC QN AUTO: 32.1 PG (ref 25.2–33.5)
MCHC RBC AUTO-ENTMCNC: 34.2 G/DL (ref 28.4–34.8)
MCV RBC AUTO: 93.8 FL (ref 82.6–102.9)
MICROORGANISM SPEC CULT: NORMAL
MICROORGANISM/AGENT SPEC: NORMAL
MONOCYTES NFR BLD: 0.87 K/UL (ref 0.1–1.2)
MONOCYTES NFR BLD: 6 % (ref 3–12)
MRSA, DNA, NASAL: NEGATIVE
NEUTROPHILS NFR BLD: 79 % (ref 36–65)
NEUTS SEG NFR BLD: 12.6 K/UL (ref 1.5–8.1)
NRBC BLD-RTO: 0 PER 100 WBC
PLATELET # BLD AUTO: 304 K/UL (ref 138–453)
PMV BLD AUTO: 8.9 FL (ref 8.1–13.5)
POTASSIUM SERPL-SCNC: 4.3 MMOL/L (ref 3.7–5.3)
RBC # BLD AUTO: 4.52 M/UL (ref 4.21–5.77)
SERVICE CMNT-IMP: NORMAL
SODIUM SERPL-SCNC: 134 MMOL/L (ref 136–145)
SPECIMEN DESCRIPTION: NORMAL
SPECIMEN DESCRIPTION: NORMAL
WBC OTHER # BLD: 15.9 K/UL (ref 3.5–11.3)

## 2024-07-09 PROCEDURE — 2700000000 HC OXYGEN THERAPY PER DAY

## 2024-07-09 PROCEDURE — 2000000000 HC ICU R&B

## 2024-07-09 PROCEDURE — 36415 COLL VENOUS BLD VENIPUNCTURE: CPT

## 2024-07-09 PROCEDURE — 99223 1ST HOSP IP/OBS HIGH 75: CPT | Performed by: INTERNAL MEDICINE

## 2024-07-09 PROCEDURE — 88305 TISSUE EXAM BY PATHOLOGIST: CPT

## 2024-07-09 PROCEDURE — 2500000003 HC RX 250 WO HCPCS

## 2024-07-09 PROCEDURE — 0B110F4 BYPASS TRACHEA TO CUTANEOUS WITH TRACHEOSTOMY DEVICE, OPEN APPROACH: ICD-10-PCS | Performed by: STUDENT IN AN ORGANIZED HEALTH CARE EDUCATION/TRAINING PROGRAM

## 2024-07-09 PROCEDURE — 6360000002 HC RX W HCPCS: Performed by: ANESTHESIOLOGY

## 2024-07-09 PROCEDURE — 6360000002 HC RX W HCPCS

## 2024-07-09 PROCEDURE — 2580000003 HC RX 258: Performed by: STUDENT IN AN ORGANIZED HEALTH CARE EDUCATION/TRAINING PROGRAM

## 2024-07-09 PROCEDURE — 6370000000 HC RX 637 (ALT 250 FOR IP): Performed by: STUDENT IN AN ORGANIZED HEALTH CARE EDUCATION/TRAINING PROGRAM

## 2024-07-09 PROCEDURE — 82947 ASSAY GLUCOSE BLOOD QUANT: CPT

## 2024-07-09 PROCEDURE — 80048 BASIC METABOLIC PNL TOTAL CA: CPT

## 2024-07-09 PROCEDURE — 7100000001 HC PACU RECOVERY - ADDTL 15 MIN: Performed by: STUDENT IN AN ORGANIZED HEALTH CARE EDUCATION/TRAINING PROGRAM

## 2024-07-09 PROCEDURE — 6370000000 HC RX 637 (ALT 250 FOR IP)

## 2024-07-09 PROCEDURE — 99291 CRITICAL CARE FIRST HOUR: CPT | Performed by: INTERNAL MEDICINE

## 2024-07-09 PROCEDURE — 0CBV8ZX EXCISION OF LEFT VOCAL CORD, VIA NATURAL OR ARTIFICIAL OPENING ENDOSCOPIC, DIAGNOSTIC: ICD-10-PCS | Performed by: STUDENT IN AN ORGANIZED HEALTH CARE EDUCATION/TRAINING PROGRAM

## 2024-07-09 PROCEDURE — 51798 US URINE CAPACITY MEASURE: CPT

## 2024-07-09 PROCEDURE — 3700000000 HC ANESTHESIA ATTENDED CARE: Performed by: STUDENT IN AN ORGANIZED HEALTH CARE EDUCATION/TRAINING PROGRAM

## 2024-07-09 PROCEDURE — 94761 N-INVAS EAR/PLS OXIMETRY MLT: CPT

## 2024-07-09 PROCEDURE — 2709999900 HC NON-CHARGEABLE SUPPLY: Performed by: STUDENT IN AN ORGANIZED HEALTH CARE EDUCATION/TRAINING PROGRAM

## 2024-07-09 PROCEDURE — 6360000002 HC RX W HCPCS: Performed by: STUDENT IN AN ORGANIZED HEALTH CARE EDUCATION/TRAINING PROGRAM

## 2024-07-09 PROCEDURE — 7100000000 HC PACU RECOVERY - FIRST 15 MIN: Performed by: STUDENT IN AN ORGANIZED HEALTH CARE EDUCATION/TRAINING PROGRAM

## 2024-07-09 PROCEDURE — 85025 COMPLETE CBC W/AUTO DIFF WBC: CPT

## 2024-07-09 PROCEDURE — 3700000001 HC ADD 15 MINUTES (ANESTHESIA): Performed by: STUDENT IN AN ORGANIZED HEALTH CARE EDUCATION/TRAINING PROGRAM

## 2024-07-09 PROCEDURE — 3600000015 HC SURGERY LEVEL 5 ADDTL 15MIN: Performed by: STUDENT IN AN ORGANIZED HEALTH CARE EDUCATION/TRAINING PROGRAM

## 2024-07-09 PROCEDURE — 94640 AIRWAY INHALATION TREATMENT: CPT

## 2024-07-09 PROCEDURE — 51701 INSERT BLADDER CATHETER: CPT

## 2024-07-09 PROCEDURE — 88331 PATH CONSLTJ SURG 1 BLK 1SPC: CPT

## 2024-07-09 PROCEDURE — 2580000003 HC RX 258

## 2024-07-09 PROCEDURE — 3600000005 HC SURGERY LEVEL 5 BASE: Performed by: STUDENT IN AN ORGANIZED HEALTH CARE EDUCATION/TRAINING PROGRAM

## 2024-07-09 PROCEDURE — 94760 N-INVAS EAR/PLS OXIMETRY 1: CPT

## 2024-07-09 RX ORDER — SODIUM CHLORIDE 0.9 % (FLUSH) 0.9 %
5-40 SYRINGE (ML) INJECTION PRN
Status: DISCONTINUED | OUTPATIENT
Start: 2024-07-09 | End: 2024-07-09 | Stop reason: HOSPADM

## 2024-07-09 RX ORDER — FENTANYL CITRATE 50 UG/ML
50 INJECTION, SOLUTION INTRAMUSCULAR; INTRAVENOUS EVERY 5 MIN PRN
Status: COMPLETED | OUTPATIENT
Start: 2024-07-09 | End: 2024-07-09

## 2024-07-09 RX ORDER — MIDAZOLAM HYDROCHLORIDE 1 MG/ML
INJECTION INTRAMUSCULAR; INTRAVENOUS PRN
Status: DISCONTINUED | OUTPATIENT
Start: 2024-07-09 | End: 2024-07-09 | Stop reason: SDUPTHER

## 2024-07-09 RX ORDER — FENTANYL CITRATE 50 UG/ML
50 INJECTION, SOLUTION INTRAMUSCULAR; INTRAVENOUS EVERY 5 MIN PRN
Status: DISCONTINUED | OUTPATIENT
Start: 2024-07-09 | End: 2024-07-09

## 2024-07-09 RX ORDER — ROCURONIUM BROMIDE 10 MG/ML
INJECTION, SOLUTION INTRAVENOUS PRN
Status: DISCONTINUED | OUTPATIENT
Start: 2024-07-09 | End: 2024-07-09 | Stop reason: SDUPTHER

## 2024-07-09 RX ORDER — PROPOFOL 10 MG/ML
INJECTION, EMULSION INTRAVENOUS CONTINUOUS PRN
Status: DISCONTINUED | OUTPATIENT
Start: 2024-07-09 | End: 2024-07-09 | Stop reason: SDUPTHER

## 2024-07-09 RX ORDER — FENTANYL CITRATE 50 UG/ML
25 INJECTION, SOLUTION INTRAMUSCULAR; INTRAVENOUS EVERY 5 MIN PRN
Status: DISCONTINUED | OUTPATIENT
Start: 2024-07-09 | End: 2024-07-09 | Stop reason: HOSPADM

## 2024-07-09 RX ORDER — SODIUM CHLORIDE 9 MG/ML
INJECTION, SOLUTION INTRAVENOUS PRN
Status: DISCONTINUED | OUTPATIENT
Start: 2024-07-09 | End: 2024-07-09 | Stop reason: HOSPADM

## 2024-07-09 RX ORDER — SODIUM CHLORIDE 0.9 % (FLUSH) 0.9 %
5-40 SYRINGE (ML) INJECTION EVERY 12 HOURS SCHEDULED
Status: DISCONTINUED | OUTPATIENT
Start: 2024-07-09 | End: 2024-07-09 | Stop reason: HOSPADM

## 2024-07-09 RX ORDER — DEXAMETHASONE SODIUM PHOSPHATE 10 MG/ML
INJECTION, SOLUTION INTRAMUSCULAR; INTRAVENOUS PRN
Status: DISCONTINUED | OUTPATIENT
Start: 2024-07-09 | End: 2024-07-09 | Stop reason: SDUPTHER

## 2024-07-09 RX ORDER — FENTANYL CITRATE 50 UG/ML
50 INJECTION, SOLUTION INTRAMUSCULAR; INTRAVENOUS ONCE
Status: COMPLETED | OUTPATIENT
Start: 2024-07-09 | End: 2024-07-09

## 2024-07-09 RX ORDER — LIDOCAINE HYDROCHLORIDE 10 MG/ML
INJECTION, SOLUTION EPIDURAL; INFILTRATION; INTRACAUDAL; PERINEURAL PRN
Status: DISCONTINUED | OUTPATIENT
Start: 2024-07-09 | End: 2024-07-09 | Stop reason: SDUPTHER

## 2024-07-09 RX ORDER — LORAZEPAM 2 MG/ML
1 INJECTION INTRAMUSCULAR ONCE
Status: COMPLETED | OUTPATIENT
Start: 2024-07-09 | End: 2024-07-09

## 2024-07-09 RX ORDER — MAGNESIUM HYDROXIDE 1200 MG/15ML
LIQUID ORAL CONTINUOUS PRN
Status: DISCONTINUED | OUTPATIENT
Start: 2024-07-09 | End: 2024-07-09 | Stop reason: HOSPADM

## 2024-07-09 RX ORDER — NALOXONE HYDROCHLORIDE 0.4 MG/ML
INJECTION, SOLUTION INTRAMUSCULAR; INTRAVENOUS; SUBCUTANEOUS PRN
Status: DISCONTINUED | OUTPATIENT
Start: 2024-07-09 | End: 2024-07-09 | Stop reason: HOSPADM

## 2024-07-09 RX ORDER — FENTANYL CITRATE 50 UG/ML
INJECTION, SOLUTION INTRAMUSCULAR; INTRAVENOUS PRN
Status: DISCONTINUED | OUTPATIENT
Start: 2024-07-09 | End: 2024-07-09 | Stop reason: SDUPTHER

## 2024-07-09 RX ORDER — CEFAZOLIN SODIUM 2 G/50ML
SOLUTION INTRAVENOUS PRN
Status: DISCONTINUED | OUTPATIENT
Start: 2024-07-09 | End: 2024-07-09 | Stop reason: SDUPTHER

## 2024-07-09 RX ORDER — ONDANSETRON 2 MG/ML
INJECTION INTRAMUSCULAR; INTRAVENOUS PRN
Status: DISCONTINUED | OUTPATIENT
Start: 2024-07-09 | End: 2024-07-09 | Stop reason: SDUPTHER

## 2024-07-09 RX ORDER — OXYMETAZOLINE HYDROCHLORIDE 0.05 G/100ML
SPRAY NASAL PRN
Status: DISCONTINUED | OUTPATIENT
Start: 2024-07-09 | End: 2024-07-09 | Stop reason: ALTCHOICE

## 2024-07-09 RX ORDER — PROPOFOL 10 MG/ML
INJECTION, EMULSION INTRAVENOUS PRN
Status: DISCONTINUED | OUTPATIENT
Start: 2024-07-09 | End: 2024-07-09 | Stop reason: SDUPTHER

## 2024-07-09 RX ADMIN — FENTANYL CITRATE 50 MCG: 50 INJECTION, SOLUTION INTRAMUSCULAR; INTRAVENOUS at 14:48

## 2024-07-09 RX ADMIN — HYDRALAZINE HYDROCHLORIDE 10 MG: 20 INJECTION INTRAMUSCULAR; INTRAVENOUS at 09:37

## 2024-07-09 RX ADMIN — IPRATROPIUM BROMIDE AND ALBUTEROL SULFATE 1 DOSE: 2.5; .5 SOLUTION RESPIRATORY (INHALATION) at 20:38

## 2024-07-09 RX ADMIN — SODIUM CHLORIDE, PRESERVATIVE FREE 40 MG: 5 INJECTION INTRAVENOUS at 08:52

## 2024-07-09 RX ADMIN — MORPHINE SULFATE 1 MG: 2 INJECTION, SOLUTION INTRAMUSCULAR; INTRAVENOUS at 06:10

## 2024-07-09 RX ADMIN — DEXAMETHASONE SODIUM PHOSPHATE 10 MG: 10 INJECTION, SOLUTION INTRAMUSCULAR; INTRAVENOUS at 13:51

## 2024-07-09 RX ADMIN — MIDAZOLAM 2 MG: 1 INJECTION INTRAMUSCULAR; INTRAVENOUS at 12:46

## 2024-07-09 RX ADMIN — BUDESONIDE AND FORMOTEROL FUMARATE DIHYDRATE 2 PUFF: 160; 4.5 AEROSOL RESPIRATORY (INHALATION) at 20:38

## 2024-07-09 RX ADMIN — LIDOCAINE HYDROCHLORIDE 50 MG: 10 INJECTION, SOLUTION EPIDURAL; INFILTRATION; INTRACAUDAL; PERINEURAL at 12:34

## 2024-07-09 RX ADMIN — FENTANYL CITRATE 50 MCG: 50 INJECTION, SOLUTION INTRAMUSCULAR; INTRAVENOUS at 04:42

## 2024-07-09 RX ADMIN — SUGAMMADEX 200 MG: 100 INJECTION, SOLUTION INTRAVENOUS at 14:00

## 2024-07-09 RX ADMIN — SODIUM CHLORIDE: 9 INJECTION, SOLUTION INTRAVENOUS at 15:26

## 2024-07-09 RX ADMIN — SODIUM CHLORIDE, PRESERVATIVE FREE 10 ML: 5 INJECTION INTRAVENOUS at 19:45

## 2024-07-09 RX ADMIN — LABETALOL HYDROCHLORIDE 10 MG: 5 INJECTION, SOLUTION INTRAVENOUS at 23:05

## 2024-07-09 RX ADMIN — LORAZEPAM 1 MG: 2 INJECTION INTRAMUSCULAR; INTRAVENOUS at 23:00

## 2024-07-09 RX ADMIN — FENTANYL CITRATE 100 MCG: 50 INJECTION, SOLUTION INTRAMUSCULAR; INTRAVENOUS at 12:34

## 2024-07-09 RX ADMIN — FENTANYL CITRATE 50 MCG: 50 INJECTION, SOLUTION INTRAMUSCULAR; INTRAVENOUS at 21:32

## 2024-07-09 RX ADMIN — PROPOFOL 150 MCG/KG/MIN: 10 INJECTION, EMULSION INTRAVENOUS at 12:34

## 2024-07-09 RX ADMIN — CEFAZOLIN SODIUM 2000 MG: 2 SOLUTION INTRAVENOUS at 12:53

## 2024-07-09 RX ADMIN — INSULIN LISPRO 2 UNITS: 100 INJECTION, SOLUTION INTRAVENOUS; SUBCUTANEOUS at 00:37

## 2024-07-09 RX ADMIN — SODIUM CHLORIDE: 9 INJECTION, SOLUTION INTRAVENOUS at 21:30

## 2024-07-09 RX ADMIN — FENTANYL CITRATE 50 MCG: 50 INJECTION, SOLUTION INTRAMUSCULAR; INTRAVENOUS at 17:48

## 2024-07-09 RX ADMIN — BUDESONIDE AND FORMOTEROL FUMARATE DIHYDRATE 2 PUFF: 160; 4.5 AEROSOL RESPIRATORY (INHALATION) at 09:09

## 2024-07-09 RX ADMIN — PROPOFOL 160 MG: 10 INJECTION, EMULSION INTRAVENOUS at 12:34

## 2024-07-09 RX ADMIN — ROCURONIUM BROMIDE 20 MG: 10 INJECTION, SOLUTION INTRAVENOUS at 13:12

## 2024-07-09 RX ADMIN — SODIUM CHLORIDE: 9 INJECTION, SOLUTION INTRAVENOUS at 07:25

## 2024-07-09 RX ADMIN — FENTANYL CITRATE 50 MCG: 50 INJECTION, SOLUTION INTRAMUSCULAR; INTRAVENOUS at 14:40

## 2024-07-09 RX ADMIN — FENTANYL CITRATE 50 MCG: 50 INJECTION, SOLUTION INTRAMUSCULAR; INTRAVENOUS at 10:41

## 2024-07-09 RX ADMIN — IPRATROPIUM BROMIDE AND ALBUTEROL SULFATE 1 DOSE: 2.5; .5 SOLUTION RESPIRATORY (INHALATION) at 09:09

## 2024-07-09 RX ADMIN — ONDANSETRON 4 MG: 2 INJECTION INTRAMUSCULAR; INTRAVENOUS at 13:51

## 2024-07-09 RX ADMIN — LABETALOL HYDROCHLORIDE 10 MG: 5 INJECTION, SOLUTION INTRAVENOUS at 15:49

## 2024-07-09 RX ADMIN — INSULIN LISPRO 2 UNITS: 100 INJECTION, SOLUTION INTRAVENOUS; SUBCUTANEOUS at 23:04

## 2024-07-09 RX ADMIN — SUGAMMADEX 200 MG: 100 INJECTION, SOLUTION INTRAVENOUS at 13:56

## 2024-07-09 RX ADMIN — ROCURONIUM BROMIDE 50 MG: 10 INJECTION, SOLUTION INTRAVENOUS at 12:35

## 2024-07-09 RX ADMIN — MORPHINE SULFATE 1 MG: 2 INJECTION, SOLUTION INTRAMUSCULAR; INTRAVENOUS at 00:37

## 2024-07-09 RX ADMIN — IPRATROPIUM BROMIDE AND ALBUTEROL SULFATE 1 DOSE: 2.5; .5 SOLUTION RESPIRATORY (INHALATION) at 16:50

## 2024-07-09 RX ADMIN — HYDRALAZINE HYDROCHLORIDE 10 MG: 20 INJECTION INTRAMUSCULAR; INTRAVENOUS at 16:23

## 2024-07-09 RX ADMIN — MORPHINE SULFATE 1 MG: 2 INJECTION, SOLUTION INTRAMUSCULAR; INTRAVENOUS at 19:44

## 2024-07-09 ASSESSMENT — PAIN DESCRIPTION - DESCRIPTORS
DESCRIPTORS: DISCOMFORT
DESCRIPTORS: SHARP;OTHER (COMMENT)
DESCRIPTORS: SHOOTING
DESCRIPTORS: SHARP

## 2024-07-09 ASSESSMENT — PAIN DESCRIPTION - ORIENTATION
ORIENTATION: LEFT

## 2024-07-09 ASSESSMENT — PAIN DESCRIPTION - LOCATION
LOCATION: FOOT;LEG
LOCATION: LEG;FOOT
LOCATION: LEG
LOCATION: THROAT
LOCATION: LEG;FOOT
LOCATION: THROAT
LOCATION: LEG;FOOT
LOCATION: NECK
LOCATION: THROAT

## 2024-07-09 ASSESSMENT — PAIN SCALES - GENERAL
PAINLEVEL_OUTOF10: 8
PAINLEVEL_OUTOF10: 7
PAINLEVEL_OUTOF10: 8
PAINLEVEL_OUTOF10: 7
PAINLEVEL_OUTOF10: 9
PAINLEVEL_OUTOF10: 0
PAINLEVEL_OUTOF10: 7
PAINLEVEL_OUTOF10: 7
PAINLEVEL_OUTOF10: 5
PAINLEVEL_OUTOF10: 8
PAINLEVEL_OUTOF10: 0
PAINLEVEL_OUTOF10: 5
PAINLEVEL_OUTOF10: 9
PAINLEVEL_OUTOF10: 1
PAINLEVEL_OUTOF10: 7
PAINLEVEL_OUTOF10: 9
PAINLEVEL_OUTOF10: 8
PAINLEVEL_OUTOF10: 2

## 2024-07-09 ASSESSMENT — ENCOUNTER SYMPTOMS
SHORTNESS OF BREATH: 1
STRIDOR: 1

## 2024-07-09 ASSESSMENT — PAIN SCALES - WONG BAKER: WONGBAKER_NUMERICALRESPONSE: HURTS LITTLE MORE

## 2024-07-09 NOTE — PROGRESS NOTES
ENT/OTOLARYNGOLOGY SUBSEQUENT CARE PROGRESS NOTE     REASON FOR CARE: s/p tracheostomy on 7/9/24     HISTORY OF PRESENT ILLNESS:   Dylan Dolan is a 58 y.o. male who underwent tracheostomy on 7/9/24.    Subjective: LENNY, Some expected post-operative oozing overnight.      Pertinent Examination:   GENERAL: well developed and well nourished and in no acute distress  HEAD: normocephalic and atraumatic  EYES: no eyelid swelling, no conjunctival injection or exudate, pupils equal round and reactive to light  EXTERNAL EARS: normal  EAR EXAM: deferred  NOSE: nares patent, normal mucosa  MOUTH/THROAT: mucous membranes moist, no focal lesions, no tonsillar enlargement or exudate  NECK: with Shiley 8 cuffed ETT   RESPIRATORY: Breathing Pattern: regular, no distress  NEUROLOGICAL:  cranial nerves II-XII are grossly intact     RELEVANT LABS/STUDIES:   Additional data reviewed:    None    Procedures:    None    Surgical risk factors:  FRESH TRACH     IMPRESSION AND RECOMMENDATIONS:   Dylan Dolan is a 58 y.o. male who underwent tracheostomy on 7/9/24     Reviewed plan of care with patient and staff RN    Plan:  Will monitor closely overnight    Fresh Trach Precautions    -Mild bloody oozing from trach tube and around trach stoma is expected  -Do not cut trach sutures for any reason without first contacting ENT. This includes shoe-string tie if placed at time of surgery  -Do not place more than 1 drain sponge under the tracheostomy tube at a time  -Keep an extra tracheostomy tube of the same size and one size smaller at bedside at all times  -Please have a suture removal kit, empty syringe, and flexible suction with suction set up at bedside at all times  -ENT will cut sutures on POD5 and perform first tracheostomy tube change if clinically indicated      Stephane Rico FNP-C  Pediatric Otolaryngology-Head and Neck Surgery  [facility]  Available through [employer]

## 2024-07-09 NOTE — PROGRESS NOTES
redness, warmth, or swelling of the joints  full range of motion noted  NEURO::   awake  alert  oriented to name, place and time  SKIN:   No bruising or bleeding  Normal skin color, texture, turgor  No redness, warmth or swelling  EXTREMITIES:  No pedal or leg edema, no calf tenderness/swelling, no erythema, distal pulses intact       MEDICATIONS:  Scheduled Meds:   sodium chloride flush  5-40 mL IntraVENous 2 times per day    pantoprazole (PROTONIX) 40 mg in sodium chloride (PF) 0.9 % 10 mL injection  40 mg IntraVENous Daily    ipratropium 0.5 mg-albuterol 2.5 mg  1 Dose Inhalation Q4H WA RT    budesonide-formoterol  2 puff Inhalation BID RT    lidocaine  1 patch TransDERmal Daily    [Held by provider] heparin (porcine)  5,000 Units SubCUTAneous 3 times per day    nicotine  1 patch TransDERmal Daily    insulin lispro  0-8 Units SubCUTAneous Q4H     Continuous Infusions:   sodium chloride      sodium chloride 100 mL/hr at 07/09/24 0725    dextrose       PRN Meds:   sodium chloride flush, 5-40 mL, PRN  sodium chloride, , PRN  potassium chloride, 20 mEq, PRN   Or  potassium chloride, 10 mEq, PRN  magnesium sulfate, 2,000 mg, PRN  ondansetron, 4 mg, Q8H PRN   Or  ondansetron, 4 mg, Q6H PRN  polyethylene glycol, 17 g, Daily PRN  acetaminophen, 650 mg, Q6H PRN   Or  acetaminophen, 650 mg, Q6H PRN  labetalol, 10 mg, Q6H PRN  hydrALAZINE, 10 mg, Q6H PRN  glucose, 4 tablet, PRN  dextrose bolus, 125 mL, PRN   Or  dextrose bolus, 250 mL, PRN  glucagon (rDNA), 1 mg, PRN  dextrose, , Continuous PRN  fentanNYL, 50 mcg, Q6H PRN  morphine, 1 mg, Q4H PRN  melatonin, 6 mg, Nightly PRN        SUPPORT DEVICES: [] Ventilator [] BIPAP  [] Nasal Cannula [x] Room Air      Additional Respiratory Assessments  Pulse: 73  Respirations: 14  SpO2: 99 %      Lab Results   Component Value Date/Time    FIO2 INFORMATION NOT PROVIDED 07/08/2024 01:37 PM         DATA:  Complete Blood Count:   Recent Labs     07/08/24  0704 07/09/24  0639   WBC 20.0*  without acute process. The  osseous structures are without acute process.     IMPRESSION:  No acute process.    ASSESSMENT:     Patient Active Problem List    Diagnosis Date Noted    Opioid dependence with current use ([facility]) 03/14/2023    Vocal cord mass 07/08/2024    Vocal cord paralysis 07/08/2024    Polyneuropathy associated with underlying disease ([facility]) 04/02/2024    Type 2 diabetes mellitus with diabetic neuropathy 06/27/2023    Mixed hyperlipidemia 01/26/2022    History of 2019 novel coronavirus disease (COVID-19) 12/16/2020    Skin ulcer of toe of left foot with necrosis of bone ([facility]) 07/27/2020    Hypertriglyceridemia 11/30/2017    Type 2 diabetes mellitus without complication ([facility]) 08/30/2017    Chronic pain of left ankle     Essential hypertension     Tourette's syndrome     Gastroesophageal reflux disease     Tobacco abuse           PLAN:     WEAN PER PROTOCOL:  [] No   [] Yes  [x] N/A    ICU PROPHYLAXIS:  Stress ulcer:  [x] PPI Agent  [] [id number] [] Sucralfate  [] Other:  VTE:   [] Enoxaparin  [x] Unfract. Heparin Subcut  [] EPC Cuffs    NUTRITION:  [x] NPO [] Tube Feeding (Specify: ) [] TPN  [] PO    HOME MEDS RECONCILED: [] No  [x] Yes    CONSULTATION NEEDED:  [] No  [x] Yes    FAMILY UPDATED:    [] No  [] Yes    TRANSFER OUT OF ICU:   [] No  [] Yes        Plan:  Patient continues to have Hoarse Voice, but saturating well on Room Air , no evidence of tachycardia, Tachypnea   Patient NPO for scheduled procedure with ENT At approx 12:30 for Biopsy of lesions   Patient in ICU for potential high risk intubation but otherwise is hemodynamically Stable   No evidence of Drooling   Continue Duo-Neb   Will return to ICU S/P Procedure Monitor for 24 hours if stable transfer to Floors tomorrow             Loy Waggoner MD  Internal Medicine PGY1  7/9/2024 8:39 AM

## 2024-07-09 NOTE — PLAN OF CARE
Problem: Respiratory - Adult  Goal: Achieves optimal ventilation and oxygenation  7/8/2024 2029 by Sofia Mayfield, ELISABETH  Flowsheets (Taken 7/8/2024 2029)  Achieves optimal ventilation and oxygenation:   Respiratory therapy support as indicated   Assess for changes in respiratory status   Assess for changes in mentation and behavior   Oxygen supplementation based on oxygen saturation or arterial blood gases   Encourage broncho-pulmonary hygiene including cough, deep breathe, incentive spirometry   Assess and instruct to report shortness of breath or any respiratory difficulty

## 2024-07-09 NOTE — PLAN OF CARE
Problem: Chronic Conditions and Co-morbidities  Goal: Patient's chronic conditions and co-morbidity symptoms are monitored and maintained or improved  7/9/2024 0748 by Eliezer Saldana RN  Outcome: Progressing     Problem: Discharge Planning  Goal: Discharge to home or other facility with appropriate resources  7/9/2024 0748 by Eliezer Saldana RN  Outcome: Progressing     Problem: Pain  Goal: Verbalizes/displays adequate comfort level or baseline comfort level  7/9/2024 0748 by Eliezer Saldana RN  Outcome: Progressing     Problem: Safety - Adult  Goal: Free from fall injury  7/9/2024 0748 by Eliezer Saldana RN  Outcome: Progressing  Flowsheets (Taken 7/9/2024 0745)  Free From Fall Injury: Based on caregiver fall risk screen, instruct family/caregiver to ask for assistance with transferring infant if caregiver noted to have fall risk factors     Problem: Respiratory - Adult  Goal: Achieves optimal ventilation and oxygenation  7/9/2024 0748 by Eliezer Saldana RN  Outcome: Progressing

## 2024-07-09 NOTE — OP NOTE
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  Intubation transitioned to open tracheostomy.  Patient tolerated procedure well and saturations remained over 92% throughout the duration of the case.     Estimated Blood Loss:   <10ml    Pathologic Specimen:   ID Type Source Tests Collected by Time Destination   A : RIGHT, TRUE VOCAL CORD Tissue Neck SURGICAL PATHOLOGY Rayo Jensen MD 2024 1318    B : LEFT, TRUE VOCAL CORD Tissue Neck SURGICAL PATHOLOGY Rayo Jensen MD 2024 1320    C : SUBGLOTTIS Tissue Neck SURGICAL PATHOLOGY Rayo Jensen MD 2024 1322    D : SUBGLOTTIS Tissue Neck SURGICAL PATHOLOGY Rayo Jensen MD 2024 1333    E : subglotic part 3 Tissue Neck SURGICAL PATHOLOGY Rayo Jensen MD 2024 1352        Operative Findings:  Bilateral vocal fold edema.  Significant glottic stenosis.  Unable to pass 5 ET tube through the subglottis.  Subglottis with 80% ulcerative mass.  Photodocumentation below    Right Vocal Fold: Negative for carcinoma  Left Vocal Fold: Positive for SCC  Subglottis: Positive for SCC    Infection Present At Time Of Surgery (PATOS) (choose all levels that have infection present):  No infection present      INDICATIONS AND CONSENT  The patient was seen and evaluated by the Otolaryngology practice.  After history and physical examination, recommendations were made to proceed to the operating room for the above listed procedures.

## 2024-07-09 NOTE — BRIEF OP NOTE
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]    Post-Op Diagnosis: Same       Procedure(s):  DIRECT LARYNGOSCOPY  TRACHEOTOMY    Surgeon(s):  Rayo Jensen MD    Assistant:  * No surgical staff found *    Anesthesia: General    Estimated Blood Loss (mL): Minimal    Complications: None    Specimens:   ID Type Source Tests Collected by Time Destination   A : RIGHT, TRUE VOCAL CORD Tissue Neck SURGICAL PATHOLOGY Rayo Jensen MD 7/9/2024 1318    B : LEFT, TRUE VOCAL CORD Tissue Neck SURGICAL PATHOLOGY Rayo Jensen MD 7/9/2024 1320    C : SUBGLOTTIS Tissue Neck SURGICAL PATHOLOGY Rayo Jensen MD 7/9/2024 1322        Implants:  * No implants in log *      Drains: * No LDAs found *    Findings:  Infection Present At Time Of Surgery (PATOS) (choose all levels that have infection present):  No infection present  Other Findings: 5 ETT unable to be passed through the subglottis. Pediatric boogie attempted to be passed through without success. Intubation converted to open tracheostomy with successful placement of a Shiley 6 cuffed ETT. Frozen section with left vocal fold and subglottis positive for SCC    Electronically signed by Rayo Jensen MD on 7/9/2024 at 1:32 PM

## 2024-07-09 NOTE — CARE COORDINATION
Case Management Assessment  Initial Evaluation    Date/Time of Evaluation: 7/9/2024 11:55 AM  Assessment Completed by: YAMINI COELHO RN    If patient is discharged prior to next notation, then this note serves as note for discharge by case management.    Patient Name: Dylan Dolan                   YOB: 1965  Diagnosis: Vocal cord mass [J38.3]  Vocal cord paralysis [J38.00]                   Date / Time: 7/8/2024 12:25 PM    Patient Admission Status: Inpatient   Readmission Risk (Low < 19, Mod (19-27), High > 27): Readmission Risk Score: 8.7    Current PCP: Alley Vila MD  PCP verified by CM? Yes (Alley Vila MD)    Chart Reviewed: Yes      History Provided by: Patient  Patient Orientation: Alert and Oriented    Patient Cognition: Alert    Hospitalization in the last 30 days (Readmission):  No    If yes, Readmission Assessment in CM Navigator will be completed.    Advance Directives:      Code Status: Full Code   Patient's Primary Decision Maker is: Legal Next of Kin    Primary Decision Maker: Maricarmen Nava - Brother/Sister - [phone]    Secondary Decision Maker: BogdanCarol - Niece/Nephew - [phone]    Discharge Planning:    Patient lives with: Alone Type of Home: House  Primary Care Giver: Self  Patient Support Systems include: Parent, Spouse/Significant Other, Family Members   Current Financial resources: Other (Comment) (BCBS)  Current community resources: None  Current services prior to admission: None            Current DME:              Type of Home Care services:  Nursing Services    ADLS  Prior functional level: Independent in ADLs/IADLs  Current functional level: Independent in ADLs/IADLs    PT AM-PAC:   /24  OT AM-PAC:   /24    Family can provide assistance at DC: Other (comment) (dependent on needs)  Would you like Case Management to discuss the discharge plan with any other family members/significant others, and if so, who? Yes (mother)  Plans to Return to Present

## 2024-07-09 NOTE — CONSULTS
Today's Date: 7/9/2024  Patient Name: Dylan Dolan  Date of admission: 7/8/2024 12:25 PM  Patient's age: 58 y.o., 1965  Admission Dx: Vocal cord mass [J38.3]  Vocal cord paralysis [J38.00]    Reason for Consult: Vocal cord mass history of cigarette smoking  Requesting Physician: Patrice Ames MD    Chief Complaint: Shortness of breath, stridor, hoarseness of voice    History Obtained From:  patient, electronic medical record    History of Present Illness:      The patient is a 58 y.o. male who is admitted to the hospital for management of vocal cord mass.    He presented to [place] ED with complaints of coughing, trouble breathing, losing his voice, ongoing for the last several months.  In the ED he was noted to have stridor for which he was given racemic epi and steroids.  CT soft tissue neck showed 8 mm x 6 mm mass in the left vocal cord.  He was then transferred to [facility] for further care.    He went to OR with ENT today for biopsy.  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  His intubation was transitioned to an open tracheostomy    He has a past medical history of cigarette smoking (1.5 packs/day since age 17), hypertension, hyperlipidemia, Tourette's syndrome    Past Medical History:   has a past medical history of Ankle pain, left, Gastroesophageal reflux disease, Hyperlipemia, Hypertension, Obesity, Tobacco abuse, and Tourette's syndrome.    Past Surgical History:   has a past surgical history that includes Ankle fracture surgery (Left, 01/01/1999); [place] tooth extraction (Bilateral); Toe amputation (Left, 03/29/2021); and tracheostomy (N/A, 07/09/2024).     Medications:    Prior to Admission medications    Medication Sig Start Date End Date Taking? Authorizing Provider   HYDROcodone-acetaminophen (NORCO) 5-325 MG per tablet Take 1 tablet by mouth every 6 hours as needed for Pain. Max Daily Amount: 4 tablets   Yes Provider, MD Dave

## 2024-07-09 NOTE — RT PROTOCOL NOTE
RT Nebulizer Bronchodilator Protocol Note    There is a bronchodilator order in the chart from a provider indicating to follow the RT Bronchodilator Protocol and there is an “Initiate RT Bronchodilator Protocol” order as well (see protocol at bottom of note).    CXR Findings:  XR CHEST PORTABLE    Result Date: 7/8/2024  No acute process.       The findings from the last RT Protocol Assessment were as follows:  Smoking: Smoker 15 pack years or more  Respiratory Pattern: Regular pattern and RR 12-20 bpm  Breath Sounds: Slightly diminished and/or crackles  Cough: Unable to generate effective cough  Indication for Bronchodilator Therapy:    Bronchodilator Assessment Score: 7    Aerosolized bronchodilator medication orders have been revised according to the RT Nebulizer Bronchodilator Protocol below.    Respiratory Therapist to perform RT Therapy Protocol Assessment initially then follow the protocol.  Repeat RT Therapy Protocol Assessment PRN for score 0-3 or on second treatment, BID, and PRN for scores above 3.    No Indications - adjust the frequency to every 6 hours PRN wheezing or bronchospasm, if no treatments needed after 48 hours then discontinue using Per Protocol order mode.     If indication present, adjust the RT bronchodilator orders based on the Bronchodilator Assessment Score as indicated below.  If a patient is on this medication at home then do not decrease Frequency below that used at home.    0-3 - enter or revise RT bronchodilator order(s) to equivalent RT Bronchodilator order with Frequency of every 4 hours PRN for wheezing or increased work of breathing using Per Protocol order mode.       4-6 - enter or revise RT Bronchodilator order(s) to two equivalent RT bronchodilator orders with one order with BID Frequency and one order with Frequency of every 4 hours PRN wheezing or increased work of breathing using Per Protocol order mode.         7-10 - enter or revise RT Bronchodilator order(s) to two

## 2024-07-09 NOTE — PLAN OF CARE
Problem: Chronic Conditions and Co-morbidities  Goal: Patient's chronic conditions and co-morbidity symptoms are monitored and maintained or improved  7/9/2024 0124 by Madai Bernstein RN  Outcome: Progressing     Problem: Discharge Planning  Goal: Discharge to home or other facility with appropriate resources  7/9/2024 0124 by Madai Bernstein RN  Outcome: Progressing     Problem: Pain  Goal: Verbalizes/displays adequate comfort level or baseline comfort level  7/9/2024 0124 by Madai Bernstein RN  Outcome: Progressing     Problem: Safety - Adult  Goal: Free from fall injury  7/9/2024 0124 by Madai Bernstein RN  Outcome: Progressing     Problem: Respiratory - Adult  Goal: Achieves optimal ventilation and oxygenation  7/9/2024 0124 by Madai Bernstein RN  Outcome: Progressing

## 2024-07-09 NOTE — PROGRESS NOTES
BRIEF ENT NOTE:    To bedside to re-evaluate this evening  -sitting up in chair, respirations regular and easy; denies difficulty breathing  -02 saturation high 90's on room air  -Voice remains hoarse    Reviewed with patient and staff RN surgery time for tomorrow is 1230   States understanding of possible need for intubation after OR tomorrow and potential need for tracheostomy in the event of an emergency.  Denies any further questions or concerns at this time.    PLAN:  -He is tentatively scheduled for surgery tomorrow at 12:30 PM.  Please ensure patient is n.p.o. at midnight.  Please hold all blood thinners for at least 24 hours after biopsy.  -Recommend MBS prior to initiation of oral diet.  Will discuss placement of NG tube with patient at time of surgery tomorrow.  -Patient will need CT scan of chest for staging purposes.  -Further treatment recommendations pending results of biopsy     Surgical consent will be obtained tomorrow at OR     --------------------------------------------------  VINAY Elizabeth  Pediatric Otolaryngology-Head and Neck Surgery  [facility] Otolaryngology group     Office  ph# [phone]    Also available in [employer]

## 2024-07-09 NOTE — ACP (ADVANCE CARE PLANNING)
Advance Care Planning     Advance Care Planning Inpatient Note  [facility] Department    Today's Date: 7/9/2024  Unit: [facility] 3- MICU    Received request from nurse for patient.  Upon review of chart and communication with care team, patient's decision making abilities are not in question.. Patient was present in the room during visit. Patient's sister Maricarmen (designated primary decision maker) was on the phone at time of discussion.    Goals of ACP Conversation:  Discuss advance care planning documents    Health Care Decision Makers:       Primary Decision Maker: Maricarmen Nava - Brother/Sister - [phone]    Secondary Decision Maker: Carol Mcfadden - Niece/Nephew - [phone]  Summary:  Completed New Documents  Advance Care Planning Documents (Patient Wishes):  Healthcare Power of /Advance Directive Appointment of Health Care Agent     Assessment:  Patient requested ACP documents to be completed prior to his surgical procedure today.  Patient had Primary Decision maker, Maricarmen/ Sister on the phone at the time of discussion.  Patient and Sister are aware of responsibilities and education was provided.  Patient did not wish to fill out a Living Will at this time.      Interventions:  Provided education on documents for clarity and greater understanding  Assisted in the completion of documents according to patient's wishes at this time    Care Preferences Communicated:   No    Outcomes/Plan:  New advance directive completed.  Returned original document(s) to patient, as well as copies for distribution to appointed agents  Copy of advance directive given to staff to scan into medical record.    Electronically signed by JORDON HE  Intern on 7/9/2024 at 3:45 PM

## 2024-07-09 NOTE — PROGRESS NOTES
RN called handoff report to Todd in Car 3. Patient given IV pain meds. Awaiting time limit, and for BP to come down. Continuing to monitor before transporting back to patient's room.

## 2024-07-10 ENCOUNTER — HOSPITAL ENCOUNTER (OUTPATIENT)
Dept: RADIATION ONCOLOGY | Age: 59
Discharge: HOME | End: 2024-07-10

## 2024-07-10 LAB
ANION GAP SERPL CALCULATED.3IONS-SCNC: 10 MMOL/L (ref 9–16)
BASOPHILS # BLD: <0.03 K/UL (ref 0–0.2)
BASOPHILS NFR BLD: 0 % (ref 0–2)
BUN SERPL-MCNC: 16 MG/DL (ref 6–20)
CALCIUM SERPL-MCNC: 8.7 MG/DL (ref 8.6–10.4)
CHLORIDE SERPL-SCNC: 100 MMOL/L (ref 98–107)
CO2 SERPL-SCNC: 24 MMOL/L (ref 20–31)
CREAT SERPL-MCNC: 0.7 MG/DL (ref 0.7–1.2)
EOSINOPHIL # BLD: <0.03 K/UL (ref 0–0.44)
EOSINOPHILS RELATIVE PERCENT: 0 % (ref 1–4)
ERYTHROCYTE [DISTWIDTH] IN BLOOD BY AUTOMATED COUNT: 12.5 % (ref 11.8–14.4)
GFR, ESTIMATED: >90 ML/MIN/1.73M2
GLUCOSE BLD-MCNC: 141 MG/DL (ref 75–110)
GLUCOSE BLD-MCNC: 143 MG/DL (ref 75–110)
GLUCOSE BLD-MCNC: 144 MG/DL (ref 75–110)
GLUCOSE BLD-MCNC: 145 MG/DL (ref 75–110)
GLUCOSE BLD-MCNC: 146 MG/DL (ref 75–110)
GLUCOSE BLD-MCNC: 149 MG/DL (ref 75–110)
GLUCOSE SERPL-MCNC: 153 MG/DL (ref 74–99)
HCT VFR BLD AUTO: 39.5 % (ref 40.7–50.3)
HGB BLD-MCNC: 13.5 G/DL (ref 13–17)
IMM GRANULOCYTES # BLD AUTO: 0.14 K/UL (ref 0–0.3)
IMM GRANULOCYTES NFR BLD: 1 %
LYMPHOCYTES NFR BLD: 1.6 K/UL (ref 1.1–3.7)
LYMPHOCYTES RELATIVE PERCENT: 8 % (ref 24–43)
MCH RBC QN AUTO: 31.5 PG (ref 25.2–33.5)
MCHC RBC AUTO-ENTMCNC: 34.2 G/DL (ref 28.4–34.8)
MCV RBC AUTO: 92.3 FL (ref 82.6–102.9)
MONOCYTES NFR BLD: 1.08 K/UL (ref 0.1–1.2)
MONOCYTES NFR BLD: 6 % (ref 3–12)
NEUTROPHILS NFR BLD: 85 % (ref 36–65)
NEUTS SEG NFR BLD: 16.1 K/UL (ref 1.5–8.1)
NRBC BLD-RTO: 0 PER 100 WBC
PLATELET # BLD AUTO: 289 K/UL (ref 138–453)
PMV BLD AUTO: 8.7 FL (ref 8.1–13.5)
POTASSIUM SERPL-SCNC: 4 MMOL/L (ref 3.7–5.3)
RBC # BLD AUTO: 4.28 M/UL (ref 4.21–5.77)
RHEUMATOID FACT SER NEPH-ACNC: <10 IU/ML (ref 0–13)
SODIUM SERPL-SCNC: 134 MMOL/L (ref 136–145)
WBC OTHER # BLD: 18.9 K/UL (ref 3.5–11.3)

## 2024-07-10 PROCEDURE — 99024 POSTOP FOLLOW-UP VISIT: CPT | Performed by: OTOLARYNGOLOGY

## 2024-07-10 PROCEDURE — 85025 COMPLETE CBC W/AUTO DIFF WBC: CPT

## 2024-07-10 PROCEDURE — 99233 SBSQ HOSP IP/OBS HIGH 50: CPT | Performed by: INTERNAL MEDICINE

## 2024-07-10 PROCEDURE — 36415 COLL VENOUS BLD VENIPUNCTURE: CPT

## 2024-07-10 PROCEDURE — 2000000000 HC ICU R&B

## 2024-07-10 PROCEDURE — 86225 DNA ANTIBODY NATIVE: CPT

## 2024-07-10 PROCEDURE — 94761 N-INVAS EAR/PLS OXIMETRY MLT: CPT

## 2024-07-10 PROCEDURE — 83516 IMMUNOASSAY NONANTIBODY: CPT

## 2024-07-10 PROCEDURE — 6360000002 HC RX W HCPCS

## 2024-07-10 PROCEDURE — 99291 CRITICAL CARE FIRST HOUR: CPT | Performed by: INTERNAL MEDICINE

## 2024-07-10 PROCEDURE — 2580000003 HC RX 258: Performed by: STUDENT IN AN ORGANIZED HEALTH CARE EDUCATION/TRAINING PROGRAM

## 2024-07-10 PROCEDURE — 86038 ANTINUCLEAR ANTIBODIES: CPT

## 2024-07-10 PROCEDURE — 6370000000 HC RX 637 (ALT 250 FOR IP): Performed by: STUDENT IN AN ORGANIZED HEALTH CARE EDUCATION/TRAINING PROGRAM

## 2024-07-10 PROCEDURE — 80048 BASIC METABOLIC PNL TOTAL CA: CPT

## 2024-07-10 PROCEDURE — 94640 AIRWAY INHALATION TREATMENT: CPT

## 2024-07-10 PROCEDURE — 6360000002 HC RX W HCPCS: Performed by: STUDENT IN AN ORGANIZED HEALTH CARE EDUCATION/TRAINING PROGRAM

## 2024-07-10 PROCEDURE — 82947 ASSAY GLUCOSE BLOOD QUANT: CPT

## 2024-07-10 PROCEDURE — 92610 EVALUATE SWALLOWING FUNCTION: CPT

## 2024-07-10 PROCEDURE — 86431 RHEUMATOID FACTOR QUANT: CPT

## 2024-07-10 PROCEDURE — 2700000000 HC OXYGEN THERAPY PER DAY

## 2024-07-10 RX ORDER — LABETALOL HYDROCHLORIDE 5 MG/ML
20 INJECTION, SOLUTION INTRAVENOUS ONCE
Status: COMPLETED | OUTPATIENT
Start: 2024-07-10 | End: 2024-07-10

## 2024-07-10 RX ORDER — FENTANYL CITRATE 50 UG/ML
50 INJECTION, SOLUTION INTRAMUSCULAR; INTRAVENOUS
Status: DISCONTINUED | OUTPATIENT
Start: 2024-07-10 | End: 2024-07-17 | Stop reason: HOSPADM

## 2024-07-10 RX ORDER — LORAZEPAM 2 MG/ML
1 INJECTION INTRAMUSCULAR EVERY 6 HOURS PRN
Status: DISCONTINUED | OUTPATIENT
Start: 2024-07-10 | End: 2024-07-16

## 2024-07-10 RX ORDER — HYDRALAZINE HYDROCHLORIDE 20 MG/ML
20 INJECTION INTRAMUSCULAR; INTRAVENOUS ONCE
Status: COMPLETED | OUTPATIENT
Start: 2024-07-10 | End: 2024-07-10

## 2024-07-10 RX ORDER — FENTANYL CITRATE 50 UG/ML
50 INJECTION, SOLUTION INTRAMUSCULAR; INTRAVENOUS EVERY 4 HOURS
Status: DISCONTINUED | OUTPATIENT
Start: 2024-07-10 | End: 2024-07-10

## 2024-07-10 RX ORDER — MORPHINE SULFATE 2 MG/ML
2 INJECTION, SOLUTION INTRAMUSCULAR; INTRAVENOUS EVERY 4 HOURS PRN
Status: DISCONTINUED | OUTPATIENT
Start: 2024-07-10 | End: 2024-07-17 | Stop reason: HOSPADM

## 2024-07-10 RX ADMIN — MORPHINE SULFATE 1 MG: 2 INJECTION, SOLUTION INTRAMUSCULAR; INTRAVENOUS at 09:13

## 2024-07-10 RX ADMIN — FENTANYL CITRATE 50 MCG: 50 INJECTION, SOLUTION INTRAMUSCULAR; INTRAVENOUS at 22:24

## 2024-07-10 RX ADMIN — IPRATROPIUM BROMIDE AND ALBUTEROL SULFATE 1 DOSE: 2.5; .5 SOLUTION RESPIRATORY (INHALATION) at 11:45

## 2024-07-10 RX ADMIN — FENTANYL CITRATE 50 MCG: 50 INJECTION, SOLUTION INTRAMUSCULAR; INTRAVENOUS at 10:44

## 2024-07-10 RX ADMIN — IPRATROPIUM BROMIDE AND ALBUTEROL SULFATE 1 DOSE: 2.5; .5 SOLUTION RESPIRATORY (INHALATION) at 15:15

## 2024-07-10 RX ADMIN — Medication 1 MG: at 08:04

## 2024-07-10 RX ADMIN — FENTANYL CITRATE 50 MCG: 50 INJECTION, SOLUTION INTRAMUSCULAR; INTRAVENOUS at 05:06

## 2024-07-10 RX ADMIN — FENTANYL CITRATE 50 MCG: 50 INJECTION, SOLUTION INTRAMUSCULAR; INTRAVENOUS at 03:00

## 2024-07-10 RX ADMIN — FENTANYL CITRATE 50 MCG: 50 INJECTION, SOLUTION INTRAMUSCULAR; INTRAVENOUS at 07:18

## 2024-07-10 RX ADMIN — IPRATROPIUM BROMIDE AND ALBUTEROL SULFATE 1 DOSE: 2.5; .5 SOLUTION RESPIRATORY (INHALATION) at 20:00

## 2024-07-10 RX ADMIN — SODIUM CHLORIDE, PRESERVATIVE FREE 40 MG: 5 INJECTION INTRAVENOUS at 08:10

## 2024-07-10 RX ADMIN — Medication 1 MG: at 17:54

## 2024-07-10 RX ADMIN — MORPHINE SULFATE 1 MG: 2 INJECTION, SOLUTION INTRAMUSCULAR; INTRAVENOUS at 13:20

## 2024-07-10 RX ADMIN — FENTANYL CITRATE 50 MCG: 50 INJECTION, SOLUTION INTRAMUSCULAR; INTRAVENOUS at 20:04

## 2024-07-10 RX ADMIN — IPRATROPIUM BROMIDE AND ALBUTEROL SULFATE 1 DOSE: 2.5; .5 SOLUTION RESPIRATORY (INHALATION) at 08:50

## 2024-07-10 RX ADMIN — SODIUM CHLORIDE: 9 INJECTION, SOLUTION INTRAVENOUS at 15:18

## 2024-07-10 RX ADMIN — HYDRALAZINE HYDROCHLORIDE 20 MG: 20 INJECTION INTRAMUSCULAR; INTRAVENOUS at 06:03

## 2024-07-10 RX ADMIN — MORPHINE SULFATE 2 MG: 2 INJECTION, SOLUTION INTRAMUSCULAR; INTRAVENOUS at 17:10

## 2024-07-10 RX ADMIN — SODIUM CHLORIDE: 9 INJECTION, SOLUTION INTRAVENOUS at 05:17

## 2024-07-10 RX ADMIN — FENTANYL CITRATE 50 MCG: 50 INJECTION, SOLUTION INTRAMUSCULAR; INTRAVENOUS at 00:06

## 2024-07-10 RX ADMIN — BUDESONIDE AND FORMOTEROL FUMARATE DIHYDRATE 2 PUFF: 160; 4.5 AEROSOL RESPIRATORY (INHALATION) at 20:00

## 2024-07-10 RX ADMIN — LABETALOL HYDROCHLORIDE 20 MG: 5 INJECTION, SOLUTION INTRAVENOUS at 02:51

## 2024-07-10 RX ADMIN — SODIUM CHLORIDE, PRESERVATIVE FREE 10 ML: 5 INJECTION INTRAVENOUS at 08:07

## 2024-07-10 RX ADMIN — MORPHINE SULFATE 1 MG: 2 INJECTION, SOLUTION INTRAMUSCULAR; INTRAVENOUS at 02:08

## 2024-07-10 RX ADMIN — SODIUM CHLORIDE, PRESERVATIVE FREE 10 ML: 5 INJECTION INTRAVENOUS at 20:04

## 2024-07-10 RX ADMIN — HYDRALAZINE HYDROCHLORIDE 10 MG: 20 INJECTION INTRAMUSCULAR; INTRAVENOUS at 17:57

## 2024-07-10 RX ADMIN — HYDRALAZINE HYDROCHLORIDE 10 MG: 20 INJECTION INTRAMUSCULAR; INTRAVENOUS at 01:06

## 2024-07-10 RX ADMIN — FENTANYL CITRATE 50 MCG: 50 INJECTION, SOLUTION INTRAMUSCULAR; INTRAVENOUS at 15:10

## 2024-07-10 ASSESSMENT — PAIN SCALES - GENERAL
PAINLEVEL_OUTOF10: 5
PAINLEVEL_OUTOF10: 9
PAINLEVEL_OUTOF10: 3
PAINLEVEL_OUTOF10: 9
PAINLEVEL_OUTOF10: 9
PAINLEVEL_OUTOF10: 4
PAINLEVEL_OUTOF10: 4
PAINLEVEL_OUTOF10: 8
PAINLEVEL_OUTOF10: 5
PAINLEVEL_OUTOF10: 7
PAINLEVEL_OUTOF10: 4
PAINLEVEL_OUTOF10: 8
PAINLEVEL_OUTOF10: 3
PAINLEVEL_OUTOF10: 8
PAINLEVEL_OUTOF10: 5
PAINLEVEL_OUTOF10: 8
PAINLEVEL_OUTOF10: 8

## 2024-07-10 ASSESSMENT — PAIN DESCRIPTION - ONSET: ONSET: ON-GOING

## 2024-07-10 ASSESSMENT — PAIN DESCRIPTION - LOCATION
LOCATION: NECK
LOCATION: NECK;BUTTOCKS
LOCATION: NECK
LOCATION: BACK;NECK
LOCATION: NECK
LOCATION: FLANK
LOCATION: NECK;BUTTOCKS
LOCATION: BACK;NECK
LOCATION: BACK;NECK
LOCATION: NECK
LOCATION: NECK;BACK
LOCATION: BACK;OTHER (COMMENT)

## 2024-07-10 ASSESSMENT — PAIN SCALES - WONG BAKER
WONGBAKER_NUMERICALRESPONSE: HURTS A LITTLE BIT
WONGBAKER_NUMERICALRESPONSE: HURTS LITTLE MORE
WONGBAKER_NUMERICALRESPONSE: HURTS A LITTLE BIT

## 2024-07-10 ASSESSMENT — PAIN DESCRIPTION - DESCRIPTORS
DESCRIPTORS: DISCOMFORT;PRESSURE
DESCRIPTORS: ACHING
DESCRIPTORS: ACHING;DISCOMFORT
DESCRIPTORS: ACHING;DISCOMFORT;SHARP
DESCRIPTORS: DISCOMFORT;PRESSURE;SPASM
DESCRIPTORS: ACHING;DISCOMFORT;PENETRATING

## 2024-07-10 ASSESSMENT — PAIN DESCRIPTION - ORIENTATION: ORIENTATION: LEFT

## 2024-07-10 ASSESSMENT — PAIN DESCRIPTION - FREQUENCY: FREQUENCY: CONTINUOUS

## 2024-07-10 NOTE — PLAN OF CARE
Problem: Chronic Conditions and Co-morbidities  Goal: Patient's chronic conditions and co-morbidity symptoms are monitored and maintained or improved  Outcome: Progressing  Flowsheets (Taken 7/9/2024 1528 by Eliezer Saldana RN)  Care Plan - Patient's Chronic Conditions and Co-Morbidity Symptoms are Monitored and Maintained or Improved: Monitor and assess patient's chronic conditions and comorbid symptoms for stability, deterioration, or improvement     Problem: Discharge Planning  Goal: Discharge to home or other facility with appropriate resources  Outcome: Progressing  Flowsheets (Taken 7/9/2024 1528 by Eliezer Saldana RN)  Discharge to home or other facility with appropriate resources: Refer to discharge planning if patient needs post-hospital services based on physician order or complex needs related to functional status, cognitive ability or social support system     Problem: Pain  Goal: Verbalizes/displays adequate comfort level or baseline comfort level  Outcome: Progressing  Flowsheets (Taken 7/9/2024 1528 by Eliezer Saldana RN)  Verbalizes/displays adequate comfort level or baseline comfort level: Assess pain using appropriate pain scale     Problem: Safety - Adult  Goal: Free from fall injury  Outcome: Progressing     Problem: Respiratory - Adult  Goal: Achieves optimal ventilation and oxygenation  Outcome: Progressing  Flowsheets (Taken 7/9/2024 1528 by Eliezer Saldana RN)  Achieves optimal ventilation and oxygenation: Position to facilitate oxygenation and minimize respiratory effort

## 2024-07-10 NOTE — PROGRESS NOTES
Evening post op check s/p tracheostomy with Shiley 6 cuffed ETT on 7/9/24.      Seen and examined on rounds this tonight  Patient resting quietly at this time; fentanyl given at 0006  Pain having some anxiety and discomfort with trach  Continues to cough up blood tinged secretions; this is expected  Has trach mask with O2 sat 99%    Discussed care with RN, medicating for pain ans anxiety     Plan:  Will monitor closely overnight     Fresh Trach Precautions     -Mild bloody oozing from trach tube and around trach stoma is expected  -Do not cut trach sutures for any reason without first contacting ENT. This includes shoe-string tie if placed at time of surgery  -Do not place more than 1 drain sponge under the tracheostomy tube at a time  -Keep an extra tracheostomy tube of the same size and one size smaller at bedside at all times  -Please have a suture removal kit, empty syringe, and flexible suction with suction set up at bedside at all times  -ENT will cut sutures on POD5 and perform first tracheostomy tube change if clinically indicated         Stephane BAUMANNP-C  Pediatric Otolaryngology-Head and Neck Surgery  [facility] Otolaryngology [facility]  Available through [employer]

## 2024-07-10 NOTE — PLAN OF CARE
BRONCHOSPASM/BRONCHOCONSTRICTION     [x]         IMPROVE AERATION/BREATH SOUNDS  [x]   ADMINISTER BRONCHODILATOR THERAPY AS APPROPRIATE  [x]   ASSESS BREATH SOUNDS  []   IMPLEMENT AEROSOL/MDI PROTOCOL  [x]   PATIENT EDUCATION AS NEEDED        PROVIDE ADEQUATE OXYGENATION WITH ACCEPTABLE SP02/ABG'S    [x]  IDENTIFY APPROPRIATE OXYGEN THERAPY  [x]   MONITOR SP02/ABG'S AS NEEDED   [x]   PATIENT EDUCATION AS NEEDED

## 2024-07-10 NOTE — PROGRESS NOTES
Today's Date: 7/10/2024  Patient Name: Dylan Dolan  Date of admission: 7/8/2024 12:25 PM  Patient's age: 58 y.o., 1965  Admission Dx: Vocal cord mass [J38.3]  Vocal cord paralysis [J38.00]    Reason for Consult: Vocal cord mass history of cigarette smoking   Requesting Physician: Patrice Ames MD    Chief Complaint:  Shortness of breath, stridor, hoarseness of voice     Interval Changes:  Patient seen and examined.  Labs and vitals reviewed.  CBC stable  Small amount of blood-tinged secretions from trach  He tells me that he is stiff from laying in bed, would like to get out of bed.  Complains of throat/neck pain and chronic left leg pain  Surgical pathology pending    History of Present Illness:    The patient is a 58 y.o. male who is admitted to the hospital for management of vocal cord mass.    He presented to [facility] ED with complaints of coughing, trouble breathing, losing his voice, ongoing for the last several months.  In the ED he was noted to have stridor for which he was given racemic epi and steroids.  CT soft tissue neck showed 8 mm x 6 mm mass in the left vocal cord.  He was then transferred to [facility] for further care.     He went to OR with ENT today for biopsy.  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  His intubation was transitioned to an open tracheostomy     He has a past medical history of cigarette smoking (1.5 packs/day since age 17), hypertension, hyperlipidemia, Tourette's syndrome    Past Medical History:   has a past medical history of Ankle pain, left, Gastroesophageal reflux disease, Hyperlipemia, Hypertension, Obesity, Tobacco abuse, and Tourette's syndrome.    Past Surgical History:   has a past surgical history that includes Ankle fracture surgery (Left, 01/01/1999); [place] tooth extraction (Bilateral); Toe amputation (Left, 03/29/2021); tracheostomy (N/A, 07/09/2024); laryngoscopy (N/A, 7/9/2024); and tracheostomy

## 2024-07-10 NOTE — PROGRESS NOTES
Physician Progress Note      PATIENT:               TRINIDAD VALERA  [facility] #:                  [id number]  :                       1965  ADMIT DATE:       2024 12:25 PM  DISCH DATE:  RESPONDING  PROVIDER #:        FÉLIX GIVENS          QUERY TEXT:    Pt admitted with Glottis Carcinoma S/P BX with Trach placement d/t difficult   intubation.  Pt noted to have B/P's  193/98 181/100 186/107 HR 66-94.  If   possible, please document in progress notes and discharge summary if you are   evaluating and/or treating any of the following:    The medical record reflects the following:  Risk Factors: Age 68. Squamous cell carcinoma left vocal cord, New   tracheostomy  Clinical Indicators: B/P's  193/98 181/100 186/107 HR 66-94. 7/10 ICU PN     Patient continued to have uncontrolled BP throughout the night despite being   given Hydralazine, Labetalol, Fentanyl for pain. O2 5L NC per Trach collar. OP   Note   Patient unable to be intubated safely with smallest ET tube   available (5).  Intubation transitioned to open tracheostomy.  Patient   tolerated procedure well .  Malignant neoplasm of vocal cord.  DIRECT   LARYNGOSCOPY W/ BIOPSY OPEN TRACHEOTOMY  Treatment: IV Hydralazine, Left vocal cord BX, Trach placement, Consults   Otolaryngology/Oncology, labs, ICU Monitoring    Any questions, Please call  Elzbieta Arciniega  M-F 6AM-230PM  [phone]  Hypertensive Urgency: Defined as SBP of >180 OR DBP >120 w/o associated organ   damage. S/s may or may not be present, but can include severe headache, SOB,   epistaxis, severe anxiety. Tx: adjustment of oral BP medication; IV meds not   usually required.  Hypertensive Emergency: SBP of >180 OR DBP >120 w/ associated organ damage   (CVA, MI, acute CHF, LYNN, encephalopathy, pulmonary edema, and unstable   angina). Documentation should include specific organ affected.  Requires   immediate treatment, usually with IV meds.  Hypertensive Crisis, unspecified: SBP of > 180 OR DBP >

## 2024-07-10 NOTE — PROGRESS NOTES
SLP ALL NOTES  Facility/Department: [facility] CAR 3- MICU   CLINICAL BEDSIDE SWALLOW EVALUATION    NAME: Dylan Dolan  : 1965  MRN: [id number]    ADMISSION DATE: 2024  ADMITTING DIAGNOSIS: has Essential hypertension; Tourette's syndrome; Gastroesophageal reflux disease; Tobacco abuse; Chronic pain of left ankle; Type 2 diabetes mellitus without complication (HCC); Hypertriglyceridemia; Skin ulcer of toe of left foot with necrosis of bone (HCC); History of 2019 novel coronavirus disease (COVID-19); Mixed hyperlipidemia; Opioid dependence with current use (HCC); Type 2 diabetes mellitus with diabetic neuropathy; Polyneuropathy associated with underlying disease (HCC); Vocal cord mass; Vocal cord paralysis; Malignant neoplasm of vocal cord (HCC); Acute respiratory failure with hypoxia (HCC); and Smoking addiction on their problem list.    Date of Eval: 7/10/2024  Evaluating Therapist: PRADIP HA    Current Diet level:  Current Diet : NPO  Current Liquid Diet : NPO    Primary Complaint  Dylan Dolan is a 58 y.o. past medical history of hypertension, hyperlipidemia, GERD, obesity, smoker Tourette's syndrome.  Patient presented to [facility] emergency department with chief complaint of cough, trouble breathing and losing his voice.  This admission a progressive condition that has been ongoing for the last several months.  Patient was seen in urgent care last week and given azithromycin and steroids and felt better while on them.  Patient then reported to the emergency department today with worsening shortness of breath, cough and worsening voice changes.  Patient was found to have stridor.  Patient was initially given racemic epi and steroids in the emergency department.  CT of the neck revealed a nonspecific left vocal cord mass of 8 mm x 6 mm.  No cervical lymphadenopathy was noted.  Patient was then transferred to [facility] ICU for further evaluation and treatment.     On my initial evaluation

## 2024-07-10 NOTE — PLAN OF CARE
Problem: Chronic Conditions and Co-morbidities  Goal: Patient's chronic conditions and co-morbidity symptoms are monitored and maintained or improved  7/10/2024 1907 by Krupa Ribera, RN  Outcome: Progressing     Problem: Discharge Planning  Goal: Discharge to home or other facility with appropriate resources  7/10/2024 1907 by Krupa Ribera, RN  Outcome: Progressing     Problem: Pain  Goal: Verbalizes/displays adequate comfort level or baseline comfort level  7/10/2024 1907 by Krupa Ribera, RN  Outcome: Progressing  Flowsheets (Taken 7/10/2024 1600 by Vanita Puga RN)  Verbalizes/displays adequate comfort level or baseline comfort level: Assess pain using appropriate pain scale     Problem: Safety - Adult  Goal: Free from fall injury  7/10/2024 1907 by Krupa Ribera, RN  Outcome: Progressing     Problem: Respiratory - Adult  Goal: Achieves optimal ventilation and oxygenation  7/10/2024 1907 by Krupa Ribera, RN  Outcome: Progressing     Problem: ABCDS Injury Assessment  Goal: Absence of physical injury  7/10/2024 1907 by Krupa Ribera, RN  Outcome: Progressing

## 2024-07-10 NOTE — PLAN OF CARE
Problem: Chronic Conditions and Co-morbidities  Goal: Patient's chronic conditions and co-morbidity symptoms are monitored and maintained or improved  7/10/2024 1024 by Vanita Puga RN  Outcome: Progressing  Flowsheets (Taken 7/10/2024 0800)  Care Plan - Patient's Chronic Conditions and Co-Morbidity Symptoms are Monitored and Maintained or Improved: Monitor and assess patient's chronic conditions and comorbid symptoms for stability, deterioration, or improvement  7/9/2024 2314 by Krupa Ribera, RN  Outcome: Progressing  Flowsheets (Taken 7/9/2024 1528 by Eliezer Saldana RN)  Care Plan - Patient's Chronic Conditions and Co-Morbidity Symptoms are Monitored and Maintained or Improved: Monitor and assess patient's chronic conditions and comorbid symptoms for stability, deterioration, or improvement     Problem: Discharge Planning  Goal: Discharge to home or other facility with appropriate resources  7/10/2024 1024 by Vanita Puga RN  Outcome: Progressing  7/9/2024 2314 by Krupa Ribera RN  Outcome: Progressing  Flowsheets (Taken 7/9/2024 1528 by Eliezer Saldana RN)  Discharge to home or other facility with appropriate resources: Refer to discharge planning if patient needs post-hospital services based on physician order or complex needs related to functional status, cognitive ability or social support system     Problem: Pain  Goal: Verbalizes/displays adequate comfort level or baseline comfort level  7/10/2024 1024 by Vanita Puga RN  Outcome: Progressing  Flowsheets (Taken 7/10/2024 0800)  Verbalizes/displays adequate comfort level or baseline comfort level: Assess pain using appropriate pain scale  7/9/2024 2314 by Krupa Ribera, RN  Outcome: Progressing  Flowsheets (Taken 7/9/2024 1528 by Eliezer Saldana RN)  Verbalizes/displays adequate comfort level or baseline comfort level: Assess pain using appropriate pain scale     Problem: Safety -

## 2024-07-10 NOTE — PROGRESS NOTES
INTENSIVE CARE UNIT  Resident Physician Progress Note    Patient - Dylan Dolan  Date of Admission -  2024 12:25 PM  Date of Evaluation -  7/10/2024  Room and Bed Number -  3016/3016-01   Hospital Day - 2      SUBJECTIVE:     OVERNIGHT EVENTS:    Patient continued to have uncontrolled BP throughout the night despite being given Hydralazine, Labetalol, Fentanyl for pain     TODAY:  Denies any acute pain, Nonverbal secondary to new trach, but nods/ shakes head to display answers to questions. Denies any SOB, Chest Pain or fever     AWAKE & FOLLOWING COMMANDS:  [] No   [x] Yes    SECRETIONS Amount:  [x] Small [] Moderate  [] Large  [] None  Color:     [] White [] Colored  [x] Bloody    SEDATION:  RAAS Score:  [] Propofol gtt  [] Versed gtt  [] Ativan gtt   [x] No Sedation    PARALYZED:  [x] No    [] Yes    VASOPRESSORS:  [x] No    [] Yes  [] Levophed [] Dopamine [] Vasopressin  [] Dobutamine [] Phenylephrine [] Epinephrine    F.A.S.T. M. H.U.G.S. B.I.D.    Feeding Diet: Diet NPO Exceptions are: Sips of Water with Meds  Fluids: NS @ 100 ml/hr   Family: Maricarmen Nava (PDM) [phone]  Analgesic: Morphine 1mg PRN , Fentanyl 1mg PRN   Sedation: None    Thrombo-prophylaxis: [] Enoxaparin, [] Unfract. Heparin Subcutaneously, [x] EPC Cuffs  Mobility:   Heads up: Heads up 30 degree  Ulcer prophylaxis: [x] PPI Agent, [] [id number], [] Sucralfate, [] Other:  Glycemic control: Sliding Scale   Spontaneous breathing trial: N/A   Bowel regimen/urine output: UOP 1200 ml , Total + 1937.6   Indwelling catheter/lines: Tracheostomy, External Urinary Catheter, Peripheral IV x 2   De-escalation:        OBJECTIVE:     VITAL SIGNS:  BP (!) 172/82   Pulse 66   Temp 98 °F (36.7 °C) (Axillary)   Resp 18   Ht 1.854 m (6' 1\")   Wt 105.1 kg (231 lb 11.3 oz)   SpO2 99%   BMI 30.57 kg/m²   Tmax over 24 hours:  Temp (24hrs), Av °F (36.7 °C), Min:96.6 °F (35.9 °C), Max:98.6 °F (37 °C)      Patient Vitals for the past 8 hrs:   BP Temp  43.0 42.4 39.5*   MCV 88.8 93.8 92.3   MCH 32.4 32.1 31.5   MCHC 36.5* 34.2 34.2   RDW 12.2 12.6 12.5    304 289   MPV 8.4 8.9 8.7        Last 3 Blood Glucose:   Recent Labs     07/08/24  0704 07/09/24  0639 07/10/24  0520   GLUCOSE 188* 153* 153*        PT/INR:    Lab Results   Component Value Date/Time    PROTIME 13.1 07/08/2024 01:37 PM    INR 1.0 07/08/2024 01:37 PM     PTT:    Lab Results   Component Value Date/Time    APTT 29.0 07/08/2024 01:37 PM       Comprehensive Metabolic Profile:   Recent Labs     07/08/24  0704 07/08/24  1337 07/09/24  0639 07/10/24  0520   *  --  134* 134*   K 3.7  --  4.3 4.0   CL 88*  --  95* 100   CO2 32*  --  23 24   BUN 13  --  19 16   CREATININE 0.8  --  0.8 0.7   GLUCOSE 188*  --  153* 153*   CALCIUM 9.7  --  9.1 8.7   BILITOT  --  0.8  --   --    ALKPHOS  --  94  --   --    AST  --  19  --   --    ALT  --  17  --   --       Magnesium:   Lab Results   Component Value Date/Time    MG 1.7 07/08/2024 01:37 PM     Phosphorus: No results found for: \"PHOS\"  Ionized Calcium:   Lab Results   Component Value Date/Time    CAION 1.14 07/08/2024 01:37 PM        Urinalysis: No results found for: \"NITRU\", \"COLORU\", \"PHUR\", \"LABCAST\", \"WBCUA\", \"RBCUA\", \"MUCUS\", \"TRICHOMONAS\", \"YEAST\", \"BACTERIA\", \"CLARITYU\", \"SPECGRAV\", \"LEUKOCYTESUR\", \"UROBILINOGEN\", \"BILIRUBINUR\", \"BLOODU\", \"GLUCOSEU\", \"KETUA\", \"AMORPHOUS\"    HgBA1c:    Lab Results   Component Value Date/Time    LABA1C 6.5 06/19/2023 12:52 PM     TSH:    Lab Results   Component Value Date/Time    TSH 1.31 06/08/2016 12:01 PM     Lactic Acid: No results found for: \"LACTA\"   Troponin: No results for input(s): \"TROPONINI\" in the last 72 hours.    Radiology/Imaging:    EXAMINATION:  ONE XRAY VIEW OF THE CHEST     7/8/2024 1:34 pm     COMPARISON:  None.     HISTORY:  ORDERING SYSTEM PROVIDED HISTORY: Wheezing SOB, Cough  TECHNOLOGIST PROVIDED HISTORY:  Wheezing SOB, Cough     FINDINGS:  The lungs are without acute focal process.

## 2024-07-10 NOTE — PROGRESS NOTES
ENT/OTOLARYNGOLOGY SUBSEQUENT CARE PROGRESS NOTE     REASON FOR CARE: Laryngeal mass status post tracheostomy     HISTORY OF PRESENT ILLNESS:   Dylan Dolan is a 58 y.o. who is being seen for follow-up after emergent tracheostomy placement for airway control for a transglottic mass. Today, he reports that he is doing well. He continues to have a large amount of mucoid secretions from the trach. He is otherwise feeling well.       Pertinent Examination:   GENERAL: well developed and well nourished and in no acute distress  HEAD: normocephalic and atraumatic  EYES: no eyelid swelling, no conjunctival injection or exudate, pupils equal round and reactive to light  EXTERNAL EARS: normal  EAR EXAM: deferred  NOSE: nares patent, normal mucosa  MOUTH/THROAT: mucous membranes moist, no focal lesions, no tonsillar enlargement or exudate  NECK:  The Shiley tracheostomy is in good position, sutures in place. The stoma is  intact with no bleeding. He has some blood tinged tracheal secretions.   RESPIRATORY: Breathing Pattern: regular, no distress     RELEVANT LABS/STUDIES:   Additional data reviewed:    None    Procedures:    None    Surgical risk factors:  significant comorbodies - Laryngeal mass     IMPRESSION AND RECOMMENDATIONS:   Dylan Dolan is a 58 y.o. male POD 1 after DL/ B and tracheostomy for a transglottic mass.       Plan:  - Continue routine tracheostomy care.   - Will perform first trach change about POD 5.  - Dr. Jensen to discuss definitive management of the mass with patient later today.   - ENT will follow closely. Please call with questions/ change in patient condition.      --------------------------------------------------  Mattie Trujillo MD  Pediatric Otolaryngology-Head and Neck Surgery  [facility] Otol[facility]    Office  ph# [phone]    Also available in [employer]

## 2024-07-11 ENCOUNTER — APPOINTMENT (OUTPATIENT)
Dept: GENERAL RADIOLOGY | Age: 59
End: 2024-07-11
Attending: INTERNAL MEDICINE
Payer: COMMERCIAL

## 2024-07-11 ENCOUNTER — TELEPHONE (OUTPATIENT)
Dept: ONCOLOGY | Age: 59
End: 2024-07-11

## 2024-07-11 LAB
ANION GAP SERPL CALCULATED.3IONS-SCNC: 8 MMOL/L (ref 9–16)
BASOPHILS # BLD: <0.03 K/UL (ref 0–0.2)
BASOPHILS NFR BLD: 0 % (ref 0–2)
BUN SERPL-MCNC: 15 MG/DL (ref 6–20)
CALCIUM SERPL-MCNC: 8.4 MG/DL (ref 8.6–10.4)
CHLORIDE SERPL-SCNC: 103 MMOL/L (ref 98–107)
CO2 SERPL-SCNC: 22 MMOL/L (ref 20–31)
CREAT SERPL-MCNC: 0.7 MG/DL (ref 0.7–1.2)
EOSINOPHIL # BLD: 0.06 K/UL (ref 0–0.44)
EOSINOPHILS RELATIVE PERCENT: 0 % (ref 1–4)
ERYTHROCYTE [DISTWIDTH] IN BLOOD BY AUTOMATED COUNT: 12.6 % (ref 11.8–14.4)
GFR, ESTIMATED: >90 ML/MIN/1.73M2
GLUCOSE BLD-MCNC: 111 MG/DL (ref 75–110)
GLUCOSE BLD-MCNC: 120 MG/DL (ref 75–110)
GLUCOSE BLD-MCNC: 121 MG/DL (ref 75–110)
GLUCOSE BLD-MCNC: 129 MG/DL (ref 75–110)
GLUCOSE BLD-MCNC: 130 MG/DL (ref 75–110)
GLUCOSE SERPL-MCNC: 128 MG/DL (ref 74–99)
HCT VFR BLD AUTO: 38.8 % (ref 40.7–50.3)
HGB BLD-MCNC: 12.8 G/DL (ref 13–17)
IMM GRANULOCYTES # BLD AUTO: 0.07 K/UL (ref 0–0.3)
IMM GRANULOCYTES NFR BLD: 1 %
LYMPHOCYTES NFR BLD: 1.92 K/UL (ref 1.1–3.7)
LYMPHOCYTES RELATIVE PERCENT: 14 % (ref 24–43)
MCH RBC QN AUTO: 31.6 PG (ref 25.2–33.5)
MCHC RBC AUTO-ENTMCNC: 33 G/DL (ref 28.4–34.8)
MCV RBC AUTO: 95.8 FL (ref 82.6–102.9)
MONOCYTES NFR BLD: 0.88 K/UL (ref 0.1–1.2)
MONOCYTES NFR BLD: 7 % (ref 3–12)
NEUTROPHILS NFR BLD: 78 % (ref 36–65)
NEUTS SEG NFR BLD: 10.63 K/UL (ref 1.5–8.1)
NRBC BLD-RTO: 0 PER 100 WBC
PLATELET # BLD AUTO: 265 K/UL (ref 138–453)
PMV BLD AUTO: 8.5 FL (ref 8.1–13.5)
POTASSIUM SERPL-SCNC: 3.9 MMOL/L (ref 3.7–5.3)
RBC # BLD AUTO: 4.05 M/UL (ref 4.21–5.77)
SODIUM SERPL-SCNC: 133 MMOL/L (ref 136–145)
WBC OTHER # BLD: 13.6 K/UL (ref 3.5–11.3)

## 2024-07-11 PROCEDURE — 99233 SBSQ HOSP IP/OBS HIGH 50: CPT | Performed by: INTERNAL MEDICINE

## 2024-07-11 PROCEDURE — 6370000000 HC RX 637 (ALT 250 FOR IP): Performed by: STUDENT IN AN ORGANIZED HEALTH CARE EDUCATION/TRAINING PROGRAM

## 2024-07-11 PROCEDURE — 92611 MOTION FLUOROSCOPY/SWALLOW: CPT

## 2024-07-11 PROCEDURE — 6360000002 HC RX W HCPCS

## 2024-07-11 PROCEDURE — 82947 ASSAY GLUCOSE BLOOD QUANT: CPT

## 2024-07-11 PROCEDURE — 2580000003 HC RX 258: Performed by: STUDENT IN AN ORGANIZED HEALTH CARE EDUCATION/TRAINING PROGRAM

## 2024-07-11 PROCEDURE — 80048 BASIC METABOLIC PNL TOTAL CA: CPT

## 2024-07-11 PROCEDURE — 2060000000 HC ICU INTERMEDIATE R&B

## 2024-07-11 PROCEDURE — 2700000000 HC OXYGEN THERAPY PER DAY

## 2024-07-11 PROCEDURE — 36415 COLL VENOUS BLD VENIPUNCTURE: CPT

## 2024-07-11 PROCEDURE — 99291 CRITICAL CARE FIRST HOUR: CPT | Performed by: INTERNAL MEDICINE

## 2024-07-11 PROCEDURE — 85025 COMPLETE CBC W/AUTO DIFF WBC: CPT

## 2024-07-11 PROCEDURE — 74230 X-RAY XM SWLNG FUNCJ C+: CPT

## 2024-07-11 PROCEDURE — 6370000000 HC RX 637 (ALT 250 FOR IP)

## 2024-07-11 PROCEDURE — 94640 AIRWAY INHALATION TREATMENT: CPT

## 2024-07-11 PROCEDURE — 94761 N-INVAS EAR/PLS OXIMETRY MLT: CPT

## 2024-07-11 PROCEDURE — 6360000002 HC RX W HCPCS: Performed by: STUDENT IN AN ORGANIZED HEALTH CARE EDUCATION/TRAINING PROGRAM

## 2024-07-11 RX ORDER — LOSARTAN POTASSIUM AND HYDROCHLOROTHIAZIDE 25; 100 MG/1; MG/1
1 TABLET ORAL DAILY
Status: DISCONTINUED | OUTPATIENT
Start: 2024-07-11 | End: 2024-07-11

## 2024-07-11 RX ORDER — LOSARTAN POTASSIUM 50 MG/1
100 TABLET ORAL DAILY
Status: DISCONTINUED | OUTPATIENT
Start: 2024-07-11 | End: 2024-07-17 | Stop reason: HOSPADM

## 2024-07-11 RX ORDER — AMLODIPINE BESYLATE 5 MG/1
5 TABLET ORAL DAILY
Status: DISCONTINUED | OUTPATIENT
Start: 2024-07-11 | End: 2024-07-16

## 2024-07-11 RX ORDER — ATORVASTATIN CALCIUM 40 MG/1
40 TABLET, FILM COATED ORAL DAILY
Status: DISCONTINUED | OUTPATIENT
Start: 2024-07-11 | End: 2024-07-17 | Stop reason: HOSPADM

## 2024-07-11 RX ORDER — HYDROCHLOROTHIAZIDE 25 MG/1
25 TABLET ORAL DAILY
Status: DISCONTINUED | OUTPATIENT
Start: 2024-07-11 | End: 2024-07-17 | Stop reason: HOSPADM

## 2024-07-11 RX ADMIN — Medication 1 MG: at 08:31

## 2024-07-11 RX ADMIN — LOSARTAN POTASSIUM 100 MG: 50 TABLET, FILM COATED ORAL at 11:41

## 2024-07-11 RX ADMIN — HYDRALAZINE HYDROCHLORIDE 10 MG: 20 INJECTION INTRAMUSCULAR; INTRAVENOUS at 08:16

## 2024-07-11 RX ADMIN — MORPHINE SULFATE 2 MG: 2 INJECTION, SOLUTION INTRAMUSCULAR; INTRAVENOUS at 18:28

## 2024-07-11 RX ADMIN — FENTANYL CITRATE 50 MCG: 50 INJECTION, SOLUTION INTRAMUSCULAR; INTRAVENOUS at 05:38

## 2024-07-11 RX ADMIN — FENTANYL CITRATE 50 MCG: 50 INJECTION, SOLUTION INTRAMUSCULAR; INTRAVENOUS at 00:34

## 2024-07-11 RX ADMIN — FENTANYL CITRATE 50 MCG: 50 INJECTION, SOLUTION INTRAMUSCULAR; INTRAVENOUS at 03:26

## 2024-07-11 RX ADMIN — SODIUM CHLORIDE: 9 INJECTION, SOLUTION INTRAVENOUS at 00:42

## 2024-07-11 RX ADMIN — BUDESONIDE AND FORMOTEROL FUMARATE DIHYDRATE 2 PUFF: 160; 4.5 AEROSOL RESPIRATORY (INHALATION) at 19:28

## 2024-07-11 RX ADMIN — Medication 1 MG: at 15:19

## 2024-07-11 RX ADMIN — Medication 1 MG: at 22:08

## 2024-07-11 RX ADMIN — SODIUM CHLORIDE, PRESERVATIVE FREE 40 MG: 5 INJECTION INTRAVENOUS at 08:18

## 2024-07-11 RX ADMIN — SODIUM CHLORIDE, PRESERVATIVE FREE 10 ML: 5 INJECTION INTRAVENOUS at 08:33

## 2024-07-11 RX ADMIN — AMLODIPINE BESYLATE 5 MG: 5 TABLET ORAL at 11:00

## 2024-07-11 RX ADMIN — HYDROCHLOROTHIAZIDE 25 MG: 25 TABLET ORAL at 11:41

## 2024-07-11 RX ADMIN — FENTANYL CITRATE 50 MCG: 50 INJECTION, SOLUTION INTRAMUSCULAR; INTRAVENOUS at 14:09

## 2024-07-11 RX ADMIN — FENTANYL CITRATE 50 MCG: 50 INJECTION, SOLUTION INTRAMUSCULAR; INTRAVENOUS at 10:36

## 2024-07-11 RX ADMIN — FENTANYL CITRATE 50 MCG: 50 INJECTION, SOLUTION INTRAMUSCULAR; INTRAVENOUS at 22:08

## 2024-07-11 RX ADMIN — BUDESONIDE AND FORMOTEROL FUMARATE DIHYDRATE 2 PUFF: 160; 4.5 AEROSOL RESPIRATORY (INHALATION) at 08:45

## 2024-07-11 RX ADMIN — IPRATROPIUM BROMIDE AND ALBUTEROL SULFATE 1 DOSE: 2.5; .5 SOLUTION RESPIRATORY (INHALATION) at 19:28

## 2024-07-11 RX ADMIN — DESMOPRESSIN ACETATE 40 MG: 0.2 TABLET ORAL at 12:04

## 2024-07-11 RX ADMIN — SODIUM CHLORIDE, PRESERVATIVE FREE 20 ML: 5 INJECTION INTRAVENOUS at 22:08

## 2024-07-11 RX ADMIN — MORPHINE SULFATE 2 MG: 2 INJECTION, SOLUTION INTRAMUSCULAR; INTRAVENOUS at 12:30

## 2024-07-11 RX ADMIN — FENTANYL CITRATE 50 MCG: 50 INJECTION, SOLUTION INTRAMUSCULAR; INTRAVENOUS at 17:38

## 2024-07-11 RX ADMIN — SODIUM CHLORIDE: 9 INJECTION, SOLUTION INTRAVENOUS at 12:41

## 2024-07-11 RX ADMIN — FENTANYL CITRATE 50 MCG: 50 INJECTION, SOLUTION INTRAMUSCULAR; INTRAVENOUS at 19:52

## 2024-07-11 RX ADMIN — MORPHINE SULFATE 2 MG: 2 INJECTION, SOLUTION INTRAMUSCULAR; INTRAVENOUS at 04:03

## 2024-07-11 RX ADMIN — FENTANYL CITRATE 50 MCG: 50 INJECTION, SOLUTION INTRAMUSCULAR; INTRAVENOUS at 08:11

## 2024-07-11 RX ADMIN — Medication 1 MG: at 01:20

## 2024-07-11 RX ADMIN — IPRATROPIUM BROMIDE AND ALBUTEROL SULFATE 1 DOSE: 2.5; .5 SOLUTION RESPIRATORY (INHALATION) at 08:43

## 2024-07-11 RX ADMIN — IPRATROPIUM BROMIDE AND ALBUTEROL SULFATE 1 DOSE: 2.5; .5 SOLUTION RESPIRATORY (INHALATION) at 11:37

## 2024-07-11 ASSESSMENT — PAIN DESCRIPTION - DESCRIPTORS
DESCRIPTORS: ACHING;DISCOMFORT
DESCRIPTORS: SORE;DISCOMFORT
DESCRIPTORS: ACHING;DISCOMFORT
DESCRIPTORS: ACHING;DISCOMFORT;SPASM
DESCRIPTORS: ACHING;SORE;TENDER
DESCRIPTORS: ACHING;DISCOMFORT;PRESSURE;SPASM
DESCRIPTORS: ACHING;DISCOMFORT
DESCRIPTORS: SORE;DISCOMFORT

## 2024-07-11 ASSESSMENT — PAIN DESCRIPTION - LOCATION
LOCATION: THROAT
LOCATION: NECK
LOCATION: BACK;THROAT;NECK
LOCATION: NECK;BACK
LOCATION: NECK
LOCATION: THROAT
LOCATION: NECK
LOCATION: BACK;NECK
LOCATION: BACK;NECK
LOCATION: HEAD;NECK
LOCATION: NECK
LOCATION: NECK
LOCATION_2: LEG
LOCATION: NECK;BACK

## 2024-07-11 ASSESSMENT — PAIN SCALES - GENERAL
PAINLEVEL_OUTOF10: 8
PAINLEVEL_OUTOF10: 8
PAINLEVEL_OUTOF10: 4
PAINLEVEL_OUTOF10: 7
PAINLEVEL_OUTOF10: 8
PAINLEVEL_OUTOF10: 8
PAINLEVEL_OUTOF10: 4
PAINLEVEL_OUTOF10: 3
PAINLEVEL_OUTOF10: 8
PAINLEVEL_OUTOF10: 4
PAINLEVEL_OUTOF10: 5
PAINLEVEL_OUTOF10: 7
PAINLEVEL_OUTOF10: 8
PAINLEVEL_OUTOF10: 7
PAINLEVEL_OUTOF10: 4
PAINLEVEL_OUTOF10: 9
PAINLEVEL_OUTOF10: 8
PAINLEVEL_OUTOF10: 8
PAINLEVEL_OUTOF10: 4
PAINLEVEL_OUTOF10: 7
PAINLEVEL_OUTOF10: 8
PAINLEVEL_OUTOF10: 8
PAINLEVEL_OUTOF10: 3
PAINLEVEL_OUTOF10: 4

## 2024-07-11 ASSESSMENT — PAIN DESCRIPTION - ORIENTATION
ORIENTATION: LEFT
ORIENTATION: LEFT
ORIENTATION: MID
ORIENTATION: MID;LEFT

## 2024-07-11 ASSESSMENT — PAIN SCALES - WONG BAKER
WONGBAKER_NUMERICALRESPONSE: HURTS A LITTLE BIT

## 2024-07-11 ASSESSMENT — PAIN DESCRIPTION - ONSET: ONSET: ON-GOING

## 2024-07-11 ASSESSMENT — PAIN DESCRIPTION - FREQUENCY: FREQUENCY: INTERMITTENT

## 2024-07-11 ASSESSMENT — PAIN DESCRIPTION - PAIN TYPE: TYPE: CHRONIC PAIN

## 2024-07-11 NOTE — TELEPHONE ENCOUNTER
Name: Dylan Dolan  : 1965  MRN: [id number]    Oncology Navigation- Initial Note:  Writer receives inpatient referral. Reviewing chart.   Patient currently admitted for vocal cord mass. Path pending.  Navigator will monitor and engage with patient after discharge.  Care team updated.    Electronically signed by Bree Spears RN on 2024 at 8:25 AM

## 2024-07-11 NOTE — PLAN OF CARE
Problem: Respiratory - Adult  Goal: Achieves optimal ventilation and oxygenation  7/10/2024 2014 by Devora Kang RCP  Outcome: Progressing   BRONCHOSPASM/BRONCHOCONSTRICTION     [x]         IMPROVE AERATION/BREATH SOUNDS  [x]   ADMINISTER BRONCHODILATOR THERAPY AS APPROPRIATE  [x]   ASSESS BREATH SOUNDS  []   IMPLEMENT AEROSOL/MDI PROTOCOL  [x]   PATIENT EDUCATION AS NEEDED  PROVIDE ADEQUATE OXYGENATION WITH ACCEPTABLE SP02/ABG'S    [x]  IDENTIFY APPROPRIATE OXYGEN THERAPY  [x]   MONITOR SP02/ABG'S AS NEEDED   [x]   PATIENT EDUCATION AS NEEDED

## 2024-07-11 NOTE — PLAN OF CARE
Problem: Chronic Conditions and Co-morbidities  Goal: Patient's chronic conditions and co-morbidity symptoms are monitored and maintained or improved  Outcome: Progressing  Flowsheets (Taken 7/11/2024 0800)  Care Plan - Patient's Chronic Conditions and Co-Morbidity Symptoms are Monitored and Maintained or Improved: Monitor and assess patient's chronic conditions and comorbid symptoms for stability, deterioration, or improvement     Problem: Discharge Planning  Goal: Discharge to home or other facility with appropriate resources  Outcome: Progressing     Problem: Pain  Goal: Verbalizes/displays adequate comfort level or baseline comfort level  Outcome: Progressing  Flowsheets (Taken 7/11/2024 1200)  Verbalizes/displays adequate comfort level or baseline comfort level: Assess pain using appropriate pain scale     Problem: Safety - Adult  Goal: Free from fall injury  Outcome: Progressing     Problem: Respiratory - Adult  Goal: Achieves optimal ventilation and oxygenation  Outcome: Progressing     Problem: ABCDS Injury Assessment  Goal: Absence of physical injury  Outcome: Progressing

## 2024-07-11 NOTE — PROGRESS NOTES
INTENSIVE CARE UNIT  Resident Physician Progress Note    Patient - Dylan Dolan  Date of Admission -  2024 12:25 PM  Date of Evaluation -  2024  Room and Bed Number -  3016/3016-01   Hospital Day - 3      SUBJECTIVE:     OVERNIGHT EVENTS:    Patient continued to have elevated Blood pressures overnight and complains of pain but otherwise unremarkable     TODAY:  Patient denies any chest pain, but states his pain control better but continues to have pain    AWAKE & FOLLOWING COMMANDS:  [] No   [x] Yes    SECRETIONS Amount:  [x] Small [] Moderate  [] Large  [] None  Color:     [] White [] Colored  [x] Bloody    SEDATION:  RAAS Score:  [] Propofol gtt  [] Versed gtt  [] Ativan gtt   [x] No Sedation    PARALYZED:  [x] No    [] Yes    VASOPRESSORS:  [x] No    [] Yes  [] Levophed [] Dopamine [] Vasopressin  [] Dobutamine [] Phenylephrine [] Epinephrine    F.A.S.T. M. H.U.G.S. B.I.D.    Feeding Diet: Diet NPO  Fluids: NS @ 100 ml/hr   Family: Clemencia Dolan (Parent) [phone]  Analgesic: Fentanyl   Sedation: None    Thrombo-prophylaxis: [] Enoxaparin, [] Unfract. Heparin Subcutaneously, [x] EPC Cuffs  Mobility: As tolerated   Heads up: Yes  Ulcer prophylaxis: [x] PPI Agent, [] [id number], [] Sucralfate, [] Other:  Glycemic control: Sliding Scale Insulin   Spontaneous breathing trial: N/A   Bowel regimen/urine output: UOP 1935ml Total +3581  Indwelling catheter/lines: Peripheral IV Left   De-escalation:        OBJECTIVE:     VITAL SIGNS:  BP (!) 154/96   Pulse 67   Temp 98 °F (36.7 °C) (Axillary)   Resp 20   Ht 1.854 m (6' 1\")   Wt 104.8 kg (231 lb 0.7 oz)   SpO2 98%   BMI 30.48 kg/m²   Tmax over 24 hours:  Temp (24hrs), Av.8 °F (37.1 °C), Min:98 °F (36.7 °C), Max:99.9 °F (37.7 °C)      Patient Vitals for the past 8 hrs:   BP Temp Temp src Pulse Resp SpO2 Weight   24 0608 -- -- -- -- 20 -- --   24 0600 (!) 154/96 -- -- 67 17 98 % --   24 0500 (!) 182/83 -- -- 73 17 100 % --   24 0433 --  current use (HCC) 03/14/2023    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    Acute respiratory failure with hypoxia (HCC) 07/09/2024    Smoking addiction 07/09/2024    Vocal cord mass 07/08/2024    Vocal cord paralysis 07/08/2024    Polyneuropathy associated with underlying disease (HCC) 04/02/2024    Type 2 diabetes mellitus with diabetic neuropathy 06/27/2023    Mixed hyperlipidemia 01/26/2022    History of 2019 novel coronavirus disease (COVID-19) 12/16/2020    Skin ulcer of toe of left foot with necrosis of bone (HCC) 07/27/2020    Hypertriglyceridemia 11/30/2017    Type 2 diabetes mellitus without complication (HCC) 08/30/2017    Chronic pain of left ankle     Essential hypertension     Tourette's syndrome     Gastroesophageal reflux disease     Tobacco abuse           PLAN:     WEAN PER PROTOCOL:  [] No   [] Yes  [x] N/A    ICU PROPHYLAXIS:  Stress ulcer:  [x] PPI Agent  [] [id number] [] Sucralfate  [] Other:  VTE:   [] Enoxaparin  [] Unfract. Heparin Subcut  [x] EPC Cuffs    NUTRITION:  [x] NPO [] Tube Feeding (Specify: ) [] TPN  [] PO    HOME MEDS RECONCILED: [] No  [x] Yes    CONSULTATION NEEDED:  [x] No  [] Yes    FAMILY UPDATED:    [] No  [x] Yes    TRANSFER OUT OF ICU:   [] No  [x] Yes          Plan:  Patient BP likely elevated with combination of pain/Anxiety and off his home medications   Will require MBS for further swallow study as Speech has evaluated the patient  WBC trending down likely increased 2/2 recent procedure, No evidence of Fever or Tachycardia  Patient saturating well on 5L , minimal secretions from trach   As per ENT Trach Change POD5 7/14/2024  Heme-Onc on board will follow up their recommendations   Continue Pain control increased Morphine to 2mg for break through   Continue Ativan 1mg Q6 prn for Anxiety   Patient [facility]             Loy Waggoner MD  Family Medicine PGY2  7/11/2024 7:39 AM

## 2024-07-11 NOTE — PROCEDURES
INSTRUMENTAL SWALLOW REPORT  MODIFIED BARIUM SWALLOW    NAME: Dylan Dolan   : 1965  MRN: [id number]       Date of Eval: 2024              Referring Diagnosis(es):      Past Medical History:  has a past medical history of Ankle pain, left, Gastroesophageal reflux disease, Hyperlipemia, Hypertension, Obesity, Tobacco abuse, and Tourette's syndrome.  Past Surgical History:  has a past surgical history that includes Ankle fracture surgery (Left, 1999); [place] tooth extraction (Bilateral); Toe amputation (Left, 2021); tracheostomy (N/A, 2024); laryngoscopy (N/A, 2024); and tracheostomy (N/A, 2024).               Type of Study: Initial MBS       Recent CXR/CT of Chest:   24  IMPRESSION:  No acute process.    Patient Complaints/Reason for Referral:  Dylan Dolan was referred for a MBS to assess the efficiency of his/her swallow function, assess for aspiration, and to make recommendations regarding safe dietary consistencies, effective compensatory strategies, and safe eating environment.       Onset of problem:    History was obtained from chart review and the patient.       Dylan Dolan is a 58 y.o. past medical history of hypertension, hyperlipidemia, GERD, obesity, smoker Tourette's syndrome.  Patient presented to [place] emergency department with chief complaint of cough, trouble breathing and losing his voice.  This admission a progressive condition that has been ongoing for the last several months.  Patient was seen in urgent care last week and given azithromycin and steroids and felt better while on them.  Patient then reported to the emergency department today with worsening shortness of breath, cough and worsening voice changes.  Patient was found to have stridor.  Patient was initially given racemic epi and steroids in the emergency department.  CT of the neck revealed a nonspecific left vocal cord mass of 8 mm x 6 mm.  No cervical lymphadenopathy was noted.

## 2024-07-11 NOTE — PROGRESS NOTES
BRIEF ENT NOTE:    To bedside for evening follow up for Dylan Dolan who is a 58 y.o. male; today is POD 1 after DL/B with tracheostomy on 7/9/24 for a transglottic mass    Subjective: He is resting more comfortable this evening.  Reported by nurse to less anxious and pain more under control.      Objective: Eyes closed at this time.  Remains with mild post operative oozing, occasional cough with less pink tinged secretions this evening.    Neck: #8 cuffed Shiley tracheostomy is in good position, sutures in place. The stoma is  intact with no bleeding. He has some blood tinged tracheal secretions.     Plan:  - Continue routine tracheostomy care.   - ENT Will perform first trach change about POD 5.  - Dr. Jensen will continue to update patient on definitive management of the mass.   - ENT will follow closely. Please call with questions/ change in patient condition.     --------------------------------------------------  ANGELICA Elizabeth  Pediatric Otolaryngology-Head and Neck Surgery  [facility] Otolaryngology group     Office  ph# [phone]    Also available in [employer]

## 2024-07-11 NOTE — TRANSITION OF CARE
Critical care team - Resident sign-out to medicine service      Date and time: 7/11/2024 1:05 PM  Patient's name:  Dylan Dolan  Medical Record Number: [id number]  Patient's account/billing number: [id number]  Patient's YOB: 1965  Age: 58 y.o.  Date of Admission: 7/8/2024 12:25 PM  Length of stay during current admission: 3    Primary Care Physician: Alley Vila MD    Code Status: Full Code    Mode of physician to physician communication:        [x] Via telephone   [] In person     Date and time of sign-out: 7/11/2024 1:05 PM    Accepting Internal Medicine resident: Dr. Miles     Accepting Medicine team: IM Team     Accepting team's attending: Dr. Lu    Patient's current ICU Bed:  3016     Patient's assigned bed on floor:  406        [] Med-Surg Monitored [x] Step-down       [] Psychiatry ICU       [] Psych floor     Reason for ICU admission:     Unable to Intubate the Patient required Tracheostomy     ICU course summary:     Patient is 59 yo male with Pmhx of HTN, HLD, T2DM and Chronic Cigarette Smoker who initially presented to the ED for Stridor and Dyspnea. Patient on 7/8/2024 was in ED and Neck CT showed 8mm x 6mm mass on his left vocal chord and thus was transferred to [facility]. Patient was admitted to the ICU as due to lesion he would be difficult intubation If needed. Patient had hoarse voice and mild wheezing but saturating well in room air. On 7/9/2024 patient was taken to OR for Direct laryngoscopy for Biopsy of Lesion but due to inability to Intubate the patient a Tracheostomy was performed instead. Patient on 7/11/2024 had MBS which showed patient was able to swallow without aspiration and cleared for Regular PO diet. Patient Blood pressure was elevated throughout stay due likely mixture of Pain/ Anxiety and inability to take PO home medications. Patient continues to Saturate through Trach. Patient will require Inpatient admission until Scheduled Trach Change

## 2024-07-11 NOTE — PROGRESS NOTES
1800- Given report to RN  assigned in 4A room 406.  Awaiting for Sign out from Provider. Family informed about the transfer.

## 2024-07-11 NOTE — PROGRESS NOTES
Today's Date: 7/11/2024  Patient Name: Dylan Dolan  Date of admission: 7/8/2024 12:25 PM  Patient's age: 58 y.o., 1965  Admission Dx: Vocal cord mass [J38.3]  Vocal cord paralysis [J38.00]    Reason for Consult: Vocal cord mass history of cigarette smoking   Requesting Physician: Patrice Ames MD    Chief Complaint:  Shortness of breath, stridor, hoarseness of voice     Interval Changes:  Patient seen and examined.  Labs and vitals reviewed.  CBC stable  Will obtain a CT chest to evaluate for nodules  Passed his video swallow study.  Overall feels better today  Surgical pathology pending  Likely transfer out of ICU today  He tells me he has not yet been out of bed but would very much like to.  Order placed for physical therapy    History of Present Illness:    The patient is a 58 y.o. male who is admitted to the hospital for management of vocal cord mass.    He presented to [place] ED with complaints of coughing, trouble breathing, losing his voice, ongoing for the last several months.  In the ED he was noted to have stridor for which he was given racemic epi and steroids.  CT soft tissue neck showed 8 mm x 6 mm mass in the left vocal cord.  He was then transferred to [facility] for further care.     He went to OR with ENT today for biopsy.  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  His intubation was transitioned to an open tracheostomy     He has a past medical history of cigarette smoking (1.5 packs/day since age 17), hypertension, hyperlipidemia, Tourette's syndrome    Past Medical History:   has a past medical history of Ankle pain, left, Gastroesophageal reflux disease, Hyperlipemia, Hypertension, Obesity, Tobacco abuse, and Tourette's syndrome.    Past Surgical History:   has a past surgical history that includes Ankle fracture surgery (Left, 01/01/1999); [place] tooth extraction (Bilateral); Toe amputation (Left, 03/29/2021); tracheostomy (N/A,  07/11/24 0816    pantoprazole (PROTONIX) 40 mg in sodium chloride (PF) 0.9 % 10 mL injection  40 mg IntraVENous Daily Rayo Jensen MD   40 mg at 07/11/24 0818    ipratropium 0.5 mg-albuterol 2.5 mg (DUONEB) nebulizer solution 1 Dose  1 Dose Inhalation Q4H WA RT Rayo Jensen MD   1 Dose at 07/11/24 1137    budesonide-formoterol (SYMBICORT) 160-4.5 MCG/ACT inhaler 2 puff  2 puff Inhalation BID RT Rayo Jensen MD   2 puff at 07/11/24 0845    glucose chewable tablet 16 g  4 tablet Oral PRN Rayo Jensen MD        dextrose bolus 10% 125 mL  125 mL IntraVENous PRRayo Granados MD        Or    dextrose bolus 10% 250 mL  250 mL IntraVENous PRN Rayo Jensen MD        glucagon injection 1 mg  1 mg SubCUTAneous PRN Rayo Jensen MD        dextrose 10 % infusion   IntraVENous Continuous PRN Rayo Jensen MD        [Held by provider] heparin (porcine) injection 5,000 Units  5,000 Units SubCUTAneous 3 times per day Rayo Jensen MD        nicotine (NICODERM CQ) 14 MG/24HR 1 patch  1 patch TransDERmal Daily Rayo Jensen MD   1 patch at 07/11/24 0834    insulin lispro (HUMALOG,ADMELOG) injection vial 0-8 Units  0-8 Units SubCUTAneous Q4H Rayo Jensen MD   2 Units at 07/09/24 2304    melatonin tablet 6 mg  6 mg Oral Nightly PRN Rayo Jensen MD   6 mg at 07/08/24 2141       Allergies:  Chantix [varenicline]    Social History:   reports that he has been smoking cigarettes. He started smoking about 44 years ago. He has a 44.5 pack-year smoking history. He quit smokeless tobacco use about 36 years ago.  His smokeless tobacco use included snuff. He reports current alcohol use. He reports that he does not use drugs.     Family History: family history includes Cancer in his maternal uncle and maternal uncle; Hypertension in his father and mother.    Review of Systems:      Constitutional: No fever or chills. No night sweats.  Eyes: No eye discharge, double

## 2024-07-11 NOTE — PROGRESS NOTES
SPIRITUAL HEALTH - [facility] [place]  [place]ESS NOTE    Shift date: 07/11/2024  Shift day: Thursday   Shift # 1    Room # 3016/3016-01   Name: Dylan Dolan                Alevism: Unknown   Place of Methodist: None    Referral: Routine Visit    Admit Date & Time: 7/8/2024 12:25 PM    Assessment:  Dylan Dolan is a 58 y.o. male in the hospital post surgical removal of mass in throat. Upon entering the room writer observes patient sitting up in bed with television on.      Intervention:  This writer had visited the patient prior to his surgery 2 days ago to fill out ACP documents and patient asked for prayer at that time and a return visit post op. Upon entering the patient room, this Writer asked to come into patient room for visit and patient waved for writer to enter.  Patient is unable to talk and is using a clipboard, paper and pen to communicate.  Patient states that it is good to have a visit.  Patient is tearful and communicates that some of the mass was cancerous and surgeon encouraged patient that he will \"get through it\".  Patient communicates that he is going to have to return for treatment.   Writer offered space for patient  to express feelings, needs, and concerns and provided a ministry presence.     Outcome:  Patient requests prayer and writer offered a prayer of healing and peace for the patient.  Patient communicated his gratitude for visit.     Plan:  Chaplains will remain available to offer spiritual and emotional support as needed.     07/11/24 1444   Encounter Summary   Encounter Overview/Reason Spiritual/Emotional Needs;Post-Procedural   Service Provided For Patient   Referral/Consult From Patient   Support System Family members   Last Encounter  07/11/24   Complexity of Encounter Moderate   Begin Time 1420   End Time  1445   Total Time Calculated 25 min   Crisis   Type Emotional distress   Spiritual/Emotional needs   Type Spiritual Support;Emotional Distress   Assessment/Intervention/Outcome   Assessment  Anxious;Fearful;Loneliness;Tearful   Intervention Active listening;Prayer (assurance of)/[place];Sustaining Presence/Ministry of presence   Outcome Comfort;Receptive           Electronically signed by JORDON HE, Intern, on 7/11/2024 at 2:46 PM.  [facility]  [facility]  [phone]

## 2024-07-11 NOTE — CONSULTS
Subjective:      Patient ID: Magda Garcia is a 65 y.o. male. HPI  Follow up of chronic conditions. Encounter Diagnoses   Name Primary?  Shift work sleep disorder     Primary insomnia     Lumbar back pain     Chronic pain of right knee     Primary osteoarthritis of right knee     Fever blister. oral     Medication monitoring encounter     Hypercholesteremia Yes    Fatigue, unspecified type     Rosacea      Holger Deleon continues to do well and has had no episodes of orthostatic hypotension lately. His current blood pressure is about what he typically runs on a regular basis. BP Readings from Last 3 Encounters:   08/14/19 102/60   05/08/19 108/60   10/31/18 116/66     His cholesterol levels were reviewed with him and he is at good benchmarks especially his ratios. His total cholesterol HDL ratio is 3.5 and his LDL to HDL ratio is 1.8. Cholesterol, Total (no units)   Date Value   08/29/2014 192     Cholesterol (mg/dL)   Date Value   05/08/2019 120 (L)     HDL (mg/dL)   Date Value   05/08/2019 34 (L)     Triglycerides (mg/dL)   Date Value   05/08/2019 118     Lab Results   Component Value Date    LDLCALC 62 05/08/2019     He continues to have some intermittent fatigue and his pharmacist is suggesting he start B12 vitamins. After some discussion, he will look into possibly starting on a multiple vitamin also with B complex supplement depending on what the contents of the multiple vitamin are. He has no evidence of B vitamin deficiencies. He continues to have right knee pain but it is intermittent. Humidity seems to be the main trigger for episodes of pain and getting off of his knee tends to stop severe episodes. 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 He has not routinely been using any over-the-counter joint rubs. His rosacea continues to respond to his current medications.   Once he was done with tetracycline orally, there was some return Sign     Unable to Pay for Housing in the Last Year: No     Number of Places Lived in the Last Year: 1     Unstable Housing in the Last Year: No       FAMILY HISTORY:  Family History   Problem Relation Age of Onset    Cancer Maternal Uncle     Hypertension Mother     Hypertension Father     Cancer Maternal Uncle        REVIEW OF SYSTEMS:    GENERAL/CONSTITUTIONAL: The patient denies fever, fatigue, weakness, weight gain or weight loss.  HEENT: (+) Hoarseness, stridor. The patient denies pain, redness, loss of vision, double or blurred vision. The patient denies ringing in the ears, loss of hearing,  trouble swallowing, or painful swallowing.  CARDIOVASCULAR: The patient denies chest pain, irregular heartbeats, sudden changes in heartbeat or palpitation.  RESPIRATORY: (+) Shortness of breath, coughing. The patient denies hemoptysis.  GASTROINTESTINAL: The patient denies nausea, vomiting, diarrhea, constipation, blood in the stools.  GENITOURINARY: The patient denies difficult urination, pain or burning with urination, blood in the urine.  MUSCULOSKELETAL: The patient denies arm, buttock, thigh or calf cramps. No joint or muscle pain.   SKIN: The patient denies easy bruising, skin redness, skin rash, hives.  NEUROLOGIC: The patient denies headache, dizziness, fainting, muscle spasm, loss of consciousness.  ENDOCRINE: The patient denies intolerance to hot or cold temperature, flushing.  HEMATOLOGIC/LYMPHATIC: The patient denies anemia, bleeding tendency or clotting tendency.  ALLERGIC/IMMUNOLOGIC: The patient denies rhinitis, asthma, skin sensitivity.  PYSCHOLOGIC: The patient denies any depression, anxiety, or confusion.    A full 14 point review of systems was performed and assessed and found to be negative except as noted above.        PHYSICAL EXAMINATION:    CHAPERONE: Nurse/MA Present    ECO Symptomatic but completely ambulatory    VITAL SIGNS: BP (!) 188/78   Pulse 70   Temp 98 °F (36.7 °C) (Axillary)    tolerated    Up with assistance    HYPOGLYCEMIA TREATMENT: blood glucose LESS THAN 70 mg/dL and patient ALERT and TOLERATING PO    HYPOGLYCEMIA TREATMENT: blood glucose LESS THAN 70 mg/dL and patient NOT ALERT or NPO    Place sequential compression device    Bladder scan    Turn or assist with turn approximately every 2 hours if patient is unable to turn self. Remind patient to turn if necessary    Maintain HOB at the lowest elevation consistent with medical plan of care    Assess skin per unit guidelines    Maintain heels off of bed at all times    Pad/offload medical devices    Use lift equipment for lifting patient    Full Code    Inpatient consult to Otolaryngology    Inpatient consult to Hem/Onc    CONSULT [facility] ONCOLOGY NURSE NAVIGATOR    Inpatient consult to Radiation Oncology    Initiate Oxygen Therapy Protocol    Respiratory care evaluation only    Pulse oximetry, continuous    Trach collar oxygen    SLP eval and treat    POCT Glucose    POC Glucose Fingerstick    POCT glucose    POC Glucose Fingerstick    POC Glucose Fingerstick    POC Glucose Fingerstick    POC Glucose Fingerstick    POC Glucose Fingerstick    POC Glucose Fingerstick    POC Glucose Fingerstick    POC Glucose Fingerstick    POC Glucose Fingerstick    POC Glucose Fingerstick    POC Glucose Fingerstick    POC Glucose Fingerstick    POC Glucose Fingerstick    POC Glucose Fingerstick    POC Glucose Fingerstick    POC Glucose Fingerstick    ADMIT TO INPATIENT    ADMIT TO INPATIENT    Transfer Patient    Transfer patient         CC:  Patient Care Team:  Alley Vila MD as PCP - General (Family Medicine)  Alley Vila MD as PCP - Empaneled Provider  Bree Spears RN as Nurse Navigator (Oncology)     Total time spent on this case on the day of encounter is 80 minutes. This time includes combination of one or more of the following - review of necessary tests, review of pertinent medical records from the EMR, performing medically appropriate

## 2024-07-11 NOTE — PROGRESS NOTES
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 who is being seen for follow-up after emergent tracheostomy placement for airway control for a transglottic mass. 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 Passed swallow study. Expected trach secretions. Without bleeding. Pain controlled.     Pertinent Examination:   GENERAL: well developed and well nourished and in no acute distress  HEAD: normocephalic and atraumatic  EYES: no eyelid swelling, no conjunctival injection or exudate, pupils equal round and reactive to light  EXTERNAL EARS: normal  EAR EXAM: deferred  NOSE: nares patent, normal mucosa  MOUTH/THROAT: mucous membranes moist, no focal lesions, no tonsillar enlargement or exudate  NECK:  8 cuffed Shiley tracheostomy is in good position, sutures in place. The stoma is  intact with no bleeding. Expected blood tinged secretions  RESPIRATORY: Comfortable on humidified blow by     RELEVANT LABS/STUDIES:   Additional data reviewed:    None    Procedures:    MBS: IMPRESSION:  Swallowing mechanism grossly within normal limits without evidence of  aspiration.    Surgical risk factors:    UNABLE TO BE INTUBATED FROM ABOVE     IMPRESSION AND RECOMMENDATIONS:   Dylan Dolan is a 58 y.o. male who is being seen for follow-up after emergent tracheostomy placement for airway control for a transglottic mass performed on 7/9. 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 Will plan for 1st trach change on Monday to promote formalization.  -Results of MBS reviewed - clear for diet from ENT perspective  -Will need chest imaging for

## 2024-07-12 ENCOUNTER — APPOINTMENT (OUTPATIENT)
Dept: CT IMAGING | Age: 59
End: 2024-07-12
Attending: INTERNAL MEDICINE
Payer: COMMERCIAL

## 2024-07-12 LAB
ANA SER QL IA: NEGATIVE
ANCA MYELOPEROXIDASE: <0.3 AU/ML (ref 0–3.5)
ANCA PROTEINASE 3: <0.7 AU/ML (ref 0–2)
ANION GAP SERPL CALCULATED.3IONS-SCNC: 11 MMOL/L (ref 9–16)
BASOPHILS # BLD: <0.03 K/UL (ref 0–0.2)
BASOPHILS NFR BLD: 0 % (ref 0–2)
BUN SERPL-MCNC: 15 MG/DL (ref 6–20)
CALCIUM SERPL-MCNC: 8.4 MG/DL (ref 8.6–10.4)
CHLORIDE SERPL-SCNC: 99 MMOL/L (ref 98–107)
CO2 SERPL-SCNC: 21 MMOL/L (ref 20–31)
CREAT SERPL-MCNC: 0.6 MG/DL (ref 0.7–1.2)
DSDNA IGG SER QL IA: 0.7 IU/ML
EOSINOPHIL # BLD: 0.16 K/UL (ref 0–0.44)
EOSINOPHILS RELATIVE PERCENT: 1 % (ref 1–4)
ERYTHROCYTE [DISTWIDTH] IN BLOOD BY AUTOMATED COUNT: 12.2 % (ref 11.8–14.4)
GBM AB SER-ACNC: <1.9 AU/ML (ref 0–7)
GFR, ESTIMATED: >90 ML/MIN/1.73M2
GLUCOSE BLD-MCNC: 119 MG/DL (ref 75–110)
GLUCOSE BLD-MCNC: 137 MG/DL (ref 75–110)
GLUCOSE BLD-MCNC: 164 MG/DL (ref 75–110)
GLUCOSE BLD-MCNC: 165 MG/DL (ref 75–110)
GLUCOSE SERPL-MCNC: 116 MG/DL (ref 74–99)
HCT VFR BLD AUTO: 37.6 % (ref 40.7–50.3)
HGB BLD-MCNC: 12.7 G/DL (ref 13–17)
IMM GRANULOCYTES # BLD AUTO: 0.08 K/UL (ref 0–0.3)
IMM GRANULOCYTES NFR BLD: 1 %
LYMPHOCYTES NFR BLD: 1.51 K/UL (ref 1.1–3.7)
LYMPHOCYTES RELATIVE PERCENT: 12 % (ref 24–43)
MAGNESIUM SERPL-MCNC: 2.1 MG/DL (ref 1.6–2.6)
MCH RBC QN AUTO: 31.4 PG (ref 25.2–33.5)
MCHC RBC AUTO-ENTMCNC: 33.8 G/DL (ref 28.4–34.8)
MCV RBC AUTO: 92.8 FL (ref 82.6–102.9)
MONOCYTES NFR BLD: 0.82 K/UL (ref 0.1–1.2)
MONOCYTES NFR BLD: 7 % (ref 3–12)
NEUTROPHILS NFR BLD: 79 % (ref 36–65)
NEUTS SEG NFR BLD: 10.12 K/UL (ref 1.5–8.1)
NRBC BLD-RTO: 0 PER 100 WBC
NUCLEAR IGG SER IA-RTO: 0.1 U/ML
PLATELET # BLD AUTO: 247 K/UL (ref 138–453)
PMV BLD AUTO: 8.6 FL (ref 8.1–13.5)
POTASSIUM SERPL-SCNC: 3.5 MMOL/L (ref 3.7–5.3)
RBC # BLD AUTO: 4.05 M/UL (ref 4.21–5.77)
SODIUM SERPL-SCNC: 131 MMOL/L (ref 136–145)
SURGICAL PATHOLOGY REPORT: NORMAL
WBC OTHER # BLD: 12.7 K/UL (ref 3.5–11.3)

## 2024-07-12 PROCEDURE — 83735 ASSAY OF MAGNESIUM: CPT

## 2024-07-12 PROCEDURE — 2700000000 HC OXYGEN THERAPY PER DAY

## 2024-07-12 PROCEDURE — 99233 SBSQ HOSP IP/OBS HIGH 50: CPT | Performed by: INTERNAL MEDICINE

## 2024-07-12 PROCEDURE — 6370000000 HC RX 637 (ALT 250 FOR IP)

## 2024-07-12 PROCEDURE — 6360000002 HC RX W HCPCS

## 2024-07-12 PROCEDURE — 6370000000 HC RX 637 (ALT 250 FOR IP): Performed by: STUDENT IN AN ORGANIZED HEALTH CARE EDUCATION/TRAINING PROGRAM

## 2024-07-12 PROCEDURE — 6360000002 HC RX W HCPCS: Performed by: STUDENT IN AN ORGANIZED HEALTH CARE EDUCATION/TRAINING PROGRAM

## 2024-07-12 PROCEDURE — 71260 CT THORAX DX C+: CPT

## 2024-07-12 PROCEDURE — 94640 AIRWAY INHALATION TREATMENT: CPT

## 2024-07-12 PROCEDURE — 2580000003 HC RX 258: Performed by: STUDENT IN AN ORGANIZED HEALTH CARE EDUCATION/TRAINING PROGRAM

## 2024-07-12 PROCEDURE — 82947 ASSAY GLUCOSE BLOOD QUANT: CPT

## 2024-07-12 PROCEDURE — 80048 BASIC METABOLIC PNL TOTAL CA: CPT

## 2024-07-12 PROCEDURE — 99232 SBSQ HOSP IP/OBS MODERATE 35: CPT | Performed by: INTERNAL MEDICINE

## 2024-07-12 PROCEDURE — 36415 COLL VENOUS BLD VENIPUNCTURE: CPT

## 2024-07-12 PROCEDURE — 6360000004 HC RX CONTRAST MEDICATION

## 2024-07-12 PROCEDURE — 2060000000 HC ICU INTERMEDIATE R&B

## 2024-07-12 PROCEDURE — 94761 N-INVAS EAR/PLS OXIMETRY MLT: CPT

## 2024-07-12 PROCEDURE — 85025 COMPLETE CBC W/AUTO DIFF WBC: CPT

## 2024-07-12 RX ADMIN — SODIUM CHLORIDE, PRESERVATIVE FREE 20 ML: 5 INJECTION INTRAVENOUS at 00:23

## 2024-07-12 RX ADMIN — SODIUM CHLORIDE, PRESERVATIVE FREE 10 ML: 5 INJECTION INTRAVENOUS at 04:17

## 2024-07-12 RX ADMIN — FENTANYL CITRATE 50 MCG: 50 INJECTION, SOLUTION INTRAMUSCULAR; INTRAVENOUS at 06:41

## 2024-07-12 RX ADMIN — DESMOPRESSIN ACETATE 40 MG: 0.2 TABLET ORAL at 08:46

## 2024-07-12 RX ADMIN — IPRATROPIUM BROMIDE AND ALBUTEROL SULFATE 1 DOSE: 2.5; .5 SOLUTION RESPIRATORY (INHALATION) at 11:16

## 2024-07-12 RX ADMIN — Medication 1 MG: at 04:17

## 2024-07-12 RX ADMIN — FENTANYL CITRATE 50 MCG: 50 INJECTION, SOLUTION INTRAMUSCULAR; INTRAVENOUS at 23:45

## 2024-07-12 RX ADMIN — Medication 1 MG: at 17:52

## 2024-07-12 RX ADMIN — FENTANYL CITRATE 50 MCG: 50 INJECTION, SOLUTION INTRAMUSCULAR; INTRAVENOUS at 13:07

## 2024-07-12 RX ADMIN — FENTANYL CITRATE 50 MCG: 50 INJECTION, SOLUTION INTRAMUSCULAR; INTRAVENOUS at 14:53

## 2024-07-12 RX ADMIN — IPRATROPIUM BROMIDE AND ALBUTEROL SULFATE 1 DOSE: 2.5; .5 SOLUTION RESPIRATORY (INHALATION) at 16:26

## 2024-07-12 RX ADMIN — IPRATROPIUM BROMIDE AND ALBUTEROL SULFATE 1 DOSE: 2.5; .5 SOLUTION RESPIRATORY (INHALATION) at 07:36

## 2024-07-12 RX ADMIN — MORPHINE SULFATE 2 MG: 2 INJECTION, SOLUTION INTRAMUSCULAR; INTRAVENOUS at 20:36

## 2024-07-12 RX ADMIN — Medication 6 MG: at 20:36

## 2024-07-12 RX ADMIN — IPRATROPIUM BROMIDE AND ALBUTEROL SULFATE 1 DOSE: 2.5; .5 SOLUTION RESPIRATORY (INHALATION) at 21:21

## 2024-07-12 RX ADMIN — Medication 1 MG: at 09:44

## 2024-07-12 RX ADMIN — HEPARIN SODIUM 5000 UNITS: 5000 INJECTION INTRAVENOUS; SUBCUTANEOUS at 12:17

## 2024-07-12 RX ADMIN — FENTANYL CITRATE 50 MCG: 50 INJECTION, SOLUTION INTRAMUSCULAR; INTRAVENOUS at 21:21

## 2024-07-12 RX ADMIN — AMLODIPINE BESYLATE 5 MG: 5 TABLET ORAL at 08:46

## 2024-07-12 RX ADMIN — IOPAMIDOL 75 ML: 755 INJECTION, SOLUTION INTRAVENOUS at 09:03

## 2024-07-12 RX ADMIN — POTASSIUM BICARBONATE 40 MEQ: 782 TABLET, EFFERVESCENT ORAL at 12:17

## 2024-07-12 RX ADMIN — SODIUM CHLORIDE, PRESERVATIVE FREE 10 ML: 5 INJECTION INTRAVENOUS at 03:19

## 2024-07-12 RX ADMIN — HYDROCHLOROTHIAZIDE 25 MG: 25 TABLET ORAL at 09:40

## 2024-07-12 RX ADMIN — SODIUM CHLORIDE, PRESERVATIVE FREE 10 ML: 5 INJECTION INTRAVENOUS at 20:37

## 2024-07-12 RX ADMIN — FENTANYL CITRATE 50 MCG: 50 INJECTION, SOLUTION INTRAMUSCULAR; INTRAVENOUS at 00:22

## 2024-07-12 RX ADMIN — SODIUM CHLORIDE, PRESERVATIVE FREE 40 MG: 5 INJECTION INTRAVENOUS at 08:47

## 2024-07-12 RX ADMIN — FENTANYL CITRATE 50 MCG: 50 INJECTION, SOLUTION INTRAMUSCULAR; INTRAVENOUS at 17:52

## 2024-07-12 RX ADMIN — SODIUM CHLORIDE, PRESERVATIVE FREE 10 ML: 5 INJECTION INTRAVENOUS at 06:42

## 2024-07-12 RX ADMIN — FENTANYL CITRATE 50 MCG: 50 INJECTION, SOLUTION INTRAMUSCULAR; INTRAVENOUS at 08:46

## 2024-07-12 RX ADMIN — FENTANYL CITRATE 50 MCG: 50 INJECTION, SOLUTION INTRAMUSCULAR; INTRAVENOUS at 11:26

## 2024-07-12 RX ADMIN — LOSARTAN POTASSIUM 100 MG: 50 TABLET, FILM COATED ORAL at 08:47

## 2024-07-12 RX ADMIN — FENTANYL CITRATE 50 MCG: 50 INJECTION, SOLUTION INTRAMUSCULAR; INTRAVENOUS at 03:18

## 2024-07-12 ASSESSMENT — PAIN DESCRIPTION - LOCATION
LOCATION: THROAT

## 2024-07-12 ASSESSMENT — PAIN - FUNCTIONAL ASSESSMENT
PAIN_FUNCTIONAL_ASSESSMENT: ACTIVITIES ARE NOT PREVENTED

## 2024-07-12 ASSESSMENT — PAIN SCALES - GENERAL
PAINLEVEL_OUTOF10: 10
PAINLEVEL_OUTOF10: 8
PAINLEVEL_OUTOF10: 8
PAINLEVEL_OUTOF10: 7
PAINLEVEL_OUTOF10: 8
PAINLEVEL_OUTOF10: 3
PAINLEVEL_OUTOF10: 8
PAINLEVEL_OUTOF10: 5
PAINLEVEL_OUTOF10: 8
PAINLEVEL_OUTOF10: 8
PAINLEVEL_OUTOF10: 7
PAINLEVEL_OUTOF10: 10

## 2024-07-12 ASSESSMENT — PAIN DESCRIPTION - DESCRIPTORS
DESCRIPTORS: ACHING;SORE;TENDER
DESCRIPTORS: ACHING;DISCOMFORT;SORE
DESCRIPTORS: DISCOMFORT
DESCRIPTORS: ACHING;SORE;DISCOMFORT
DESCRIPTORS: ACHING;SORE;TENDER
DESCRIPTORS: ACHING;DISCOMFORT;SORE
DESCRIPTORS: ACHING;DISCOMFORT
DESCRIPTORS: ACHING;DISCOMFORT
DESCRIPTORS: ACHING;DISCOMFORT;SORE
DESCRIPTORS: ACHING;SORE;DISCOMFORT
DESCRIPTORS: ACHING;DISCOMFORT
DESCRIPTORS: ACHING;SORE;DISCOMFORT
DESCRIPTORS: DISCOMFORT;SORE;ACHING

## 2024-07-12 ASSESSMENT — PAIN DESCRIPTION - ORIENTATION
ORIENTATION: LEFT

## 2024-07-12 NOTE — PROGRESS NOTES
Physician Progress Note    Patient - Dylan Dolan  Date of Admission -  2024 12:25 PM  Date of Evaluation -  2024  Room and Bed Number -  0406/0406-01   Hospital Day - 4  Cc- Neck mass   SUBJECTIVE:     No acute events     Review of Systems   Limited due to trach   Has pain at surgery site     On Regular diet with thin liquids   OBJECTIVE:     VITAL SIGNS:  BP (!) 144/96   Pulse 86   Temp 98.1 °F (36.7 °C) (Oral)   Resp 23   Ht 1.854 m (6' 1\")   Wt 104.8 kg (231 lb 0.7 oz)   SpO2 99%   BMI 30.48 kg/m²   Tmax over 24 hours:  Temp (24hrs), Av.7 °F (37.1 °C), Min:98.1 °F (36.7 °C), Max:99.5 °F (37.5 °C)      Patient Vitals for the past 8 hrs:   BP Temp Temp src Pulse Resp SpO2 Weight   24 0846 (!) 144/96 -- -- -- 23 -- --   24 0800 (!) 144/96 98.1 °F (36.7 °C) Oral 86 20 99 % --   24 0736 -- -- -- 72 19 97 % --   24 0641 -- -- -- -- 16 -- --   24 0426 (!) 135/95 98.2 °F (36.8 °C) Oral 84 21 97 % 104.8 kg (231 lb 0.7 oz)   24 0348 -- -- -- -- 21 -- --   24 0335 -- -- -- 83 15 98 % --   24 0318 -- -- -- -- 21 -- --         Intake/Output Summary (Last 24 hours) at 2024 1015  Last data filed at 2024 0856  Gross per 24 hour   Intake 2346.68 ml   Output 3525 ml   Net -1178.32 ml     Date 24 0000 - 24 2359   Shift 8041-2975 5043-7161 8256-7753 24 Hour Total   INTAKE   P.O.(mL/kg/hr) 850(1)   850   I.V.(mL/kg) 1046.7(10)   1046.7(10)   Shift Total(mL/kg) 1896.7(18.1)   1896.7(18.1)   OUTPUT   Urine(mL/kg/hr) 825(1) 500  1325   Shift Total(mL/kg) 825(7.9) 500(4.8)  1325(12.6)   Weight (kg) 104.8 104.8 104.8 104.8     Wt Readings from Last 3 Encounters:   24 104.8 kg (231 lb 0.7 oz)   24 108.9 kg (240 lb)   24 108 kg (238 lb)     Body mass index is 30.48 kg/m².        PHYSICAL EXAM:  Head and neck atraumatic, normocephalic    Lymph nodes-no cervical, supraclavicular lymphadenopathy    Neck-trach     Lungs - Ventilating all

## 2024-07-12 NOTE — PLAN OF CARE
Problem: Respiratory - Adult  Goal: Achieves optimal ventilation and oxygenation  7/12/2024 0741 by Daniella Hernandez RCP  Outcome: Progressing  Flowsheets (Taken 7/11/2024 2030 by Hoda Baldwin RN)  Achieves optimal ventilation and oxygenation:   Assess for changes in respiratory status   Assess for changes in mentation and behavior   Position to facilitate oxygenation and minimize respiratory effort   Oxygen supplementation based on oxygen saturation or arterial blood gases   Assess the need for suctioning and aspirate as needed   Respiratory therapy support as indicated

## 2024-07-12 NOTE — PROGRESS NOTES
PHARYNX/LARYNX:  The tonsillar pillars are normal in appearance.  The tongue is normal in appearance.  The valleculae, epiglottis, aryepiglottic folds and pyriform sinuses appear unremarkable. There is a nonspecific mass within the left vocal cord measuring 8 mm x 6 mm. Direct visual inspection of biopsy is suggested. SALIVARY GLANDS/THYROID:  The parotid and submandibular glands appear unremarkable.  The thyroid gland appears unremarkable. LYMPH NODES:  No cervical or supraclavicular lymphadenopathy is seen. SOFT TISSUES:  No appreciable soft tissue swelling or mass is seen.  Small sebaceous cyst is identified within the right zygomatic region measuring 1.4 cm. BRAIN/ORBITS/SINUSES:  The visualized portion of the intracranial contents appear unremarkable.  The visualized portion of the orbits, paranasal sinuses and mastoid air cells demonstrate no acute abnormality. LUNG APICES/SUPERIOR MEDIASTINUM:  No focal consolidation is seen within the visualized lung apices.  No superior mediastinal lymphadenopathy or mass. The visualized portion of the trachea appears unremarkable. BONES:  No aggressive appearing lytic or blastic bony lesion.     1. Nonspecific mass within the left vocal cord measuring 8 mm x 6 mm. Direct visual inspection and biopsy is suggested. 2. No cervical lymphadenopathy.        Impression:   Primary Problem  Vocal cord mass    Active Hospital Problems    Diagnosis Date Noted    Malignant neoplasm of vocal cord (HCC) [C32.0] 07/09/2024    Acute respiratory failure with hypoxia (HCC) [J96.01] 07/09/2024    Smoking addiction [F17.200] 07/09/2024    Vocal cord mass [J38.3] 07/08/2024    Vocal cord paralysis [J38.00] 07/08/2024       Assessment:  Vocal cord mass with frozen sample indicative of left vocal cord and subglottic squamous cell carcinoma.    Plan:  I reviewed the labs/imaging available to me,outside records and discussed with the patient.I explained to the patient the nature of this problem. I  reflects the content of the visit, the document can still have some errors including those of syntax and sound a like substitutions which may escape proof reading.  It such instances, actual meaning can be extrapolated by contextual diversion.

## 2024-07-12 NOTE — PROGRESS NOTES
BRIEF ENT NOTE:    To bedside for evening follow up for Dylan Dolan who is a 58 y.o. 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       Objective: Eyes closed at this time.  Remains with mild post operative oozing, occasional cough with less pink tinged secretions this evening.     Neck: #8 cuffed Shiley tracheostomy is in good position, sutures in place. The stoma is  intact with no bleeding. With expected pink tinged tracheal secretions.     Surgical risk factors:     UNABLE TO BE INTUBATED FROM ABOVE    Discussed the following plan of care with staff RN   Plan:  -As patient is not able to be intubated from above, recommend remaining inpatient until first trach change.   - ENT Will perform first trach change Monday to promote formalization  Fresh Trach Precautions Until 1st Trach Change by ENT     -Mild bloody oozing from trach tube and around trach stoma is expected  -Do not cut trach sutures for any reason without first contacting ENT  -Do not place more than 1 drain sponge under the tracheostomy tube at a time  -Keep an extra tracheostomy tube of the same size and one size smaller at bedside at all times  -Please have a suture removal kit, empty syringe, and flexible suction with suction set up at bedside at all times  - ENT will follow closely. Please call with questions/ change in patient condition.       --------------------------------------------------  ANGELICA Elizabeth  Pediatric Otolaryngology-Head and Neck Surgery  [facility] Otolaryngology group     Office  ph# [phone]    Also available in [employer]

## 2024-07-12 NOTE — PROGRESS NOTES
BRIEF ENT NOTE    With patient's verbal consent, patient's information was sent to Dr. Hernandes and Dr. Hernandez (Head and Neck Surgery) at [facility] for evaluation and consideration for total laryngectomy.  Will keep care team updated regarding timing of outpatient visit.    Rayo Jensen MD  Otolaryngology - Head and Neck Surgery  [facility] @ [facility]

## 2024-07-12 NOTE — PROGRESS NOTES
[facility]     Department of Internal Medicine - Staff Internal Medicine Service   ICU PATIENT TRANSFER NOTE        Patient:  Dylan Dolan  YOB: 1965  MRN: [id number]     Acct: [id number]     Admit date: 7/8/2024    Code Status:-  Full code     Reason for ICU Admission:-   airway management    SUPPORT DEVICES: [] Ventilator [] BIPAP  [] Nasal Cannula [] Room Air    Consultations:- [] Cardiology [] Nephrology  [x] Hemo onco  [] GI                               [] ID [x] ENT  [] Rheum [] Endo   []Physiotherapy                                 Others:-     NUTRITION:  [] NPO [] Tube Feeding (Specify: ) [] TPN  [x] PO    Central Lines:- [x] No   [] Yes           If yes - Days/Date of Insertion.      Pt seen,examined and Chart reviewed.    ICU COURSE:    The patient is a 58 y.o. male with past medical history significant for hypertension, hyperlipidemia, T2DM, GERD, obesity, smoking who was initially admitted on 7/8/2024 with Vocal cord mass [J38.3]  Vocal cord paralysis [J38.00] and presented to [place] ER with cough, shortness of breath, voice changes ongoing for few weeks.  He was found to have stridor.  Patient was given racemic epi and steroids and CT scan of the neck revealed nonspecific left vocal cord mass of 8 mm x 6 mm.  Patient was then transferred to [facility] ICU for further evaluation and treatment.    In the ICU, ENT was consulted and they evaluated the patient. They recommended CT scan of chest and biopsy for further management.  Patient underwent a successful direct laryngoscopy with biopsy of the mass and tracheostomy on 07/09/2024.  Frozen section results positive for SCC. Hematology and oncology was consulted,  recommended PET CT out patient.  Radiation oncology was consulted on 07/10/24 and they recommended outpatient treatment for him.    Currently, patient is alert and oriented. Trach mask in place. Denies any acute  flush, sodium chloride, potassium chloride **OR** potassium chloride, magnesium sulfate, ondansetron **OR** ondansetron, polyethylene glycol, acetaminophen **OR** acetaminophen, labetalol, hydrALAZINE, glucose, dextrose bolus **OR** dextrose bolus, glucagon (rDNA), dextrose, melatonin    Objective:    CBC:   Recent Labs     07/09/24  0639 07/10/24  0520 07/11/24  0314   WBC 15.9* 18.9* 13.6*   HGB 14.5 13.5 12.8*    289 265     BMP:    Recent Labs     07/09/24  0639 07/10/24  0520 07/11/24  0314   * 134* 133*   K 4.3 4.0 3.9   CL 95* 100 103   CO2 23 24 22   BUN 19 16 15   CREATININE 0.8 0.7 0.7   GLUCOSE 153* 153* 128*     Calcium:  Recent Labs     07/11/24  0314   CALCIUM 8.4*     Ionized Calcium:Invalid input(s): \"IONCA\"  Magnesium:No results for input(s): \"MG\" in the last 72 hours.  Phosphorus:No results for input(s): \"PHOS\" in the last 72 hours.  BNP:No results for input(s): \"BNP\" in the last 72 hours.  Glucose:  Recent Labs     07/11/24  1129 07/11/24  1548 07/11/24  2206   POCGLU 121* 111* 130*     HgbA1C: No results for input(s): \"LABA1C\" in the last 72 hours.  INR: No results for input(s): \"INR\" in the last 72 hours.  Hepatic: No results for input(s): \"ALKPHOS\", \"ALT\", \"AST\", \"BILITOT\", \"BILIDIR\", \"LABALBU\" in the last 72 hours.    Invalid input(s): \"PROT\"  Amylase and Lipase:No results for input(s): \"LACTA\", \"AMYLASE\" in the last 72 hours.  Lactic Acid: No results for input(s): \"LACTA\" in the last 72 hours.  CARDIAC ENZYMES:No results for input(s): \"CKTOTAL\", \"CKMB\", \"CKMBINDEX\", \"TROPONINI\" in the last 72 hours.  BNP: No results for input(s): \"BNP\" in the last 72 hours.  Lipids: No results for input(s): \"CHOL\", \"TRIG\", \"HDL\", \"LDL\" in the last 72 hours.    Invalid input(s): \"LDLCALC\"  ABGs: No results found for: \"PH\", \"PCO2\", \"PO2\", \"HCO3\", \"O2SAT\"  Thyroid:   Lab Results   Component Value Date/Time    TSH 1.31 06/08/2016 12:01 PM        Urinalysis: No results for input(s): \"BACTERIA\",

## 2024-07-12 NOTE — CARE COORDINATION
Post Acute Facility/Agency List     Provided patient with the following list, the list includes the overall star ratings obtained from CMS per the Medicare Web site (www.Medicare.gov):     [] Long Term Acute Care Facilities  [] Acute Inpatient Rehabilitation Facilities  [x] Skilled Nursing Facilities  [x] Home Care    Provided verbal instructions on how to utilize the QR Code to obtain additional detailed star ratings from www.Medicare.gov     offered to print and provide the detailed list:    []Accepted   [x]Declined    The following printed detailed lists were provided: n/a              Transitional planning      Writer to room to discuss d/c plan, goal is home but discussed SNF and home care both lists provided,  ENT on board, plan for first trach change on Monday, heme-onc on board, needs PET/CT outpatient, goal is home, will need supplies ordered. PS sent to MD for orders.

## 2024-07-12 NOTE — PLAN OF CARE
Problem: Respiratory - Adult  Goal: Achieves optimal ventilation and oxygenation  7/12/2024 0241 by Hoda Baldwin RN  Outcome: Progressing  Flowsheets (Taken 7/11/2024 2030)  Achieves optimal ventilation and oxygenation:   Assess for changes in respiratory status   Assess for changes in mentation and behavior   Position to facilitate oxygenation and minimize respiratory effort   Oxygen supplementation based on oxygen saturation or arterial blood gases   Assess the need for suctioning and aspirate as needed   Respiratory therapy support as indicated     Problem: Safety - Adult  Goal: Free from fall injury  7/12/2024 0241 by Hoda Baldwin RN  Outcome: Progressing  Flowsheets (Taken 7/11/2024 2030)  Free From Fall Injury: Instruct family/caregiver on patient safety     Problem: Pain  Goal: Verbalizes/displays adequate comfort level or baseline comfort level  7/12/2024 0241 by Hoda Baldwin RN  Outcome: Progressing  Flowsheets (Taken 7/11/2024 2042)  Verbalizes/displays adequate comfort level or baseline comfort level: Assess pain using appropriate pain scale     Problem: Chronic Conditions and Co-morbidities  Goal: Patient's chronic conditions and co-morbidity symptoms are monitored and maintained or improved  7/12/2024 0241 by Hoda Baldwin RN  Outcome: Progressing  Flowsheets (Taken 7/11/2024 2030)  Care Plan - Patient's Chronic Conditions and Co-Morbidity Symptoms are Monitored and Maintained or Improved:   Monitor and assess patient's chronic conditions and comorbid symptoms for stability, deterioration, or improvement   Collaborate with multidisciplinary team to address chronic and comorbid conditions and prevent exacerbation or deterioration   Update acute care plan with appropriate goals if chronic or comorbid symptoms are exacerbated and prevent overall improvement and discharge

## 2024-07-12 NOTE — PROGRESS NOTES
Trach mask in place. Denies any acute complaints.    OBJECTIVE     Vital Signs:  BP (!) 135/95   Pulse 84   Temp 98.2 °F (36.8 °C) (Oral)   Resp 21   Ht 1.854 m (6' 1\")   Wt 104.8 kg (231 lb 0.7 oz)   SpO2 97%   BMI 30.48 kg/m²     Temp (24hrs), Av.9 °F (37.2 °C), Min:98.2 °F (36.8 °C), Max:99.5 °F (37.5 °C)    In: 2463.6   Out: 4200 [Urine:4200]    Physical Exam:  Physical Exam  Constitutional:       Appearance: Normal appearance.   HENT:      Head: Normocephalic and atraumatic.      Mouth/Throat:      Comments: Tracheotomy tube in place.  Cardiovascular:      Rate and Rhythm: Normal rate and regular rhythm.      Pulses: Normal pulses.      Heart sounds: Normal heart sounds.   Pulmonary:      Effort: Pulmonary effort is normal.      Breath sounds: Normal breath sounds.   Abdominal:      General: Abdomen is flat.   Neurological:      Mental Status: He is alert.   Psychiatric:         Mood and Affect: Mood normal.         Behavior: Behavior normal.           Medications:  Scheduled Medications:    amLODIPine  5 mg Oral Daily    atorvastatin  40 mg Oral Daily    losartan  100 mg Oral Daily    And    hydroCHLOROthiazide  25 mg Oral Daily    sodium chloride flush  5-40 mL IntraVENous 2 times per day    pantoprazole (PROTONIX) 40 mg in sodium chloride (PF) 0.9 % 10 mL injection  40 mg IntraVENous Daily    ipratropium 0.5 mg-albuterol 2.5 mg  1 Dose Inhalation Q4H WA RT    budesonide-formoterol  2 puff Inhalation BID RT    [Held by provider] heparin (porcine)  5,000 Units SubCUTAneous 3 times per day    nicotine  1 patch TransDERmal Daily    insulin lispro  0-8 Units SubCUTAneous Q4H     Continuous Infusions:    sodium chloride      sodium chloride 100 mL/hr at 24 1241    dextrose       PRN MedicationsfentanNYL, 50 mcg, Q2H PRN  LORazepam, 1 mg, Q6H PRN  morphine, 2 mg, Q4H PRN  sodium chloride flush, 5-40 mL, PRN  sodium chloride, , PRN  potassium chloride, 20 mEq, PRN   Or  potassium chloride, 10 mEq,  process.     CT SOFT TISSUE NECK W CONTRAST    Result Date: 7/8/2024  1. Nonspecific mass within the left vocal cord measuring 8 mm x 6 mm. Direct visual inspection and biopsy is suggested. 2. No cervical lymphadenopathy.       ASSESSMENT & PLAN     Assessment and Plan:    Principal Problem:    Vocal cord mass  Active Problems:    Vocal cord paralysis    Malignant neoplasm of vocal cord (HCC)    Acute respiratory failure with hypoxia (HCC)    Smoking addiction  Resolved Problems:    * No resolved hospital problems. *      Acute hypoxic respiratory failure: On 5 L via trach mask.  Wean off as tolerated.  Continue bronchodilators.     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  S/p direct laryngoscopy and biopsy frozen section result positive for squamous cell carcinoma.  -Follow-up on final biopsy results.  -Follow-up on CT chest  -ENT on board.  Plan for first trach change on Monday.  -Heme-onc on board.  Needs PET/CT outpatient     Hypertension: Resumed Norvasc and Hyzaar.  Monitor blood pressure     Type 2 diabetes mellitus:   -Medium dose sliding scale.  Glucose checks.  Hypoglycemia protocol    Diet: Adult diet  DVT ppx : On Eliquis  GI ppx: Protonix    PT/OT: On board  Discharge Planning / SW: Will discharge once medically stable.      Franky Vivas MD  Internal Medicine Resident, PGY-1  [place], [place]  7/12/2024 6:11 AM

## 2024-07-13 PROBLEM — Z93.0 TRACHEOSTOMY IN PLACE (HCC): Status: ACTIVE | Noted: 2024-07-13

## 2024-07-13 LAB
ANION GAP SERPL CALCULATED.3IONS-SCNC: 11 MMOL/L (ref 9–16)
BASOPHILS # BLD: 0.03 K/UL (ref 0–0.2)
BASOPHILS NFR BLD: 0 % (ref 0–2)
BUN SERPL-MCNC: 13 MG/DL (ref 6–20)
CALCIUM SERPL-MCNC: 8.4 MG/DL (ref 8.6–10.4)
CHLORIDE SERPL-SCNC: 97 MMOL/L (ref 98–107)
CO2 SERPL-SCNC: 22 MMOL/L (ref 20–31)
CREAT SERPL-MCNC: 0.7 MG/DL (ref 0.7–1.2)
EOSINOPHIL # BLD: 0.2 K/UL (ref 0–0.44)
EOSINOPHILS RELATIVE PERCENT: 2 % (ref 1–4)
ERYTHROCYTE [DISTWIDTH] IN BLOOD BY AUTOMATED COUNT: 12.3 % (ref 11.8–14.4)
GFR, ESTIMATED: >90 ML/MIN/1.73M2
GLUCOSE BLD-MCNC: 135 MG/DL (ref 75–110)
GLUCOSE BLD-MCNC: 138 MG/DL (ref 75–110)
GLUCOSE BLD-MCNC: 153 MG/DL (ref 75–110)
GLUCOSE BLD-MCNC: 199 MG/DL (ref 75–110)
GLUCOSE BLD-MCNC: 209 MG/DL (ref 75–110)
GLUCOSE BLD-MCNC: 227 MG/DL (ref 75–110)
GLUCOSE SERPL-MCNC: 135 MG/DL (ref 74–99)
HCT VFR BLD AUTO: 38.6 % (ref 40.7–50.3)
HGB BLD-MCNC: 13.1 G/DL (ref 13–17)
IMM GRANULOCYTES # BLD AUTO: 0.1 K/UL (ref 0–0.3)
IMM GRANULOCYTES NFR BLD: 1 %
LYMPHOCYTES NFR BLD: 1.73 K/UL (ref 1.1–3.7)
LYMPHOCYTES RELATIVE PERCENT: 13 % (ref 24–43)
MCH RBC QN AUTO: 31.5 PG (ref 25.2–33.5)
MCHC RBC AUTO-ENTMCNC: 33.9 G/DL (ref 28.4–34.8)
MCV RBC AUTO: 92.8 FL (ref 82.6–102.9)
MICROORGANISM SPEC CULT: NORMAL
MICROORGANISM SPEC CULT: NORMAL
MONOCYTES NFR BLD: 1.13 K/UL (ref 0.1–1.2)
MONOCYTES NFR BLD: 8 % (ref 3–12)
NEUTROPHILS NFR BLD: 76 % (ref 36–65)
NEUTS SEG NFR BLD: 10.37 K/UL (ref 1.5–8.1)
NRBC BLD-RTO: 0 PER 100 WBC
PLATELET # BLD AUTO: 265 K/UL (ref 138–453)
PMV BLD AUTO: 8.8 FL (ref 8.1–13.5)
POTASSIUM SERPL-SCNC: 3.6 MMOL/L (ref 3.7–5.3)
RBC # BLD AUTO: 4.16 M/UL (ref 4.21–5.77)
SERVICE CMNT-IMP: NORMAL
SERVICE CMNT-IMP: NORMAL
SODIUM SERPL-SCNC: 130 MMOL/L (ref 136–145)
SPECIMEN DESCRIPTION: NORMAL
SPECIMEN DESCRIPTION: NORMAL
WBC OTHER # BLD: 13.6 K/UL (ref 3.5–11.3)

## 2024-07-13 PROCEDURE — 6360000002 HC RX W HCPCS: Performed by: STUDENT IN AN ORGANIZED HEALTH CARE EDUCATION/TRAINING PROGRAM

## 2024-07-13 PROCEDURE — 6360000002 HC RX W HCPCS

## 2024-07-13 PROCEDURE — 94761 N-INVAS EAR/PLS OXIMETRY MLT: CPT

## 2024-07-13 PROCEDURE — 85025 COMPLETE CBC W/AUTO DIFF WBC: CPT

## 2024-07-13 PROCEDURE — 51798 US URINE CAPACITY MEASURE: CPT

## 2024-07-13 PROCEDURE — 36415 COLL VENOUS BLD VENIPUNCTURE: CPT

## 2024-07-13 PROCEDURE — 2700000000 HC OXYGEN THERAPY PER DAY

## 2024-07-13 PROCEDURE — 99232 SBSQ HOSP IP/OBS MODERATE 35: CPT | Performed by: INTERNAL MEDICINE

## 2024-07-13 PROCEDURE — 6370000000 HC RX 637 (ALT 250 FOR IP)

## 2024-07-13 PROCEDURE — 6370000000 HC RX 637 (ALT 250 FOR IP): Performed by: STUDENT IN AN ORGANIZED HEALTH CARE EDUCATION/TRAINING PROGRAM

## 2024-07-13 PROCEDURE — 2580000003 HC RX 258: Performed by: STUDENT IN AN ORGANIZED HEALTH CARE EDUCATION/TRAINING PROGRAM

## 2024-07-13 PROCEDURE — 82947 ASSAY GLUCOSE BLOOD QUANT: CPT

## 2024-07-13 PROCEDURE — 80048 BASIC METABOLIC PNL TOTAL CA: CPT

## 2024-07-13 PROCEDURE — 2060000000 HC ICU INTERMEDIATE R&B

## 2024-07-13 RX ADMIN — Medication 1 MG: at 00:03

## 2024-07-13 RX ADMIN — DESMOPRESSIN ACETATE 40 MG: 0.2 TABLET ORAL at 08:42

## 2024-07-13 RX ADMIN — MORPHINE SULFATE 2 MG: 2 INJECTION, SOLUTION INTRAMUSCULAR; INTRAVENOUS at 09:04

## 2024-07-13 RX ADMIN — FENTANYL CITRATE 50 MCG: 50 INJECTION, SOLUTION INTRAMUSCULAR; INTRAVENOUS at 18:50

## 2024-07-13 RX ADMIN — LOSARTAN POTASSIUM 100 MG: 50 TABLET, FILM COATED ORAL at 08:43

## 2024-07-13 RX ADMIN — SODIUM CHLORIDE, PRESERVATIVE FREE 10 ML: 5 INJECTION INTRAVENOUS at 07:42

## 2024-07-13 RX ADMIN — Medication 1 MG: at 23:50

## 2024-07-13 RX ADMIN — HEPARIN SODIUM 5000 UNITS: 5000 INJECTION INTRAVENOUS; SUBCUTANEOUS at 21:07

## 2024-07-13 RX ADMIN — FENTANYL CITRATE 50 MCG: 50 INJECTION, SOLUTION INTRAMUSCULAR; INTRAVENOUS at 05:35

## 2024-07-13 RX ADMIN — MORPHINE SULFATE 2 MG: 2 INJECTION, SOLUTION INTRAMUSCULAR; INTRAVENOUS at 16:16

## 2024-07-13 RX ADMIN — FENTANYL CITRATE 50 MCG: 50 INJECTION, SOLUTION INTRAMUSCULAR; INTRAVENOUS at 16:55

## 2024-07-13 RX ADMIN — FENTANYL CITRATE 50 MCG: 50 INJECTION, SOLUTION INTRAMUSCULAR; INTRAVENOUS at 07:41

## 2024-07-13 RX ADMIN — Medication 1 MG: at 12:10

## 2024-07-13 RX ADMIN — FENTANYL CITRATE 50 MCG: 50 INJECTION, SOLUTION INTRAMUSCULAR; INTRAVENOUS at 12:09

## 2024-07-13 RX ADMIN — FENTANYL CITRATE 50 MCG: 50 INJECTION, SOLUTION INTRAMUSCULAR; INTRAVENOUS at 09:50

## 2024-07-13 RX ADMIN — Medication 6 MG: at 21:14

## 2024-07-13 RX ADMIN — FENTANYL CITRATE 50 MCG: 50 INJECTION, SOLUTION INTRAMUSCULAR; INTRAVENOUS at 14:53

## 2024-07-13 RX ADMIN — POTASSIUM BICARBONATE 40 MEQ: 782 TABLET, EFFERVESCENT ORAL at 07:42

## 2024-07-13 RX ADMIN — MORPHINE SULFATE 2 MG: 2 INJECTION, SOLUTION INTRAMUSCULAR; INTRAVENOUS at 22:31

## 2024-07-13 RX ADMIN — SODIUM CHLORIDE, PRESERVATIVE FREE 40 MG: 5 INJECTION INTRAVENOUS at 08:43

## 2024-07-13 RX ADMIN — Medication 1 MG: at 18:04

## 2024-07-13 RX ADMIN — INSULIN LISPRO 2 UNITS: 100 INJECTION, SOLUTION INTRAVENOUS; SUBCUTANEOUS at 22:31

## 2024-07-13 RX ADMIN — SODIUM CHLORIDE, PRESERVATIVE FREE 10 ML: 5 INJECTION INTRAVENOUS at 21:07

## 2024-07-13 RX ADMIN — FENTANYL CITRATE 50 MCG: 50 INJECTION, SOLUTION INTRAMUSCULAR; INTRAVENOUS at 02:16

## 2024-07-13 RX ADMIN — INSULIN LISPRO 2 UNITS: 100 INJECTION, SOLUTION INTRAVENOUS; SUBCUTANEOUS at 15:17

## 2024-07-13 RX ADMIN — POLYETHYLENE GLYCOL 3350 17 G: 17 POWDER, FOR SOLUTION ORAL at 09:50

## 2024-07-13 RX ADMIN — HEPARIN SODIUM 5000 UNITS: 5000 INJECTION INTRAVENOUS; SUBCUTANEOUS at 13:44

## 2024-07-13 RX ADMIN — FENTANYL CITRATE 50 MCG: 50 INJECTION, SOLUTION INTRAMUSCULAR; INTRAVENOUS at 21:06

## 2024-07-13 RX ADMIN — FENTANYL CITRATE 50 MCG: 50 INJECTION, SOLUTION INTRAMUSCULAR; INTRAVENOUS at 22:59

## 2024-07-13 RX ADMIN — AMLODIPINE BESYLATE 5 MG: 5 TABLET ORAL at 08:42

## 2024-07-13 RX ADMIN — Medication 1 MG: at 06:24

## 2024-07-13 ASSESSMENT — PAIN SCALES - GENERAL
PAINLEVEL_OUTOF10: 8
PAINLEVEL_OUTOF10: 6
PAINLEVEL_OUTOF10: 9
PAINLEVEL_OUTOF10: 7
PAINLEVEL_OUTOF10: 8
PAINLEVEL_OUTOF10: 9
PAINLEVEL_OUTOF10: 8
PAINLEVEL_OUTOF10: 8
PAINLEVEL_OUTOF10: 9
PAINLEVEL_OUTOF10: 8
PAINLEVEL_OUTOF10: 9
PAINLEVEL_OUTOF10: 8

## 2024-07-13 ASSESSMENT — PAIN DESCRIPTION - LOCATION
LOCATION: THROAT
LOCATION: THROAT;NECK
LOCATION: THROAT
LOCATION: NECK;THROAT
LOCATION: THROAT

## 2024-07-13 ASSESSMENT — PAIN - FUNCTIONAL ASSESSMENT
PAIN_FUNCTIONAL_ASSESSMENT: ACTIVITIES ARE NOT PREVENTED

## 2024-07-13 ASSESSMENT — PAIN DESCRIPTION - ORIENTATION
ORIENTATION: LEFT

## 2024-07-13 ASSESSMENT — PAIN DESCRIPTION - DESCRIPTORS
DESCRIPTORS: ACHING;DISCOMFORT
DESCRIPTORS: ACHING;DISCOMFORT;SORE
DESCRIPTORS: ACHING;DISCOMFORT
DESCRIPTORS: ACHING;DISCOMFORT
DESCRIPTORS: ACHING;DISCOMFORT;SORE
DESCRIPTORS: DISCOMFORT
DESCRIPTORS: ACHING;DISCOMFORT;SORE
DESCRIPTORS: DISCOMFORT;ACHING
DESCRIPTORS: ACHING;DISCOMFORT

## 2024-07-13 NOTE — PLAN OF CARE
Problem: Chronic Conditions and Co-morbidities  Goal: Patient's chronic conditions and co-morbidity symptoms are monitored and maintained or improved  7/13/2024 0739 by Fatimah Spence  Outcome: Progressing  7/12/2024 2059 by Yarely Kim RN  Outcome: Progressing     Problem: Discharge Planning  Goal: Discharge to home or other facility with appropriate resources  7/13/2024 0739 by Fatimah Spence  Outcome: Progressing  7/12/2024 2059 by Yarely Kim, RN  Outcome: Progressing     Problem: Pain  Goal: Verbalizes/displays adequate comfort level or baseline comfort level  7/13/2024 0739 by Fatimah Spence  Outcome: Progressing  7/12/2024 2059 by Yarely Kim RN  Outcome: Progressing

## 2024-07-13 NOTE — PROGRESS NOTES
07/12/24 2122   Care Plan - Respiratory Goals   Achieves optimal ventilation and oxygenation Respiratory therapy support as indicated;Assess and instruct to report shortness of breath or any respiratory difficulty;Assess the need for suctioning and aspirate as needed;Encourage broncho-pulmonary hygiene including cough, deep breathe, incentive spirometry;Initiate smoking cessation protocol as indicated;Oxygen supplementation based on oxygen saturation or arterial blood gases;Position to facilitate oxygenation and minimize respiratory effort;Assess for changes in mentation and behavior;Assess for changes in respiratory status

## 2024-07-13 NOTE — PROGRESS NOTES
unremarkable. LYMPH NODES:  No cervical or supraclavicular lymphadenopathy is seen. SOFT TISSUES:  No appreciable soft tissue swelling or mass is seen.  Small sebaceous cyst is identified within the right zygomatic region measuring 1.4 cm. BRAIN/ORBITS/SINUSES:  The visualized portion of the intracranial contents appear unremarkable.  The visualized portion of the orbits, paranasal sinuses and mastoid air cells demonstrate no acute abnormality. LUNG APICES/SUPERIOR MEDIASTINUM:  No focal consolidation is seen within the visualized lung apices.  No superior mediastinal lymphadenopathy or mass. The visualized portion of the trachea appears unremarkable. BONES:  No aggressive appearing lytic or blastic bony lesion.     1. Nonspecific mass within the left vocal cord measuring 8 mm x 6 mm. Direct visual inspection and biopsy is suggested. 2. No cervical lymphadenopathy.        Impression:   Primary Problem  Vocal cord mass    Active Hospital Problems    Diagnosis Date Noted    Malignant neoplasm of vocal cord (HCC) [C32.0] 07/09/2024    Acute respiratory failure with hypoxia (HCC) [J96.01] 07/09/2024    Smoking addiction [F17.200] 07/09/2024    Vocal cord mass [J38.3] 07/08/2024    Vocal cord paralysis [J38.00] 07/08/2024       Assessment:  Vocal cord mass with frozen sample indicative of left vocal cord and subglottic squamous cell carcinoma.    Plan:  I reviewed the labs/imaging available to me,outside records and discussed with the patient.I explained to the patient the nature of this problem. I explained the significance of these abnormalities and possible etiology and management options ENT input output  ENT input appreciated  Plan surgical intervention as well as radiation oncology intervention  Will follow      Discussed with patient and Nurse.    We will continue to follow up on this patient.        Electronically signed by Christa Blackburn MD            This note is created with the assistance of a speech  recognition program.  While intending to generate a document that actually reflects the content of the visit, the document can still have some errors including those of syntax and sound a like substitutions which may escape proof reading.  It such instances, actual meaning can be extrapolated by contextual diversion.

## 2024-07-13 NOTE — PROGRESS NOTES
[facility]  Internal Medicine Teaching Residency Program  Inpatient Daily Progress Note  ______________________________________________________________________________    Patient: Dylan Dolan  YOB: 1965   MRN:[id number]    Acct: [id number]     Room: [facility]6/0406-01  Admit date: 7/8/2024  Today's date: 07/13/24  Number of days in the hospital: 5    SUBJECTIVE   Admitting Diagnosis: Vocal cord mass  CC: cough, shortness of breath, change in his voice    No acute events overnight.    Pt examined at bedside. Chart & results reviewed.   Denies any acute complaints.  Saturating well on 5 L  He Is ambulating well  Urine output 4.4 L over 24 hours  Labs this morning reviewed  Sodium 130.  Potassium 3.6 replaced.  CT chest negative for metastasis.      BRIEF HISTORY     The patient is a 58 y.o. male with past medical history significant for hypertension, hyperlipidemia, T2DM, GERD, obesity, smoking who was initially admitted on 7/8/2024 with Vocal cord mass [J38.3]  Vocal cord paralysis [J38.00] and presented to [place] ER with cough, shortness of breath, voice changes ongoing for few weeks.  He was found to have stridor.  Patient was given racemic epi and steroids and CT scan of the neck revealed nonspecific left vocal cord mass of 8 mm x 6 mm.  Patient was then transferred to [facility] ICU for further evaluation and treatment.     In the ICU, ENT was consulted and they evaluated the patient. They recommended CT scan of chest and biopsy for further management.  Patient underwent a successful direct laryngoscopy with biopsy of the mass and tracheostomy on 07/09/2024.  Frozen section results positive for SCC. Hematology and oncology was consulted,  recommended PET CT out patient.  Radiation oncology was consulted on 07/10/24 and they recommended outpatient treatment for him.     Currently, patient is alert and oriented. Trach mask in place. Denies  Or  potassium chloride, 10 mEq, PRN  magnesium sulfate, 2,000 mg, PRN  ondansetron, 4 mg, Q8H PRN   Or  ondansetron, 4 mg, Q6H PRN  polyethylene glycol, 17 g, Daily PRN  acetaminophen, 650 mg, Q6H PRN   Or  acetaminophen, 650 mg, Q6H PRN  labetalol, 10 mg, Q6H PRN  hydrALAZINE, 10 mg, Q6H PRN  glucose, 4 tablet, PRN  dextrose bolus, 125 mL, PRN   Or  dextrose bolus, 250 mL, PRN  glucagon (rDNA), 1 mg, PRN  dextrose, , Continuous PRN  melatonin, 6 mg, Nightly PRN        Diagnostic Labs:  CBC:   Recent Labs     07/11/24 0314 07/12/24  0513 07/13/24  0402   WBC 13.6* 12.7* 13.6*   RBC 4.05* 4.05* 4.16*   HGB 12.8* 12.7* 13.1   HCT 38.8* 37.6* 38.6*   MCV 95.8 92.8 92.8   RDW 12.6 12.2 12.3    247 265       BMP:   Recent Labs     07/11/24 0314 07/12/24  0513 07/13/24  0402   * 131* 130*   K 3.9 3.5* 3.6*    99 97*   CO2 22 21 22   BUN 15 15 13   CREATININE 0.7 0.6* 0.7       BNP: No results for input(s): \"BNP\" in the last 72 hours.  PT/INR: No results for input(s): \"PROTIME\", \"INR\" in the last 72 hours.  APTT: No results for input(s): \"APTT\" in the last 72 hours.  CARDIAC ENZYMES: No results for input(s): \"CKMB\", \"CKMBINDEX\", \"TROPONINI\" in the last 72 hours.    Invalid input(s): \"CKTOTAL;3\"  FASTING LIPID PANEL:  Lab Results   Component Value Date    CHOL 120 06/19/2023    HDL 25 (L) 06/19/2023    TRIG 289 (H) 06/19/2023     LIVER PROFILE: No results for input(s): \"AST\", \"ALT\", \"BILIDIR\", \"BILITOT\", \"ALKPHOS\" in the last 72 hours.    Invalid input(s): \"ALB\"   MICROBIOLOGY:   Lab Results   Component Value Date/Time    CULTURE  07/08/2024 03:58 PM     MULTIPLE SPECIES OF GRAM NEGATIVE BACTERIA, NO PREDOMINANT ORGANISM ISOLATED.       Imaging:    FL MODIFIED BARIUM SWALLOW W VIDEO    Result Date: 7/11/2024  Swallowing mechanism grossly within normal limits without evidence of aspiration. Please see separate speech pathology report for full discussion of findings and recommendations.     XR CHEST

## 2024-07-13 NOTE — PROGRESS NOTES
07/13/24 1941   Care Plan - Respiratory Goals   Achieves optimal ventilation and oxygenation Assess and instruct to report shortness of breath or any respiratory difficulty;Respiratory therapy support as indicated;Assess the need for suctioning and aspirate as needed;Encourage broncho-pulmonary hygiene including cough, deep breathe, incentive spirometry;Initiate smoking cessation protocol as indicated;Oxygen supplementation based on oxygen saturation or arterial blood gases;Position to facilitate oxygenation and minimize respiratory effort;Assess for changes in respiratory status;Assess for changes in mentation and behavior

## 2024-07-13 NOTE — PROGRESS NOTES
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 who is being seen for follow-up after emergent tracheostomy placement for airway control for a transglottic mass.  Surgical pathology consistent with left vocal fold SCC with subglottic extension    Subjective: Doing well this morning. Passed swallow study.  CT chest without evidence of metastasis.  Up in chair this morning    Pertinent Examination:   GENERAL: well developed and well nourished and in no acute distress  HEAD: normocephalic and atraumatic  EYES: no eyelid swelling, no conjunctival injection or exudate, pupils equal round and reactive to light  EXTERNAL EARS: normal  EAR EXAM: deferred  NOSE: nares patent, normal mucosa  MOUTH/THROAT: mucous membranes moist, no focal lesions, no tonsillar enlargement or exudate  NECK:  8 cuffed Shiley tracheostomy is in good position, sutures in place. The stoma is  intact with no bleeding. Expected blood tinged secretions  RESPIRATORY: Comfortable on humidified blow by     RELEVANT LABS/STUDIES:   Additional data reviewed:    CT Chest (7/12/2024): IMPRESSION:  1. No evidence of metastatic disease in the chest.  2. Dense bandlike opacity in the left lower lobe that appears new in  comparison to prior plain film imaging.  Atelectasis or developing  pneumonitis may be considered.  3. Trace left pleural effusion.  This is likely reactive.  4. Asymmetric soft tissue at the level of the left vocal cord which  correlates to prior findings.    Procedures:    None    Surgical risk factors:    UNABLE TO BE INTUBATED FROM ABOVE     IMPRESSION AND RECOMMENDATIONS:   Dylan Dolan is a 58 y.o. male who is being seen for follow-up after emergent tracheostomy placement for airway control for a transglottic mass performed on 7/9.  Surgical pathology confirming left vocal fold SCC with subglottic  None

## 2024-07-13 NOTE — PROGRESS NOTES
PULMONARY PROGRESS NOTE      Patient:  Dylan Dolan  YOB: 1965    MRN: [id number]     Acct: [id number]     Admit date: 7/8/2024    REASON FOR CONSULT:-Vocal cord mass    Pt seen and Chart reviewed.    Subjective:     57 yo with past medical history of HTN, HLD, T2DM, GERD, tobacco abuse, initially presented to ER with cough, worsening SOB, voice changes, he was found to have stridor. CT of the neck revealed nonspecific left vocal cord mass 8 mm x 6 mm.     He was [facility] to [facility] for further evaluation. 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 He underwent open tracheostomy    Biopsy results shows squamous cell carcinoma      Interval History  7/13/2024    On 5 L via trach mask  Afebrile, hemodynamically stable  ENT planning for trach change on Monday    Review of Systems -   Review of Systems   Constitutional:  Negative for fever.   Respiratory:  Positive for cough. Negative for shortness of breath.    Cardiovascular:  Negative for chest pain and leg swelling.   Gastrointestinal:  Negative for abdominal pain.   Musculoskeletal:  Negative for arthralgias and myalgias.           Physical Exam:  Vitals: /83   Pulse 83   Temp 97.5 °F (36.4 °C) (Axillary)   Resp 22   Ht 1.854 m (6' 1\")   Wt 99 kg (218 lb 4.1 oz)   SpO2 100%   BMI 28.80 kg/m²   24 hour intake/output:  Intake/Output Summary (Last 24 hours) at 7/13/2024 0918  Last data filed at 7/13/2024 0858  Gross per 24 hour   Intake 260 ml   Output 3950 ml   Net -3690 ml     Last 3 weights:  Wt Readings from Last 3 Encounters:   07/13/24 99 kg (218 lb 4.1 oz)   07/08/24 108.9 kg (240 lb)   07/02/24 108 kg (238 lb)       General appearance: alert and cooperative with exam  Physical Examination:   Physical Exam  Constitutional:       General: He is awake.      Comments: Trach mask in place   Cardiovascular:      Rate and Rhythm: Normal rate and regular rhythm.  for change of tracheoestomy tube on postop day 5.  Bronchodilators with DuoNeb aerosol.    Please note that this chart was generated using voice recognition Dragon dictation software. Although every effort was made to ensure the accuracy of this automated transcription, some errors in transcription may have occurred.     Patrice Ames MD  7/13/2024 7:15 PM

## 2024-07-14 LAB
ANION GAP SERPL CALCULATED.3IONS-SCNC: 16 MMOL/L (ref 9–16)
BASOPHILS # BLD: <0.03 K/UL (ref 0–0.2)
BASOPHILS NFR BLD: 0 % (ref 0–2)
BUN SERPL-MCNC: 11 MG/DL (ref 6–20)
CALCIUM SERPL-MCNC: 8.5 MG/DL (ref 8.6–10.4)
CHLORIDE SERPL-SCNC: 99 MMOL/L (ref 98–107)
CO2 SERPL-SCNC: 18 MMOL/L (ref 20–31)
CREAT SERPL-MCNC: 0.7 MG/DL (ref 0.7–1.2)
EOSINOPHIL # BLD: 0.24 K/UL (ref 0–0.44)
EOSINOPHILS RELATIVE PERCENT: 2 % (ref 1–4)
ERYTHROCYTE [DISTWIDTH] IN BLOOD BY AUTOMATED COUNT: 12.3 % (ref 11.8–14.4)
GFR, ESTIMATED: >90 ML/MIN/1.73M2
GLUCOSE BLD-MCNC: 146 MG/DL (ref 75–110)
GLUCOSE BLD-MCNC: 204 MG/DL (ref 75–110)
GLUCOSE BLD-MCNC: 215 MG/DL (ref 75–110)
GLUCOSE BLD-MCNC: 237 MG/DL (ref 75–110)
GLUCOSE BLD-MCNC: 243 MG/DL (ref 75–110)
GLUCOSE BLD-MCNC: 311 MG/DL (ref 75–110)
GLUCOSE SERPL-MCNC: 154 MG/DL (ref 74–99)
HCT VFR BLD AUTO: 36.7 % (ref 40.7–50.3)
HGB BLD-MCNC: 13 G/DL (ref 13–17)
IMM GRANULOCYTES # BLD AUTO: 0.09 K/UL (ref 0–0.3)
IMM GRANULOCYTES NFR BLD: 1 %
LYMPHOCYTES NFR BLD: 1.93 K/UL (ref 1.1–3.7)
LYMPHOCYTES RELATIVE PERCENT: 16 % (ref 24–43)
MCH RBC QN AUTO: 31.9 PG (ref 25.2–33.5)
MCHC RBC AUTO-ENTMCNC: 35.4 G/DL (ref 28.4–34.8)
MCV RBC AUTO: 90.2 FL (ref 82.6–102.9)
MONOCYTES NFR BLD: 1.03 K/UL (ref 0.1–1.2)
MONOCYTES NFR BLD: 8 % (ref 3–12)
NEUTROPHILS NFR BLD: 73 % (ref 36–65)
NEUTS SEG NFR BLD: 9.01 K/UL (ref 1.5–8.1)
NRBC BLD-RTO: 0 PER 100 WBC
PLATELET # BLD AUTO: 283 K/UL (ref 138–453)
PMV BLD AUTO: 8.9 FL (ref 8.1–13.5)
POTASSIUM SERPL-SCNC: 4 MMOL/L (ref 3.7–5.3)
RBC # BLD AUTO: 4.07 M/UL (ref 4.21–5.77)
SODIUM SERPL-SCNC: 133 MMOL/L (ref 136–145)
WBC OTHER # BLD: 12.3 K/UL (ref 3.5–11.3)

## 2024-07-14 PROCEDURE — 6360000002 HC RX W HCPCS: Performed by: STUDENT IN AN ORGANIZED HEALTH CARE EDUCATION/TRAINING PROGRAM

## 2024-07-14 PROCEDURE — 6370000000 HC RX 637 (ALT 250 FOR IP): Performed by: STUDENT IN AN ORGANIZED HEALTH CARE EDUCATION/TRAINING PROGRAM

## 2024-07-14 PROCEDURE — 94761 N-INVAS EAR/PLS OXIMETRY MLT: CPT

## 2024-07-14 PROCEDURE — 99232 SBSQ HOSP IP/OBS MODERATE 35: CPT | Performed by: INTERNAL MEDICINE

## 2024-07-14 PROCEDURE — 6360000002 HC RX W HCPCS

## 2024-07-14 PROCEDURE — 2060000000 HC ICU INTERMEDIATE R&B

## 2024-07-14 PROCEDURE — 36415 COLL VENOUS BLD VENIPUNCTURE: CPT

## 2024-07-14 PROCEDURE — 51798 US URINE CAPACITY MEASURE: CPT

## 2024-07-14 PROCEDURE — 85025 COMPLETE CBC W/AUTO DIFF WBC: CPT

## 2024-07-14 PROCEDURE — 2700000000 HC OXYGEN THERAPY PER DAY

## 2024-07-14 PROCEDURE — 80048 BASIC METABOLIC PNL TOTAL CA: CPT

## 2024-07-14 PROCEDURE — 2580000003 HC RX 258: Performed by: STUDENT IN AN ORGANIZED HEALTH CARE EDUCATION/TRAINING PROGRAM

## 2024-07-14 PROCEDURE — 94640 AIRWAY INHALATION TREATMENT: CPT

## 2024-07-14 PROCEDURE — 6370000000 HC RX 637 (ALT 250 FOR IP)

## 2024-07-14 PROCEDURE — 0B21XFZ CHANGE TRACHEOSTOMY DEVICE IN TRACHEA, EXTERNAL APPROACH: ICD-10-PCS | Performed by: STUDENT IN AN ORGANIZED HEALTH CARE EDUCATION/TRAINING PROGRAM

## 2024-07-14 RX ADMIN — FENTANYL CITRATE 50 MCG: 50 INJECTION, SOLUTION INTRAMUSCULAR; INTRAVENOUS at 21:08

## 2024-07-14 RX ADMIN — MORPHINE SULFATE 2 MG: 2 INJECTION, SOLUTION INTRAMUSCULAR; INTRAVENOUS at 03:36

## 2024-07-14 RX ADMIN — HEPARIN SODIUM 5000 UNITS: 5000 INJECTION INTRAVENOUS; SUBCUTANEOUS at 19:25

## 2024-07-14 RX ADMIN — SODIUM CHLORIDE, PRESERVATIVE FREE 40 MG: 5 INJECTION INTRAVENOUS at 08:22

## 2024-07-14 RX ADMIN — MORPHINE SULFATE 2 MG: 2 INJECTION, SOLUTION INTRAMUSCULAR; INTRAVENOUS at 09:58

## 2024-07-14 RX ADMIN — FENTANYL CITRATE 50 MCG: 50 INJECTION, SOLUTION INTRAMUSCULAR; INTRAVENOUS at 23:32

## 2024-07-14 RX ADMIN — FENTANYL CITRATE 50 MCG: 50 INJECTION, SOLUTION INTRAMUSCULAR; INTRAVENOUS at 15:50

## 2024-07-14 RX ADMIN — FENTANYL CITRATE 50 MCG: 50 INJECTION, SOLUTION INTRAMUSCULAR; INTRAVENOUS at 01:25

## 2024-07-14 RX ADMIN — FENTANYL CITRATE 50 MCG: 50 INJECTION, SOLUTION INTRAMUSCULAR; INTRAVENOUS at 06:09

## 2024-07-14 RX ADMIN — SODIUM CHLORIDE, PRESERVATIVE FREE 10 ML: 5 INJECTION INTRAVENOUS at 08:38

## 2024-07-14 RX ADMIN — IPRATROPIUM BROMIDE AND ALBUTEROL SULFATE 1 DOSE: 2.5; .5 SOLUTION RESPIRATORY (INHALATION) at 20:02

## 2024-07-14 RX ADMIN — MORPHINE SULFATE 2 MG: 2 INJECTION, SOLUTION INTRAMUSCULAR; INTRAVENOUS at 19:25

## 2024-07-14 RX ADMIN — INSULIN LISPRO 2 UNITS: 100 INJECTION, SOLUTION INTRAVENOUS; SUBCUTANEOUS at 23:32

## 2024-07-14 RX ADMIN — IPRATROPIUM BROMIDE AND ALBUTEROL SULFATE 1 DOSE: 2.5; .5 SOLUTION RESPIRATORY (INHALATION) at 15:26

## 2024-07-14 RX ADMIN — SODIUM CHLORIDE, PRESERVATIVE FREE 10 ML: 5 INJECTION INTRAVENOUS at 01:25

## 2024-07-14 RX ADMIN — SODIUM CHLORIDE, PRESERVATIVE FREE 10 ML: 5 INJECTION INTRAVENOUS at 19:26

## 2024-07-14 RX ADMIN — DESMOPRESSIN ACETATE 40 MG: 0.2 TABLET ORAL at 08:24

## 2024-07-14 RX ADMIN — Medication 1 MG: at 06:09

## 2024-07-14 RX ADMIN — Medication 1 MG: at 11:59

## 2024-07-14 RX ADMIN — IPRATROPIUM BROMIDE AND ALBUTEROL SULFATE 1 DOSE: 2.5; .5 SOLUTION RESPIRATORY (INHALATION) at 07:42

## 2024-07-14 RX ADMIN — HEPARIN SODIUM 5000 UNITS: 5000 INJECTION INTRAVENOUS; SUBCUTANEOUS at 14:03

## 2024-07-14 RX ADMIN — FENTANYL CITRATE 50 MCG: 50 INJECTION, SOLUTION INTRAMUSCULAR; INTRAVENOUS at 12:14

## 2024-07-14 RX ADMIN — INSULIN LISPRO 2 UNITS: 100 INJECTION, SOLUTION INTRAVENOUS; SUBCUTANEOUS at 15:53

## 2024-07-14 RX ADMIN — AMLODIPINE BESYLATE 5 MG: 5 TABLET ORAL at 08:23

## 2024-07-14 RX ADMIN — FENTANYL CITRATE 50 MCG: 50 INJECTION, SOLUTION INTRAMUSCULAR; INTRAVENOUS at 04:25

## 2024-07-14 RX ADMIN — IPRATROPIUM BROMIDE AND ALBUTEROL SULFATE 1 DOSE: 2.5; .5 SOLUTION RESPIRATORY (INHALATION) at 11:40

## 2024-07-14 RX ADMIN — INSULIN LISPRO 2 UNITS: 100 INJECTION, SOLUTION INTRAVENOUS; SUBCUTANEOUS at 08:23

## 2024-07-14 RX ADMIN — LOSARTAN POTASSIUM 100 MG: 50 TABLET, FILM COATED ORAL at 08:23

## 2024-07-14 RX ADMIN — INSULIN LISPRO 6 UNITS: 100 INJECTION, SOLUTION INTRAVENOUS; SUBCUTANEOUS at 19:37

## 2024-07-14 RX ADMIN — FENTANYL CITRATE 50 MCG: 50 INJECTION, SOLUTION INTRAMUSCULAR; INTRAVENOUS at 18:10

## 2024-07-14 RX ADMIN — FENTANYL CITRATE 50 MCG: 50 INJECTION, SOLUTION INTRAMUSCULAR; INTRAVENOUS at 08:23

## 2024-07-14 RX ADMIN — MORPHINE SULFATE 2 MG: 2 INJECTION, SOLUTION INTRAMUSCULAR; INTRAVENOUS at 14:29

## 2024-07-14 RX ADMIN — Medication 1 MG: at 22:23

## 2024-07-14 ASSESSMENT — PAIN DESCRIPTION - DESCRIPTORS
DESCRIPTORS: SHARP
DESCRIPTORS: SHARP
DESCRIPTORS: ACHING;SHARP
DESCRIPTORS: SHARP
DESCRIPTORS: SHARP

## 2024-07-14 ASSESSMENT — PAIN SCALES - GENERAL
PAINLEVEL_OUTOF10: 8
PAINLEVEL_OUTOF10: 0
PAINLEVEL_OUTOF10: 0
PAINLEVEL_OUTOF10: 7
PAINLEVEL_OUTOF10: 9
PAINLEVEL_OUTOF10: 4
PAINLEVEL_OUTOF10: 0
PAINLEVEL_OUTOF10: 7
PAINLEVEL_OUTOF10: 4
PAINLEVEL_OUTOF10: 8
PAINLEVEL_OUTOF10: 8
PAINLEVEL_OUTOF10: 0
PAINLEVEL_OUTOF10: 8
PAINLEVEL_OUTOF10: 8
PAINLEVEL_OUTOF10: 5
PAINLEVEL_OUTOF10: 8
PAINLEVEL_OUTOF10: 8
PAINLEVEL_OUTOF10: 5
PAINLEVEL_OUTOF10: 0
PAINLEVEL_OUTOF10: 6
PAINLEVEL_OUTOF10: 4
PAINLEVEL_OUTOF10: 8
PAINLEVEL_OUTOF10: 6
PAINLEVEL_OUTOF10: 8
PAINLEVEL_OUTOF10: 0
PAINLEVEL_OUTOF10: 1

## 2024-07-14 ASSESSMENT — PAIN DESCRIPTION - LOCATION
LOCATION: THROAT
LOCATION: NECK
LOCATION: NECK
LOCATION: THROAT
LOCATION: NECK
LOCATION: NECK
LOCATION: THROAT
LOCATION: NECK

## 2024-07-14 ASSESSMENT — PAIN SCALES - WONG BAKER
WONGBAKER_NUMERICALRESPONSE: HURTS A LITTLE BIT

## 2024-07-14 ASSESSMENT — PAIN DESCRIPTION - ORIENTATION
ORIENTATION: INNER

## 2024-07-14 NOTE — PROGRESS NOTES
PULMONARY PROGRESS NOTE      Patient:  Dylan Dolan  YOB: 1965    MRN: [id number]     Acct: [id number]     Admit date: 7/8/2024    REASON FOR CONSULT:-Vocal cord mass    Pt seen and Chart reviewed.    Subjective:     57 yo with past medical history of HTN, HLD, T2DM, GERD, tobacco abuse, initially presented to ER with cough, worsening SOB, voice changes, he was found to have stridor. CT of the neck revealed nonspecific left vocal cord mass 8 mm x 6 mm.     He was transferred to [facility] for further evaluation. 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 He underwent open tracheostomy    Biopsy results shows squamous cell carcinoma      Interval History  7/14/2024    On 5 L via trach mask 28%  Afebrile, hemodynamically stable  ENT planning for trach change today      CT scan of the chest reviewed done on 07/11/2024 there are mild area of platelike and bandlike atelectasis present in left lower lobe mass or nodule was seen.    Review of Systems -   Review of Systems   Constitutional:  Negative for fever.   Respiratory:  Positive for cough. Negative for shortness of breath.    Cardiovascular:  Negative for chest pain and leg swelling.   Gastrointestinal:  Negative for abdominal pain.   Musculoskeletal:  Negative for arthralgias and myalgias.           Physical Exam:  Vitals: /88   Pulse 76   Temp 99 °F (37.2 °C) (Oral)   Resp 20   Ht 1.854 m (6' 1\")   Wt 104.6 kg (230 lb 9.6 oz)   SpO2 100%   BMI 30.42 kg/m²   24 hour intake/output:  Intake/Output Summary (Last 24 hours) at 7/14/2024 1255  Last data filed at 7/14/2024 1202  Gross per 24 hour   Intake 1160 ml   Output 4150 ml   Net -2990 ml       Last 3 weights:  Wt Readings from Last 3 Encounters:   07/14/24 104.6 kg (230 lb 9.6 oz)   07/08/24 108.9 kg (240 lb)   07/02/24 108 kg (238 lb)       General appearance: alert and cooperative with exam  Physical Examination:

## 2024-07-14 NOTE — PROGRESS NOTES
[facility]  Internal Medicine Teaching Residency Program  Inpatient Daily Progress Note  ______________________________________________________________________________    Patient: Dylan Dolan  YOB: 1965   MRN:[id number]    Acct: [id number]     Room: 0406/0406-01  Admit date: 7/8/2024  Today's date: 07/14/24  Number of days in the hospital: 6    SUBJECTIVE   Admitting Diagnosis: Vocal cord mass    CC: cough, shortness of breath, change in his voice    Pt examined at bedside. Chart & results reviewed.  No acute events overnight.  Denies any acute complaints.  Patient is hemodynamically stable and maintaining saturation at 5 L of oxygen via tracheostomy tube.    Labs this morning reviewed.  Sodium is 133.  Potassium 4.0    Modified barium swallow performed on 07/10/24 demonstrated normal swallowing without evidence of aspiration.  CT chest without contrast performed on 07/11/24 demonstrated no evidence of metastasis to the chest.    ENT successfully changed tracheostomy tube today without any complications.  Will follow-up with their further recommendations.      BRIEF HISTORY     The patient is a 58 y.o. male with past medical history significant for hypertension, hyperlipidemia, T2DM, GERD, obesity, smoking who was initially admitted on 7/8/2024 with Vocal cord mass [J38.3]  Vocal cord paralysis [J38.00] and presented to [place] ER with cough, shortness of breath, voice changes ongoing for few weeks.  He was found to have stridor.  Patient was given racemic epi and steroids and CT scan of the neck revealed nonspecific left vocal cord mass of 8 mm x 6 mm.  Patient was then transferred to [facility] ICU for further evaluation and treatment.     In the ICU, ENT was consulted and they evaluated the patient. They recommended CT scan of chest and biopsy for further management.  Patient underwent a successful direct laryngoscopy with biopsy of  SW: Pending trach tube exchange.  Manager on board    Franky Vivas MD  Internal Medicine Resident, PGY-1  [place], [place]  7/14/2024 8:41 AM

## 2024-07-14 NOTE — PLAN OF CARE
Problem: Chronic Conditions and Co-morbidities  Goal: Patient's chronic conditions and co-morbidity symptoms are monitored and maintained or improved  7/13/2024 2045 by Yarely Kim RN  Outcome: Progressing  7/13/2024 0739 by Fatimah Spence  Outcome: Progressing     Problem: Discharge Planning  Goal: Discharge to home or other facility with appropriate resources  7/13/2024 2045 by Yarely Kim RN  Outcome: Progressing  7/13/2024 0739 by Fatimah Spence  Outcome: Progressing     Problem: Pain  Goal: Verbalizes/displays adequate comfort level or baseline comfort level  7/13/2024 2045 by Yarely Kim RN  Outcome: Progressing  7/13/2024 0739 by Fatimah Spence  Outcome: Progressing

## 2024-07-14 NOTE — PLAN OF CARE
Problem: Chronic Conditions and Co-morbidities  Goal: Patient's chronic conditions and co-morbidity symptoms are monitored and maintained or improved  7/14/2024 0456 by Aleksandra Moran RN  Outcome: Progressing  Flowsheets (Taken 7/13/2024 2300)  Care Plan - Patient's Chronic Conditions and Co-Morbidity Symptoms are Monitored and Maintained or Improved:   Monitor and assess patient's chronic conditions and comorbid symptoms for stability, deterioration, or improvement   Collaborate with multidisciplinary team to address chronic and comorbid conditions and prevent exacerbation or deterioration   Update acute care plan with appropriate goals if chronic or comorbid symptoms are exacerbated and prevent overall improvement and discharge  7/13/2024 2045 by Yarely Kim RN  Outcome: Progressing     Problem: Discharge Planning  Goal: Discharge to home or other facility with appropriate resources  7/14/2024 0456 by Aleksandra Moran RN  Outcome: Progressing  Flowsheets (Taken 7/13/2024 2300)  Discharge to home or other facility with appropriate resources:   Identify barriers to discharge with patient and caregiver   Arrange for needed discharge resources and transportation as appropriate   Identify discharge learning needs (meds, wound care, etc)   Refer to discharge planning if patient needs post-hospital services based on physician order or complex needs related to functional status, cognitive ability or social support system  7/13/2024 2045 by Yarely Kim RN  Outcome: Progressing     Problem: Pain  Goal: Verbalizes/displays adequate comfort level or baseline comfort level  7/14/2024 0456 by Aleksandra Moran RN  Outcome: Progressing  7/13/2024 2045 by Yarely Kim RN  Outcome: Progressing     Problem: Safety - Adult  Goal: Free from fall injury  Outcome: Progressing  Flowsheets (Taken 7/14/2024 0000)  Free From Fall Injury: Instruct family/caregiver on patient safety     Problem: Respiratory - Adult  Goal: Achieves optimal

## 2024-07-14 NOTE — PLAN OF CARE
Problem: Respiratory - Adult  Goal: Achieves optimal ventilation and oxygenation  7/14/2024 0749 by Char Sorenson, RCP  Outcome: Progressing

## 2024-07-14 NOTE — PROGRESS NOTES
Recent Labs     07/12/24  0513 07/13/24  0402 07/14/24  0432   * 130* 133*   K 3.5* 3.6* 4.0   BUN 15 13 11   CREATININE 0.6* 0.7 0.7   CL 99 97* 99   CO2 21 22 18*      LFTS  No results for input(s): \"ALKPHOS\", \"ALT\", \"AST\", \"BILITOT\", \"BILIDIR\", \"LABALBU\" in the last 72 hours.      Imaging:  XR CHEST PORTABLE    Result Date: 7/8/2024  No acute process.     CT SOFT TISSUE NECK W CONTRAST    Result Date: 7/8/2024  1. Nonspecific mass within the left vocal cord measuring 8 mm x 6 mm. Direct visual inspection and biopsy is suggested. 2. No cervical lymphadenopathy.     XR CHEST PORTABLE    Result Date: 7/8/2024  EXAMINATION: ONE XRAY VIEW OF THE CHEST 7/8/2024 1:34 pm COMPARISON: None. HISTORY: ORDERING SYSTEM PROVIDED HISTORY: Wheezing SOB, Cough TECHNOLOGIST PROVIDED HISTORY: Wheezing SOB, Cough FINDINGS: The lungs are without acute focal process.  There is no effusion or pneumothorax. The cardiomediastinal silhouette is without acute process. The osseous structures are without acute process.     No acute process.     CT SOFT TISSUE NECK W CONTRAST    Result Date: 7/8/2024  EXAMINATION: CT OF THE NECK SOFT TISSUE WITH CONTRAST  7/8/2024 TECHNIQUE: CT of the neck was performed with the administration of intravenous contrast. Multiplanar reformatted images are provided for review. Automated exposure control, iterative reconstruction, and/or weight based adjustment of the mA/kV was utilized to reduce the radiation dose to as low as reasonably achievable. COMPARISON: None HISTORY: ORDERING SYSTEM PROVIDED HISTORY: Hoarse voice multiple months, stridor TECHNOLOGIST PROVIDED HISTORY: Hoarse voice multiple months, stridor Additional Contrast?->1 Reason for Exam: Hoarse voice FINDINGS: PHARYNX/LARYNX:  The tonsillar pillars are normal in appearance.  The tongue is normal in appearance.  The valleculae, epiglottis, aryepiglottic folds and pyriform sinuses appear unremarkable. There is a nonspecific mass within the

## 2024-07-14 NOTE — PROGRESS NOTES
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 who is being seen for follow-up after emergent tracheostomy placement for airway control for a transglottic mass.  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  Patient up in chair this morning.    Pertinent Examination:   GENERAL: well developed and well nourished and in no acute distress  HEAD: normocephalic and atraumatic  EYES: no eyelid swelling, no conjunctival injection or exudate, pupils equal round and reactive to light  EXTERNAL EARS: normal  EAR EXAM: deferred  NOSE: nares patent, normal mucosa  MOUTH/THROAT: mucous membranes moist, no focal lesions, no tonsillar enlargement or exudate  NECK: Tracheostomy tube changed to a Shiley 8 uncuffed tube.  Patient tolerated the procedure well.  New soft collar in place.  Tracheostomy tube change without complication and graded as an easy exchange  RESPIRATORY: Comfortable on humidified blow by     RELEVANT LABS/STUDIES:   Additional data reviewed:    CT Chest (7/12/2024): IMPRESSION:  1. No evidence of metastatic disease in the chest.  2. Dense bandlike opacity in the left lower lobe that appears new in  comparison to prior plain film imaging.  Atelectasis or developing  pneumonitis may be considered.  3. Trace left pleural effusion.  This is likely reactive.  4. Asymmetric soft tissue at the level of the left vocal cord which  correlates to prior findings.    Procedures:    None    Surgical risk factors:    UNABLE TO BE INTUBATED FROM ABOVE     IMPRESSION AND RECOMMENDATIONS:   Dylan Dolan is a 58 y.o. male who is being seen for follow-up after emergent tracheostomy placement for airway control for a transglottic mass performed on 7/9.  Surgical pathology confirming left vocal fold SCC with subglottic

## 2024-07-14 NOTE — PLAN OF CARE
Problem: Chronic Conditions and Co-morbidities  Goal: Patient's chronic conditions and co-morbidity symptoms are monitored and maintained or improved  Outcome: Progressing     Problem: Discharge Planning  Goal: Discharge to home or other facility with appropriate resources  Outcome: Progressing     Problem: Pain  Goal: Verbalizes/displays adequate comfort level or baseline comfort level  Outcome: Progressing     Problem: Safety - Adult  Goal: Free from fall injury  Outcome: Progressing     Problem: Respiratory - Adult  Goal: Achieves optimal ventilation and oxygenation  7/14/2024 1948 by Malini Mc, RN  Outcome: Progressing  7/14/2024 0749 by Char Sorenson, RCP  Outcome: Progressing     Problem: ABCDS Injury Assessment  Goal: Absence of physical injury  Outcome: Progressing

## 2024-07-15 ENCOUNTER — TELEPHONE (OUTPATIENT)
Dept: RADIATION ONCOLOGY | Age: 59
End: 2024-07-15

## 2024-07-15 ENCOUNTER — TELEPHONE (OUTPATIENT)
Dept: FAMILY MEDICINE CLINIC | Age: 59
End: 2024-07-15

## 2024-07-15 ENCOUNTER — TELEPHONE (OUTPATIENT)
Dept: ONCOLOGY | Age: 59
End: 2024-07-15

## 2024-07-15 DIAGNOSIS — C32.0 MALIGNANT NEOPLASM OF VOCAL CORD (HCC): Primary | ICD-10-CM

## 2024-07-15 DIAGNOSIS — J38.3 VOCAL CORD MASS: ICD-10-CM

## 2024-07-15 PROBLEM — M54.2 ACUTE NECK PAIN: Status: ACTIVE | Noted: 2024-07-15

## 2024-07-15 PROBLEM — Z51.5 PALLIATIVE CARE ENCOUNTER: Status: ACTIVE | Noted: 2024-07-15

## 2024-07-15 PROBLEM — Z71.89 ACP (ADVANCE CARE PLANNING): Status: ACTIVE | Noted: 2024-07-15

## 2024-07-15 LAB
ANION GAP SERPL CALCULATED.3IONS-SCNC: 11 MMOL/L (ref 9–16)
BASOPHILS # BLD: 0.03 K/UL (ref 0–0.2)
BASOPHILS NFR BLD: 0 % (ref 0–2)
BUN SERPL-MCNC: 7 MG/DL (ref 6–20)
CALCIUM SERPL-MCNC: 9 MG/DL (ref 8.6–10.4)
CHLORIDE SERPL-SCNC: 98 MMOL/L (ref 98–107)
CO2 SERPL-SCNC: 25 MMOL/L (ref 20–31)
CREAT SERPL-MCNC: 0.7 MG/DL (ref 0.7–1.2)
EOSINOPHIL # BLD: 0.2 K/UL (ref 0–0.44)
EOSINOPHILS RELATIVE PERCENT: 2 % (ref 1–4)
ERYTHROCYTE [DISTWIDTH] IN BLOOD BY AUTOMATED COUNT: 12.1 % (ref 11.8–14.4)
GFR, ESTIMATED: >90 ML/MIN/1.73M2
GLUCOSE BLD-MCNC: 151 MG/DL (ref 75–110)
GLUCOSE BLD-MCNC: 167 MG/DL (ref 75–110)
GLUCOSE BLD-MCNC: 168 MG/DL (ref 75–110)
GLUCOSE BLD-MCNC: 194 MG/DL (ref 75–110)
GLUCOSE BLD-MCNC: 261 MG/DL (ref 75–110)
GLUCOSE BLD-MCNC: 298 MG/DL (ref 75–110)
GLUCOSE SERPL-MCNC: 153 MG/DL (ref 74–99)
HCT VFR BLD AUTO: 37.7 % (ref 40.7–50.3)
HGB BLD-MCNC: 12.8 G/DL (ref 13–17)
IMM GRANULOCYTES # BLD AUTO: 0.07 K/UL (ref 0–0.3)
IMM GRANULOCYTES NFR BLD: 1 %
LYMPHOCYTES NFR BLD: 1.51 K/UL (ref 1.1–3.7)
LYMPHOCYTES RELATIVE PERCENT: 13 % (ref 24–43)
MCH RBC QN AUTO: 31.8 PG (ref 25.2–33.5)
MCHC RBC AUTO-ENTMCNC: 34 G/DL (ref 28.4–34.8)
MCV RBC AUTO: 93.5 FL (ref 82.6–102.9)
MONOCYTES NFR BLD: 1.05 K/UL (ref 0.1–1.2)
MONOCYTES NFR BLD: 9 % (ref 3–12)
NEUTROPHILS NFR BLD: 75 % (ref 36–65)
NEUTS SEG NFR BLD: 9.09 K/UL (ref 1.5–8.1)
NRBC BLD-RTO: 0 PER 100 WBC
PLATELET # BLD AUTO: 264 K/UL (ref 138–453)
PMV BLD AUTO: 9.3 FL (ref 8.1–13.5)
POTASSIUM SERPL-SCNC: 4.2 MMOL/L (ref 3.7–5.3)
RBC # BLD AUTO: 4.03 M/UL (ref 4.21–5.77)
SODIUM SERPL-SCNC: 134 MMOL/L (ref 136–145)
WBC OTHER # BLD: 12 K/UL (ref 3.5–11.3)

## 2024-07-15 PROCEDURE — 6360000002 HC RX W HCPCS: Performed by: STUDENT IN AN ORGANIZED HEALTH CARE EDUCATION/TRAINING PROGRAM

## 2024-07-15 PROCEDURE — 94761 N-INVAS EAR/PLS OXIMETRY MLT: CPT

## 2024-07-15 PROCEDURE — 6370000000 HC RX 637 (ALT 250 FOR IP)

## 2024-07-15 PROCEDURE — 94640 AIRWAY INHALATION TREATMENT: CPT

## 2024-07-15 PROCEDURE — 85025 COMPLETE CBC W/AUTO DIFF WBC: CPT

## 2024-07-15 PROCEDURE — 99232 SBSQ HOSP IP/OBS MODERATE 35: CPT | Performed by: INTERNAL MEDICINE

## 2024-07-15 PROCEDURE — 2580000003 HC RX 258: Performed by: STUDENT IN AN ORGANIZED HEALTH CARE EDUCATION/TRAINING PROGRAM

## 2024-07-15 PROCEDURE — 36415 COLL VENOUS BLD VENIPUNCTURE: CPT

## 2024-07-15 PROCEDURE — 99233 SBSQ HOSP IP/OBS HIGH 50: CPT | Performed by: HOSPITALIST

## 2024-07-15 PROCEDURE — 80048 BASIC METABOLIC PNL TOTAL CA: CPT

## 2024-07-15 PROCEDURE — 6370000000 HC RX 637 (ALT 250 FOR IP): Performed by: NURSE PRACTITIONER

## 2024-07-15 PROCEDURE — 2700000000 HC OXYGEN THERAPY PER DAY

## 2024-07-15 PROCEDURE — 6360000002 HC RX W HCPCS

## 2024-07-15 PROCEDURE — 2060000000 HC ICU INTERMEDIATE R&B

## 2024-07-15 PROCEDURE — 97161 PT EVAL LOW COMPLEX 20 MIN: CPT

## 2024-07-15 PROCEDURE — 99223 1ST HOSP IP/OBS HIGH 75: CPT | Performed by: NURSE PRACTITIONER

## 2024-07-15 PROCEDURE — 6370000000 HC RX 637 (ALT 250 FOR IP): Performed by: STUDENT IN AN ORGANIZED HEALTH CARE EDUCATION/TRAINING PROGRAM

## 2024-07-15 PROCEDURE — 82947 ASSAY GLUCOSE BLOOD QUANT: CPT

## 2024-07-15 RX ORDER — OXYCODONE HYDROCHLORIDE 5 MG/1
10 TABLET ORAL EVERY 4 HOURS PRN
Status: DISCONTINUED | OUTPATIENT
Start: 2024-07-15 | End: 2024-07-16

## 2024-07-15 RX ORDER — OXYCODONE HYDROCHLORIDE 5 MG/1
5 TABLET ORAL EVERY 4 HOURS PRN
Status: DISCONTINUED | OUTPATIENT
Start: 2024-07-15 | End: 2024-07-15

## 2024-07-15 RX ORDER — OXYCODONE HYDROCHLORIDE 5 MG/1
5 TABLET ORAL EVERY 4 HOURS PRN
Status: DISCONTINUED | OUTPATIENT
Start: 2024-07-15 | End: 2024-07-16

## 2024-07-15 RX ORDER — INSULIN GLARGINE 100 [IU]/ML
5 INJECTION, SOLUTION SUBCUTANEOUS NIGHTLY
Status: DISCONTINUED | OUTPATIENT
Start: 2024-07-15 | End: 2024-07-17 | Stop reason: HOSPADM

## 2024-07-15 RX ORDER — OXYCODONE HYDROCHLORIDE 5 MG/1
5 TABLET ORAL ONCE
Status: COMPLETED | OUTPATIENT
Start: 2024-07-15 | End: 2024-07-15

## 2024-07-15 RX ORDER — OXYCODONE HYDROCHLORIDE AND ACETAMINOPHEN 5; 325 MG/1; MG/1
1 TABLET ORAL EVERY 4 HOURS PRN
Status: DISCONTINUED | OUTPATIENT
Start: 2024-07-15 | End: 2024-07-15

## 2024-07-15 RX ADMIN — FENTANYL CITRATE 50 MCG: 50 INJECTION, SOLUTION INTRAMUSCULAR; INTRAVENOUS at 06:27

## 2024-07-15 RX ADMIN — OXYCODONE HYDROCHLORIDE AND ACETAMINOPHEN 1 TABLET: 5; 325 TABLET ORAL at 14:35

## 2024-07-15 RX ADMIN — HEPARIN SODIUM 5000 UNITS: 5000 INJECTION INTRAVENOUS; SUBCUTANEOUS at 04:43

## 2024-07-15 RX ADMIN — SODIUM CHLORIDE, PRESERVATIVE FREE 10 ML: 5 INJECTION INTRAVENOUS at 18:08

## 2024-07-15 RX ADMIN — FENTANYL CITRATE 50 MCG: 50 INJECTION, SOLUTION INTRAMUSCULAR; INTRAVENOUS at 01:24

## 2024-07-15 RX ADMIN — DESMOPRESSIN ACETATE 40 MG: 0.2 TABLET ORAL at 08:32

## 2024-07-15 RX ADMIN — IPRATROPIUM BROMIDE AND ALBUTEROL SULFATE 1 DOSE: 2.5; .5 SOLUTION RESPIRATORY (INHALATION) at 07:36

## 2024-07-15 RX ADMIN — SODIUM CHLORIDE, PRESERVATIVE FREE 10 ML: 5 INJECTION INTRAVENOUS at 20:30

## 2024-07-15 RX ADMIN — IPRATROPIUM BROMIDE AND ALBUTEROL SULFATE 1 DOSE: 2.5; .5 SOLUTION RESPIRATORY (INHALATION) at 22:30

## 2024-07-15 RX ADMIN — Medication 1 MG: at 04:43

## 2024-07-15 RX ADMIN — INSULIN GLARGINE 5 UNITS: 100 INJECTION, SOLUTION SUBCUTANEOUS at 20:30

## 2024-07-15 RX ADMIN — MORPHINE SULFATE 2 MG: 2 INJECTION, SOLUTION INTRAMUSCULAR; INTRAVENOUS at 02:34

## 2024-07-15 RX ADMIN — SODIUM CHLORIDE, PRESERVATIVE FREE 10 ML: 5 INJECTION INTRAVENOUS at 08:34

## 2024-07-15 RX ADMIN — SODIUM CHLORIDE, PRESERVATIVE FREE 40 MG: 5 INJECTION INTRAVENOUS at 08:32

## 2024-07-15 RX ADMIN — IPRATROPIUM BROMIDE AND ALBUTEROL SULFATE 1 DOSE: 2.5; .5 SOLUTION RESPIRATORY (INHALATION) at 16:39

## 2024-07-15 RX ADMIN — AMLODIPINE BESYLATE 5 MG: 5 TABLET ORAL at 08:32

## 2024-07-15 RX ADMIN — OXYCODONE 10 MG: 5 TABLET ORAL at 20:29

## 2024-07-15 RX ADMIN — FENTANYL CITRATE 50 MCG: 50 INJECTION, SOLUTION INTRAMUSCULAR; INTRAVENOUS at 03:42

## 2024-07-15 RX ADMIN — HEPARIN SODIUM 5000 UNITS: 5000 INJECTION INTRAVENOUS; SUBCUTANEOUS at 20:29

## 2024-07-15 RX ADMIN — OXYCODONE 5 MG: 5 TABLET ORAL at 12:39

## 2024-07-15 RX ADMIN — LOSARTAN POTASSIUM 100 MG: 50 TABLET, FILM COATED ORAL at 08:32

## 2024-07-15 RX ADMIN — INSULIN LISPRO 4 UNITS: 100 INJECTION, SOLUTION INTRAVENOUS; SUBCUTANEOUS at 16:22

## 2024-07-15 RX ADMIN — Medication 1 MG: at 11:13

## 2024-07-15 RX ADMIN — IPRATROPIUM BROMIDE AND ALBUTEROL SULFATE 1 DOSE: 2.5; .5 SOLUTION RESPIRATORY (INHALATION) at 11:23

## 2024-07-15 RX ADMIN — OXYCODONE 5 MG: 5 TABLET ORAL at 08:32

## 2024-07-15 RX ADMIN — OXYCODONE 5 MG: 5 TABLET ORAL at 16:22

## 2024-07-15 RX ADMIN — HEPARIN SODIUM 5000 UNITS: 5000 INJECTION INTRAVENOUS; SUBCUTANEOUS at 12:43

## 2024-07-15 RX ADMIN — INSULIN LISPRO 4 UNITS: 100 INJECTION, SOLUTION INTRAVENOUS; SUBCUTANEOUS at 20:39

## 2024-07-15 RX ADMIN — Medication 1 MG: at 18:08

## 2024-07-15 ASSESSMENT — ENCOUNTER SYMPTOMS
ABDOMINAL PAIN: 0
COUGH: 1
SHORTNESS OF BREATH: 0

## 2024-07-15 ASSESSMENT — PAIN DESCRIPTION - LOCATION
LOCATION: BACK;LEG;THROAT
LOCATION: BACK;THROAT
LOCATION: BACK
LOCATION: BACK;LEG;THROAT

## 2024-07-15 ASSESSMENT — PAIN SCALES - GENERAL
PAINLEVEL_OUTOF10: 8
PAINLEVEL_OUTOF10: 1
PAINLEVEL_OUTOF10: 8
PAINLEVEL_OUTOF10: 9
PAINLEVEL_OUTOF10: 9
PAINLEVEL_OUTOF10: 8

## 2024-07-15 ASSESSMENT — PAIN SCALES - WONG BAKER
WONGBAKER_NUMERICALRESPONSE: HURTS A LITTLE BIT
WONGBAKER_NUMERICALRESPONSE: HURTS A LITTLE BIT

## 2024-07-15 NOTE — PROGRESS NOTES
Attending Physician Statement  I have discussed the case, including pertinent history and exam findings with the resident and the team.  I have seen and examined the patient and the key elements of the encounter have been performed by me.  I agree with the assessment, plan and orders as documented by the resident.      In Brief:  Dylan Dolan is a 58 y.o. male, who is on day 7 of hospital stay, presented with No chief complaint on file.  , found to have Vocal cord mass.    Principal Problem:    Vocal cord mass  Active Problems:    Vocal cord paralysis    Malignant neoplasm of vocal cord (HCC)    Acute respiratory failure with hypoxia (HCC)    Tobacco dependence    Tracheostomy in place (HCC)  Resolved Problems:    * No resolved hospital problems. *      Plan:     Patient seen and evaluated.  Patient had vocal cord mass which was removed.  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  Heme-onc input appreciated.  ENT input appreciated.  Pulmonary input appreciated.  Current plan is to discharge the patient once tracheostomy supplies have been arranged.  Working with case management in this regard.        NOE LAWS MD   Attending Physician, Internal Medicine Residency Program  7/15/2024, 4:09 PM

## 2024-07-15 NOTE — CARE COORDINATION
Transitional Planning:   Family in room and requesting to see CM. Mom and livier Medina(RN) in room w pt to discuss d/c planning. Discussed levels of care and suggested might be best to transition to facility first. Pt does not meet criteria for ARU. [facility] in [place] area is [facility] and they refused, Pt does not want SNF. [facility] in [place] is what pt/family wants, pt will need trach supplies and portable o2 with humidification. Pt  wants Carol to be main contact for discharge planning  Referral land faxed to [facility] [phone], awaiting response  [phone]- Confirmed pt has PCP, Alley Vila MD. Spoke with Dr. Mustafa who states trach supply orders were requested by CM on Friday- she is asking CM what supplies are needed. Dr. Mustafa to collaborate with ENT/ resp to get needed orders for supplies . [facility] is choice for supplies when rx's obtained.

## 2024-07-15 NOTE — TELEPHONE ENCOUNTER
Name: Dylan Dolan  : 1965  MRN: [id number]    Oncology Navigation- Initial Note:    Intake-  Contact Type: Telephone    Continuum of Care: Diagnosis/Active Treatment    Notes:  CHART REVIEW NEW REFERRAL    Navigator received navigation assignment via pathology and reviewing chart now.   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    Received message from FINESSE Garcia CNP of need for PET CT outpatient and that livier Medina, livier/POA/, wants to set up appt prior to discharge from hospital. Waiting for order to be entered.    Phone call to Carol to introduce ONN role and provide contact information and availability. Also provided contact information for [facility].  She states they do not have a discharge date yet since they still need to coordinate supplies and services.  Informed her writer or oncology clinic nurse will assist with scheduling PET CT once order placed. Will also need to know expected date of discharge in order to schedule appropriately.    Navigator following for continuity of care.      Electronically signed by Bree Spears RN on 7/15/2024 at 4:24 PM

## 2024-07-15 NOTE — PLAN OF CARE
Problem: Respiratory - Adult  Goal: Achieves optimal ventilation and oxygenation  7/15/2024 0740 by Cecy White, RCTAHIR  Outcome: Progressing

## 2024-07-15 NOTE — CONSULTS
normal.  Superior mediastinum is normal.  Limited evaluation of the neck.  There is  asymmetric soft tissue at the level of the left vocal cord which does  correlate to prior findings.  No lymph node enlargement within the chest. The  esophagus tapers normally.    Lungs/pleura: Tracheostomy tube present.  The airways are patent.  Trace left  pleural effusion.  Dense peripheral opacification in both lower lobes that is  mild favoring atelectasis.  There is also a thickened bandlike opacity in the  left lower lobe.  Mild centrilobular emphysema.  No suspicious mass or  nodule.  Tiny calcified granulomata incidentally observed in the right hilum  and superior segment of the right lower lobe.    Upper Abdomen: Adrenal glands are normal.  No significant findings in the  visualized upper abdomen.    Soft Tissues/Bones: No skeletal abnormalities are appreciated within the  chest.    Impression  1. No evidence of metastatic disease in the chest.  2. Dense bandlike opacity in the left lower lobe that appears new in  comparison to prior plain film imaging.  Atelectasis or developing  pneumonitis may be considered.  3. Trace left pleural effusion.  This is likely reactive.  4. Asymmetric soft tissue at the level of the left vocal cord which  correlates to prior findings.       Xray Result (most recent):  XR CHEST PORTABLE 07/08/2024    Narrative  EXAMINATION:  ONE XRAY VIEW OF THE CHEST    7/8/2024 1:34 pm    COMPARISON:  None.    HISTORY:  ORDERING SYSTEM PROVIDED HISTORY: Wheezing SOB, Cough  TECHNOLOGIST PROVIDED HISTORY:  Wheezing SOB, Cough    FINDINGS:  The lungs are without acute focal process.  There is no effusion or  pneumothorax. The cardiomediastinal silhouette is without acute process. The  osseous structures are without acute process.    Impression  No acute process.       MRI Result (most recent):  No results found for this or any previous visit from the past 3650 days.      No results found for this or any  total time I spent in seeing the patient, discussing goals of care, advanced directives, code status, greater than 50% time in counseling and other major issues was more than 77 minutes      Total time in talking with family, discussing with patient, chart review, and writing note: 77  Complexity: 3 - med changes      Electronically signed by     ANURAG Muniz  Hospice/Palliative Care  [facility], [place], [place]  7/15/2024 4:01 PM  Palliative care office: [phone]

## 2024-07-15 NOTE — PROGRESS NOTES
Physical Therapy  Facility/Department: [address] STEPDOWN  Physical Therapy Initial Assessment    Name: Dylan Dolan  : 1965  MRN: [id number]  Date of Service: 7/15/2024    Discharge Recommendations:  No therapy recommended at discharge   PT Equipment Recommendations  Equipment Needed: No      Patient Diagnosis(es): Diagnoses of Malignant neoplasm of vocal cord (HCC) and Polyneuropathy associated with underlying disease (HCC) were pertinent to this visit.  Past Medical History:  has a past medical history of Ankle pain, left, Gastroesophageal reflux disease, Hyperlipemia, Hypertension, Obesity, Tobacco abuse, and Tourette's syndrome.  Past Surgical History:  has a past surgical history that includes Ankle fracture surgery (Left, 1999); Patriot tooth extraction (Bilateral); Toe amputation (Left, 2021); tracheostomy (N/A, 2024); laryngoscopy (N/A, 2024); and tracheostomy (N/A, 2024).    Assessment   Assessment: Pt able to ambulate 200 ft without device indep, no loss of balance. Pt negotiate 6 stairs with SUP, 1 rail. Pt indep for transfers without device. Pt at baseline for mobility and gait, no further therapy needs at this time.  Therapy Prognosis: Good  Requires PT Follow-Up: No  Activity Tolerance  Activity Tolerance: Patient tolerated evaluation without incident     Plan   Physical Therapy Plan  General Plan: Discharge with evaluation only  Safety Devices  Type of Devices: Left in chair  Restraints  Restraints Initially in Place: No     Restrictions  Restrictions/Precautions  Restrictions/Precautions: Up as Tolerated  Position Activity Restriction  Other position/activity restrictions: trach to room air     Subjective   General  Chart Reviewed: No  Patient assessed for rehabilitation services?: Yes  Family / Caregiver Present: Yes (family)  Follows Commands: Within Functional Limits  General Comment  Comments: RN and pt agreeable to treatment session  Subjective  Subjective: Pt reports  no pain, numbness or tingling.         Social/Functional History  Social/Functional History  Lives With: Alone  Type of Home: House  Home Layout: One level  Home Access: Stairs to enter with rails  Entrance Stairs - Number of Steps: 2  Entrance Stairs - Rails: Both  Home Equipment: None  ADL Assistance: Independent  Homemaking Assistance: Independent  Homemaking Responsibilities: Yes  Ambulation Assistance: Independent  Transfer Assistance: Independent  Active : Yes  Mode of Transportation: Truck  Occupation: Full time employment  Type of Occupation: [employer]  Vision/Hearing  Vision  Vision: Within Functional Limits  Hearing  Hearing: Within functional limits    Cognition   Orientation  Overall Orientation Status: Within Normal Limits  Cognition  Overall Cognitive Status: WNL             Bed mobility  Supine to Sit: Independent  Sit to Supine: Independent  Scooting: Independent  Transfers  Sit to Stand: Independent  Stand to Sit: Independent  Comment: up without device  Ambulation  Surface: Level tile  Device: No Device  Assistance: Independent  Gait Deviations: None  Distance: 200 ft  Comments: no mobility concerns, SpO2 >97% throughout  More Ambulation?: No  Stairs/Curb  Stairs?: Yes  Stairs  # Steps : 6  Stairs Height: 6\"  Rails: Left ascending  Device: No Device  Assistance: Supervision  Comment: reciprocal gait     Balance  Posture: Good  Sitting - Static: Good  Sitting - Dynamic: Good  Standing - Static: Good  Standing - Dynamic: Good  Comments: up without device           OutComes Score      AM-PAC - Mobility    AM-PAC Basic Mobility - Inpatient   How much help is needed turning from your back to your side while in a flat bed without using bedrails?: None  How much help is needed moving from lying on your back to sitting on the side of a flat bed without using bedrails?: None  How much help is needed moving to and from a bed to a chair?: None  How much help is needed standing up from a chair using

## 2024-07-15 NOTE — TELEPHONE ENCOUNTER
Patiently currently admitted. Home health paperwork received today for Dr. Vila to sign if she will follow patient for skilled nursing.

## 2024-07-15 NOTE — TELEPHONE ENCOUNTER
----- Message from Charli Alanis sent at 7/15/2024  3:24 PM EDT -----  Regarding: ECC Message to Provider  ECC Message to Provider    Relationship to Patient: Self     Additional Information Pt would like to inform his pcp Dr. Alley Vila that he is in the [facility]. Pt have an upcoming appointment with Dr. Hager. Pt had cancer in vocal his cords that need breathing tool.    Caller : Carol Mcfadden   --------------------------------------------------------------------------------------------------------------------------    Call Back Information: OK to leave message on voicemail  Preferred Call Back Number: Phone [phone]

## 2024-07-15 NOTE — PROGRESS NOTES
Today's Date: 7/15/2024  Patient Name: Dylan Dolan  Date of admission: 7/8/2024 12:25 PM  Patient's age: 58 y.o., 1965  Admission Dx: Vocal cord mass [J38.3]  Vocal cord paralysis [J38.00]    Reason for Consult: Vocal cord mass history of cigarette smoking   Requesting Physician: Patrice Ames MD    Chief Complaint:  Shortness of breath, stridor, hoarseness of voice     Interval Changes:  Patient seen and examined.  Labs and vitals reviewed.  CBC stable, hemoglobin 12.8, leukocytosis improving 12.0 today  Patient has been started on regular diet, tolerating well.  Patient had bedside trach tube exchanged 7/14/24  Pathology results discussed with patient and niece at bedside.   States increased pain today, both chronic and acute surgical pain. Will change to percocet 5-325 [id number] prn and consult palliative care for outpatient pain management    History of Present Illness:    The patient is a 58 y.o. male who is admitted to the hospital for management of vocal cord mass.    He presented to [place] ED with complaints of coughing, trouble breathing, losing his voice, ongoing for the last several months.  In the ED he was noted to have stridor for which he was given racemic epi and steroids.  CT soft tissue neck showed 8 mm x 6 mm mass in the left vocal cord.  He was then transferred to [facility] for further care.     He went to OR with ENT for biopsy.  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  His intubation was transitioned to an open tracheostomy     He has a past medical history of cigarette smoking (1.5 packs/day since age 17), hypertension, hyperlipidemia, Tourette's syndrome    Past Medical History:   has a past medical history of Ankle pain, left, Gastroesophageal reflux disease, Hyperlipemia, Hypertension, Obesity, Tobacco abuse, and Tourette's syndrome.    Past Surgical History:   has a past surgical history that includes Ankle fracture surgery  (Left, 01/01/1999); [place] tooth extraction (Bilateral); Toe amputation (Left, 03/29/2021); tracheostomy (N/A, 07/09/2024); laryngoscopy (N/A, 7/9/2024); and tracheostomy (N/A, 7/9/2024).     Medications:    Prior to Admission medications    Medication Sig Start Date End Date Taking? Authorizing Provider   HYDROcodone-acetaminophen (NORCO) 5-325 MG per tablet Take 1 tablet by mouth every 6 hours as needed for Pain. Max Daily Amount: 4 tablets   Yes Provider, MD Dave   omeprazole (PRILOSEC) 20 MG delayed release capsule TAKE 1 CAPSULE DAILY 6/25/24   Alley Vila MD   pimozide (ORAP) 1 MG tablet Take 2 tablets by mouth 2 times daily 4/2/24   Alley Vila MD   losartan-hydroCHLOROthiazide (HYZAAR) 100-25 MG per tablet Take 1 tablet by mouth daily 4/2/24   Alley Vila MD   atorvastatin (LIPITOR) 40 MG tablet Take 1 tablet by mouth daily 4/2/24   Alley Vila MD   amLODIPine (NORVASC) 5 MG tablet Take 1 tablet by mouth daily 4/2/24   Alley Vila MD   hydroCHLOROthiazide (HYDRODIURIL) 25 MG tablet Take 1 tablet by mouth daily 4/2/24   Alley Vila MD   Diabetic Shoe MISC by Does not apply route Dispense DM shoe and insoles.  custom accomodative offloading insole with toe filler L side.     Amputated great toe of left foot (HCC)  (primary encounter diagnosis)  Equinus deformity of both feet  Diabetic polyneuropathy associated with type 2 diabetes mellitus (HCC) 4/20/21   Ivan Banks DPM     Current Facility-Administered Medications   Medication Dose Route Frequency Provider Last Rate Last Admin    oxyCODONE (ROXICODONE) immediate release tablet 5 mg  5 mg Oral Q4H PRN Marisol Flores MD   5 mg at 07/15/24 1239    amLODIPine (NORVASC) tablet 5 mg  5 mg Oral Daily Loy Waggoner MD   5 mg at 07/15/24 0832    atorvastatin (LIPITOR) tablet 40 mg  40 mg Oral Daily Loy Waggoner MD   40 mg at 07/15/24 0832    losartan (COZAAR) tablet 100 mg  100 mg Oral Daily Loy Waggoner MD   100 mg at

## 2024-07-15 NOTE — PROGRESS NOTES
Palliative Care Update:  Received request to see if [facility] Can see this patient in [facility].     Called and spoke with Vee from [facility].  She confirmed that they can take patients if they are with in an hour and thirty minutes from [place].    We believe that patient is within this time.      Faxed order, demographic and H&P for continuation of care to [facility] atten: Vee Kruse Team. [phone].    [facility] Palliative Care Coordinator  Bethany LANGSTONN, RN, ONN-CG  [facility] [place] [phone]/ [facility] [place], [place] [phone]/ [facility] [phone]

## 2024-07-15 NOTE — PROGRESS NOTES
VIDEO    Result Date: 7/11/2024  Swallowing mechanism grossly within normal limits without evidence of aspiration. Please see separate speech pathology report for full discussion of findings and recommendations.     XR CHEST PORTABLE    Result Date: 7/8/2024  No acute process.     CT SOFT TISSUE NECK W CONTRAST    Result Date: 7/8/2024  1. Nonspecific mass within the left vocal cord measuring 8 mm x 6 mm. Direct visual inspection and biopsy is suggested. 2. No cervical lymphadenopathy.       ASSESSMENT & PLAN     Assessment and Plan:    Principal Problem:    Vocal cord mass  Active Problems:    Vocal cord paralysis    Malignant neoplasm of vocal cord (HCC)    Acute respiratory failure with hypoxia (HCC)    Tobacco dependence    Tracheostomy in place (HCC)  Resolved Problems:    * No resolved hospital problems. *      Acute hypoxic respiratory failure:     - Patient is on 5 L oxygen via tracheostomy tube.   - Continue bronchodilators.     Squamous Cell carcinoma of left vocal cord:     - CT soft tissue neck showed left vocal cord mass.  S/p direct laryngoscopy and biopsy frozen section result positive for squamous cell carcinoma.   - CT chest negative for metastasis.   - ENT on board.  ENT successfully changed tracheostomy tube today without any complications.  Will follow-up with further recommendations by ENT.   - Heme-onc on board.  Needs PET/CT outpatient  - Palliative care on board  - Pain management per palliative recommendations    Hyponatremia:      - Sodium 133.     - Likely secondary to poor oral intake.Encouraged to increase oral intake.     - HCTZ held.   - Continue to monitor.     Hypertension:      - Continue Norvasc and losartan. HCTZ on hold due to hyponatremia.      - Monitor blood pressure     Type 2 diabetes mellitus:     -Medium dose sliding scale.  POC Glucose checks.  Hypoglycemia protocol      Diet: Adult diet  DVT ppx : Heparin  GI ppx: Protonix    PT/OT: On board  Discharge Planning / SW:  Pending trach tube exchange.  Manager on board    Thierry Mcleod MD  Internal Medicine Resident, PGY-1  [place], [place]  7/15/2024 5:43 PM

## 2024-07-15 NOTE — PROGRESS NOTES
PULMONARY PROGRESS NOTE      Patient:  Dylan Dolan  YOB: 1965    MRN: [id number]     Acct: [id number]     Admit date: 7/8/2024    REASON FOR CONSULT:-Vocal cord mass    Pt seen and Chart reviewed.    Subjective:     59 yo with past medical history of HTN, HLD, T2DM, GERD, tobacco abuse, initially presented to ER with cough, worsening SOB, voice changes, he was found to have stridor. CT of the neck revealed nonspecific left vocal cord mass 8 mm x 6 mm.     He was transferred to [facility] for further evaluation. 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 He underwent open tracheostomy    Biopsy results shows squamous cell carcinoma      Interval History  7/15/2024    On 5 L via trach mask 28%  Afebrile, hemodynamically stable  Denies any acute complaints, tolerating diet    CT scan of the chest reviewed done on 07/11/2024 there are mild area of platelike and bandlike atelectasis present in left lower lobe mass or nodule was seen.    Review of Systems -   Review of Systems   Constitutional:  Negative for fever.   Respiratory:  Positive for cough. Negative for shortness of breath.    Cardiovascular:  Negative for chest pain and leg swelling.   Gastrointestinal:  Negative for abdominal pain.   Musculoskeletal:  Negative for arthralgias and myalgias.           Physical Exam:  Vitals: BP (!) 147/100   Pulse 91   Temp 98.8 °F (37.1 °C) (Oral)   Resp 16   Ht 1.854 m (6' 1\")   Wt 104.6 kg (230 lb 9.6 oz)   SpO2 98%   BMI 30.42 kg/m²   24 hour intake/output:  Intake/Output Summary (Last 24 hours) at 7/15/2024 1441  Last data filed at 7/15/2024 0503  Gross per 24 hour   Intake --   Output 2550 ml   Net -2550 ml       Last 3 weights:  Wt Readings from Last 3 Encounters:   07/14/24 104.6 kg (230 lb 9.6 oz)   07/08/24 108.9 kg (240 lb)   07/02/24 108 kg (238 lb)       General appearance: alert and cooperative with exam  Physical  dextrose, melatonin    Objective:    CBC:   Recent Labs     07/13/24  0402 07/14/24  0432 07/15/24  0813   WBC 13.6* 12.3* 12.0*   HGB 13.1 13.0 12.8*    283 264       BMP:    Recent Labs     07/13/24  0402 07/14/24  0432 07/15/24  0813   * 133* 134*   K 3.6* 4.0 4.2   CL 97* 99 98   CO2 22 18* 25   BUN 13 11 7   CREATININE 0.7 0.7 0.7   GLUCOSE 135* 154* 153*       Calcium:  Recent Labs     07/15/24  0813   CALCIUM 9.0       Ionized Calcium:Invalid input(s): \"IONCA\"  Magnesium:  No results for input(s): \"MG\" in the last 72 hours.    Phosphorus:No results for input(s): \"PHOS\" in the last 72 hours.  BNP:No results for input(s): \"BNP\" in the last 72 hours.  Glucose:  Recent Labs     07/15/24  0345 07/15/24  0756 07/15/24  1122   POCGLU 168* 151* 167*       HgbA1C: No results for input(s): \"LABA1C\" in the last 72 hours.  INR: No results for input(s): \"INR\" in the last 72 hours.  Hepatic: No results for input(s): \"ALKPHOS\", \"ALT\", \"AST\", \"BILITOT\", \"BILIDIR\", \"LABALBU\" in the last 72 hours.    Invalid input(s): \"PROT\"  Amylase and Lipase:No results for input(s): \"LACTA\", \"AMYLASE\" in the last 72 hours.  Lactic Acid: No results for input(s): \"LACTA\" in the last 72 hours.  CARDIAC ENZYMES:No results for input(s): \"CKTOTAL\", \"CKMB\", \"CKMBINDEX\", \"TROPONINI\" in the last 72 hours.  BNP: No results for input(s): \"BNP\" in the last 72 hours.  Lipids: No results for input(s): \"CHOL\", \"TRIG\", \"HDL\", \"LDL\" in the last 72 hours.    Invalid input(s): \"LDLCALC\"  ABGs: No results found for: \"PH\", \"PCO2\", \"PO2\", \"HCO3\", \"O2SAT\"  Thyroid:   Lab Results   Component Value Date/Time    TSH 1.31 06/08/2016 12:01 PM      Urinalysis: No results for input(s): \"BACTERIA\", \"BLOODU\", \"CLARITYU\", \"COLORU\", \"PHUR\", \"PROTEINU\", \"RBCUA\", \"SPECGRAV\", \"BILIRUBINUR\", \"NITRU\", \"WBCUA\", \"LEUKOCYTESUR\", \"GLUCOSEU\" in the last 72 hours.          Assessment and Plan:    Patient Active Problem List   Diagnosis    Essential hypertension

## 2024-07-15 NOTE — PLAN OF CARE
Problem: Chronic Conditions and Co-morbidities  Goal: Patient's chronic conditions and co-morbidity symptoms are monitored and maintained or improved  Outcome: Progressing     Problem: Discharge Planning  Goal: Discharge to home or other facility with appropriate resources  Outcome: Progressing     Problem: Pain  Goal: Verbalizes/displays adequate comfort level or baseline comfort level  Outcome: Progressing     Problem: Safety - Adult  Goal: Free from fall injury  Outcome: Progressing     Problem: Respiratory - Adult  Goal: Achieves optimal ventilation and oxygenation  7/15/2024 1726 by Therese Winters  Outcome: Progressing  7/15/2024 0740 by Cecy White, ELISABETH  Outcome: Progressing     Problem: ABCDS Injury Assessment  Goal: Absence of physical injury  Outcome: Progressing

## 2024-07-15 NOTE — TELEPHONE ENCOUNTER
Call from .  for follow up appt after discharge. Pt planning to go home on discharge. Per Dr. Starks he would like to see at least 2 wks post surgery, trachs and bx, therefore I moved follow up out to 7/29/24.

## 2024-07-16 ENCOUNTER — APPOINTMENT (OUTPATIENT)
Dept: GENERAL RADIOLOGY | Age: 59
End: 2024-07-16
Attending: INTERNAL MEDICINE
Payer: COMMERCIAL

## 2024-07-16 ENCOUNTER — TELEPHONE (OUTPATIENT)
Dept: ONCOLOGY | Age: 59
End: 2024-07-16

## 2024-07-16 LAB
GLUCOSE BLD-MCNC: 167 MG/DL (ref 75–110)
GLUCOSE BLD-MCNC: 178 MG/DL (ref 75–110)
GLUCOSE BLD-MCNC: 179 MG/DL (ref 75–110)
GLUCOSE BLD-MCNC: 179 MG/DL (ref 75–110)
GLUCOSE BLD-MCNC: 193 MG/DL (ref 75–110)
GLUCOSE BLD-MCNC: 204 MG/DL (ref 75–110)

## 2024-07-16 PROCEDURE — 6360000002 HC RX W HCPCS

## 2024-07-16 PROCEDURE — 6370000000 HC RX 637 (ALT 250 FOR IP)

## 2024-07-16 PROCEDURE — 6360000002 HC RX W HCPCS: Performed by: STUDENT IN AN ORGANIZED HEALTH CARE EDUCATION/TRAINING PROGRAM

## 2024-07-16 PROCEDURE — 94761 N-INVAS EAR/PLS OXIMETRY MLT: CPT

## 2024-07-16 PROCEDURE — 94640 AIRWAY INHALATION TREATMENT: CPT

## 2024-07-16 PROCEDURE — 6370000000 HC RX 637 (ALT 250 FOR IP): Performed by: NURSE PRACTITIONER

## 2024-07-16 PROCEDURE — 99232 SBSQ HOSP IP/OBS MODERATE 35: CPT | Performed by: INTERNAL MEDICINE

## 2024-07-16 PROCEDURE — 82947 ASSAY GLUCOSE BLOOD QUANT: CPT

## 2024-07-16 PROCEDURE — 99233 SBSQ HOSP IP/OBS HIGH 50: CPT | Performed by: INTERNAL MEDICINE

## 2024-07-16 PROCEDURE — 99232 SBSQ HOSP IP/OBS MODERATE 35: CPT | Performed by: HOSPITALIST

## 2024-07-16 PROCEDURE — 99232 SBSQ HOSP IP/OBS MODERATE 35: CPT | Performed by: NURSE PRACTITIONER

## 2024-07-16 PROCEDURE — 71045 X-RAY EXAM CHEST 1 VIEW: CPT

## 2024-07-16 PROCEDURE — 2580000003 HC RX 258: Performed by: STUDENT IN AN ORGANIZED HEALTH CARE EDUCATION/TRAINING PROGRAM

## 2024-07-16 PROCEDURE — 6370000000 HC RX 637 (ALT 250 FOR IP): Performed by: STUDENT IN AN ORGANIZED HEALTH CARE EDUCATION/TRAINING PROGRAM

## 2024-07-16 PROCEDURE — 2700000000 HC OXYGEN THERAPY PER DAY

## 2024-07-16 PROCEDURE — 2060000000 HC ICU INTERMEDIATE R&B

## 2024-07-16 RX ORDER — OXYCODONE HYDROCHLORIDE 5 MG/1
10 TABLET ORAL EVERY 4 HOURS PRN
Status: DISCONTINUED | OUTPATIENT
Start: 2024-07-16 | End: 2024-07-17 | Stop reason: HOSPADM

## 2024-07-16 RX ORDER — LORAZEPAM 1 MG/1
1 TABLET ORAL EVERY 6 HOURS PRN
Status: DISCONTINUED | OUTPATIENT
Start: 2024-07-16 | End: 2024-07-17 | Stop reason: HOSPADM

## 2024-07-16 RX ORDER — SENNA AND DOCUSATE SODIUM 50; 8.6 MG/1; MG/1
1 TABLET, FILM COATED ORAL DAILY
Status: DISCONTINUED | OUTPATIENT
Start: 2024-07-16 | End: 2024-07-17 | Stop reason: HOSPADM

## 2024-07-16 RX ORDER — AMLODIPINE BESYLATE 10 MG/1
10 TABLET ORAL DAILY
Status: DISCONTINUED | OUTPATIENT
Start: 2024-07-16 | End: 2024-07-17 | Stop reason: HOSPADM

## 2024-07-16 RX ORDER — LORAZEPAM 1 MG/1
1 TABLET ORAL EVERY 6 HOURS PRN
Qty: 28 TABLET | Refills: 0 | Status: SHIPPED | OUTPATIENT
Start: 2024-07-16 | End: 2024-07-23

## 2024-07-16 RX ORDER — OXYCODONE HYDROCHLORIDE 5 MG/1
15 TABLET ORAL EVERY 4 HOURS PRN
Status: DISCONTINUED | OUTPATIENT
Start: 2024-07-16 | End: 2024-07-17 | Stop reason: HOSPADM

## 2024-07-16 RX ORDER — OXYCODONE HYDROCHLORIDE 15 MG/1
15 TABLET ORAL EVERY 4 HOURS PRN
Qty: 42 TABLET | Refills: 0 | Status: SHIPPED | OUTPATIENT
Start: 2024-07-16 | End: 2024-07-23

## 2024-07-16 RX ADMIN — AMLODIPINE BESYLATE 10 MG: 10 TABLET ORAL at 08:35

## 2024-07-16 RX ADMIN — DOCUSATE SODIUM 50 MG AND SENNOSIDES 8.6 MG 1 TABLET: 8.6; 5 TABLET, FILM COATED ORAL at 12:42

## 2024-07-16 RX ADMIN — HEPARIN SODIUM 5000 UNITS: 5000 INJECTION INTRAVENOUS; SUBCUTANEOUS at 04:36

## 2024-07-16 RX ADMIN — Medication 6 MG: at 21:53

## 2024-07-16 RX ADMIN — SODIUM CHLORIDE, PRESERVATIVE FREE 40 MG: 5 INJECTION INTRAVENOUS at 08:36

## 2024-07-16 RX ADMIN — IPRATROPIUM BROMIDE AND ALBUTEROL SULFATE 1 DOSE: 2.5; .5 SOLUTION RESPIRATORY (INHALATION) at 21:52

## 2024-07-16 RX ADMIN — LORAZEPAM 1 MG: 1 TABLET ORAL at 17:48

## 2024-07-16 RX ADMIN — LORAZEPAM 1 MG: 1 TABLET ORAL at 23:43

## 2024-07-16 RX ADMIN — OXYCODONE 15 MG: 5 TABLET ORAL at 21:53

## 2024-07-16 RX ADMIN — Medication 1 MG: at 08:36

## 2024-07-16 RX ADMIN — LOSARTAN POTASSIUM 100 MG: 50 TABLET, FILM COATED ORAL at 08:35

## 2024-07-16 RX ADMIN — SODIUM CHLORIDE, PRESERVATIVE FREE 10 ML: 5 INJECTION INTRAVENOUS at 20:59

## 2024-07-16 RX ADMIN — INSULIN GLARGINE 5 UNITS: 100 INJECTION, SOLUTION SUBCUTANEOUS at 20:59

## 2024-07-16 RX ADMIN — HEPARIN SODIUM 5000 UNITS: 5000 INJECTION INTRAVENOUS; SUBCUTANEOUS at 21:00

## 2024-07-16 RX ADMIN — IPRATROPIUM BROMIDE AND ALBUTEROL SULFATE 1 DOSE: 2.5; .5 SOLUTION RESPIRATORY (INHALATION) at 08:53

## 2024-07-16 RX ADMIN — SODIUM CHLORIDE, PRESERVATIVE FREE 10 ML: 5 INJECTION INTRAVENOUS at 09:59

## 2024-07-16 RX ADMIN — HEPARIN SODIUM 5000 UNITS: 5000 INJECTION INTRAVENOUS; SUBCUTANEOUS at 12:39

## 2024-07-16 RX ADMIN — OXYCODONE 10 MG: 5 TABLET ORAL at 00:21

## 2024-07-16 RX ADMIN — IPRATROPIUM BROMIDE AND ALBUTEROL SULFATE 1 DOSE: 2.5; .5 SOLUTION RESPIRATORY (INHALATION) at 11:23

## 2024-07-16 RX ADMIN — OXYCODONE 10 MG: 5 TABLET ORAL at 08:35

## 2024-07-16 RX ADMIN — LABETALOL HYDROCHLORIDE 10 MG: 5 INJECTION, SOLUTION INTRAVENOUS at 03:50

## 2024-07-16 RX ADMIN — Medication 1 MG: at 00:21

## 2024-07-16 RX ADMIN — IPRATROPIUM BROMIDE AND ALBUTEROL SULFATE 1 DOSE: 2.5; .5 SOLUTION RESPIRATORY (INHALATION) at 15:40

## 2024-07-16 RX ADMIN — OXYCODONE 15 MG: 5 TABLET ORAL at 12:37

## 2024-07-16 RX ADMIN — INSULIN LISPRO 2 UNITS: 100 INJECTION, SOLUTION INTRAVENOUS; SUBCUTANEOUS at 03:45

## 2024-07-16 RX ADMIN — OXYCODONE 10 MG: 5 TABLET ORAL at 04:35

## 2024-07-16 RX ADMIN — DESMOPRESSIN ACETATE 40 MG: 0.2 TABLET ORAL at 08:35

## 2024-07-16 RX ADMIN — OXYCODONE 15 MG: 5 TABLET ORAL at 17:47

## 2024-07-16 ASSESSMENT — PAIN SCALES - GENERAL
PAINLEVEL_OUTOF10: 9
PAINLEVEL_OUTOF10: 6
PAINLEVEL_OUTOF10: 8
PAINLEVEL_OUTOF10: 8
PAINLEVEL_OUTOF10: 4
PAINLEVEL_OUTOF10: 7
PAINLEVEL_OUTOF10: 6
PAINLEVEL_OUTOF10: 7
PAINLEVEL_OUTOF10: 4
PAINLEVEL_OUTOF10: 8
PAINLEVEL_OUTOF10: 7

## 2024-07-16 ASSESSMENT — PAIN DESCRIPTION - ORIENTATION
ORIENTATION: LEFT
ORIENTATION: ANTERIOR
ORIENTATION: ANTERIOR;LEFT

## 2024-07-16 ASSESSMENT — PAIN DESCRIPTION - DESCRIPTORS
DESCRIPTORS: ACHING;DISCOMFORT
DESCRIPTORS: SORE;PRESSURE
DESCRIPTORS: SORE
DESCRIPTORS: SORE;PRESSURE

## 2024-07-16 ASSESSMENT — PAIN DESCRIPTION - LOCATION
LOCATION: THROAT
LOCATION: BACK;THROAT
LOCATION: BACK;THROAT
LOCATION: BACK;LEG;THROAT
LOCATION: THROAT;BACK
LOCATION: THROAT

## 2024-07-16 ASSESSMENT — ENCOUNTER SYMPTOMS
SHORTNESS OF BREATH: 0
ABDOMINAL PAIN: 0
COUGH: 1

## 2024-07-16 NOTE — ACP (ADVANCE CARE PLANNING)
Advance Care Planning      Palliative Medicine Provider (MD/NP)  Advance Care Planning (ACP) Conversation      Date of Conversation: 07/16/24  The patient and/or authorized decision maker consented to a voluntary Advance Care Planning conversation.   Individuals present for the conversation:   Patient with decision making capacity    Legal Healthcare Agent(s):    Primary Decision Maker: Maricarmen Nava - Brother/Sister - [phone]    Secondary Decision Maker: Carol Mcfadden - Niece/Nephew - [phone]    ACP documents available in EMR prior to discussion:  -Power of  for Healthcare    Primary Palliative Diagnosis(es):  Subglottic squamous cell carcinoma     Conversation Summary:  Patient completed ACP document prior to tracheostomy for healthcare power of . Sister Maricarmen is primary and niece Carol is secondary.   Discussed resuscitative measures with patient. He wishes to remain full code.   No changes made at this time.     Resuscitation Status:    Code Status: Full Code    Outcomes / Completed Documentation:  An explanation of advance directives and their importance was provided and the following forms completed:    -No new documents completed.    If new document completed, original was provided to patient and/or family member.    Copy was placed for scanning into the [facility] EMR.      I spent 5 minutes providing separately identifiable ACP services with the patient and/or surrogate decision maker in a voluntary, in-person conversation discussing the patient's wishes and goals as detailed in the above note.       Leyla Santana, APRN - CNP

## 2024-07-16 NOTE — PLAN OF CARE
Problem: Respiratory - Adult  Goal: Achieves optimal ventilation and oxygenation  7/16/2024 0856 by Zenaida Noonan, ELISABETH  Outcome: Progressing   BRONCHOSPASM/BRONCHOCONSTRICTION     [x]         IMPROVE AERATION/BREATH SOUNDS  [x]   ADMINISTER BRONCHODILATOR THERAPY AS APPROPRIATE  [x]   ASSESS BREATH SOUNDS  [x]   IMPLEMENT AEROSOL/MDI PROTOCOL  [x]   PATIENT EDUCATION AS NEEDED

## 2024-07-16 NOTE — DISCHARGE INSTR - COC
Continuity of Care Form    Patient Name: Dylan Dolan   :  1965  MRN:  [id number]    Admit date:  2024  Discharge date:  ***    Code Status Order: Full Code   Advance Directives:   Advance Care Flowsheet Documentation       Date/Time Healthcare Directive Type of Healthcare Directive Copy in Chart Healthcare Agent Appointed Healthcare Agent's Name Healthcare Agent's Phone Number    [phone] Yes, patient has an advance directive for healthcare treatment Durable power of  for health care Yes, copy in chart Adult siblings -- --            Admitting Physician:  Patrice Ames MD  PCP: Alley Vila MD    Discharging Nurse: ***  Discharging Hospital Unit/Room#: 0406/0406-01  Discharging Unit Phone Number: ***    Emergency Contact:   Extended Emergency Contact Information  Primary Emergency Contact: Maricarmen Nava  Home Phone: [phone]  Relation: Brother/Sister  Secondary Emergency Contact: Carol Mcfadden  Home Phone: [phone]  Relation: Niece/Nephew   needed? No    Past Surgical History:  Past Surgical History:   Procedure Laterality Date    ANKLE FRACTURE SURGERY Left 1999    LARYNGOSCOPY N/A 2024    DIRECT LARYNGOSCOPY performed by Rayo Jensen MD at [facility] OR    TOE AMPUTATION Left 2021    left great toe, osteomyelitis, [facility], Dr. Kovacs    TRACHEOSTOMY N/A 2024    DIRECT LARYNGOSCOPY    TRACHEOSTOMY N/A 2024    TRACHEOTOMY performed by Rayo Jensen MD at [facility] OR    WISDOM TOOTH EXTRACTION Bilateral        Immunization History:   Immunization History   Administered Date(s) Administered    COVID-19, J&J, (age 18y+), IM, 0.5 mL 2021    Influenza, AFLURIA (age 3 yrs+), FLUZONE, (age 6 mo+), MDV, 0.5mL 2017    Influenza, FLUAD, (age 65 y+), Adjuvanted, 0.5mL 2023    Influenza, FLUARIX, FLULAVAL, FLUZONE (age 6 mo+) AND AFLURIA, (age 3 y+), PF, 0.5mL 2018, 2019, 10/21/2020, 2021, 2022

## 2024-07-16 NOTE — TELEPHONE ENCOUNTER
Name: Dylan Dolan  : 1965  MRN: [id number]    Oncology Navigation Follow-Up Note    Contact Type:  Telephone    Notes:   Earliest PET CT appt available at [facility] is 24 at 1:30 pm, arrive at 1:00 pm. He is currently scheduled to see Dr. Ag 24. Message to Dr. Ag regarding timing of appts to see if he wants changes made.    Per Dr. Ag, move med onc consult out one week to 24. Maxine HARO LPN notified. Appt changed to 24 at 9:30 am.    Phone call to Carol and inform of PET CT appt and med onc appt. Instruction provided on time to arrive, location and prep for PET CT. She verbalized understanding.    1:17 pm phone contact with [place] nurse navigator for Hernan Hernandez and Cecilio. She requests update on inpatient status and possible discharge date. Informed her he is still inpatient but potential for discharge today per Dr. Ag. Informed her that livier Medina is coordinating appts and provided her contact information.    Electronically signed by Bree Spears RN on 2024 at 9:08 AM

## 2024-07-16 NOTE — PROGRESS NOTES
[facility]  Internal Medicine Teaching Residency Program  Inpatient Daily Progress Note  ______________________________________________________________________________    Patient: Dylan Dolan  YOB: 1965   MRN:[id number]    Acct: [id number]     Room: 0406/0406-01  Admit date: 7/8/2024  Today's date: 07/16/24  Number of days in the hospital: 8    SUBJECTIVE   Admitting Diagnosis: Vocal cord mass    CC: cough, shortness of breath, change in his voice    Patient seen and examined at bedside.  No acute events overnight.  No complaints today.  Spoke with case management, will need final clarification from ENT for DME order for tracheostomy supplies.  Per case management, tracheostomy supplies may take several days to deliver/arrange.  Patient is medically cleared for discharge, will remain until tracheostomy supplies arranged.  Palliative care was consulted and they are now managing his pain regimen.      BRIEF HISTORY     The patient is a 58 y.o. male with past medical history significant for hypertension, hyperlipidemia, T2DM, GERD, obesity, smoking who was initially admitted on 7/8/2024 with Vocal cord mass [J38.3]  Vocal cord paralysis [J38.00] and presented to [place] ER with cough, shortness of breath, voice changes ongoing for few weeks.  He was found to have stridor.  Patient was given racemic epi and steroids and CT scan of the neck revealed nonspecific left vocal cord mass of 8 mm x 6 mm.  Patient was then transferred to [facility] ICU for further evaluation and treatment.     In the ICU, ENT was consulted and they evaluated the patient. They recommended CT scan of chest and biopsy for further management.  Patient underwent a successful direct laryngoscopy with biopsy of the mass and tracheostomy on 07/09/2024.  Frozen section results positive for SCC. Hematology and oncology was consulted,  recommended PET CT out patient.   mellitus:     -Medium dose sliding scale.  POC Glucose checks.  Hypoglycemia protocol      Diet: Regular diet  DVT ppx : Heparin  GI ppx: Protonix    PT/OT: On board  Discharge Planning / SW: [facility], needs tracheostomy supplies arranged prior to discharge    Thierry Mcleod MD  Internal Medicine Resident, PGY-1  [place], [place]  7/16/2024 10:52 AM

## 2024-07-16 NOTE — PROGRESS NOTES
Writer spoke with pt and his niece regarding discharge.  Dme orders are placed as well as homegoing medications.  Follow up with the home health company still in works with .  CARLOS A complete.  Pt will need transportation set up for dc.

## 2024-07-16 NOTE — CARE COORDINATION
Transitional planning    Call to [facility], spoke with Donald, can accept for [facility]. Spoke with attending on unit about needing to order trach supplies, they area aware and will get the orders. Call to [facility], spoke with Allison, she directed me to call [facility] office phone out of order, they are affiliated with [facility], call to [facility], he said get order in as soon as possible to ensure they can accommodate.   stated

## 2024-07-16 NOTE — PROGRESS NOTES
FINDINGS: PHARYNX/LARYNX:  The tonsillar pillars are normal in appearance.  The tongue is normal in appearance.  The valleculae, epiglottis, aryepiglottic folds and pyriform sinuses appear unremarkable. There is a nonspecific mass within the left vocal cord measuring 8 mm x 6 mm. Direct visual inspection of biopsy is suggested. SALIVARY GLANDS/THYROID:  The parotid and submandibular glands appear unremarkable.  The thyroid gland appears unremarkable. LYMPH NODES:  No cervical or supraclavicular lymphadenopathy is seen. SOFT TISSUES:  No appreciable soft tissue swelling or mass is seen.  Small sebaceous cyst is identified within the right zygomatic region measuring 1.4 cm. BRAIN/ORBITS/SINUSES:  The visualized portion of the intracranial contents appear unremarkable.  The visualized portion of the orbits, paranasal sinuses and mastoid air cells demonstrate no acute abnormality. LUNG APICES/SUPERIOR MEDIASTINUM:  No focal consolidation is seen within the visualized lung apices.  No superior mediastinal lymphadenopathy or mass. The visualized portion of the trachea appears unremarkable. BONES:  No aggressive appearing lytic or blastic bony lesion.     1. Nonspecific mass within the left vocal cord measuring 8 mm x 6 mm. Direct visual inspection and biopsy is suggested. 2. No cervical lymphadenopathy.        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 I explained the significance of these abnormalities and possible etiology and management options.  Arrangements being made for outpatient further workup and treatment for the patient  Okay to discharge patient.  Patient set up for follow-up outpatient  Outpatient CT PET set up        Jadon Ag M.D.          This note is created with the assistance of a speech recognition program.  While intending to generate a document that actually reflects the content of the visit, the document can still have some errors including those of syntax and sound a like substitutions which may escape proof reading.  It such instances, actual meaning can be extrapolated by contextual diversion.

## 2024-07-16 NOTE — PLAN OF CARE
Problem: Respiratory - Adult  Goal: Achieves optimal ventilation and oxygenation  7/16/2024 0315 by Gin Salguero RCP  Outcome: Progressing  Flowsheets (Taken 7/16/2024 0315)  Achieves optimal ventilation and oxygenation:   Assess for changes in respiratory status   Assess the need for suctioning and aspirate as needed   Respiratory therapy support as indicated   Oxygen supplementation based on oxygen saturation or arterial blood gases  7/15/2024 2151 by Malini Mc, RN  Outcome: Progressing  7/15/2024 1726 by Therese Winters  Outcome: Progressing

## 2024-07-16 NOTE — PLAN OF CARE
Problem: Chronic Conditions and Co-morbidities  Goal: Patient's chronic conditions and co-morbidity symptoms are monitored and maintained or improved  7/15/2024 2151 by Malini Mc RN  Outcome: Progressing  7/15/2024 1726 by Therese Winters  Outcome: Progressing     Problem: Discharge Planning  Goal: Discharge to home or other facility with appropriate resources  7/15/2024 2151 by Malini Mc RN  Outcome: Progressing  7/15/2024 1726 by Therese Winters  Outcome: Progressing     Problem: Pain  Goal: Verbalizes/displays adequate comfort level or baseline comfort level  7/15/2024 2151 by Malini Mc RN  Outcome: Progressing  7/15/2024 1726 by Therese Winters  Outcome: Progressing     Problem: Safety - Adult  Goal: Free from fall injury  7/15/2024 2151 by Malini Mc RN  Outcome: Progressing  7/15/2024 1726 by Therese Winters  Outcome: Progressing     Problem: Respiratory - Adult  Goal: Achieves optimal ventilation and oxygenation  7/15/2024 2151 by Malini Mc RN  Outcome: Progressing  7/15/2024 1726 by Therese Winters  Outcome: Progressing     Problem: ABCDS Injury Assessment  Goal: Absence of physical injury  7/15/2024 2151 by Malini Mc RN  Outcome: Progressing  7/15/2024 1726 by Therese Winters  Outcome: Progressing

## 2024-07-16 NOTE — PROGRESS NOTES
PULMONARY PROGRESS NOTE      Patient:  Dylan Dolan  YOB: 1965    MRN: [id number]     Acct: [id number]     Admit date: 7/8/2024    REASON FOR CONSULT:-Vocal cord mass    Pt seen and Chart reviewed.    Subjective:     57 yo with past medical history of HTN, HLD, T2DM, GERD, tobacco abuse, initially presented to ER with cough, worsening SOB, voice changes, he was found to have stridor. CT of the neck revealed nonspecific left vocal cord mass 8 mm x 6 mm.     He was transferred to [facility] for further evaluation. 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 He underwent open tracheostomy    Biopsy results shows squamous cell carcinoma      Interval History  7/16/2024    On 5 L via trach mask 28%  Afebrile, hemodynamically stable  Denies any acute complaints, tolerating diet    CT scan of the chest reviewed done on 07/11/2024 there are mild area of platelike and bandlike atelectasis present in left lower lobe mass or nodule was seen.    Review of Systems -   Review of Systems   Constitutional:  Negative for fever.   Respiratory:  Positive for cough. Negative for shortness of breath.    Cardiovascular:  Negative for chest pain and leg swelling.   Gastrointestinal:  Negative for abdominal pain.   Musculoskeletal:  Negative for arthralgias and myalgias.           Physical Exam:  Vitals: BP (!) 169/90 Comment: prn labetalol given  Pulse 87   Temp 98.4 °F (36.9 °C) (Oral)   Resp 15   Ht 1.854 m (6' 1\")   Wt 104.6 kg (230 lb 9.6 oz)   SpO2 97%   BMI 30.42 kg/m²   24 hour intake/output:No intake or output data in the 24 hours ending 07/16/24 0718    Last 3 weights:  Wt Readings from Last 3 Encounters:   07/14/24 104.6 kg (230 lb 9.6 oz)   07/08/24 108.9 kg (240 lb)   07/02/24 108 kg (238 lb)       General appearance: alert and cooperative with exam  Physical Examination:   Physical Exam  Constitutional:       General: He is awake.       Comments: Trach mask in place   Cardiovascular:      Rate and Rhythm: Normal rate and regular rhythm.      Heart sounds: Normal heart sounds.   Pulmonary:      Effort: Pulmonary effort is normal.      Breath sounds: Normal breath sounds. No decreased breath sounds, wheezing, rhonchi or rales.   Abdominal:      General: Abdomen is flat. Bowel sounds are normal.      Palpations: Abdomen is soft. There is no shifting dullness.      Tenderness: There is no abdominal tenderness.   Neurological:      General: No focal deficit present.      Mental Status: He is alert and oriented to person, place, and time.   Psychiatric:         Attention and Perception: Attention normal.         Mood and Affect: Mood normal.         Speech: Speech normal.         Behavior: Behavior is cooperative.           Diet:  ADULT DIET; Regular    Medications:Current Inpatient    Scheduled Meds:   amLODIPine  10 mg Oral Daily    insulin glargine  5 Units SubCUTAneous Nightly    atorvastatin  40 mg Oral Daily    losartan  100 mg Oral Daily    And    [Held by provider] hydroCHLOROthiazide  25 mg Oral Daily    sodium chloride flush  5-40 mL IntraVENous 2 times per day    pantoprazole (PROTONIX) 40 mg in sodium chloride (PF) 0.9 % 10 mL injection  40 mg IntraVENous Daily    ipratropium 0.5 mg-albuterol 2.5 mg  1 Dose Inhalation Q4H WA RT    heparin (porcine)  5,000 Units SubCUTAneous 3 times per day    nicotine  1 patch TransDERmal Daily    insulin lispro  0-8 Units SubCUTAneous Q4H     Continuous Infusions:   sodium chloride      dextrose       PRN Meds:oxyCODONE **OR** oxyCODONE, [Held by provider] fentanNYL, LORazepam, [Held by provider] morphine, sodium chloride flush, sodium chloride, potassium chloride **OR** potassium chloride, magnesium sulfate, ondansetron **OR** ondansetron, polyethylene glycol, acetaminophen **OR** acetaminophen, labetalol, hydrALAZINE, glucose, dextrose bolus **OR** dextrose bolus, glucagon (rDNA), dextrose,

## 2024-07-16 NOTE — PROGRESS NOTES
PALLIATIVE CARE PROGRESS NOTE     NAME:  Dylan Dolan  MEDICAL RECORD NUMBER:  [id number]  AGE: 58 y.o.   GENDER: male  : 1965  TODAY'S DATE:  2024  Room: [facility]0406-01    Reason For Consult:  Goals of care evaluation  Distress management  Symptom Management  Guidance and support  Facilitate communications  Assistance in coordinating care  Recommendations for the above    Plan      Palliative Interaction:    I met with patient this morning on rounds. No family present at this time. He is able to tell me that his pain is \"a little\" better today but he still continues to rate pain 7/10. Patient was previously on Norco at home.     Patient did tell me that he had a bowel movement today. We discussed stool softeners yesterday due to increase in pain medications and he is in agreement with this.     Patient is set up for [facility] palliative to manage his pain and anxiety medications. Discussed the PRN Ativan which was ordered as IV. Patient agreeable to transition to oral Ativan. Discussed his pain medication - will increase to 10-15mg Q4 PRN Oxycodone. I would recommend palliative at home to consider starting long acting pain medications versus every 4 hours as needed. Patient is planning for discharge as soon as supplies are available for home - will defer to outpatient palliative to start new long acting medications.     Discussed resuscitative measures with the patient. He confirms that he would like to remain full code. He completed healthcare power of  prior to tracheostomy.     All patient's questions answered. Patient uses paper and pen to write questions or mouths questions with trach intact. He is alert and oriented.     Palliative to follow. Please reach out for discharge for medications to be sent to pharmacy. Patient agrees to send to [facility] outpatient pharmacy.     IMPRESSION/ PLAN  Symptom management/pain control    We feel the patient's symptoms are being controlled. Their

## 2024-07-16 NOTE — PROGRESS NOTES
BRIEF ENT PROGRESS    I have received confirmation that the office of Dr. Hernandez and Dr. Hernandes will contact patient to schedule an outpatient clinic visit as soon as possible after discharge from hospital.  I will follow-up with patient to make sure that this has been coordinated upon discharge.    Raoy Jensen MD  Otolaryngology - Head and Neck Surgery  [facility] @ [facility]

## 2024-07-16 NOTE — PROGRESS NOTES
Patient was evaluated today for the diagnosis of  squamous cell carcinoma of vocal cord .  I entered a DME order for tracheostomy supplies- Shiley 8 and 6 uncuffed tubes, trach tube collar, inner cannula, suction machine and other related supplies.   The need for this equipment was discussed with the patient and he understands and is in agreement.     Linda Miles MD  Internal Medicine Resident, PGY-2  [place], [place]  7/16/2024,3:00 PM'

## 2024-07-17 VITALS
RESPIRATION RATE: 20 BRPM | WEIGHT: 230.6 LBS | TEMPERATURE: 97.5 F | HEART RATE: 108 BPM | DIASTOLIC BLOOD PRESSURE: 91 MMHG | SYSTOLIC BLOOD PRESSURE: 142 MMHG | HEIGHT: 73 IN | OXYGEN SATURATION: 92 % | BODY MASS INDEX: 30.56 KG/M2

## 2024-07-17 LAB
ANION GAP SERPL CALCULATED.3IONS-SCNC: 11 MMOL/L (ref 9–16)
BASOPHILS # BLD: <0.03 K/UL (ref 0–0.2)
BASOPHILS NFR BLD: 0 % (ref 0–2)
BUN SERPL-MCNC: 8 MG/DL (ref 6–20)
CALCIUM SERPL-MCNC: 9 MG/DL (ref 8.6–10.4)
CHLORIDE SERPL-SCNC: 92 MMOL/L (ref 98–107)
CO2 SERPL-SCNC: 26 MMOL/L (ref 20–31)
CREAT SERPL-MCNC: 0.8 MG/DL (ref 0.7–1.2)
CREAT UR-MCNC: 68.7 MG/DL (ref 39–259)
EOSINOPHIL # BLD: 0.18 K/UL (ref 0–0.44)
EOSINOPHILS RELATIVE PERCENT: 2 % (ref 1–4)
ERYTHROCYTE [DISTWIDTH] IN BLOOD BY AUTOMATED COUNT: 12.2 % (ref 11.8–14.4)
GFR, ESTIMATED: >90 ML/MIN/1.73M2
GLUCOSE BLD-MCNC: 147 MG/DL (ref 75–110)
GLUCOSE BLD-MCNC: 170 MG/DL (ref 75–110)
GLUCOSE BLD-MCNC: 194 MG/DL (ref 75–110)
GLUCOSE SERPL-MCNC: 245 MG/DL (ref 74–99)
HCT VFR BLD AUTO: 35.9 % (ref 40.7–50.3)
HGB BLD-MCNC: 12.2 G/DL (ref 13–17)
IMM GRANULOCYTES # BLD AUTO: 0.06 K/UL (ref 0–0.3)
IMM GRANULOCYTES NFR BLD: 1 %
LYMPHOCYTES NFR BLD: 1.36 K/UL (ref 1.1–3.7)
LYMPHOCYTES RELATIVE PERCENT: 14 % (ref 24–43)
MCH RBC QN AUTO: 31.5 PG (ref 25.2–33.5)
MCHC RBC AUTO-ENTMCNC: 34 G/DL (ref 28.4–34.8)
MCV RBC AUTO: 92.8 FL (ref 82.6–102.9)
MONOCYTES NFR BLD: 0.88 K/UL (ref 0.1–1.2)
MONOCYTES NFR BLD: 9 % (ref 3–12)
NEUTROPHILS NFR BLD: 74 % (ref 36–65)
NEUTS SEG NFR BLD: 7.26 K/UL (ref 1.5–8.1)
NRBC BLD-RTO: 0 PER 100 WBC
OSMOLALITY SERPL: 276 MOSM/KG (ref 275–295)
OSMOLALITY UR: 305 MOSM/KG (ref 80–1300)
PLATELET # BLD AUTO: 272 K/UL (ref 138–453)
PMV BLD AUTO: 9 FL (ref 8.1–13.5)
POTASSIUM SERPL-SCNC: 4.4 MMOL/L (ref 3.7–5.3)
RBC # BLD AUTO: 3.87 M/UL (ref 4.21–5.77)
SODIUM SERPL-SCNC: 129 MMOL/L (ref 136–145)
SODIUM SERPL-SCNC: 129 MMOL/L (ref 136–145)
SODIUM UR-SCNC: 39 MMOL/L
WBC OTHER # BLD: 9.8 K/UL (ref 3.5–11.3)

## 2024-07-17 PROCEDURE — 80048 BASIC METABOLIC PNL TOTAL CA: CPT

## 2024-07-17 PROCEDURE — 82947 ASSAY GLUCOSE BLOOD QUANT: CPT

## 2024-07-17 PROCEDURE — 6370000000 HC RX 637 (ALT 250 FOR IP): Performed by: NURSE PRACTITIONER

## 2024-07-17 PROCEDURE — 6360000002 HC RX W HCPCS: Performed by: STUDENT IN AN ORGANIZED HEALTH CARE EDUCATION/TRAINING PROGRAM

## 2024-07-17 PROCEDURE — 84300 ASSAY OF URINE SODIUM: CPT

## 2024-07-17 PROCEDURE — 99232 SBSQ HOSP IP/OBS MODERATE 35: CPT | Performed by: INTERNAL MEDICINE

## 2024-07-17 PROCEDURE — 99239 HOSP IP/OBS DSCHRG MGMT >30: CPT | Performed by: HOSPITALIST

## 2024-07-17 PROCEDURE — 6370000000 HC RX 637 (ALT 250 FOR IP)

## 2024-07-17 PROCEDURE — 36415 COLL VENOUS BLD VENIPUNCTURE: CPT

## 2024-07-17 PROCEDURE — 94761 N-INVAS EAR/PLS OXIMETRY MLT: CPT

## 2024-07-17 PROCEDURE — 99231 SBSQ HOSP IP/OBS SF/LOW 25: CPT | Performed by: NURSE PRACTITIONER

## 2024-07-17 PROCEDURE — 84295 ASSAY OF SERUM SODIUM: CPT

## 2024-07-17 PROCEDURE — 85025 COMPLETE CBC W/AUTO DIFF WBC: CPT

## 2024-07-17 PROCEDURE — 2580000003 HC RX 258: Performed by: STUDENT IN AN ORGANIZED HEALTH CARE EDUCATION/TRAINING PROGRAM

## 2024-07-17 PROCEDURE — 83930 ASSAY OF BLOOD OSMOLALITY: CPT

## 2024-07-17 PROCEDURE — 82570 ASSAY OF URINE CREATININE: CPT

## 2024-07-17 PROCEDURE — 6370000000 HC RX 637 (ALT 250 FOR IP): Performed by: STUDENT IN AN ORGANIZED HEALTH CARE EDUCATION/TRAINING PROGRAM

## 2024-07-17 PROCEDURE — 94640 AIRWAY INHALATION TREATMENT: CPT

## 2024-07-17 PROCEDURE — 83935 ASSAY OF URINE OSMOLALITY: CPT

## 2024-07-17 RX ORDER — AMLODIPINE BESYLATE 10 MG/1
10 TABLET ORAL DAILY
Qty: 30 TABLET | Refills: 3 | Status: SHIPPED | OUTPATIENT
Start: 2024-07-18

## 2024-07-17 RX ORDER — PREGABALIN 75 MG/1
75 CAPSULE ORAL 2 TIMES DAILY
Qty: 60 CAPSULE | Refills: 0 | Status: CANCELLED | OUTPATIENT
Start: 2024-07-17 | End: 2024-08-16

## 2024-07-17 RX ORDER — IPRATROPIUM BROMIDE AND ALBUTEROL SULFATE 2.5; .5 MG/3ML; MG/3ML
3 SOLUTION RESPIRATORY (INHALATION)
Qty: 360 ML | Refills: 0 | Status: SHIPPED | OUTPATIENT
Start: 2024-07-17

## 2024-07-17 RX ADMIN — OXYCODONE 15 MG: 5 TABLET ORAL at 05:56

## 2024-07-17 RX ADMIN — IPRATROPIUM BROMIDE AND ALBUTEROL SULFATE 1 DOSE: 2.5; .5 SOLUTION RESPIRATORY (INHALATION) at 07:55

## 2024-07-17 RX ADMIN — HEPARIN SODIUM 5000 UNITS: 5000 INJECTION INTRAVENOUS; SUBCUTANEOUS at 12:50

## 2024-07-17 RX ADMIN — LORAZEPAM 1 MG: 1 TABLET ORAL at 12:50

## 2024-07-17 RX ADMIN — LORAZEPAM 1 MG: 1 TABLET ORAL at 05:56

## 2024-07-17 RX ADMIN — SODIUM CHLORIDE, PRESERVATIVE FREE 10 ML: 5 INJECTION INTRAVENOUS at 09:39

## 2024-07-17 RX ADMIN — OXYCODONE 15 MG: 5 TABLET ORAL at 01:49

## 2024-07-17 RX ADMIN — SODIUM CHLORIDE, PRESERVATIVE FREE 40 MG: 5 INJECTION INTRAVENOUS at 08:47

## 2024-07-17 RX ADMIN — DESMOPRESSIN ACETATE 40 MG: 0.2 TABLET ORAL at 08:46

## 2024-07-17 RX ADMIN — HEPARIN SODIUM 5000 UNITS: 5000 INJECTION INTRAVENOUS; SUBCUTANEOUS at 05:56

## 2024-07-17 RX ADMIN — AMLODIPINE BESYLATE 10 MG: 10 TABLET ORAL at 08:46

## 2024-07-17 RX ADMIN — LOSARTAN POTASSIUM 100 MG: 50 TABLET, FILM COATED ORAL at 08:46

## 2024-07-17 RX ADMIN — OXYCODONE 15 MG: 5 TABLET ORAL at 10:14

## 2024-07-17 RX ADMIN — OXYCODONE 15 MG: 5 TABLET ORAL at 14:07

## 2024-07-17 RX ADMIN — DOCUSATE SODIUM 50 MG AND SENNOSIDES 8.6 MG 1 TABLET: 8.6; 5 TABLET, FILM COATED ORAL at 08:46

## 2024-07-17 RX ADMIN — IPRATROPIUM BROMIDE AND ALBUTEROL SULFATE 1 DOSE: 2.5; .5 SOLUTION RESPIRATORY (INHALATION) at 11:30

## 2024-07-17 ASSESSMENT — PAIN SCALES - GENERAL
PAINLEVEL_OUTOF10: 6
PAINLEVEL_OUTOF10: 8
PAINLEVEL_OUTOF10: 6
PAINLEVEL_OUTOF10: 8
PAINLEVEL_OUTOF10: 4
PAINLEVEL_OUTOF10: 8

## 2024-07-17 ASSESSMENT — PAIN DESCRIPTION - LOCATION
LOCATION: BACK;THROAT
LOCATION: THROAT;BACK
LOCATION: BACK;THROAT
LOCATION: THROAT;BACK

## 2024-07-17 ASSESSMENT — PAIN DESCRIPTION - DESCRIPTORS
DESCRIPTORS: PRESSURE;SORE
DESCRIPTORS: ACHING;DISCOMFORT;SORE
DESCRIPTORS: ACHING;DISCOMFORT;SORE;TENDER
DESCRIPTORS: SORE;PRESSURE

## 2024-07-17 ASSESSMENT — ENCOUNTER SYMPTOMS
COUGH: 1
SHORTNESS OF BREATH: 0
ABDOMINAL PAIN: 0

## 2024-07-17 ASSESSMENT — PAIN DESCRIPTION - ORIENTATION
ORIENTATION: LEFT;RIGHT
ORIENTATION: ANTERIOR;RIGHT
ORIENTATION: RIGHT;LEFT

## 2024-07-17 NOTE — PROGRESS NOTES
CLINICAL PHARMACY NOTE: MEDS TO BEDS    Total # of Prescriptions Filled: 2   The following medications were delivered to the patient:  Norvasc 10mg  Metformin 500mg    Additional Documentation:

## 2024-07-17 NOTE — PROGRESS NOTES
CLINICAL PHARMACY NOTE: MEDS TO BEDS    Total # of Prescriptions Filled: 2   The following medications were delivered to the patient:  OXY 5MG   LORAZEPAM 1MG     Additional Documentation:

## 2024-07-17 NOTE — PROGRESS NOTES
Pt will need humidification for trach to prevent mucous plugging. Order for humidification with trach placed .   As per case management - humidification device will be available by 1 week .     Electronically signed by RAISSA DONNELLY MD on 7/17/2024 at 4:20 PM

## 2024-07-17 NOTE — PROGRESS NOTES
PULMONARY PROGRESS NOTE      Patient:  Dylan Dolan  YOB: 1965    MRN: [id number]     Acct: [id number]     Admit date: 7/8/2024    REASON FOR CONSULT:-Vocal cord mass    Pt seen and Chart reviewed.    Subjective:     59 yo with past medical history of HTN, HLD, T2DM, GERD, tobacco abuse, initially presented to ER with cough, worsening SOB, voice changes, he was found to have stridor. CT of the neck revealed nonspecific left vocal cord mass 8 mm x 6 mm.     He was transferred to [facility] for further evaluation. 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 He underwent open tracheostomy    Biopsy results shows squamous cell carcinoma      Interval History  7/17/2024    On 5 L via trach mask 28%  Afebrile, hemodynamically stable  Denies any acute complaints, tolerating diet    CT scan of the chest reviewed done on 07/11/2024 there are mild area of platelike and bandlike atelectasis present in left lower lobe mass or nodule was seen.    Review of Systems -   Review of Systems   Constitutional:  Negative for fever.   Respiratory:  Positive for cough. Negative for shortness of breath.    Cardiovascular:  Negative for chest pain and leg swelling.   Gastrointestinal:  Negative for abdominal pain.   Musculoskeletal:  Negative for arthralgias and myalgias.           Physical Exam:  Vitals: BP (!) 135/95   Pulse 98   Temp 98.2 °F (36.8 °C) (Oral)   Resp 20   Ht 1.854 m (6' 1\")   Wt 104.6 kg (230 lb 9.6 oz)   SpO2 92%   BMI 30.42 kg/m²   24 hour intake/output:  Intake/Output Summary (Last 24 hours) at 7/17/2024 1247  Last data filed at 7/17/2024 0657  Gross per 24 hour   Intake 1810 ml   Output --   Net 1810 ml       Last 3 weights:  Wt Readings from Last 3 Encounters:   07/14/24 104.6 kg (230 lb 9.6 oz)   07/08/24 108.9 kg (240 lb)   07/02/24 108 kg (238 lb)       General appearance: alert and cooperative with exam  Physical

## 2024-07-17 NOTE — PLAN OF CARE
Problem: Chronic Conditions and Co-morbidities  Goal: Patient's chronic conditions and co-morbidity symptoms are monitored and maintained or improved  7/17/2024 1009 by Gifty Calderon RN  Outcome: Progressing  7/17/2024 0139 by Hoda Baldwin RN  Outcome: Progressing  Flowsheets (Taken 7/16/2024 2100)  Care Plan - Patient's Chronic Conditions and Co-Morbidity Symptoms are Monitored and Maintained or Improved:   Monitor and assess patient's chronic conditions and comorbid symptoms for stability, deterioration, or improvement   Collaborate with multidisciplinary team to address chronic and comorbid conditions and prevent exacerbation or deterioration   Update acute care plan with appropriate goals if chronic or comorbid symptoms are exacerbated and prevent overall improvement and discharge     Problem: Discharge Planning  Goal: Discharge to home or other facility with appropriate resources  7/17/2024 1009 by Gifty Calderon RN  Outcome: Progressing  7/17/2024 0139 by Hoda Baldwin RN  Outcome: Progressing  Flowsheets (Taken 7/16/2024 2100)  Discharge to home or other facility with appropriate resources:   Identify barriers to discharge with patient and caregiver   Arrange for needed discharge resources and transportation as appropriate   Identify discharge learning needs (meds, wound care, etc)   Refer to discharge planning if patient needs post-hospital services based on physician order or complex needs related to functional status, cognitive ability or social support system     Problem: Pain  Goal: Verbalizes/displays adequate comfort level or baseline comfort level  7/17/2024 1009 by Gifty Calderon RN  Outcome: Progressing  7/17/2024 0139 by Hoda Baldwin, RN  Outcome: Progressing     Problem: Safety - Adult  Goal: Free from fall injury  7/17/2024 1009 by Gifty Calderon RN  Outcome: Progressing  7/17/2024 0139 by Hoda Baldwin, RN  Outcome: Progressing  Flowsheets (Taken 7/16/2024

## 2024-07-17 NOTE — PLAN OF CARE
Problem: Respiratory - Adult  Goal: Achieves optimal ventilation and oxygenation  7/17/2024 0758 by Mary Shea, ELISABETH  Outcome: Progressing   BRONCHOSPASM/BRONCHOCONSTRICTION     [x]         IMPROVE AERATION/BREATH SOUNDS  [x]   ADMINISTER BRONCHODILATOR THERAPY AS APPROPRIATE  [x]   ASSESS BREATH SOUNDS  []   IMPLEMENT AEROSOL/MDI PROTOCOL  [x]   PATIENT EDUCATION AS NEEDED

## 2024-07-17 NOTE — DISCHARGE INSTRUCTIONS
Pt informed and contact information given. Informed referrals are still pending insurance approval.  Pt will call back to check status. You were admitted to the hospital with noisy breathing and found to have squamous cell carcinoma of vocal cord.  You underwent tracheostomy.  Take oxycodone every 4 hours as needed for pain.  Continue to take your old medications as prescribed.  Your sodium levels were low during hospital stay. You will need a repeat lab in one week. The lab is ordered  Follow up with Dr. Ag, oncologist on 7/30/24 at 9:30 AM. The appointment is scheduled  Follow up with Dr. Starks, radiation oncologist on 7/29/2024 at 9:30 AM. The appointment is scheduled.   You need to follow up with Dr. Hernandez and Dr. Hernandes at [facility] for further evaluation and treatment options. They will reach out to you to schedule an appointment.  Follow up with your PCP in one week. Discuss with your PCP about medications for your diabetes.  In case of worsening symptoms or new symptoms arise, please visit ER.

## 2024-07-17 NOTE — PROGRESS NOTES
Pt discharged home with supplies and all discharge instructions, medications and follow up appointments.  All information discussed with family and patient who verbalized understanding.  Items ready to be delivered were delivered to family prior to discharge.    Pt taken to private vehicle driven by friends of family.    Daughter/niece updated.

## 2024-07-17 NOTE — CARE COORDINATION
Transitional planning      0900 writer to room to discuss d/c plan and waiting on supplies, patient verbalized understanding.      1015 call to [facility], left  for Balaji.      1100 Balaji on unit, will arrange for delivery and discuss with patient.         1500 [facility] out of concentrators, call to winter Carpio have 1 for a week, primary RN to reach out to MD for order.       1600 call to [facility], left  for Cammie,  DME orders received, clinicals and orders faxed to [facility].      1630 call back from Cammie, will call unit with MARYA, provided patient info and updated on fax, she will process order, RN updated and will update patient.      1645 call from Cammie cannot provide, due to patient using [facility]. Call to Anu with [facility], cannot provide either. Call to Balaji with [facility], will have within a week, per primary RN , MD ok'd patient to return home without it for a week, orders faxed to [facility], updated Balaji and nurse ok to d/c today and primary will update patient.

## 2024-07-17 NOTE — PROGRESS NOTES
Today's Date: 7/17/2024  Patient Name: Dylan Dolan  Date of admission: 7/8/2024 12:25 PM  Patient's age: 58 y.o., 1965  Admission Dx: Vocal cord mass [J38.3]  Vocal cord paralysis [J38.00]    Reason for Consult: Vocal cord mass history of cigarette smoking   Requesting Physician: Patrice Ames MD    Chief Complaint:  Shortness of breath, stridor, hoarseness of voice     Interval Changes:  Patient seen and examined.  Labs and vitals reviewed.  CBC stable.  Hyponatremic, 129  No new complaints or interval events  Plan is to discharge once home tracheostomy supplies have been arranged  Palliative care following for pain management, he tells me his pain is much improved.    History of Present Illness:    The patient is a 58 y.o. male who is admitted to the hospital for management of vocal cord mass.    He presented to [place] ED with complaints of coughing, trouble breathing, losing his voice, ongoing for the last several months.  In the ED he was noted to have stridor for which he was given racemic epi and steroids.  CT soft tissue neck showed 8 mm x 6 mm mass in the left vocal cord.  He was then transferred to [facility] for further care.     He went to OR with ENT for biopsy.  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  His intubation was transitioned to an open tracheostomy     He has a past medical history of cigarette smoking (1.5 packs/day since age 17), hypertension, hyperlipidemia, Tourette's syndrome    Past Medical History:   has a past medical history of Ankle pain, left, Gastroesophageal reflux disease, Hyperlipemia, Hypertension, Obesity, Tobacco abuse, and Tourette's syndrome.    Past Surgical History:   has a past surgical history that includes Ankle fracture surgery (Left, 01/01/1999); [place] tooth extraction (Bilateral); Toe amputation (Left, 03/29/2021); tracheostomy (N/A, 07/09/2024); laryngoscopy (N/A, 7/9/2024); and tracheostomy (N/A,  ros and hpi , including pertinent history and exam findings, labs, imaging , medication ad other relavent clincal data.  I have reviewed the key elements of all parts of the encounter with the Nurse Practitioner .  I agree with the assessment, plan and orders as documented by the  NP with the following addendum     More than 50% of the time was spent taking care of this patient in addition to the nurse practitioner time.  That also included history taking follow-up physical examination and review of system.        Clear discharge patient home  Outpatient PET scan already set up  Will follow-up outpatient  Patient will be following up with surgery and radiation oncology outpatient as well      Jadon Ag M.D.        This note is created with the assistance of a speech recognition program.  While intending to generate a document that actually reflects the content of the visit, the document can still have some errors including those of syntax and sound a like substitutions which may escape proof reading.  It such instances, actual meaning can be extrapolated by contextual diversion.

## 2024-07-17 NOTE — PLAN OF CARE
Problem: Respiratory - Adult  Goal: Achieves optimal ventilation and oxygenation  Outcome: Progressing  Flowsheets (Taken 7/16/2024 2030)  Achieves optimal ventilation and oxygenation:   Assess for changes in respiratory status   Assess for changes in mentation and behavior   Position to facilitate oxygenation and minimize respiratory effort   Oxygen supplementation based on oxygen saturation or arterial blood gases   Assess the need for suctioning and aspirate as needed   Respiratory therapy support as indicated     Problem: Safety - Adult  Goal: Free from fall injury  Outcome: Progressing  Flowsheets (Taken 7/16/2024 2100)  Free From Fall Injury: Instruct family/caregiver on patient safety     Problem: Pain  Goal: Verbalizes/displays adequate comfort level or baseline comfort level  Outcome: Progressing     Problem: Discharge Planning  Goal: Discharge to home or other facility with appropriate resources  Outcome: Progressing  Flowsheets (Taken 7/16/2024 2100)  Discharge to home or other facility with appropriate resources:   Identify barriers to discharge with patient and caregiver   Arrange for needed discharge resources and transportation as appropriate   Identify discharge learning needs (meds, wound care, etc)   Refer to discharge planning if patient needs post-hospital services based on physician order or complex needs related to functional status, cognitive ability or social support system

## 2024-07-17 NOTE — PLAN OF CARE
Problem: Respiratory - Adult  Goal: Achieves optimal ventilation and oxygenation  7/17/2024 0329 by Jo [address] RCP  Outcome: Progressing  Flowsheets (Taken 7/17/2024 0329)  Achieves optimal ventilation and oxygenation:   Assess for changes in respiratory status   Position to facilitate oxygenation and minimize respiratory effort   Assess the need for suctioning and aspirate as needed   Respiratory therapy support as indicated   Assess for changes in mentation and behavior   Oxygen supplementation based on oxygen saturation or arterial blood gases   Encourage broncho-pulmonary hygiene including cough, deep breathe, incentive spirometry   Assess and instruct to report shortness of breath or any respiratory difficulty  7/17/2024 0139 by Hoda Baldwin, RN  Outcome: Progressing  Flowsheets (Taken 7/16/2024 2030)  Achieves optimal ventilation and oxygenation:   Assess for changes in respiratory status   Assess for changes in mentation and behavior   Position to facilitate oxygenation and minimize respiratory effort   Oxygen supplementation based on oxygen saturation or arterial blood gases   Assess the need for suctioning and aspirate as needed   Respiratory therapy support as indicated

## 2024-07-17 NOTE — PROGRESS NOTES
PALLIATIVE CARE PROGRESS NOTE     NAME:  Dylan Dolan  MEDICAL RECORD NUMBER:  [id number]  AGE: 58 y.o.   GENDER: male  : 1965  TODAY'S DATE:  2024  Room: [facility]0406-01    Reason For Consult:  Goals of care evaluation  Distress management  Symptom Management  Guidance and support  Facilitate communications  Assistance in coordinating care  Recommendations for the above    Plan      Palliative Interaction:    I met with patient this morning on rounds. Patient is still waiting for discharge planning with supplies.     Updated him that prescriptions were sent to [facility] pharmacy for discharge. Once official discharge time is made, will notify [facility] to set up time to meet at home.     Patient denies other needs. Encouraged him to speak with home palliative about long acting pain medications. Use Senna-S as needed for constipation.     Palliative to follow.     IMPRESSION/ PLAN  Symptom management/pain control    We feel the patient's symptoms are being controlled. Their current regimen has been reviewed by myself and discussed with the staff. We recommend adjusting their medications as follows:    Goals of care evaluation  The patient goals of care are cure, live longer, provide comfort care/support/palliation/relieve suffering, and remain at home  Long discussion to ensure the patient's and family's understanding of goals of care, and theconcept of palliative care.    Code Status:  FULL    Other Recommendations -       History of Present Illness     HISTORY OF PRESENT ILLNESS:   The patient is a 58 y.o. male who presented to [facility] emergency department with complaints of coughing, losing his voice, and difficulty breathing.  Patient was noted to have stridor and given racemic epi and steroids.  Patient had CT soft tissue neck which showed left vocal cord mass.  He was transferred to [facility] for further care on 2024.  Patient went to OR with ENT for biopsy and tracheostomy was done at

## 2024-07-17 NOTE — PROGRESS NOTES
[facility]  Internal Medicine Teaching Residency Program  Inpatient Daily Progress Note  ______________________________________________________________________________    Patient: Dylan Dolan  YOB: 1965   MRN:[id number]    Acct: [id number]     Room: 0406/0406-01  Admit date: 7/8/2024  Today's date: 07/17/24  Number of days in the hospital: 9    SUBJECTIVE   Admitting Diagnosis: Vocal cord mass    CC: cough, shortness of breath, change in his voice    Patient seen and examined at bedside.    No acute events overnight.    Patient does not have any acute complaints.  Palliative care was consulted and they are now managing his pain regimen.    Labs reviewed, sodium 129, chloride 92, glucose 245.      BRIEF HISTORY     The patient is a 58 y.o. male with past medical history significant for hypertension, hyperlipidemia, T2DM, GERD, obesity, smoking who was initially admitted on 7/8/2024 with Vocal cord mass [J38.3]  Vocal cord paralysis [J38.00] and presented to [place] ER with cough, shortness of breath, voice changes ongoing for few weeks.  He was found to have stridor.  Patient was given racemic epi and steroids and CT scan of the neck revealed nonspecific left vocal cord mass of 8 mm x 6 mm.  Patient was then transferred to [facility] ICU for further evaluation and treatment.     In the ICU, ENT was consulted and they evaluated the patient. They recommended CT scan of chest and biopsy for further management.  Patient underwent a successful direct laryngoscopy with biopsy of the mass and tracheostomy on 07/09/2024.  Frozen section results positive for SCC. Hematology and oncology was consulted,  recommended PET CT out patient.  Radiation oncology was consulted on 07/10/24 and they recommended outpatient treatment for him.     Currently, patient is alert and oriented. Trach mask in place. Denies any acute complaints.    OBJECTIVE     Vital

## 2024-07-18 ENCOUNTER — CARE COORDINATION (OUTPATIENT)
Dept: CARE COORDINATION | Age: 59
End: 2024-07-18

## 2024-07-18 ENCOUNTER — TELEPHONE (OUTPATIENT)
Dept: PALLATIVE CARE | Age: 59
End: 2024-07-18

## 2024-07-18 ENCOUNTER — APPOINTMENT (OUTPATIENT)
Dept: RADIATION ONCOLOGY | Age: 59
End: 2024-07-18
Payer: COMMERCIAL

## 2024-07-18 ENCOUNTER — ENROLLMENT (OUTPATIENT)
Dept: CARE COORDINATION | Age: 59
End: 2024-07-18

## 2024-07-18 ENCOUNTER — TELEPHONE (OUTPATIENT)
Dept: FAMILY MEDICINE CLINIC | Age: 59
End: 2024-07-18

## 2024-07-18 DIAGNOSIS — F17.200 TOBACCO DEPENDENCE: Primary | ICD-10-CM

## 2024-07-18 DIAGNOSIS — R06.02 SHORTNESS OF BREATH: ICD-10-CM

## 2024-07-18 DIAGNOSIS — J38.3 VOCAL CORD MASS: ICD-10-CM

## 2024-07-18 RX ORDER — NICOTINE 21 MG/24HR
1 PATCH, TRANSDERMAL 24 HOURS TRANSDERMAL DAILY
Qty: 42 PATCH | Refills: 0 | Status: SHIPPED | OUTPATIENT
Start: 2024-07-18 | End: 2024-08-29

## 2024-07-18 NOTE — CARE COORDINATION
Care Transitions Note    Initial Call - Call within 2 business days of discharge: Yes    Attempted to reach patient for transitions of care follow up. Unable to reach patient.    Outreach Attempts:   Multiple attempts to contact patient, family,   at phone numbers on file.     Patient: Dylan Dolan    Patient : 1965   MRN: [id number]    Reason for Admission:   Discharge Date: 24  RURS: Readmission Risk Score: 9.2    Last Discharge Facility       Date Complaint Diagnosis Description Type Department Provider    24  Tracheostomy in place (HCC) ... Admission (Discharged) [facility] Raquel, Sly Powellavarthy,...    24 Cough Vocal cord mass ... ED (TRANSFER) [facility] Zachery Real, DO          # 1 attempt-Attempted initial 24 hour hospital follow up call. Left a Hipaa compliant message with name and call back information. Requested return call to [phone].     Was this an external facility discharge? No    Follow Up Appointment:   Patient has hospital follow up appointment scheduled        Future Appointments         Provider Specialty Dept Phone    2024 1:30 PM (Arrive by 1:00 PM) [place] INJECTION ROOM Radiology [phone]    2024 2:30 PM (Arrive by 2:00 PM) [place] CT RM 2; [place] PET/CT ROOM Radiology [phone]    2024 2:30 PM Pasha Starks MD; SCHEDULE, [facility] RAD ONC NURSE Radiation Oncology [phone]    2024 9:30 AM Jadon Ag MD Oncology [phone]            Plan for follow-up on next business day.      Maggie Sexton RN

## 2024-07-18 NOTE — TELEPHONE ENCOUNTER
Spoke with Carol LAGUNAS from [facility]. Patient is needing a prescription for a nebulizer machine only. They have a prescription from the hospital for medication already. Order for nebulizer pended.    She would like nebulizer machine order sent to [facility] in [place].

## 2024-07-18 NOTE — TELEPHONE ENCOUNTER
Called Vee at [facility].  Notified her that patient was discharged from the hospital last evening.  Patient ready to be seen by [facility].    [facility] Palliative Care Coordinator  Bethany LANGSTONN, RN, ONN-[facility] [phone]/ [facility] [phone]/ [facility] [phone]

## 2024-07-18 NOTE — TELEPHONE ENCOUNTER
Fax received requesting an order for Nicotine patches.  A prescription was sent to [facility] in [place].

## 2024-07-19 ENCOUNTER — CARE COORDINATION (OUTPATIENT)
Dept: CARE COORDINATION | Age: 59
End: 2024-07-19

## 2024-07-19 NOTE — CARE COORDINATION
Care Transitions Note    Initial Call - Call within 2 business days of discharge: Yes    Attempted to reach patient for transitions of care follow up. Unable to reach patient.    Outreach Attempts:   Multiple attempts to contact patient, family,   at phone numbers on file.     Patient: Dylan Dolan    Patient : 1965   MRN: [id number]    Reason for Admission:   Discharge Date: 24  RURS: Readmission Risk Score: 9.2    Last Discharge Facility       Date Complaint Diagnosis Description Type Department Provider    24  Tracheostomy in place (HCC) ... Admission (Discharged) [facility] Raquel, Sly Powellavarthy,...    24 Cough Vocal cord mass ... ED (TRANSFER) [facility] Zachery Real, DO          # 2 attempt-Attempted initial 24 hour hospital follow up call. Left a Hipaa compliant message with name and call back information. Requested return call to [phone].     2 unsuccessful attempts to reach patient, care transition episode resolved//JU    Was this an external facility discharge? No    Follow Up Appointment:   Patient does not have a follow up appointment scheduled at time of call.     Future Appointments         Provider Specialty Dept Phone    2024 1:30 PM (Arrive by 1:00 PM) [place] INJECTION ROOM Radiology [phone]    2024 2:30 PM (Arrive by 2:00 PM) [place] CT RM 2; [place] PET/CT ROOM Radiology [phone]    2024 2:30 PM Pasha Starks MD; SCHEDULE, [facility] ON[facility] NURSE Radiation Oncology [phone]    2024 9:30 AM Jadon Ag MD Oncology [phone]    2024 11:20 AM Alley Vila MD Family Medicine [phone]                   Maggie Sexton RN

## 2024-07-19 NOTE — TELEPHONE ENCOUNTER
Care Transitions Initial Follow Up Call    Outreach made within 2 business days of discharge: Yes    Patient: Dylan Dolan Patient : 1965   MRN: [id number]  Reason for Admission: SOB, Vocal cord mass  Discharge Date: 24       Spoke with: patients sister    Discharge department/facility: [facility] Interactive Patient Contact:  Was patient able to fill all prescriptions: Yes  Was patient instructed to bring all medications to the follow-up visit: Yes  Is patient taking all medications as directed in the discharge summary? Yes  Does patient understand their discharge instructions: Yes  Does patient have questions or concerns that need addressed prior to 7-14 day follow up office visit: no    Scheduled appointment with PCP within 7-14 days    Follow Up  Future Appointments   Date Time Provider Department Center   2024  1:30 PM [place] NM INJECTION ROOM [facility]   2024  2:30 PM [place] PET/CT ROOM PB [facility] NM [facility]   2024  2:30 PM SCHEDULE, [facility] ONC NURSE PB PB [facility]   2024  9:30 AM Jadon Ag MD [facility]   2024 11:20 AM [facility] MD [facility]       Constance Craig [facility]

## 2024-07-24 ENCOUNTER — HOSPITAL ENCOUNTER (OUTPATIENT)
Dept: NUCLEAR MEDICINE | Age: 59
Discharge: HOME | End: 2024-07-26
Attending: INTERNAL MEDICINE
Payer: COMMERCIAL

## 2024-07-24 DIAGNOSIS — C32.0 MALIGNANT NEOPLASM OF VOCAL CORD (HCC): ICD-10-CM

## 2024-07-24 LAB — GLUCOSE BLD-MCNC: 111 MG/DL (ref 75–110)

## 2024-07-24 PROCEDURE — 3430000000 HC RX DIAGNOSTIC RADIOPHARMACEUTICAL: Performed by: INTERNAL MEDICINE

## 2024-07-24 PROCEDURE — 82947 ASSAY GLUCOSE BLOOD QUANT: CPT

## 2024-07-24 PROCEDURE — 2580000003 HC RX 258: Performed by: INTERNAL MEDICINE

## 2024-07-24 PROCEDURE — 78815 PET IMAGE W/CT SKULL-THIGH: CPT

## 2024-07-24 PROCEDURE — A9609 HC RX DIAGNOSTIC RADIOPHARMACEUTICAL: HCPCS | Performed by: INTERNAL MEDICINE

## 2024-07-24 RX ORDER — SODIUM CHLORIDE 0.9 % (FLUSH) 0.9 %
10 SYRINGE (ML) INJECTION PRN
Status: DISCONTINUED | OUTPATIENT
Start: 2024-07-24 | End: 2024-07-27 | Stop reason: HOSPADM

## 2024-07-24 RX ORDER — FLUDEOXYGLUCOSE F 18 200 MCI/ML
10 INJECTION, SOLUTION INTRAVENOUS
Status: COMPLETED | OUTPATIENT
Start: 2024-07-24 | End: 2024-07-24

## 2024-07-24 RX ADMIN — SODIUM CHLORIDE, PRESERVATIVE FREE 10 ML: 5 INJECTION INTRAVENOUS at 13:20

## 2024-07-24 RX ADMIN — FLUDEOXYGLUCOSE F 18 10.38 MILLICURIE: 200 INJECTION, SOLUTION INTRAVENOUS at 13:20

## 2024-07-24 RX ADMIN — SODIUM CHLORIDE, PRESERVATIVE FREE 10 ML: 5 INJECTION INTRAVENOUS at 13:21

## 2024-07-24 NOTE — DISCHARGE SUMMARY
[facility]     Department of Internal Medicine - Staff Internal Medicine Teaching Service    INPATIENT DISCHARGE SUMMARY      Patient Identification:  Dylan Dolan is a 58 y.o. male.  :  1965  MRN: [id number]     Acct: [id number]   PCP: Alley Vila MD  Admit Date:  2024  Discharge date and time: 2024  6:22 PM   Attending Provider: No att. providers found                                     ACTIVE DISCHARGE DIAGNOSES     Hospital Problem Lists:  Principal Problem:    Vocal cord mass  Active Problems:    Vocal cord paralysis    Malignant neoplasm of vocal cord (HCC)    Acute respiratory failure with hypoxia (HCC)    Tobacco dependence    Tracheostomy in place (HCC)    Acute neck pain    ACP (advance care planning)    Palliative care encounter  Resolved Problems:    * No resolved hospital problems. *      HOSPITAL STAY     Brief Inpatient course:   Dylan Dolan is a 58 y.o. male with past medical history of hypertension, hyperlipidemia, GERD, obesity, smoker Tourette's syndrome who was admitted for the management of Vocal cord mass, presented to the emergency department with cough, trouble breathing, change in voice.     Patient initially presented to emergency department on 24 with complaint of worsening shortness of breath, cough and worsening voice changes.  Patient also had stridor.  In the emergency department patient received racemic epinephrine and steroids, CT scan of the neck revealed nonspecific left vocal cord mass.  Subsequently patient was transferred to [facility] ICU.  ENT was consulted for hoarseness and stridor.  ENT performed flexible laryngoscopy at bedside, findings consistent with glottic malignancy.    On 24 patient underwent direct laryngoscopy with biopsy and open tracheotomy.  During the procedure 5 ET tube were unable to pass through the subglottis and subglottis was found to have ulcerative mass.  Intubation was

## 2024-07-25 ENCOUNTER — TELEPHONE (OUTPATIENT)
Dept: ONCOLOGY | Age: 59
End: 2024-07-25

## 2024-07-25 NOTE — TELEPHONE ENCOUNTER
Name: Dylan Dolan  : 1965  MRN: [id number]    Oncology Navigation Follow-Up Note    Contact Type:  Telephone    Notes:   Writer received voice message from [facility] nurse navigator for Dr. Hernandez's office that patient's niece is requesting trach canulas to get them through until the supplier ships his supplies.    Phone call to Carol, patient's niece POA. She states the supplier told her yesterday the supplies have shipped but they do not have tracking. Carol has one cannula left.  Patient to establish with Dr. Hernandez tomorrow.   She states she got a call from Zainab at [facility] yesterday that they would not be able to help with supplies.    Spoke to Carol by phone. They have reached out to [facility], the DME provider and they are not able to get supplies to them any sooner. She was told by Leora at Dr. Hernandez's office that they do not have his size in the office. They have been cleaning cannulas but he does not have humidification and the cannulas are encrusted.  She plans to start calling hospitals to see if she can locate spare cannulas until his shipment arrives.    Electronically signed by Bree Spears RN on 2024 at 8:31 AM

## 2024-07-29 ENCOUNTER — PREP FOR PROCEDURE (OUTPATIENT)
Dept: RADIATION ONCOLOGY | Age: 59
End: 2024-07-29

## 2024-07-29 ENCOUNTER — HOSPITAL ENCOUNTER (OUTPATIENT)
Dept: RADIATION ONCOLOGY | Age: 59
Discharge: HOME | End: 2024-07-29
Payer: COMMERCIAL

## 2024-07-29 VITALS
RESPIRATION RATE: 16 BRPM | TEMPERATURE: 97.6 F | BODY MASS INDEX: 31.08 KG/M2 | DIASTOLIC BLOOD PRESSURE: 98 MMHG | HEART RATE: 97 BPM | OXYGEN SATURATION: 97 % | WEIGHT: 235.6 LBS | SYSTOLIC BLOOD PRESSURE: 164 MMHG

## 2024-07-29 PROCEDURE — 99212 OFFICE O/P EST SF 10 MIN: CPT | Performed by: RADIOLOGY

## 2024-07-29 RX ORDER — DOCUSATE SODIUM 100 MG/1
100 CAPSULE, LIQUID FILLED ORAL 2 TIMES DAILY
COMMUNITY

## 2024-07-29 RX ORDER — ACETAMINOPHEN 500 MG
500 TABLET ORAL PRN
COMMUNITY

## 2024-07-29 RX ORDER — LORAZEPAM 1 MG/1
1 TABLET ORAL EVERY 6 HOURS PRN
COMMUNITY

## 2024-07-29 RX ORDER — LOSARTAN POTASSIUM 100 MG/1
100 TABLET ORAL DAILY
COMMUNITY

## 2024-07-29 RX ORDER — OXYCODONE HYDROCHLORIDE 5 MG/1
10 CAPSULE ORAL EVERY 4 HOURS PRN
COMMUNITY

## 2024-07-29 ASSESSMENT — PAIN DESCRIPTION - LOCATION: LOCATION: BACK;LEG;NECK

## 2024-07-29 ASSESSMENT — PAIN SCALES - GENERAL: PAINLEVEL_OUTOF10: 4

## 2024-07-30 ENCOUNTER — TELEPHONE (OUTPATIENT)
Dept: ONCOLOGY | Age: 59
End: 2024-07-30

## 2024-07-30 ENCOUNTER — OFFICE VISIT (OUTPATIENT)
Dept: ONCOLOGY | Age: 59
End: 2024-07-30
Payer: COMMERCIAL

## 2024-07-30 VITALS
SYSTOLIC BLOOD PRESSURE: 130 MMHG | OXYGEN SATURATION: 96 % | HEIGHT: 73 IN | HEART RATE: 98 BPM | BODY MASS INDEX: 31.41 KG/M2 | TEMPERATURE: 97 F | DIASTOLIC BLOOD PRESSURE: 70 MMHG | WEIGHT: 237 LBS

## 2024-07-30 DIAGNOSIS — C32.2 SQUAMOUS CELL CARCINOMA OF SUBGLOTTIS (HCC): Primary | ICD-10-CM

## 2024-07-30 DIAGNOSIS — F95.2 TOURETTE'S SYNDROME: ICD-10-CM

## 2024-07-30 DIAGNOSIS — C32.9 LARYNX CARCINOMA (HCC): Primary | ICD-10-CM

## 2024-07-30 PROCEDURE — 3078F DIAST BP <80 MM HG: CPT | Performed by: INTERNAL MEDICINE

## 2024-07-30 PROCEDURE — 3075F SYST BP GE 130 - 139MM HG: CPT | Performed by: INTERNAL MEDICINE

## 2024-07-30 PROCEDURE — 99215 OFFICE O/P EST HI 40 MIN: CPT | Performed by: INTERNAL MEDICINE

## 2024-07-30 NOTE — TELEPHONE ENCOUNTER
placement, and PEG tube placement if needed.   Educated provided.  Navigator following for continuity of care.    Phone call to [facility] to inform them patient will receive chemo concurrent with RT at [place].   Message to Ancillary social workers to contact patient regarding transportation needs.    Electronically signed by Bree Spears RN on 7/30/2024 at 10:08 AM

## 2024-07-30 NOTE — PROGRESS NOTES
Advance Care Planning     Hospice Services: Patient is not currently receiving hospice services/has not received hospice care within the performance year.    Advance Care Planning was discussed with patient, and the patient's Advance Care Plan is as follows:  Pt has living will and power of  documents and are already scanned into EPIC.        
condition which places them at a risk for a fall   4-6 Moderate Risk 1.  Provide assistance as indicated for ambulation activities  2.  Reorient confused/cognitively impaired patient  3.  Chair/bed in low position, stretcher/bed with siderails up except when performing patient care activities  4.  Educate patient/family/caregiver on falls prevention     7 or   Higher High Risk 1.  Place patient in easily observable treatment room  2.  Patient attended at all times by family member or staff  3.  Provide assistance as indicated for ambulation activities  4.  Reorient confused/cognitively impaired patient  5.  Chair/bed in low position, stretcher/bed with siderails up except when performing patient care activities  6.  Educate patient/family/caregiver on falls prevention         PLAN: Patient is seen today for follow up post PET scan and for consultation with Dr. Starks for larynx cancer.  Pt ambulatory on arrival with a steady gait accompanied by niece who is also one of his POAs.  Pt whispers to speak or writes on a notepad, and states he has some chronic pain in his back and leg and some pain in his neck.  He is currently on 10mg oxycodone for pain per palliative care and is not on norco anymore.  Pt has a trach placed and they have been having a hard time finding cannulas for the trach due to orders taking 2-3 weeks to get.  Pt family had to order some through a private company to get them by for now.  Pt is not up to date on dental visits but is to be seen by Dr. Ellie MONCADA in [place], [place] per family's niece to have his teeth pulled.  They are hoping to do it in the next 2 weeks and are waiting for the dentist to call them back about the removal.  Pt has been a smoker for over 40 years smoking about 1.5 packs a day and quit one month ago.  Pt is to see Dr. Ag tomorrow 7/30/24 with talks of a port placement as well.  Picture and consent obtained.  To call back for teach/sim after receiving dental 
  2021 7.44 1.50 - 8.10 k/uL Final     Hemoglobin   Date Value Ref Range Status   2024 12.2 (L) 13.0 - 17.0 g/dL Final     Platelets   Date Value Ref Range Status   2024 272 138 - 453 k/uL Final     No results found for: \"\", \"CEA\", \"\", \"WL4506\"  No results found for: \"PSA\"    IMAGIN24 PET Scan  IMPRESSION:  1. Metabolically active left vocal cord mass consistent with focal cord  carcinoma.  SUV maximum 9.7  2. Small metabolically active left-sided cervical lymph nodes are present  left levels 2 through 5.  Representative SUV maximum 2.8.  3. No metabolically active pulmonary nodules or masses.  4. No metabolically active adenopathy in the abdomen or pelvis.  5. No metabolically active osseous metastases.  6. Tracheostomy tube in situ.  7. Uptake of radiotracer in the upper sigmoid above a length of severe  diverticulosis. This is likely due to fecal stasis. Inflammation or other  etiology not excluded.  SUV maximum 6.3.    2024 CT Neck  IMPRESSION:  1. Nonspecific mass within the left vocal cord measuring 8 mm x 6 mm. Direct  visual inspection and biopsy is suggested.  2. No cervical lymphadenopathy.     PATHOLOGY:  2024 Laryngeal biopsy   -- Diagnosis --   A.  VOCAL CORD, RIGHT TRUE, BIOPSY:-SQUAMOUS MUCOSA WITH SLIGHT   REACTIVE CHANGE.-NO CARCINOMA.   B.  VOCAL CORD, LEFT TRUE, BIOPSY:-INVASIVE MODERATELY DIFFERENTIATED   KERATINIZING SQUAMOUS CELL CARCINOMA.   C.  SUBGLOTTIS, BIOPSY:-INVASIVE MODERATELY DIFFERENTIATED   KERATINIZING SQUAMOUS CELL CARCINOMA.   D.  SUBGLOTTIS, PART 3, BIOPSY:-INVASIVE MODERATELY DIFFERENTIATED   KERATINIZING SQUAMOUS CELL CARCINOMA.        ASSESSMENT AND PLAN:  Dylan Dolan is a 58 y.o. male with a  Cancer Staging   Larynx carcinoma (HCC)  Staging form: Larynx - Glottis, AJCC 8th Edition  - Clinical stage from 2024: Stage MIKE (cT3, cN2, cM0) - Signed by Pasha Starks MD on 2024      We reviewed with the patient and family 
enter the appropriate indicators that are present for fall risk identification.   Total the numbers entered and assign a fall risk score from Table 2.  Reassess patient at a minimum every 12 weeks or with status change.    Assessment   Date  7/29/2024     1.  Mental Ability: confusion/cognitively impaired 0     2.  Elimination Issues: incontinence, frequency 0       3.  Ambulatory: use of assistive devices (walker, cane, off-loading devices),        attached to equipment (IV pole, oxygen) 0     4.  Sensory Limitations: dizziness, vertigo, impaired vision 0     5.  Age less than 65        0     6.  Age 65 or greater 0     7.  Medication: diuretics, strong analgesics, hypnotics, sedatives,        antihypertensive agents 3   8.  Falls:  recent history of falls within the last 3 months (not to include slipping or        tripping) 0   TOTAL 3    If score of 4 or greater was education given? No       TABLE 2   Risk Score Risk Level Plan of Care   0-3 Little or  No Risk 1.  Provide assistance as indicated for ambulation activities  2.  Reorient confused/cognitively impaired patient  3.  Call-light/bell within patient's reach  4.  Chair/bed in low position, stretcher/bed with siderails up except when performing patient care activities  5.  Educate patient/family/caregiver on falls prevention  6.  Reassess in 12 weeks or with any noted change in patient condition which places them at a risk for a fall   4-6 Moderate Risk 1.  Provide assistance as indicated for ambulation activities  2.  Reorient confused/cognitively impaired patient  3.  Call-light/bell within patient's reach  4.  Chair/bed in low position, stretcher/bed with siderails up except when performing patient care activities  5.  Educate patient/family/caregiver on falls prevention     7 or   Higher High Risk 1.  Place patient in easily observable treatment room  2.  Patient attended at all times by family member or staff  3.  Provide assistance as indicated for

## 2024-07-31 ENCOUNTER — TELEPHONE (OUTPATIENT)
Dept: SURGERY | Age: 59
End: 2024-07-31

## 2024-07-31 ENCOUNTER — TELEPHONE (OUTPATIENT)
Dept: ONCOLOGY | Age: 59
End: 2024-07-31

## 2024-07-31 ENCOUNTER — INITIAL CONSULT (OUTPATIENT)
Dept: SURGERY | Age: 59
End: 2024-07-31

## 2024-07-31 ENCOUNTER — CLINICAL DOCUMENTATION (OUTPATIENT)
Facility: HOSPITAL | Age: 59
End: 2024-07-31

## 2024-07-31 VITALS
WEIGHT: 233.6 LBS | HEIGHT: 73 IN | BODY MASS INDEX: 30.96 KG/M2 | DIASTOLIC BLOOD PRESSURE: 78 MMHG | SYSTOLIC BLOOD PRESSURE: 132 MMHG

## 2024-07-31 DIAGNOSIS — C32.9 LARYNX CARCINOMA (HCC): Primary | ICD-10-CM

## 2024-07-31 RX ORDER — PIMOZIDE 1 MG/1
2 TABLET ORAL 2 TIMES DAILY
Qty: 360 TABLET | Refills: 0 | Status: SHIPPED | OUTPATIENT
Start: 2024-07-31

## 2024-07-31 NOTE — TELEPHONE ENCOUNTER
Dylan called requesting a refill of the below medication which has been pended for you:     Requested Prescriptions     Pending Prescriptions Disp Refills    pimozide (ORAP) 1 MG tablet [Pharmacy Med Name: PIMOZIDE TAB 1MG] 360 tablet 0     Sig: TAKE 2 TABLETS TWICE A DAY       Last Appointment Date: 4/2/2024  Next Appointment Date: None    Allergies   Allergen Reactions    Chantix [Varenicline] Other (See Comments)     Unusual dreams.

## 2024-07-31 NOTE — PROGRESS NOTES
Subjective   Dylan Dolan is a 58 y.o. male who presents today for discussion of port placement.  Patient has recently been diagnosed with laryngeal cancer and earlier this month was at [facility] where he had a trach placed.  He has seen oncology and is being planned for chemotherapy and they have requested port placement to facilitate chemo.    Past Medical History:   Diagnosis Date    Ankle pain, left     chronic; s/p fracture in 1999    Gastroesophageal reflux disease     Hyperlipemia     Hypertension     Obesity     Tobacco abuse     Tourette's syndrome        Past Surgical History:   Procedure Laterality Date    ANKLE FRACTURE SURGERY Left 01/01/1999    LARYNGOSCOPY N/A 7/9/2024    DIRECT LARYNGOSCOPY performed by Rayo Jensen MD at [email or web address] OR    TOE AMPUTATION Left 03/29/2021    left great toe, osteomyelitis, [facility], Dr. Kovacs    TRACHEOSTOMY N/A 07/09/2024    DIRECT LARYNGOSCOPY    TRACHEOSTOMY N/A 7/9/2024    TRACHEOTOMY performed by Rayo Jensen MD at [facility] [[email or web address] OR    WISDOM TOOTH EXTRACTION Bilateral        Current Outpatient Medications   Medication Sig Dispense Refill    pimozide (ORAP) 1 MG tablet TAKE 2 TABLETS TWICE A  tablet 0    LORazepam (ATIVAN) 1 MG tablet Take 1 tablet by mouth every 6 hours as needed for Anxiety.      oxyCODONE 5 MG capsule Take 2 capsules by mouth every 4 hours as needed for Pain.      losartan (COZAAR) 100 MG tablet Take 1 tablet by mouth daily 100-25mg      docusate sodium (COLACE) 100 MG capsule Take 1 capsule by mouth 2 times daily      acetaminophen (TYLENOL) 500 MG tablet Take 1 tablet by mouth as needed for Pain 2 tablets as needed      nicotine (NICODERM CQ) 14 MG/24HR Place 1 patch onto the skin daily 42 patch 0    Nebulizers (COMPRESSOR/NEBULIZER) MISC Use four times per day for SOB 1 each 3    ipratropium 0.5 mg-albuterol 2.5 mg (DUONEB) 0.5-2.5 (3) MG/3ML SOLN nebulizer solution Inhale 3 mLs into the lungs every 4 hours (while

## 2024-07-31 NOTE — ASSESSMENT & PLAN NOTE
Patient with a new diagnosis of laryngeal cancer.  Had been planned for chemotherapy and he has been sent to me for port placement.  I will plan for right sided port placement and earliest available date.  Risks of the procedure including bleeding, infection, scarring, pain, damage surrounding structures, need for additional surgery, port malfunction which could require replacement and anesthesia risks are discussed and consent is obtained.

## 2024-07-31 NOTE — TELEPHONE ENCOUNTER
received referral from Nurse Navigator stating transportation concerns with radiation treatment.  spoke with Carol, patient's niece. Carol states patient has hour and a half drive and cannot transport himself due to pain medications. Patient has family support but it is limited and they all work full time.  looking into transportation or possible lodging assistance.

## 2024-07-31 NOTE — TELEPHONE ENCOUNTER
[facility]     Pre-Operative Evaluation/Consultation    Name:  Dylan Dolan                                         Age:  58 y.o.  MRN:  [id number]       :  1965   Date:  2024         Sex: male    There were no encounter diagnoses.    Surgeon:  Dr. Jeevan José  Procedure (Planned):  Right Port Placement  Date Scheduled surgery: 24    Attending : No att. providers found    Primary Physician: Dr. Vila  Cardiologist: None    Type of Anesthesia Requested: General     Patient Medical history:  Allergies   Allergen Reactions    Chantix [Varenicline] Other (See Comments)     Unusual dreams.     Social History     Tobacco Use    Smoking status: Former     Current packs/day: 1.00     Average packs/day: 1 pack/day for 44.6 years (44.6 ttl pk-yrs)     Types: Cigarettes     Start date: 1980    Smokeless tobacco: Former     Types: Snuff     Quit date: 1987   Substance Use Topics    Alcohol use: Not Currently     Comment: occasional    Drug use: No         Additional ordered pre-operative testing:  []CBC    []ABG      [] BMP   []URINALYSIS   []CMP    []HCG   []COAGS PT/INR  []T&C  []LFTs   []TYPE AND SCREEN    [] EKG  [] Chest X-Ray  [] Other Radiology    [] Sent to Hospitalist None  [x] Sent to Anesthesia for your review:   [] Additional Orders: None     Comments: Laryngoscopy & Tracheotomy performed on 24 at [facility].   Requests: No Special requests    Signed: Tanisha Stout LPN 2024 2:14 PM

## 2024-08-01 ENCOUNTER — TELEPHONE (OUTPATIENT)
Dept: FAMILY MEDICINE CLINIC | Age: 59
End: 2024-08-01

## 2024-08-01 DIAGNOSIS — C32.2 MALIGNANT NEOPLASM OF SUBGLOTTIS (HCC): ICD-10-CM

## 2024-08-01 DIAGNOSIS — Z43.0 ENCOUNTER FOR ATTENTION TO TRACHEOSTOMY (HCC): Primary | ICD-10-CM

## 2024-08-01 DIAGNOSIS — C32.0 MALIGNANT NEOPLASM OF GLOTTIS (HCC): ICD-10-CM

## 2024-08-01 DIAGNOSIS — I10 ESSENTIAL HYPERTENSION: ICD-10-CM

## 2024-08-01 NOTE — TELEPHONE ENCOUNTER
Home health certification and plan of care done 8/1/24 on patient for date services 7/18/24-9/15/24. Verified medications. Physician time spent is 15 minutes for activities to coordinate services, documenting, medical decision making, and review of reports, treatment plans, and test results.

## 2024-08-01 NOTE — TELEPHONE ENCOUNTER
I agree with the documentation of Opal Davis LPN.   Electronically signed by Alley Vila MD on 8/1/24 at 12:57 PM EDT.

## 2024-08-01 NOTE — PROGRESS NOTES
Dylan Dolan                                                                                                                  7/30/2024  MRN:   [id number]  YOB: 1965  PCP:                           Alley Vila MD  Referring Physician: No ref. provider found  Treating Physician Name: ANDREW JI MD      Reason for visit:  Chief Complaint   Patient presents with    Cancer     F/u post st [facility]    Posthospital discharge follow-up.  Discussed treatment plan.    Current problems:  Subglottic squamous cell carcinoma, clinical stage T3 N2 M0  Tobacco dependence  S/p tracheostomy    Active and recent treatments:  Anticipating concurrent chemoradiation    Summary of Case/History:  Dylan Dolan a 58 y.o.male is a patient with symptoms of cough shortness of breath and hoarseness of voice presented to the hospital with in [place] and was noted to have stridor.  Subsequently patient underwent CT neck on July 8, 2024 which showed a 8 mm mass in the left vocal cord.  Patient was transferred to [facility] for further workup.  Patient was evaluated by ENT on July 9, 2024 underwent a biopsy of the left vocal cord mass which came back as a squamous cell carcinoma.  Patient was intubated and transition to an open tracheostomy for airway protection.  Patient does have a history of smoking 1/2 pack since age 17.    Interim History:  Presents to clinic for posthospital discharge follow-up visit.  He is accompanied by his niece.  Patient is nonverbal due to tracheostomy.  Communicates with writing.  He has been doing well since he was discharged to the hospital.  CT PET showed evidence of ipsilateral lymph node metastasis.  He has seen radiation oncology.  Pain is controlled.    During this visit patient's allergy, social, medical, surgical history and medications were reviewed and updated.    Past Medical History:   Past Medical History:   Diagnosis Date    Ankle pain, left     chronic; s/p fracture in

## 2024-08-05 ENCOUNTER — TELEPHONE (OUTPATIENT)
Dept: ONCOLOGY | Age: 59
End: 2024-08-05

## 2024-08-05 NOTE — TELEPHONE ENCOUNTER
Name: Dylan Dolan  : 1965  MRN: [id number]    Oncology Navigation Follow-Up Note    Contact Type:  Telephone    Notes:   Phone call to assess patient needs. Spoke with Carol. She reports patient had all teeth removed last week. She received message from Dr. Hernandez's office that it was recommended to biopsy salivatory gland. She will coordinate with them for this. She will also coordinate with rad onc for sim/teaching.  She denies any navigation needs at present.  Navigator following for continuity of care.      Electronically signed by Bree Spears RN on 2024 at 10:14 AM

## 2024-08-06 ENCOUNTER — TELEPHONE (OUTPATIENT)
Dept: RADIATION ONCOLOGY | Age: 59
End: 2024-08-06

## 2024-08-06 NOTE — TELEPHONE ENCOUNTER
Incoming call from pts sister, stating pt finished his dental extractions on 8/1/24. CT SIM scheduled for 8/15/24, d/t pt needs an afternoon time d/t distance.

## 2024-08-08 ENCOUNTER — TELEPHONE (OUTPATIENT)
Dept: ONCOLOGY | Age: 59
End: 2024-08-08

## 2024-08-08 NOTE — TELEPHONE ENCOUNTER
called patient's niece to discuss options during treatment. No transportation options available at this time.  recommended calling [employer] about lodging, niece states they had thought of this but patient unable to take care of self alone with trach.  continues to look for transportation options.

## 2024-08-08 NOTE — H&P
date: 1/1/1980    Smokeless tobacco: Former       Types: Snuff       Quit date: 11/17/1987   Substance and Sexual Activity    Alcohol use: Not Currently       Comment: occasional    Drug use: No    Sexual activity: Not on file   Other Topics Concern    Not on file   Social History Narrative    Not on file      Social Determinants of Health           Financial Resource Strain: Patient Declined (4/2/2024)     Overall Financial Resource Strain (CARDIA)      Difficulty of Paying Living Expenses: Patient declined   Food Insecurity: Patient Declined (7/8/2024)     Hunger Vital Sign      Worried About Running Out of Food in the Last Year: Patient declined      Ran Out of Food in the Last Year: Patient declined   Transportation Needs: Patient Declined (7/8/2024)     PRAPARE - Transportation      Lack of Transportation (Medical): Patient declined      Lack of Transportation (Non-Medical): Patient declined   Physical Activity: Not on file   Stress: Not on file   Social Connections: Not on file   Intimate Partner Violence: Not on file   Housing Stability: Low Risk  (7/8/2024)     Housing Stability Vital Sign      Unable to Pay for Housing in the Last Year: No      Number of Places Lived in the Last Year: 1      Unstable Housing in the Last Year: No            ROS:   Review of Systems - General ROS: negative  Psychological ROS: negative  Ophthalmic ROS: negative  ENT ROS: negative  Respiratory ROS: no cough, shortness of breath, or wheezing  Cardiovascular ROS: no chest pain or dyspnea on exertion  Gastrointestinal ROS: no abdominal pain, change in bowel habits, or black or bloody stools  Genito-Urinary ROS: no dysuria, trouble voiding, or hematuria  Musculoskeletal ROS: negative  Dermatological ROS: negative              Objective       Vitals:     07/31/24 1329   BP: 132/78      General:in no apparent distress  Eyes: No gross abnormalities.  Ears, Nose, Throat: tympanic membrane, external ear and ear canal normal bilaterally,  oropharynx clear and moist with normal mucous membranes and hearing grossly normal bilaterally  Neck: Patient has a Shiley tracheostomy tube in place   Lungs: clear to auscultation without wheezes or rales   Heart: S1S2, no mumurs, RRR  Abdomen: soft, nontender, no HSM, no guarding, no rebound, no masses  Extremity: negative  Neuro: CN II-XII grossly intact        1. Larynx carcinoma (HCC)  Assessment & Plan:  Patient with a new diagnosis of laryngeal cancer.  Had been planned for chemotherapy and he has been sent to me for port placement.  I will plan for right sided port placement and earliest available date.  Risks of the procedure including bleeding, infection, scarring, pain, damage surrounding structures, need for additional surgery, port malfunction which could require replacement and anesthesia risks are discussed and consent is obtained.           (Please note that portions of this note were completed with a voice recognition program.  Efforts were made to edit the dictations but occasionally words are mis-transcribed.)     The patient was interviewed and examined and there have been no changes since the above History and Physical.    Electronically signed by Jeevan José DO on 8/8/2024 at 7:25 PM

## 2024-08-08 NOTE — DISCHARGE INSTRUCTIONS
Patient Discharge Instructions  Discharge Date:  08/09/24       Discharged To:  Home    Home with Home Health Care: No    RESUME ACTIVITY:      BATHING: Ok to shower starting the day after surgery.  No tub baths or submerging in water until after follow up in office.    DRIVING: No driving for 1day or while taking narcotic pain medications    RETURN TO WORK: Ok to return as tolerated 1 week following surgery with the following restrictions:  No lifting more than 10 pounds  The above restrictions are in effect for 2 week(s)    WALKING:  Yes    STAIRS:  Yes    LIFTING: Less than 10 pounds for 2weeks     DIET: common adult    SPECIAL INSTRUCTIONS:     Call the office at [phone] if you have a fever > 100 F, or if your incision becomes red, tender, or drains more than a small amount of clear fluid.    Call for follow up appointment with Dr. José in:  1-2weeks    May use ibuprofen, if able, for additional pain control.  Use up to 400mg every 6 hours as needed.    Ok to use ice packs to incisions for comfort.  Use 15 minutes on, 30 minutes off and repeat as desired.    Use over the counter stool softeners such as miralax or colace as needed for constipation.

## 2024-08-09 ENCOUNTER — ANESTHESIA (OUTPATIENT)
Dept: OPERATING ROOM | Age: 59
End: 2024-08-09
Payer: COMMERCIAL

## 2024-08-09 ENCOUNTER — APPOINTMENT (OUTPATIENT)
Dept: GENERAL RADIOLOGY | Age: 59
End: 2024-08-09
Attending: SURGERY
Payer: COMMERCIAL

## 2024-08-09 ENCOUNTER — ANESTHESIA EVENT (OUTPATIENT)
Dept: OPERATING ROOM | Age: 59
End: 2024-08-09
Payer: COMMERCIAL

## 2024-08-09 ENCOUNTER — HOSPITAL ENCOUNTER (OUTPATIENT)
Age: 59
Setting detail: OUTPATIENT SURGERY
Discharge: HOME | End: 2024-08-09
Attending: SURGERY | Admitting: SURGERY
Payer: COMMERCIAL

## 2024-08-09 VITALS
RESPIRATION RATE: 18 BRPM | OXYGEN SATURATION: 99 % | WEIGHT: 230.4 LBS | HEIGHT: 73 IN | HEART RATE: 78 BPM | SYSTOLIC BLOOD PRESSURE: 148 MMHG | TEMPERATURE: 97.1 F | DIASTOLIC BLOOD PRESSURE: 86 MMHG | BODY MASS INDEX: 30.54 KG/M2

## 2024-08-09 DIAGNOSIS — G89.18 POST-OP PAIN: Primary | ICD-10-CM

## 2024-08-09 PROCEDURE — 3700000000 HC ANESTHESIA ATTENDED CARE: Performed by: SURGERY

## 2024-08-09 PROCEDURE — C1788 PORT, INDWELLING, IMP: HCPCS | Performed by: SURGERY

## 2024-08-09 PROCEDURE — 77001 FLUOROGUIDE FOR VEIN DEVICE: CPT | Performed by: SURGERY

## 2024-08-09 PROCEDURE — 6360000002 HC RX W HCPCS: Performed by: SURGERY

## 2024-08-09 PROCEDURE — 3600000012 HC SURGERY LEVEL 2 ADDTL 15MIN: Performed by: SURGERY

## 2024-08-09 PROCEDURE — 2580000003 HC RX 258: Performed by: SURGERY

## 2024-08-09 PROCEDURE — 36561 INSERT TUNNELED CV CATH: CPT | Performed by: SURGERY

## 2024-08-09 PROCEDURE — 2500000003 HC RX 250 WO HCPCS: Performed by: NURSE ANESTHETIST, CERTIFIED REGISTERED

## 2024-08-09 PROCEDURE — 3700000001 HC ADD 15 MINUTES (ANESTHESIA): Performed by: SURGERY

## 2024-08-09 PROCEDURE — 6360000002 HC RX W HCPCS: Performed by: NURSE ANESTHETIST, CERTIFIED REGISTERED

## 2024-08-09 PROCEDURE — 2709999900 HC NON-CHARGEABLE SUPPLY: Performed by: SURGERY

## 2024-08-09 PROCEDURE — 7100000011 HC PHASE II RECOVERY - ADDTL 15 MIN: Performed by: SURGERY

## 2024-08-09 PROCEDURE — 2500000003 HC RX 250 WO HCPCS: Performed by: SURGERY

## 2024-08-09 PROCEDURE — 7100000010 HC PHASE II RECOVERY - FIRST 15 MIN: Performed by: SURGERY

## 2024-08-09 PROCEDURE — 3600000002 HC SURGERY LEVEL 2 BASE: Performed by: SURGERY

## 2024-08-09 DEVICE — PORT INFUS SGL LUMN ATTCH POLYUR OPN END CATH 8FR POWERPRT: Type: IMPLANTABLE DEVICE | Site: CHEST | Status: FUNCTIONAL

## 2024-08-09 RX ORDER — SODIUM CHLORIDE 0.9 % (FLUSH) 0.9 %
5-40 SYRINGE (ML) INJECTION EVERY 12 HOURS SCHEDULED
Status: DISCONTINUED | OUTPATIENT
Start: 2024-08-09 | End: 2024-08-09 | Stop reason: HOSPADM

## 2024-08-09 RX ORDER — TRAMADOL HYDROCHLORIDE 50 MG/1
50 TABLET ORAL EVERY 6 HOURS PRN
Qty: 12 TABLET | Refills: 0 | Status: SHIPPED | OUTPATIENT
Start: 2024-08-09 | End: 2024-08-12

## 2024-08-09 RX ORDER — DEXMEDETOMIDINE HYDROCHLORIDE 100 UG/ML
INJECTION, SOLUTION INTRAVENOUS PRN
Status: DISCONTINUED | OUTPATIENT
Start: 2024-08-09 | End: 2024-08-09 | Stop reason: SDUPTHER

## 2024-08-09 RX ORDER — SODIUM CHLORIDE 0.9 % (FLUSH) 0.9 %
5-40 SYRINGE (ML) INJECTION PRN
Status: DISCONTINUED | OUTPATIENT
Start: 2024-08-09 | End: 2024-08-09 | Stop reason: HOSPADM

## 2024-08-09 RX ORDER — SODIUM CHLORIDE 9 MG/ML
INJECTION, SOLUTION INTRAVENOUS PRN
Status: DISCONTINUED | OUTPATIENT
Start: 2024-08-09 | End: 2024-08-09 | Stop reason: HOSPADM

## 2024-08-09 RX ORDER — HEPARIN SODIUM 5000 [USP'U]/ML
INJECTION, SOLUTION INTRAVENOUS; SUBCUTANEOUS PRN
Status: DISCONTINUED | OUTPATIENT
Start: 2024-08-09 | End: 2024-08-09 | Stop reason: ALTCHOICE

## 2024-08-09 RX ORDER — SODIUM CHLORIDE, SODIUM LACTATE, POTASSIUM CHLORIDE, CALCIUM CHLORIDE 600; 310; 30; 20 MG/100ML; MG/100ML; MG/100ML; MG/100ML
INJECTION, SOLUTION INTRAVENOUS CONTINUOUS
Status: DISCONTINUED | OUTPATIENT
Start: 2024-08-09 | End: 2024-08-09 | Stop reason: HOSPADM

## 2024-08-09 RX ORDER — PROPOFOL 10 MG/ML
INJECTION, EMULSION INTRAVENOUS PRN
Status: DISCONTINUED | OUTPATIENT
Start: 2024-08-09 | End: 2024-08-09 | Stop reason: SDUPTHER

## 2024-08-09 RX ORDER — FENTANYL CITRATE 50 UG/ML
INJECTION, SOLUTION INTRAMUSCULAR; INTRAVENOUS PRN
Status: DISCONTINUED | OUTPATIENT
Start: 2024-08-09 | End: 2024-08-09 | Stop reason: SDUPTHER

## 2024-08-09 RX ADMIN — Medication 2000 MG: at 11:55

## 2024-08-09 RX ADMIN — SODIUM CHLORIDE, POTASSIUM CHLORIDE, SODIUM LACTATE AND CALCIUM CHLORIDE: 600; 310; 30; 20 INJECTION, SOLUTION INTRAVENOUS at 11:31

## 2024-08-09 RX ADMIN — DEXMEDETOMIDINE HYDROCHLORIDE 20 MCG: 100 INJECTION, SOLUTION, CONCENTRATE INTRAVENOUS at 11:51

## 2024-08-09 RX ADMIN — PROPOFOL 150 MG: 10 INJECTION, EMULSION INTRAVENOUS at 11:51

## 2024-08-09 RX ADMIN — FENTANYL CITRATE 100 MCG: 50 INJECTION, SOLUTION INTRAMUSCULAR; INTRAVENOUS at 11:51

## 2024-08-09 ASSESSMENT — PAIN SCALES - GENERAL
PAINLEVEL_OUTOF10: 0
PAINLEVEL_OUTOF10: 0

## 2024-08-09 ASSESSMENT — PAIN - FUNCTIONAL ASSESSMENT
PAIN_FUNCTIONAL_ASSESSMENT: PREVENTS OR INTERFERES SOME ACTIVE ACTIVITIES AND ADLS
PAIN_FUNCTIONAL_ASSESSMENT: 0-10
PAIN_FUNCTIONAL_ASSESSMENT: ADULT NONVERBAL PAIN SCALE (NPVS)

## 2024-08-09 NOTE — ANESTHESIA PRE PROCEDURE
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   • Acute respiratory failure with hypoxia ([facility]) J96.01   • Tobacco dependence F17.200   • Tracheostomy in place ([facility]) Z93.0   • Acute neck pain M54.2   • ACP (advance care planning) Z71.89   • Palliative care encounter Z51.5   • 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 Kovacs   • TRACHEOSTOMY N/A 07/09/2024    DIRECT LARYNGOSCOPY   • TRACHEOSTOMY N/A 7/9/2024    TRACHEOTOMY performed by Rayo Jensen MD at [facility]   • WISDOM TOOTH EXTRACTION Bilateral        Social History:    Social History     Tobacco Use   • Smoking status: Former     Current packs/day: 1.00     Average packs/day: 1 pack/day for 44.6 years (44.6 ttl pk-yrs)     Types: Cigarettes     Start date: 1/1/1980   • Smokeless tobacco: Former     Types: Snuff     Quit date: 11/17/1987   Substance Use Topics   • Alcohol use: Not Currently     Comment: occasional                                Counseling given: Not Answered      Vital Signs (Current): There were no vitals filed for this visit.                                           BP Readings from Last 3 Encounters:   07/31/24 132/78   07/30/24 130/70   07/29/24 (!) 164/98       NPO Status: Time of last

## 2024-08-09 NOTE — ANESTHESIA POSTPROCEDURE EVALUATION
Department of Anesthesiology  Postprocedure Note    Patient: Dylan Dolan  MRN: [id number]  YOB: 1965  Date of evaluation: 8/9/2024    Procedure Summary       Date: 08/09/24 Room / Location: [address]    Anesthesia Start: 1144 Anesthesia Stop: 1235    Procedure: Right port placement (Right) Diagnosis:       Larynx carcinoma (HCC)      (Larynx carcinoma (HCC) [C32.9])    Surgeons: Jeevan José DO Responsible Provider: Zachary Arroyo APRN - CRNA    Anesthesia Type: General, TIVA ASA Status: 3            Anesthesia Type: General, TIVA    Anupama Phase I: Anupama Score: 10    Anupama Phase II: Anupama Score: 9    Anesthesia Post Evaluation    Patient location during evaluation: bedside  Level of consciousness: sleepy but conscious  Airway patency: patent  Nausea & Vomiting: no nausea and no vomiting  Cardiovascular status: hemodynamically stable  Respiratory status: spontaneous ventilation  Hydration status: stable  Pain management: satisfactory to patient    No notable events documented.

## 2024-08-10 PROBLEM — C32.9 LARYNGEAL CANCER (HCC): Status: ACTIVE | Noted: 2024-08-10

## 2024-08-10 NOTE — OP NOTE
[facility]                [address] [place] [address]                            OPERATIVE REPORT      PATIENT NAME: TRINIDAD VALERA                  : 1965  MED REC NO: [id number]                         ROOM: [facility]  ACCOUNT NO: [id number]                       ADMIT DATE: 2024  PROVIDER: Jeevan José DO      DATE OF PROCEDURE:  2024    SURGEON:  Jeevan José DO    ASSISTANT:  None.    PREOPERATIVE DIAGNOSIS:  Laryngeal cancer.    POSTOPERATIVE DIAGNOSIS:  Laryngeal cancer.    PROCEDURE:  Right internal jugular vein port placement with ultrasound and fluoroscopic guidance.    ANESTHESIA:  General.    ESTIMATED BLOOD LOSS:  10 mL.    FLUIDS:  500 mL crystalloid.    COMPLICATIONS:  None.    SPECIMENS:  None.    INDICATIONS FOR PROCEDURE:  The patient is a 58-year-old gentleman who has recently been diagnosed with laryngeal cancer.  He has undergone a tracheostomy previously and is now being planned for chemotherapy.  He was sent to me for port placement to facilitate chemo.  Prior to the time of the procedure, risks, benefits, and alternatives were explained to the patient and consent was obtained.    DESCRIPTION OF PROCEDURE:  The patient was brought to the operative suite, placed on operative table in supine position.  Monitoring devices and SCD cuffs were placed.  As the patient has a tracheostomy, endotracheal anesthesia was initiated through the tracheostomy tube in place.  The patient's chest and neck were then prepped and draped in sterile fashion.  We did make sure to prep the cuff of the trach during the skin prep and then I draped to completely occlude the trach out of the field.  Once he was draped, I had an ultrasound draped into the field and used this to inspect the right neck.  The internal jugular vein was identified and then followed down along its course to the base of the right neck.  Once here, inspection of the

## 2024-08-13 ENCOUNTER — TELEPHONE (OUTPATIENT)
Dept: FAMILY MEDICINE CLINIC | Age: 59
End: 2024-08-13

## 2024-08-13 ENCOUNTER — TELEPHONE (OUTPATIENT)
Dept: RADIATION ONCOLOGY | Age: 59
End: 2024-08-13

## 2024-08-13 NOTE — TELEPHONE ENCOUNTER
Dental Clearance forms faxed to Dr. Argueta (Fax:  [phone], Phone:  [phone]).  Writer informed that pt has an appt there today at 11 am.  Requested dental clearance be signed and returned by 8/15/24.

## 2024-08-13 NOTE — TELEPHONE ENCOUNTER
Patient is scheduled for follow up regarding hospital stay from 7/8-7/17 at [facility] for Vocal cord mass.  He is scheduled for follow up 8/22/24 @ 11:20

## 2024-08-14 NOTE — PROGRESS NOTES
CLINICAL PHARMACY NOTE: MEDS TO BEDS    Total # of Prescriptions Filled: 1   The following medications were delivered to the patient:  Tramadol 50mg    Additional Documentation:

## 2024-08-15 ENCOUNTER — CLINICAL DOCUMENTATION (OUTPATIENT)
Dept: ONCOLOGY | Age: 59
End: 2024-08-15

## 2024-08-15 ENCOUNTER — HOSPITAL ENCOUNTER (OUTPATIENT)
Dept: RADIATION ONCOLOGY | Age: 59
Discharge: HOME | End: 2024-08-15
Payer: COMMERCIAL

## 2024-08-15 DIAGNOSIS — I10 ESSENTIAL HYPERTENSION: ICD-10-CM

## 2024-08-15 DIAGNOSIS — C32.9 LARYNGEAL CANCER (HCC): Primary | ICD-10-CM

## 2024-08-15 PROCEDURE — 77470 SPECIAL RADIATION TREATMENT: CPT | Performed by: RADIOLOGY

## 2024-08-15 PROCEDURE — 77334 RADIATION TREATMENT AID(S): CPT | Performed by: RADIOLOGY

## 2024-08-15 RX ORDER — LOSARTAN POTASSIUM AND HYDROCHLOROTHIAZIDE 25; 100 MG/1; MG/1
1 TABLET ORAL DAILY
Qty: 90 TABLET | Refills: 0 | Status: SHIPPED | OUTPATIENT
Start: 2024-08-15

## 2024-08-15 NOTE — TELEPHONE ENCOUNTER
Dylan called requesting a refill of the below medication which has been pended for you:     Requested Prescriptions     Pending Prescriptions Disp Refills    losartan-hydroCHLOROthiazide (HYZAAR) 100-25 MG per tablet [Pharmacy Med Name: LOSARTAN/HCT -25] 90 tablet 0     Sig: TAKE 1 TABLET DAILY       Last Appointment Date: 4/2/2024  Next Appointment Date: 8/22/2024    Allergies   Allergen Reactions    Chantix [Varenicline] Other (See Comments)     Unusual dreams.

## 2024-08-15 NOTE — DISCHARGE INSTRUCTIONS
Side Effects of Head and Neck  Fatigue  Hair loss  Mouth changes  Skin changes  Throat changes  Taste changes  Less active thyroid gland    Fatigue  How long it lasts  When you will first feel fatigue depends on a few factors, such as your age, health, how active you are, and how you felt before radiation therapy started.  Fatigue can last from 6 weeks to a year after your last radiation therapy session. Some people may always feel fatigue and not have as much energy as they did before radiation therapy.  Ways to Manage Fatigue    Try to sleep at least 8 hours each night. This may be more sleep than you needed before radiation therapy. One way to sleep better at night is to be active during the day. Another way is to relax right before going to bed. Do calming activities before bedtime, such as reading, working on a [employer] puzzle, or listening to music.  Plan time to rest. Take short naps or rest breaks between activities.  Try not to do too much. With fatigue, you ay not have enough energy to do all the things you want to do. Stay active, but choose the activities that are most important to you. Try to let go of things that don't matter as much now. For example, you might go to work but not do housework. You might watch your children's sprots events but not cook dinner.  Exercise. Research shows that most people feel better when they get some exercise each day. Go for a short walk, ride a bike, or do yoga. Talk with your doctor or nurse about types of exercise you can do while having radiation therapy.  Relax. Mediatation, prayer, gentle yoga, guided imagery, and visualization are ways you can learn to relax and decrease stress.    For relaxation exercises:  Visit Learning to Relax on the National Cancer [place]'s website at : www.cancer.gov/about-cancer/copingfeelings/relaxation  See Facing Forward: Life After Cancer Treatment at: www.cancer.gov/publications/patient-education/facing-forward  Eat and drink well.

## 2024-08-15 NOTE — PROGRESS NOTES
Radiation Treatment Site/Plan/Fractions:  Larynx/35 Fractions Daily      Concurrent Chemotherapy/Immunotherapy:  Yes-concurrent Cisplatin per Dr. Ag      Cardiac Device:  None      Transportation Concerns:  None-from [place] and family bringing patient      Nursing Referrals and Reasons:  Previous referral to dietician.  Patient completed nutritional assessment and Distress Screening.      Contrast Given:  Isovue 370 via right chest port-91 mls total.  Patient tolerated well and good blood return to IV.          Miscellaneous Information:  Site specific side effects and skin care reviewed with patient and supportive sister.  Gave therapist pt's daughter, Carol, [phone] for appts since pt is nonverbal due to trach.  Patient very receptive to information.  Will follow to coordinate speech therapy and lymphedema as well.

## 2024-08-15 NOTE — PROGRESS NOTES
[facility]- MEDICAL NUTRITION THERAPY     Visit Type: Initial  Reason for Assessment: Physician Consult- Dr Starks     NUTRITION RECOMMENDATIONS / MONITORING / EVALUATION  Encouraged continuation of oral diet as tolerated.   Will monitor PO tolerance, adequacy of intake, and weight throughout treatment. Modify diet as needed.   Encouraged patient to reach out,as needed, with any nutrition questions/concerns.    Subjective/Current Data:  Dylan Dolan is a 58 y.o. male with Subglottic squamous cell carcinoma, s/p tracheostomy. Plan for chemotherapy and radiation.   Chart reviewed and met with patient and patient's sister during RO appt. Pt reports he is currently tolerating diet/eating well. No c/o swallowing difficulty, but does have some chewing difficulty d/t recent extraction of teeth. Pt states he is able to find foods that he can tolerate well. Pt reports usual body weight of approx 240 lbs. Last documented weight in EHR was 230 lb on 8/9/24.     Objective Data:  Patient Active Problem List    Diagnosis Date Noted    Opioid dependence with current use ([facility]) 03/14/2023    Laryngeal cancer ([facility]) 08/10/2024    Squamous cell carcinoma of subglottis (HCC) 07/30/2024    Acute neck pain 07/15/2024    ACP (advance care planning) 07/15/2024    Palliative care encounter 07/15/2024    Tracheostomy in place ([facility]) 07/13/2024    Larynx carcinoma (HCC) 07/09/2024    Acute respiratory failure with hypoxia ([facility]) 07/09/2024    Tobacco dependence 07/09/2024    Vocal cord mass 07/08/2024    Vocal cord paralysis 07/08/2024    Polyneuropathy associated with underlying disease ([facility]) 04/02/2024    Type 2 diabetes mellitus with diabetic neuropathy 06/27/2023    Mixed hyperlipidemia 01/26/2022    History of 2019 novel coronavirus disease (COVID-19) 12/16/2020    Skin ulcer of toe of left foot with necrosis of bone (HCC) 07/27/2020    Hypertriglyceridemia 11/30/2017    Type 2 diabetes mellitus without complication ([facility]) 08/30/2017 
no

## 2024-08-19 ENCOUNTER — TELEPHONE (OUTPATIENT)
Dept: ONCOLOGY | Age: 59
End: 2024-08-19

## 2024-08-19 NOTE — TELEPHONE ENCOUNTER
Name: Dylan Dolan  : 1965  MRN: [id number]    Oncology Navigation Follow-Up Note    Contact Type:  Telephone    Notes:   Received request from Dr. Starks regarding parotid biopsy.    Reviewing chart. Spoke with SHILPI Medina.   Biopsy of parotid gland completed 8/15/24 at [facility]    She states  is to call them back with some options for transportation.   She asks how soon he will be scheduled for RT. Instructed on the process and that rad onc will call to schedule. He will also be receiving chemo weekly while getting RT. Writer will monitor for coordination of care.  Encouraged them to call if they have additional questions/concerns.      Electronically signed by Bree Spears RN on 2024 at 2:35 PM

## 2024-08-20 ENCOUNTER — TELEPHONE (OUTPATIENT)
Dept: RADIATION ONCOLOGY | Age: 59
End: 2024-08-20

## 2024-08-20 NOTE — TELEPHONE ENCOUNTER
Cali was left on our voicemail regarding pt's parotid pathology which is still pending.   Noah @[facility] ENT office stated pt has not yet seen Dr. Clark, so no pathology yet.

## 2024-08-21 ENCOUNTER — OFFICE VISIT (OUTPATIENT)
Dept: SURGERY | Age: 59
End: 2024-08-21

## 2024-08-21 VITALS
HEIGHT: 73 IN | DIASTOLIC BLOOD PRESSURE: 82 MMHG | SYSTOLIC BLOOD PRESSURE: 124 MMHG | WEIGHT: 236 LBS | BODY MASS INDEX: 31.28 KG/M2 | HEART RATE: 74 BPM

## 2024-08-21 DIAGNOSIS — Z09 POSTOP CHECK: Primary | ICD-10-CM

## 2024-08-21 NOTE — ASSESSMENT & PLAN NOTE
Patient doing well healing as expected.  Port has been used without any difficulties.  No complaints today.  Patient may follow-up as needed.  Can return to all regular activities.    May continue to use the port as needed.  Discussed with him that once he has completed treatment and her wishes to have it removed he can come back to the office and we will discuss removal.

## 2024-08-21 NOTE — PROGRESS NOTES
Subjective   Dylan Dolan is a 58 y.o. male who presents today for follow-up from recent port placement on 8/9.  Patient is undergoing treatment for laryngeal cancer.  Since placement states he has had very little pain.  Denies any incisional problems.  No other complaints.    Past Medical History:   Diagnosis Date    Ankle pain, left     chronic; s/p fracture in 1999    Gastroesophageal reflux disease     Hyperlipemia     Hypertension     Obesity     Tobacco abuse     Tourette's syndrome        Past Surgical History:   Procedure Laterality Date    ANKLE FRACTURE SURGERY Left 01/01/1999    LARYNGOSCOPY N/A 07/09/2024    DIRECT LARYNGOSCOPY performed by Rayo Jensen MD at [facility] OR    PORT SURGERY Right 8/9/2024    Right port placement performed by Jeevan José DO at [facility] OR    TOE AMPUTATION Left 03/29/2021    left great toe, osteomyelitis, [facility], Dr. Kovacs    TRACHEOSTOMY N/A 07/09/2024    TRACHEOTOMY performed by Rayo Jensen MD at [facility] OR    WISDOM TOOTH EXTRACTION Bilateral        Current Outpatient Medications   Medication Sig Dispense Refill    losartan-hydroCHLOROthiazide (HYZAAR) 100-25 MG per tablet TAKE 1 TABLET DAILY 90 tablet 0    betamethasone valerate (VALISONE) 0.1 % cream Apply topically 2 times daily. 45 g 2    pimozide (ORAP) 1 MG tablet TAKE 2 TABLETS TWICE A  tablet 0    LORazepam (ATIVAN) 1 MG tablet Take 1 tablet by mouth every 6 hours as needed for Anxiety.      oxyCODONE 5 MG capsule Take 2 capsules by mouth every 4 hours as needed for Pain.      losartan (COZAAR) 100 MG tablet Take 1 tablet by mouth daily 100-25mg      docusate sodium (COLACE) 100 MG capsule Take 1 capsule by mouth 2 times daily      acetaminophen (TYLENOL) 500 MG tablet Take 1 tablet by mouth as needed for Pain 2 tablets as needed      Nebulizers (COMPRESSOR/NEBULIZER) MISC Use four times per day for SOB 1 each 3    ipratropium 0.5 mg-albuterol 2.5 mg (DUONEB) 0.5-2.5 (3) MG/3ML

## 2024-08-22 ENCOUNTER — TELEPHONE (OUTPATIENT)
Dept: ONCOLOGY | Age: 59
End: 2024-08-22

## 2024-08-22 ENCOUNTER — OFFICE VISIT (OUTPATIENT)
Dept: FAMILY MEDICINE CLINIC | Age: 59
End: 2024-08-22
Payer: COMMERCIAL

## 2024-08-22 VITALS
HEART RATE: 80 BPM | HEIGHT: 73 IN | WEIGHT: 233 LBS | DIASTOLIC BLOOD PRESSURE: 82 MMHG | BODY MASS INDEX: 30.88 KG/M2 | TEMPERATURE: 98.4 F | SYSTOLIC BLOOD PRESSURE: 132 MMHG | OXYGEN SATURATION: 99 %

## 2024-08-22 DIAGNOSIS — G89.29 CHRONIC PAIN OF LEFT ANKLE: ICD-10-CM

## 2024-08-22 DIAGNOSIS — E11.40 TYPE 2 DIABETES MELLITUS WITH DIABETIC NEUROPATHY, WITHOUT LONG-TERM CURRENT USE OF INSULIN (HCC): ICD-10-CM

## 2024-08-22 DIAGNOSIS — R06.02 SHORTNESS OF BREATH: ICD-10-CM

## 2024-08-22 DIAGNOSIS — C32.9 LARYNX CARCINOMA (HCC): Primary | ICD-10-CM

## 2024-08-22 DIAGNOSIS — M25.572 CHRONIC PAIN OF LEFT ANKLE: ICD-10-CM

## 2024-08-22 PROCEDURE — 99214 OFFICE O/P EST MOD 30 MIN: CPT | Performed by: FAMILY MEDICINE

## 2024-08-22 PROCEDURE — 3079F DIAST BP 80-89 MM HG: CPT | Performed by: FAMILY MEDICINE

## 2024-08-22 PROCEDURE — 3075F SYST BP GE 130 - 139MM HG: CPT | Performed by: FAMILY MEDICINE

## 2024-08-22 RX ORDER — IPRATROPIUM BROMIDE AND ALBUTEROL SULFATE 2.5; .5 MG/3ML; MG/3ML
3 SOLUTION RESPIRATORY (INHALATION) EVERY 4 HOURS PRN
Qty: 360 ML | Refills: 1 | Status: SHIPPED | OUTPATIENT
Start: 2024-08-22

## 2024-08-22 NOTE — TELEPHONE ENCOUNTER
Name: Dylan Dolan  : 1965  MRN: [id number]    Oncology Navigation Follow-Up Note    Contact Type:  Telephone    Notes: phone call to Dr. Garnett at [facility] to request path report faxed to Dr. Starks. Left detailed vm with request.      Electronically signed by Bree Spears RN on 2024 at 10:15 AM

## 2024-08-28 ENCOUNTER — TELEPHONE (OUTPATIENT)
Dept: RADIATION ONCOLOGY | Age: 59
End: 2024-08-28

## 2024-08-28 ENCOUNTER — HOSPITAL ENCOUNTER (OUTPATIENT)
Dept: RADIATION ONCOLOGY | Age: 59
Discharge: HOME | End: 2024-08-28
Payer: COMMERCIAL

## 2024-08-28 PROCEDURE — 77300 RADIATION THERAPY DOSE PLAN: CPT | Performed by: RADIOLOGY

## 2024-08-28 PROCEDURE — 77301 RADIOTHERAPY DOSE PLAN IMRT: CPT | Performed by: RADIOLOGY

## 2024-08-28 PROCEDURE — 77338 DESIGN MLC DEVICE FOR IMRT: CPT | Performed by: RADIOLOGY

## 2024-08-28 NOTE — TELEPHONE ENCOUNTER
Called pt's niece, Carol, to request information related to disability paperwork received by our office.  She states  will be setting up transportation and requesting an update regarding this from .  Writer did tell pt start date is to be determined still and she should get a call in the near future regarding start date.      Carol also states pt has [facility] for pain medication and Ativan.  She states this service comes to the home weekly.

## 2024-08-29 ENCOUNTER — TELEPHONE (OUTPATIENT)
Dept: ONCOLOGY | Age: 59
End: 2024-08-29

## 2024-08-29 NOTE — TELEPHONE ENCOUNTER
called patient's niece to touch base about assistance.  offered applying for lily through [facility] but lily would strictly be financial, not transportation. There are no available transportation assistance opportunities that  is aware of. Any transportation would likely be out of pocket. Niece suggested patient getting into assisted living closer to facility which may alleviate some transportation concerns but will be large out of pocket cost. Patient unable to utilize [facility] lodging or other hotel assistance as he cannot take care of himself alone.

## 2024-09-02 DIAGNOSIS — I10 ESSENTIAL HYPERTENSION: ICD-10-CM

## 2024-09-03 RX ORDER — HYDROCHLOROTHIAZIDE 25 MG/1
25 TABLET ORAL DAILY
Qty: 90 TABLET | Refills: 0 | Status: SHIPPED | OUTPATIENT
Start: 2024-09-03 | End: 2024-09-03

## 2024-09-03 RX ORDER — HYDROCHLOROTHIAZIDE 25 MG/1
25 TABLET ORAL DAILY
Qty: 90 TABLET | Refills: 0 | Status: SHIPPED | OUTPATIENT
Start: 2024-09-03

## 2024-09-03 NOTE — TELEPHONE ENCOUNTER
Dylan called requesting a refill of the below medication which has been pended for you:     Requested Prescriptions     Pending Prescriptions Disp Refills    hydroCHLOROthiazide (HYDRODIURIL) 25 MG tablet [Pharmacy Med Name: HYDROCHLOROT TAB 25MG] 90 tablet 0     Sig: TAKE 1 TABLET DAILY       Last Appointment Date: 8/22/2024  Next Appointment Date: 11/27/2024    Allergies   Allergen Reactions    Chantix [Varenicline] Other (See Comments)     Unusual dreams.

## 2024-09-04 ENCOUNTER — TELEPHONE (OUTPATIENT)
Dept: ONCOLOGY | Age: 59
End: 2024-09-04

## 2024-09-04 RX ORDER — PROCHLORPERAZINE MALEATE 10 MG
10 TABLET ORAL EVERY 6 HOURS PRN
Qty: 120 TABLET | Refills: 3 | Status: SHIPPED | OUTPATIENT
Start: 2024-09-04

## 2024-09-04 RX ORDER — ONDANSETRON 4 MG/1
4 TABLET, ORALLY DISINTEGRATING ORAL 3 TIMES DAILY PRN
Qty: 90 TABLET | Refills: 3 | Status: SHIPPED | OUTPATIENT
Start: 2024-09-04 | End: 2024-10-04

## 2024-09-04 RX ORDER — LIDOCAINE/PRILOCAINE 2.5 %-2.5%
CREAM (GRAM) TOPICAL
Qty: 30 G | Refills: 2 | Status: SHIPPED | OUTPATIENT
Start: 2024-09-04

## 2024-09-04 NOTE — TELEPHONE ENCOUNTER
[facility]- MEDICAL NUTRITION THERAPY     Visit Type: Follow-up     NUTRITION RECOMMENDATIONS / MONITORING / EVALUATION  Encouraged well-balanced diet as tolerated.    Will monitor PO tolerance, adequacy of intake, and weight throughout treatment. Modify diet as needed.   Encouraged patient/family to reach out,as needed, with any nutrition questions/concerns.    Subjective/Current Data:  Dylan Dolan is a 58 y.o. male with Subglottic squamous cell carcinoma, s/p tracheostomy. Plan for start of chemotherapy and radiation next week.   Spoke with patient's niece, Carol, for nutrition follow-up. Carol reports pt is currently eating very well; no problems/complaints. Carol worried about how patient will eat after start of tx. States she has already talked to patient about use of oral nutrition supplements if/as needed. Reports discussion was had about possible need for feeding tube (NG vs PEG) if patient unable to maintain nutrition throughout treatment.     Objective Data:  Patient Active Problem List    Diagnosis Date Noted    Opioid dependence with current use ([facility]) 03/14/2023    Postop check 08/21/2024    Laryngeal cancer ([facility]) 08/10/2024    Squamous cell carcinoma of subglottis ([facility]) 07/30/2024    Acute neck pain 07/15/2024    ACP (advance care planning) 07/15/2024    Palliative care encounter 07/15/2024    Tracheostomy in place ([facility]) 07/13/2024    Larynx carcinoma ([facility]) 07/09/2024    Acute respiratory failure with hypoxia ([facility]) 07/09/2024    Tobacco dependence 07/09/2024    Vocal cord mass 07/08/2024    Vocal cord paralysis 07/08/2024    Polyneuropathy associated with underlying disease ([facility]) 04/02/2024    Type 2 diabetes mellitus with diabetic neuropathy 06/27/2023    Mixed hyperlipidemia 01/26/2022    History of 2019 novel coronavirus disease (COVID-19) 12/16/2020    Skin ulcer of toe of left foot with necrosis of bone ([facility]) 07/27/2020    Hypertriglyceridemia 11/30/2017    Type 2 diabetes mellitus without

## 2024-09-05 ENCOUNTER — TELEPHONE (OUTPATIENT)
Dept: ONCOLOGY | Age: 59
End: 2024-09-05

## 2024-09-05 NOTE — TELEPHONE ENCOUNTER
Name: Dylan Dolan  : 1965  MRN: [id number]    Oncology Navigation Follow-Up Note    Contact Type:  Telephone    Notes:   Reviewing chart. Patient is scheduled to start chemo/radiation at [facility] 9/10/24.   Phone call to assess needs placed to Carol. No answer. No voice mail option.      Electronically signed by Bree Spears RN on 2024 at 10:06 AM

## 2024-09-09 ENCOUNTER — TELEPHONE (OUTPATIENT)
Dept: ONCOLOGY | Age: 59
End: 2024-09-09

## 2024-09-10 ENCOUNTER — HOSPITAL ENCOUNTER (OUTPATIENT)
Dept: RADIATION ONCOLOGY | Age: 59
Discharge: HOME | End: 2024-09-10
Payer: COMMERCIAL

## 2024-09-10 PROCEDURE — 77386 HC NTSTY MODUL RAD TX DLVR CPLX: CPT | Performed by: RADIOLOGY

## 2024-09-11 ENCOUNTER — HOSPITAL ENCOUNTER (OUTPATIENT)
Dept: INFUSION THERAPY | Age: 59
Discharge: HOME | End: 2024-09-11
Payer: COMMERCIAL

## 2024-09-11 ENCOUNTER — HOSPITAL ENCOUNTER (OUTPATIENT)
Dept: RADIATION ONCOLOGY | Age: 59
Discharge: HOME | End: 2024-09-11
Payer: COMMERCIAL

## 2024-09-11 ENCOUNTER — OFFICE VISIT (OUTPATIENT)
Dept: ONCOLOGY | Age: 59
End: 2024-09-11
Payer: COMMERCIAL

## 2024-09-11 VITALS
HEART RATE: 90 BPM | BODY MASS INDEX: 31.56 KG/M2 | SYSTOLIC BLOOD PRESSURE: 143 MMHG | DIASTOLIC BLOOD PRESSURE: 94 MMHG | RESPIRATION RATE: 18 BRPM | WEIGHT: 239.2 LBS | TEMPERATURE: 98 F

## 2024-09-11 VITALS
TEMPERATURE: 98 F | WEIGHT: 239.2 LBS | DIASTOLIC BLOOD PRESSURE: 94 MMHG | SYSTOLIC BLOOD PRESSURE: 143 MMHG | BODY MASS INDEX: 31.56 KG/M2 | HEART RATE: 90 BPM | RESPIRATION RATE: 18 BRPM

## 2024-09-11 VITALS
BODY MASS INDEX: 31.56 KG/M2 | WEIGHT: 239.2 LBS | SYSTOLIC BLOOD PRESSURE: 150 MMHG | HEART RATE: 92 BPM | DIASTOLIC BLOOD PRESSURE: 100 MMHG

## 2024-09-11 DIAGNOSIS — C32.9 LARYNGEAL CANCER (HCC): Primary | ICD-10-CM

## 2024-09-11 DIAGNOSIS — C32.2 SQUAMOUS CELL CARCINOMA OF SUBGLOTTIS (HCC): Primary | ICD-10-CM

## 2024-09-11 DIAGNOSIS — Z51.11 CHEMOTHERAPY MANAGEMENT, ENCOUNTER FOR: ICD-10-CM

## 2024-09-11 LAB
ALBUMIN SERPL-MCNC: 4 G/DL (ref 3.5–5.2)
ALBUMIN/GLOB SERPL: 1.1 {RATIO} (ref 1–2.5)
ALP SERPL-CCNC: 123 U/L (ref 40–129)
ALT SERPL-CCNC: 12 U/L (ref 5–41)
ANION GAP SERPL CALCULATED.3IONS-SCNC: 12 MMOL/L (ref 9–17)
AST SERPL-CCNC: 12 U/L
BASOPHILS # BLD: 0 K/UL (ref 0–0.2)
BASOPHILS NFR BLD: 0 % (ref 0–2)
BILIRUB SERPL-MCNC: 0.5 MG/DL (ref 0.3–1.2)
BUN SERPL-MCNC: 10 MG/DL (ref 6–20)
CALCIUM SERPL-MCNC: 9.6 MG/DL (ref 8.6–10.4)
CHLORIDE SERPL-SCNC: 93 MMOL/L (ref 98–107)
CO2 SERPL-SCNC: 27 MMOL/L (ref 20–31)
CREAT SERPL-MCNC: 0.9 MG/DL (ref 0.7–1.2)
EOSINOPHIL # BLD: 0.1 K/UL (ref 0–0.4)
EOSINOPHILS RELATIVE PERCENT: 1 % (ref 1–4)
ERYTHROCYTE [DISTWIDTH] IN BLOOD BY AUTOMATED COUNT: 13 % (ref 12.5–15.4)
GFR, ESTIMATED: >90 ML/MIN/1.73M2
GLUCOSE SERPL-MCNC: 330 MG/DL (ref 70–99)
HBV CORE AB SER QL: NONREACTIVE
HBV SURFACE AB SERPL IA-ACNC: <3.5 MIU/ML
HBV SURFACE AG SERPL QL IA: NONREACTIVE
HCT VFR BLD AUTO: 36.4 % (ref 41–53)
HGB BLD-MCNC: 12.7 G/DL (ref 13.5–17.5)
LYMPHOCYTES NFR BLD: 1.3 K/UL (ref 1–4.8)
LYMPHOCYTES RELATIVE PERCENT: 12 % (ref 24–44)
MAGNESIUM SERPL-MCNC: 1.5 MG/DL (ref 1.6–2.6)
MCH RBC QN AUTO: 30.8 PG (ref 26–34)
MCHC RBC AUTO-ENTMCNC: 34.7 G/DL (ref 31–37)
MCV RBC AUTO: 88.6 FL (ref 80–100)
MONOCYTES NFR BLD: 0.9 K/UL (ref 0.1–1.2)
MONOCYTES NFR BLD: 8 % (ref 2–11)
NEUTROPHILS NFR BLD: 79 % (ref 36–66)
NEUTS SEG NFR BLD: 9 K/UL (ref 1.8–7.7)
PHOSPHATE SERPL-MCNC: 3.7 MG/DL (ref 2.5–4.5)
PLATELET # BLD AUTO: 361 K/UL (ref 140–450)
PMV BLD AUTO: 6.7 FL (ref 6–12)
POTASSIUM SERPL-SCNC: 4.2 MMOL/L (ref 3.7–5.3)
PROT SERPL-MCNC: 7.5 G/DL (ref 6.4–8.3)
RBC # BLD AUTO: 4.11 M/UL (ref 4.5–5.9)
SODIUM SERPL-SCNC: 132 MMOL/L (ref 135–144)
WBC OTHER # BLD: 11.5 K/UL (ref 3.5–11)

## 2024-09-11 PROCEDURE — 2580000003 HC RX 258: Performed by: INTERNAL MEDICINE

## 2024-09-11 PROCEDURE — 86704 HEP B CORE ANTIBODY TOTAL: CPT

## 2024-09-11 PROCEDURE — 36591 DRAW BLOOD OFF VENOUS DEVICE: CPT

## 2024-09-11 PROCEDURE — 77386 HC NTSTY MODUL RAD TX DLVR CPLX: CPT | Performed by: RADIOLOGY

## 2024-09-11 PROCEDURE — 77336 RADIATION PHYSICS CONSULT: CPT | Performed by: RADIOLOGY

## 2024-09-11 PROCEDURE — 96413 CHEMO IV INFUSION 1 HR: CPT

## 2024-09-11 PROCEDURE — 3077F SYST BP >= 140 MM HG: CPT | Performed by: INTERNAL MEDICINE

## 2024-09-11 PROCEDURE — 96367 TX/PROPH/DG ADDL SEQ IV INF: CPT

## 2024-09-11 PROCEDURE — 99214 OFFICE O/P EST MOD 30 MIN: CPT | Performed by: INTERNAL MEDICINE

## 2024-09-11 PROCEDURE — 87340 HEPATITIS B SURFACE AG IA: CPT

## 2024-09-11 PROCEDURE — 96375 TX/PRO/DX INJ NEW DRUG ADDON: CPT

## 2024-09-11 PROCEDURE — 84100 ASSAY OF PHOSPHORUS: CPT

## 2024-09-11 PROCEDURE — 3080F DIAST BP >= 90 MM HG: CPT | Performed by: INTERNAL MEDICINE

## 2024-09-11 PROCEDURE — 96366 THER/PROPH/DIAG IV INF ADDON: CPT

## 2024-09-11 PROCEDURE — 80053 COMPREHEN METABOLIC PANEL: CPT

## 2024-09-11 PROCEDURE — 83735 ASSAY OF MAGNESIUM: CPT

## 2024-09-11 PROCEDURE — 86317 IMMUNOASSAY INFECTIOUS AGENT: CPT

## 2024-09-11 PROCEDURE — 85025 COMPLETE CBC W/AUTO DIFF WBC: CPT

## 2024-09-11 PROCEDURE — 6360000002 HC RX W HCPCS: Performed by: INTERNAL MEDICINE

## 2024-09-11 RX ORDER — MAGNESIUM SULFATE 1 G/100ML
1000 INJECTION INTRAVENOUS ONCE
Status: COMPLETED | OUTPATIENT
Start: 2024-09-11 | End: 2024-09-11

## 2024-09-11 RX ORDER — HEPARIN SODIUM (PORCINE) LOCK FLUSH IV SOLN 100 UNIT/ML 100 UNIT/ML
500 SOLUTION INTRAVENOUS PRN
OUTPATIENT
Start: 2024-09-18

## 2024-09-11 RX ORDER — SODIUM CHLORIDE 0.9 % (FLUSH) 0.9 %
5-40 SYRINGE (ML) INJECTION PRN
Status: DISCONTINUED | OUTPATIENT
Start: 2024-09-11 | End: 2024-09-12 | Stop reason: HOSPADM

## 2024-09-11 RX ORDER — SODIUM CHLORIDE 9 MG/ML
5-250 INJECTION, SOLUTION INTRAVENOUS PRN
OUTPATIENT
Start: 2024-09-18

## 2024-09-11 RX ORDER — PALONOSETRON 0.05 MG/ML
0.25 INJECTION, SOLUTION INTRAVENOUS ONCE
Status: COMPLETED | OUTPATIENT
Start: 2024-09-11 | End: 2024-09-11

## 2024-09-11 RX ORDER — DIPHENHYDRAMINE HYDROCHLORIDE 50 MG/ML
50 INJECTION INTRAMUSCULAR; INTRAVENOUS
OUTPATIENT
Start: 2024-09-18

## 2024-09-11 RX ORDER — EPINEPHRINE 1 MG/ML
0.3 INJECTION, SOLUTION, CONCENTRATE INTRAVENOUS PRN
Status: CANCELLED | OUTPATIENT
Start: 2024-09-11

## 2024-09-11 RX ORDER — PROCHLORPERAZINE EDISYLATE 5 MG/ML
5 INJECTION INTRAMUSCULAR; INTRAVENOUS
OUTPATIENT
Start: 2024-09-18

## 2024-09-11 RX ORDER — SODIUM CHLORIDE AND POTASSIUM CHLORIDE 150; 900 MG/100ML; MG/100ML
INJECTION, SOLUTION INTRAVENOUS ONCE
Status: COMPLETED | OUTPATIENT
Start: 2024-09-11 | End: 2024-09-11

## 2024-09-11 RX ORDER — PALONOSETRON 0.05 MG/ML
0.25 INJECTION, SOLUTION INTRAVENOUS ONCE
OUTPATIENT
Start: 2024-09-18 | End: 2024-09-18

## 2024-09-11 RX ORDER — FAMOTIDINE 10 MG/ML
20 INJECTION, SOLUTION INTRAVENOUS
OUTPATIENT
Start: 2024-09-18

## 2024-09-11 RX ORDER — SODIUM CHLORIDE 9 MG/ML
INJECTION, SOLUTION INTRAVENOUS CONTINUOUS
OUTPATIENT
Start: 2024-09-18

## 2024-09-11 RX ORDER — SODIUM CHLORIDE 0.9 % (FLUSH) 0.9 %
5-40 SYRINGE (ML) INJECTION PRN
OUTPATIENT
Start: 2024-09-18

## 2024-09-11 RX ORDER — MEPERIDINE HYDROCHLORIDE 50 MG/ML
12.5 INJECTION INTRAMUSCULAR; INTRAVENOUS; SUBCUTANEOUS PRN
OUTPATIENT
Start: 2024-09-18

## 2024-09-11 RX ORDER — ONDANSETRON 2 MG/ML
8 INJECTION INTRAMUSCULAR; INTRAVENOUS
OUTPATIENT
Start: 2024-09-18

## 2024-09-11 RX ORDER — PALONOSETRON 0.05 MG/ML
0.25 INJECTION, SOLUTION INTRAVENOUS ONCE
Status: CANCELLED | OUTPATIENT
Start: 2024-09-11 | End: 2024-09-11

## 2024-09-11 RX ORDER — SODIUM CHLORIDE 9 MG/ML
5-250 INJECTION, SOLUTION INTRAVENOUS PRN
Status: DISCONTINUED | OUTPATIENT
Start: 2024-09-11 | End: 2024-09-12 | Stop reason: HOSPADM

## 2024-09-11 RX ORDER — ACETAMINOPHEN 325 MG/1
650 TABLET ORAL
Status: CANCELLED | OUTPATIENT
Start: 2024-09-11

## 2024-09-11 RX ORDER — HEPARIN 100 UNIT/ML
500 SYRINGE INTRAVENOUS PRN
Status: DISCONTINUED | OUTPATIENT
Start: 2024-09-11 | End: 2024-09-12 | Stop reason: HOSPADM

## 2024-09-11 RX ORDER — EPINEPHRINE 1 MG/ML
0.3 INJECTION, SOLUTION, CONCENTRATE INTRAVENOUS PRN
OUTPATIENT
Start: 2024-09-18

## 2024-09-11 RX ORDER — ACETAMINOPHEN 325 MG/1
650 TABLET ORAL
OUTPATIENT
Start: 2024-09-18

## 2024-09-11 RX ORDER — ALBUTEROL SULFATE 90 UG/1
4 AEROSOL, METERED RESPIRATORY (INHALATION) PRN
OUTPATIENT
Start: 2024-09-18

## 2024-09-11 RX ORDER — ALBUTEROL SULFATE 90 UG/1
4 AEROSOL, METERED RESPIRATORY (INHALATION) PRN
Status: CANCELLED | OUTPATIENT
Start: 2024-09-11

## 2024-09-11 RX ORDER — SODIUM CHLORIDE 9 MG/ML
INJECTION, SOLUTION INTRAVENOUS CONTINUOUS
Status: CANCELLED | OUTPATIENT
Start: 2024-09-11

## 2024-09-11 RX ORDER — PROCHLORPERAZINE EDISYLATE 5 MG/ML
5 INJECTION INTRAMUSCULAR; INTRAVENOUS
Status: CANCELLED | OUTPATIENT
Start: 2024-09-11

## 2024-09-11 RX ORDER — FAMOTIDINE 10 MG/ML
20 INJECTION, SOLUTION INTRAVENOUS
Status: CANCELLED | OUTPATIENT
Start: 2024-09-11

## 2024-09-11 RX ORDER — ONDANSETRON 2 MG/ML
8 INJECTION INTRAMUSCULAR; INTRAVENOUS
Status: CANCELLED | OUTPATIENT
Start: 2024-09-11

## 2024-09-11 RX ORDER — DIPHENHYDRAMINE HYDROCHLORIDE 50 MG/ML
50 INJECTION INTRAMUSCULAR; INTRAVENOUS
Status: CANCELLED | OUTPATIENT
Start: 2024-09-11

## 2024-09-11 RX ORDER — MEPERIDINE HYDROCHLORIDE 50 MG/ML
12.5 INJECTION INTRAMUSCULAR; INTRAVENOUS; SUBCUTANEOUS PRN
Status: CANCELLED | OUTPATIENT
Start: 2024-09-11

## 2024-09-11 RX ORDER — SODIUM CHLORIDE 9 MG/ML
5-250 INJECTION, SOLUTION INTRAVENOUS PRN
Status: CANCELLED | OUTPATIENT
Start: 2024-09-11

## 2024-09-11 RX ORDER — DEXAMETHASONE SODIUM PHOSPHATE 10 MG/ML
10 INJECTION INTRAMUSCULAR; INTRAVENOUS ONCE
Status: COMPLETED | OUTPATIENT
Start: 2024-09-11 | End: 2024-09-11

## 2024-09-11 RX ADMIN — DEXAMETHASONE SODIUM PHOSPHATE 10 MG: 10 INJECTION INTRAMUSCULAR; INTRAVENOUS at 10:43

## 2024-09-11 RX ADMIN — MAGNESIUM SULFATE HEPTAHYDRATE 1000 MG: 1 INJECTION, SOLUTION INTRAVENOUS at 09:28

## 2024-09-11 RX ADMIN — MAGNESIUM SULFATE HEPTAHYDRATE 1000 MG: 1 INJECTION, SOLUTION INTRAVENOUS at 12:33

## 2024-09-11 RX ADMIN — CISPLATIN 94 MG: 100 INJECTION, SOLUTION INTRAVENOUS at 11:23

## 2024-09-11 RX ADMIN — SODIUM CHLORIDE, PRESERVATIVE FREE 10 ML: 5 INJECTION INTRAVENOUS at 13:32

## 2024-09-11 RX ADMIN — PALONOSETRON 0.25 MG: 0.05 INJECTION, SOLUTION INTRAVENOUS at 10:41

## 2024-09-11 RX ADMIN — SODIUM CHLORIDE 250 ML/HR: 9 INJECTION, SOLUTION INTRAVENOUS at 09:27

## 2024-09-11 RX ADMIN — HEPARIN 500 UNITS: 100 SYRINGE at 13:32

## 2024-09-11 RX ADMIN — SODIUM CHLORIDE 150 MG: 9 INJECTION, SOLUTION INTRAVENOUS at 10:54

## 2024-09-11 RX ADMIN — POTASSIUM CHLORIDE AND SODIUM CHLORIDE: 900; 150 INJECTION, SOLUTION INTRAVENOUS at 09:26

## 2024-09-11 ASSESSMENT — PAIN SCALES - GENERAL: PAINLEVEL_OUTOF10: 6

## 2024-09-11 ASSESSMENT — PAIN DESCRIPTION - LOCATION: LOCATION: HEAD

## 2024-09-12 ENCOUNTER — HOSPITAL ENCOUNTER (OUTPATIENT)
Dept: SPEECH THERAPY | Age: 59
Setting detail: THERAPIES SERIES
Discharge: HOME | End: 2024-09-12
Payer: COMMERCIAL

## 2024-09-12 ENCOUNTER — TELEPHONE (OUTPATIENT)
Dept: ONCOLOGY | Age: 59
End: 2024-09-12

## 2024-09-12 ENCOUNTER — HOSPITAL ENCOUNTER (OUTPATIENT)
Dept: RADIATION ONCOLOGY | Age: 59
Discharge: HOME | End: 2024-09-12
Payer: COMMERCIAL

## 2024-09-12 PROCEDURE — 77386 HC NTSTY MODUL RAD TX DLVR CPLX: CPT | Performed by: RADIOLOGY

## 2024-09-12 PROCEDURE — 92610 EVALUATE SWALLOWING FUNCTION: CPT

## 2024-09-13 ENCOUNTER — HOSPITAL ENCOUNTER (OUTPATIENT)
Dept: RADIATION ONCOLOGY | Age: 59
Discharge: HOME | End: 2024-09-13
Payer: COMMERCIAL

## 2024-09-13 ENCOUNTER — TELEPHONE (OUTPATIENT)
Dept: RADIATION ONCOLOGY | Age: 59
End: 2024-09-13

## 2024-09-13 PROCEDURE — 77386 HC NTSTY MODUL RAD TX DLVR CPLX: CPT | Performed by: RADIOLOGY

## 2024-09-16 ENCOUNTER — HOSPITAL ENCOUNTER (OUTPATIENT)
Dept: RADIATION ONCOLOGY | Age: 59
Discharge: HOME | End: 2024-09-16
Payer: COMMERCIAL

## 2024-09-17 ENCOUNTER — HOSPITAL ENCOUNTER (OUTPATIENT)
Dept: RADIATION ONCOLOGY | Age: 59
Discharge: HOME | End: 2024-09-17
Payer: COMMERCIAL

## 2024-09-17 PROCEDURE — 77386 HC NTSTY MODUL RAD TX DLVR CPLX: CPT | Performed by: RADIOLOGY

## 2024-09-18 ENCOUNTER — HOSPITAL ENCOUNTER (OUTPATIENT)
Dept: RADIATION ONCOLOGY | Age: 59
Discharge: HOME | End: 2024-09-18
Payer: COMMERCIAL

## 2024-09-18 ENCOUNTER — CLINICAL DOCUMENTATION (OUTPATIENT)
Dept: ONCOLOGY | Age: 59
End: 2024-09-18

## 2024-09-18 ENCOUNTER — TELEPHONE (OUTPATIENT)
Dept: ONCOLOGY | Age: 59
End: 2024-09-18

## 2024-09-18 ENCOUNTER — HOSPITAL ENCOUNTER (OUTPATIENT)
Dept: INFUSION THERAPY | Age: 59
Discharge: HOME | End: 2024-09-18
Payer: COMMERCIAL

## 2024-09-18 ENCOUNTER — OFFICE VISIT (OUTPATIENT)
Dept: ONCOLOGY | Age: 59
End: 2024-09-18
Payer: COMMERCIAL

## 2024-09-18 VITALS
BODY MASS INDEX: 30.61 KG/M2 | TEMPERATURE: 97.7 F | HEART RATE: 76 BPM | SYSTOLIC BLOOD PRESSURE: 143 MMHG | WEIGHT: 232 LBS | DIASTOLIC BLOOD PRESSURE: 90 MMHG | RESPIRATION RATE: 16 BRPM

## 2024-09-18 VITALS
TEMPERATURE: 97.7 F | DIASTOLIC BLOOD PRESSURE: 90 MMHG | SYSTOLIC BLOOD PRESSURE: 143 MMHG | WEIGHT: 232 LBS | RESPIRATION RATE: 16 BRPM | BODY MASS INDEX: 30.61 KG/M2 | HEART RATE: 76 BPM

## 2024-09-18 DIAGNOSIS — C32.2 SQUAMOUS CELL CARCINOMA OF SUBGLOTTIS (HCC): Primary | ICD-10-CM

## 2024-09-18 DIAGNOSIS — Z51.11 CHEMOTHERAPY MANAGEMENT, ENCOUNTER FOR: ICD-10-CM

## 2024-09-18 DIAGNOSIS — C32.9 LARYNGEAL CANCER (HCC): ICD-10-CM

## 2024-09-18 LAB
ALBUMIN SERPL-MCNC: 3.9 G/DL (ref 3.5–5.2)
ALBUMIN/GLOB SERPL: 1.1 {RATIO} (ref 1–2.5)
ALP SERPL-CCNC: 115 U/L (ref 40–129)
ALT SERPL-CCNC: 13 U/L (ref 5–41)
ANION GAP SERPL CALCULATED.3IONS-SCNC: 13 MMOL/L (ref 9–17)
AST SERPL-CCNC: 10 U/L
BASOPHILS # BLD: 0.1 K/UL (ref 0–0.2)
BASOPHILS NFR BLD: 0 % (ref 0–2)
BILIRUB SERPL-MCNC: 0.7 MG/DL (ref 0.3–1.2)
BUN SERPL-MCNC: 11 MG/DL (ref 6–20)
CALCIUM SERPL-MCNC: 9.5 MG/DL (ref 8.6–10.4)
CHLORIDE SERPL-SCNC: 92 MMOL/L (ref 98–107)
CO2 SERPL-SCNC: 25 MMOL/L (ref 20–31)
CREAT SERPL-MCNC: 0.8 MG/DL (ref 0.7–1.2)
EOSINOPHIL # BLD: 0.1 K/UL (ref 0–0.4)
EOSINOPHILS RELATIVE PERCENT: 1 % (ref 1–4)
ERYTHROCYTE [DISTWIDTH] IN BLOOD BY AUTOMATED COUNT: 13.1 % (ref 12.5–15.4)
GFR, ESTIMATED: >90 ML/MIN/1.73M2
GLUCOSE SERPL-MCNC: 260 MG/DL (ref 70–99)
HCT VFR BLD AUTO: 36.5 % (ref 41–53)
HGB BLD-MCNC: 12.6 G/DL (ref 13.5–17.5)
LYMPHOCYTES NFR BLD: 1.2 K/UL (ref 1–4.8)
LYMPHOCYTES RELATIVE PERCENT: 10 % (ref 24–44)
MAGNESIUM SERPL-MCNC: 1.7 MG/DL (ref 1.6–2.6)
MCH RBC QN AUTO: 30.8 PG (ref 26–34)
MCHC RBC AUTO-ENTMCNC: 34.4 G/DL (ref 31–37)
MCV RBC AUTO: 89.3 FL (ref 80–100)
MONOCYTES NFR BLD: 1 K/UL (ref 0.1–1.2)
MONOCYTES NFR BLD: 8 % (ref 2–11)
NEUTROPHILS NFR BLD: 81 % (ref 36–66)
NEUTS SEG NFR BLD: 10.3 K/UL (ref 1.8–7.7)
PHOSPHATE SERPL-MCNC: 3.9 MG/DL (ref 2.5–4.5)
PLATELET # BLD AUTO: 301 K/UL (ref 140–450)
PMV BLD AUTO: 6.7 FL (ref 6–12)
POTASSIUM SERPL-SCNC: 4.1 MMOL/L (ref 3.7–5.3)
PROT SERPL-MCNC: 7.3 G/DL (ref 6.4–8.3)
RBC # BLD AUTO: 4.08 M/UL (ref 4.5–5.9)
SODIUM SERPL-SCNC: 130 MMOL/L (ref 135–144)
WBC OTHER # BLD: 12.7 K/UL (ref 3.5–11)

## 2024-09-18 PROCEDURE — 3077F SYST BP >= 140 MM HG: CPT | Performed by: INTERNAL MEDICINE

## 2024-09-18 PROCEDURE — 96366 THER/PROPH/DIAG IV INF ADDON: CPT

## 2024-09-18 PROCEDURE — 85025 COMPLETE CBC W/AUTO DIFF WBC: CPT

## 2024-09-18 PROCEDURE — 84100 ASSAY OF PHOSPHORUS: CPT

## 2024-09-18 PROCEDURE — 3080F DIAST BP >= 90 MM HG: CPT | Performed by: INTERNAL MEDICINE

## 2024-09-18 PROCEDURE — 96413 CHEMO IV INFUSION 1 HR: CPT

## 2024-09-18 PROCEDURE — 96360 HYDRATION IV INFUSION INIT: CPT

## 2024-09-18 PROCEDURE — 2580000003 HC RX 258: Performed by: INTERNAL MEDICINE

## 2024-09-18 PROCEDURE — 77386 HC NTSTY MODUL RAD TX DLVR CPLX: CPT | Performed by: RADIOLOGY

## 2024-09-18 PROCEDURE — 96375 TX/PRO/DX INJ NEW DRUG ADDON: CPT

## 2024-09-18 PROCEDURE — 6360000002 HC RX W HCPCS: Performed by: INTERNAL MEDICINE

## 2024-09-18 PROCEDURE — 96361 HYDRATE IV INFUSION ADD-ON: CPT

## 2024-09-18 PROCEDURE — 80053 COMPREHEN METABOLIC PANEL: CPT

## 2024-09-18 PROCEDURE — 96365 THER/PROPH/DIAG IV INF INIT: CPT

## 2024-09-18 PROCEDURE — 96367 TX/PROPH/DG ADDL SEQ IV INF: CPT

## 2024-09-18 PROCEDURE — 99214 OFFICE O/P EST MOD 30 MIN: CPT | Performed by: INTERNAL MEDICINE

## 2024-09-18 PROCEDURE — 83735 ASSAY OF MAGNESIUM: CPT

## 2024-09-18 PROCEDURE — 96368 THER/DIAG CONCURRENT INF: CPT

## 2024-09-18 PROCEDURE — 77336 RADIATION PHYSICS CONSULT: CPT | Performed by: RADIOLOGY

## 2024-09-18 RX ORDER — EPINEPHRINE 1 MG/ML
0.3 INJECTION, SOLUTION, CONCENTRATE INTRAVENOUS PRN
OUTPATIENT
Start: 2024-09-25

## 2024-09-18 RX ORDER — FAMOTIDINE 10 MG/ML
20 INJECTION, SOLUTION INTRAVENOUS
OUTPATIENT
Start: 2024-09-25

## 2024-09-18 RX ORDER — MAGNESIUM SULFATE 1 G/100ML
1000 INJECTION INTRAVENOUS ONCE
Status: COMPLETED | OUTPATIENT
Start: 2024-09-18 | End: 2024-09-18

## 2024-09-18 RX ORDER — DIPHENHYDRAMINE HYDROCHLORIDE 50 MG/ML
50 INJECTION INTRAMUSCULAR; INTRAVENOUS
OUTPATIENT
Start: 2024-09-25

## 2024-09-18 RX ORDER — SODIUM CHLORIDE 9 MG/ML
5-250 INJECTION, SOLUTION INTRAVENOUS PRN
OUTPATIENT
Start: 2024-09-25

## 2024-09-18 RX ORDER — HEPARIN SODIUM (PORCINE) LOCK FLUSH IV SOLN 100 UNIT/ML 100 UNIT/ML
500 SOLUTION INTRAVENOUS PRN
OUTPATIENT
Start: 2024-09-25

## 2024-09-18 RX ORDER — MEPERIDINE HYDROCHLORIDE 50 MG/ML
12.5 INJECTION INTRAMUSCULAR; INTRAVENOUS; SUBCUTANEOUS PRN
OUTPATIENT
Start: 2024-09-25

## 2024-09-18 RX ORDER — SODIUM CHLORIDE 9 MG/ML
5-250 INJECTION, SOLUTION INTRAVENOUS PRN
Status: DISCONTINUED | OUTPATIENT
Start: 2024-09-18 | End: 2024-09-19 | Stop reason: HOSPADM

## 2024-09-18 RX ORDER — HYDROCODONE BITARTRATE AND ACETAMINOPHEN 5; 325 MG/1; MG/1
1 TABLET ORAL EVERY 8 HOURS PRN
Qty: 45 TABLET | Refills: 0 | Status: SHIPPED | OUTPATIENT
Start: 2024-09-18 | End: 2024-10-03

## 2024-09-18 RX ORDER — ONDANSETRON 2 MG/ML
8 INJECTION INTRAMUSCULAR; INTRAVENOUS
OUTPATIENT
Start: 2024-09-25

## 2024-09-18 RX ORDER — PALONOSETRON 0.05 MG/ML
0.25 INJECTION, SOLUTION INTRAVENOUS ONCE
OUTPATIENT
Start: 2024-09-25 | End: 2024-09-25

## 2024-09-18 RX ORDER — ACETAMINOPHEN 325 MG/1
650 TABLET ORAL
OUTPATIENT
Start: 2024-09-25

## 2024-09-18 RX ORDER — DEXAMETHASONE SODIUM PHOSPHATE 10 MG/ML
10 INJECTION INTRAMUSCULAR; INTRAVENOUS ONCE
Status: COMPLETED | OUTPATIENT
Start: 2024-09-18 | End: 2024-09-18

## 2024-09-18 RX ORDER — ALBUTEROL SULFATE 90 UG/1
4 INHALANT RESPIRATORY (INHALATION) PRN
OUTPATIENT
Start: 2024-09-25

## 2024-09-18 RX ORDER — HEPARIN 100 UNIT/ML
500 SYRINGE INTRAVENOUS PRN
Status: DISCONTINUED | OUTPATIENT
Start: 2024-09-18 | End: 2024-09-19 | Stop reason: HOSPADM

## 2024-09-18 RX ORDER — SODIUM CHLORIDE 0.9 % (FLUSH) 0.9 %
5-40 SYRINGE (ML) INJECTION PRN
Status: DISCONTINUED | OUTPATIENT
Start: 2024-09-18 | End: 2024-09-19 | Stop reason: HOSPADM

## 2024-09-18 RX ORDER — SODIUM CHLORIDE AND POTASSIUM CHLORIDE 150; 900 MG/100ML; MG/100ML
INJECTION, SOLUTION INTRAVENOUS CONTINUOUS
Status: DISCONTINUED | OUTPATIENT
Start: 2024-09-18 | End: 2024-09-19 | Stop reason: HOSPADM

## 2024-09-18 RX ORDER — PALONOSETRON 0.05 MG/ML
0.25 INJECTION, SOLUTION INTRAVENOUS ONCE
Status: COMPLETED | OUTPATIENT
Start: 2024-09-18 | End: 2024-09-18

## 2024-09-18 RX ORDER — SODIUM CHLORIDE 9 MG/ML
INJECTION, SOLUTION INTRAVENOUS CONTINUOUS
OUTPATIENT
Start: 2024-09-25

## 2024-09-18 RX ORDER — SODIUM CHLORIDE 0.9 % (FLUSH) 0.9 %
5-40 SYRINGE (ML) INJECTION PRN
OUTPATIENT
Start: 2024-09-25

## 2024-09-18 RX ORDER — PROCHLORPERAZINE EDISYLATE 5 MG/ML
5 INJECTION INTRAMUSCULAR; INTRAVENOUS
OUTPATIENT
Start: 2024-09-25

## 2024-09-18 RX ADMIN — Medication 500 UNITS: at 13:16

## 2024-09-18 RX ADMIN — DEXAMETHASONE SODIUM PHOSPHATE 10 MG: 10 INJECTION INTRAMUSCULAR; INTRAVENOUS at 10:12

## 2024-09-18 RX ADMIN — MAGNESIUM SULFATE HEPTAHYDRATE 1000 MG: 1 INJECTION, SOLUTION INTRAVENOUS at 08:32

## 2024-09-18 RX ADMIN — CISPLATIN 94 MG: 1 INJECTION INTRAVENOUS at 10:52

## 2024-09-18 RX ADMIN — SODIUM CHLORIDE 150 MG: 9 INJECTION, SOLUTION INTRAVENOUS at 10:20

## 2024-09-18 RX ADMIN — PALONOSETRON 0.25 MG: 0.05 INJECTION, SOLUTION INTRAVENOUS at 10:12

## 2024-09-18 RX ADMIN — MAGNESIUM SULFATE HEPTAHYDRATE 1000 MG: 1 INJECTION, SOLUTION INTRAVENOUS at 12:13

## 2024-09-18 RX ADMIN — SODIUM CHLORIDE 120 ML/HR: 9 INJECTION, SOLUTION INTRAVENOUS at 08:31

## 2024-09-18 RX ADMIN — POTASSIUM CHLORIDE AND SODIUM CHLORIDE: 900; 150 INJECTION, SOLUTION INTRAVENOUS at 08:31

## 2024-09-18 RX ADMIN — SODIUM CHLORIDE, PRESERVATIVE FREE 10 ML: 5 INJECTION INTRAVENOUS at 13:16

## 2024-09-19 ENCOUNTER — HOSPITAL ENCOUNTER (OUTPATIENT)
Dept: RADIATION ONCOLOGY | Age: 59
Discharge: HOME | End: 2024-09-19
Payer: COMMERCIAL

## 2024-09-20 ENCOUNTER — HOSPITAL ENCOUNTER (OUTPATIENT)
Dept: RADIATION ONCOLOGY | Age: 59
Discharge: HOME | End: 2024-09-20
Payer: COMMERCIAL

## 2024-09-20 PROBLEM — Z09 POSTOP CHECK: Status: RESOLVED | Noted: 2024-08-21 | Resolved: 2024-09-20

## 2024-09-22 DIAGNOSIS — E78.2 MIXED HYPERLIPIDEMIA: ICD-10-CM

## 2024-09-22 DIAGNOSIS — K21.9 GASTROESOPHAGEAL REFLUX DISEASE, UNSPECIFIED WHETHER ESOPHAGITIS PRESENT: ICD-10-CM

## 2024-09-23 ENCOUNTER — HOSPITAL ENCOUNTER (OUTPATIENT)
Dept: RADIATION ONCOLOGY | Age: 59
Discharge: HOME | End: 2024-09-23
Payer: COMMERCIAL

## 2024-09-23 PROCEDURE — 77386 HC NTSTY MODUL RAD TX DLVR CPLX: CPT | Performed by: RADIOLOGY

## 2024-09-24 ENCOUNTER — HOSPITAL ENCOUNTER (OUTPATIENT)
Dept: RADIATION ONCOLOGY | Age: 59
Discharge: HOME | End: 2024-09-24
Payer: COMMERCIAL

## 2024-09-24 PROCEDURE — 77386 HC NTSTY MODUL RAD TX DLVR CPLX: CPT | Performed by: RADIOLOGY

## 2024-09-24 RX ORDER — ATORVASTATIN CALCIUM 40 MG/1
40 TABLET, FILM COATED ORAL DAILY
Qty: 90 TABLET | Refills: 0 | Status: SHIPPED | OUTPATIENT
Start: 2024-09-24

## 2024-09-25 ENCOUNTER — HOSPITAL ENCOUNTER (OUTPATIENT)
Dept: RADIATION ONCOLOGY | Age: 59
Discharge: HOME | End: 2024-09-25
Payer: COMMERCIAL

## 2024-09-25 ENCOUNTER — HOSPITAL ENCOUNTER (OUTPATIENT)
Dept: INFUSION THERAPY | Age: 59
Discharge: HOME | End: 2024-09-25
Payer: COMMERCIAL

## 2024-09-25 ENCOUNTER — OFFICE VISIT (OUTPATIENT)
Dept: ONCOLOGY | Age: 59
End: 2024-09-25
Payer: COMMERCIAL

## 2024-09-25 VITALS
TEMPERATURE: 97.9 F | SYSTOLIC BLOOD PRESSURE: 149 MMHG | RESPIRATION RATE: 20 BRPM | WEIGHT: 229.3 LBS | BODY MASS INDEX: 30.25 KG/M2 | DIASTOLIC BLOOD PRESSURE: 90 MMHG | HEART RATE: 78 BPM

## 2024-09-25 VITALS
WEIGHT: 229.3 LBS | HEART RATE: 78 BPM | SYSTOLIC BLOOD PRESSURE: 149 MMHG | TEMPERATURE: 97.9 F | BODY MASS INDEX: 30.25 KG/M2 | RESPIRATION RATE: 20 BRPM | DIASTOLIC BLOOD PRESSURE: 90 MMHG

## 2024-09-25 DIAGNOSIS — Z51.11 CHEMOTHERAPY MANAGEMENT, ENCOUNTER FOR: ICD-10-CM

## 2024-09-25 DIAGNOSIS — C32.2 SQUAMOUS CELL CARCINOMA OF SUBGLOTTIS (HCC): Primary | ICD-10-CM

## 2024-09-25 LAB
ALBUMIN SERPL-MCNC: 3.8 G/DL (ref 3.5–5.2)
ALBUMIN/GLOB SERPL: 1.1 {RATIO} (ref 1–2.5)
ALP SERPL-CCNC: 104 U/L (ref 40–129)
ALT SERPL-CCNC: 11 U/L (ref 5–41)
ANION GAP SERPL CALCULATED.3IONS-SCNC: 12 MMOL/L (ref 9–17)
AST SERPL-CCNC: 9 U/L
BASOPHILS # BLD: 0 K/UL (ref 0–0.2)
BASOPHILS NFR BLD: 0 % (ref 0–2)
BILIRUB SERPL-MCNC: 0.6 MG/DL (ref 0.3–1.2)
BUN SERPL-MCNC: 12 MG/DL (ref 6–20)
CALCIUM SERPL-MCNC: 9.2 MG/DL (ref 8.6–10.4)
CHLORIDE SERPL-SCNC: 92 MMOL/L (ref 98–107)
CO2 SERPL-SCNC: 24 MMOL/L (ref 20–31)
CREAT SERPL-MCNC: 0.8 MG/DL (ref 0.7–1.2)
EOSINOPHIL # BLD: 0.1 K/UL (ref 0–0.4)
EOSINOPHILS RELATIVE PERCENT: 1 % (ref 1–4)
ERYTHROCYTE [DISTWIDTH] IN BLOOD BY AUTOMATED COUNT: 13.3 % (ref 12.5–15.4)
GFR, ESTIMATED: >90 ML/MIN/1.73M2
GLUCOSE SERPL-MCNC: 200 MG/DL (ref 70–99)
HCT VFR BLD AUTO: 33.9 % (ref 41–53)
HGB BLD-MCNC: 11.7 G/DL (ref 13.5–17.5)
LYMPHOCYTES NFR BLD: 0.8 K/UL (ref 1–4.8)
LYMPHOCYTES RELATIVE PERCENT: 8 % (ref 24–44)
MAGNESIUM SERPL-MCNC: 1.8 MG/DL (ref 1.6–2.6)
MCH RBC QN AUTO: 30.5 PG (ref 26–34)
MCHC RBC AUTO-ENTMCNC: 34.4 G/DL (ref 31–37)
MCV RBC AUTO: 88.5 FL (ref 80–100)
MONOCYTES NFR BLD: 0.9 K/UL (ref 0.1–1.2)
MONOCYTES NFR BLD: 9 % (ref 2–11)
NEUTROPHILS NFR BLD: 82 % (ref 36–66)
NEUTS SEG NFR BLD: 8.4 K/UL (ref 1.8–7.7)
PHOSPHATE SERPL-MCNC: 3.4 MG/DL (ref 2.5–4.5)
PLATELET # BLD AUTO: 304 K/UL (ref 140–450)
PMV BLD AUTO: 6.4 FL (ref 6–12)
POTASSIUM SERPL-SCNC: 4.1 MMOL/L (ref 3.7–5.3)
PROT SERPL-MCNC: 7.2 G/DL (ref 6.4–8.3)
RBC # BLD AUTO: 3.83 M/UL (ref 4.5–5.9)
SODIUM SERPL-SCNC: 128 MMOL/L (ref 135–144)
WBC OTHER # BLD: 10.2 K/UL (ref 3.5–11)

## 2024-09-25 PROCEDURE — 6360000002 HC RX W HCPCS: Performed by: INTERNAL MEDICINE

## 2024-09-25 PROCEDURE — 77386 HC NTSTY MODUL RAD TX DLVR CPLX: CPT | Performed by: RADIOLOGY

## 2024-09-25 PROCEDURE — 99214 OFFICE O/P EST MOD 30 MIN: CPT | Performed by: INTERNAL MEDICINE

## 2024-09-25 PROCEDURE — 80053 COMPREHEN METABOLIC PANEL: CPT

## 2024-09-25 PROCEDURE — 96366 THER/PROPH/DIAG IV INF ADDON: CPT

## 2024-09-25 PROCEDURE — 77336 RADIATION PHYSICS CONSULT: CPT | Performed by: RADIOLOGY

## 2024-09-25 PROCEDURE — 83735 ASSAY OF MAGNESIUM: CPT

## 2024-09-25 PROCEDURE — 2580000003 HC RX 258: Performed by: INTERNAL MEDICINE

## 2024-09-25 PROCEDURE — 84100 ASSAY OF PHOSPHORUS: CPT

## 2024-09-25 PROCEDURE — 3080F DIAST BP >= 90 MM HG: CPT | Performed by: INTERNAL MEDICINE

## 2024-09-25 PROCEDURE — 96367 TX/PROPH/DG ADDL SEQ IV INF: CPT

## 2024-09-25 PROCEDURE — 85025 COMPLETE CBC W/AUTO DIFF WBC: CPT

## 2024-09-25 PROCEDURE — 3077F SYST BP >= 140 MM HG: CPT | Performed by: INTERNAL MEDICINE

## 2024-09-25 PROCEDURE — 96413 CHEMO IV INFUSION 1 HR: CPT

## 2024-09-25 PROCEDURE — 96375 TX/PRO/DX INJ NEW DRUG ADDON: CPT

## 2024-09-25 RX ORDER — EPINEPHRINE 1 MG/ML
0.3 INJECTION, SOLUTION, CONCENTRATE INTRAVENOUS PRN
OUTPATIENT
Start: 2024-10-02

## 2024-09-25 RX ORDER — DEXAMETHASONE SODIUM PHOSPHATE 10 MG/ML
10 INJECTION INTRAMUSCULAR; INTRAVENOUS ONCE
Status: COMPLETED | OUTPATIENT
Start: 2024-09-25 | End: 2024-09-25

## 2024-09-25 RX ORDER — MEPERIDINE HYDROCHLORIDE 50 MG/ML
12.5 INJECTION INTRAMUSCULAR; INTRAVENOUS; SUBCUTANEOUS PRN
OUTPATIENT
Start: 2024-10-02

## 2024-09-25 RX ORDER — ONDANSETRON 2 MG/ML
8 INJECTION INTRAMUSCULAR; INTRAVENOUS
OUTPATIENT
Start: 2024-10-02

## 2024-09-25 RX ORDER — HEPARIN SODIUM (PORCINE) LOCK FLUSH IV SOLN 100 UNIT/ML 100 UNIT/ML
500 SOLUTION INTRAVENOUS PRN
OUTPATIENT
Start: 2024-10-02

## 2024-09-25 RX ORDER — HEPARIN 100 UNIT/ML
500 SYRINGE INTRAVENOUS PRN
Status: DISCONTINUED | OUTPATIENT
Start: 2024-09-25 | End: 2024-09-26 | Stop reason: HOSPADM

## 2024-09-25 RX ORDER — SODIUM CHLORIDE 0.9 % (FLUSH) 0.9 %
5-40 SYRINGE (ML) INJECTION PRN
Status: DISCONTINUED | OUTPATIENT
Start: 2024-09-25 | End: 2024-09-26 | Stop reason: HOSPADM

## 2024-09-25 RX ORDER — FAMOTIDINE 10 MG/ML
20 INJECTION, SOLUTION INTRAVENOUS
OUTPATIENT
Start: 2024-10-02

## 2024-09-25 RX ORDER — SODIUM CHLORIDE 0.9 % (FLUSH) 0.9 %
5-40 SYRINGE (ML) INJECTION PRN
OUTPATIENT
Start: 2024-10-02

## 2024-09-25 RX ORDER — DIPHENHYDRAMINE HYDROCHLORIDE 50 MG/ML
50 INJECTION INTRAMUSCULAR; INTRAVENOUS
OUTPATIENT
Start: 2024-10-02

## 2024-09-25 RX ORDER — ALBUTEROL SULFATE 90 UG/1
4 INHALANT RESPIRATORY (INHALATION) PRN
OUTPATIENT
Start: 2024-10-02

## 2024-09-25 RX ORDER — OXYCODONE HYDROCHLORIDE 10 MG/1
10 TABLET ORAL EVERY 4 HOURS PRN
Qty: 90 TABLET | Refills: 0 | Status: SHIPPED | OUTPATIENT
Start: 2024-09-25 | End: 2024-10-10

## 2024-09-25 RX ORDER — PALONOSETRON 0.05 MG/ML
0.25 INJECTION, SOLUTION INTRAVENOUS ONCE
Status: COMPLETED | OUTPATIENT
Start: 2024-09-25 | End: 2024-09-25

## 2024-09-25 RX ORDER — SODIUM CHLORIDE 9 MG/ML
5-250 INJECTION, SOLUTION INTRAVENOUS PRN
Status: DISCONTINUED | OUTPATIENT
Start: 2024-09-25 | End: 2024-09-26 | Stop reason: HOSPADM

## 2024-09-25 RX ORDER — PALONOSETRON 0.05 MG/ML
0.25 INJECTION, SOLUTION INTRAVENOUS ONCE
OUTPATIENT
Start: 2024-10-02 | End: 2024-10-02

## 2024-09-25 RX ORDER — ACETAMINOPHEN 325 MG/1
650 TABLET ORAL
OUTPATIENT
Start: 2024-10-02

## 2024-09-25 RX ORDER — SODIUM CHLORIDE 9 MG/ML
5-250 INJECTION, SOLUTION INTRAVENOUS PRN
OUTPATIENT
Start: 2024-10-02

## 2024-09-25 RX ORDER — PROCHLORPERAZINE EDISYLATE 5 MG/ML
5 INJECTION INTRAMUSCULAR; INTRAVENOUS
OUTPATIENT
Start: 2024-10-02

## 2024-09-25 RX ORDER — SODIUM CHLORIDE 9 MG/ML
INJECTION, SOLUTION INTRAVENOUS CONTINUOUS
OUTPATIENT
Start: 2024-10-02

## 2024-09-25 RX ADMIN — SODIUM CHLORIDE 250 ML/HR: 9 INJECTION, SOLUTION INTRAVENOUS at 08:55

## 2024-09-25 RX ADMIN — CISPLATIN 94 MG: 1 INJECTION INTRAVENOUS at 10:36

## 2024-09-25 RX ADMIN — POTASSIUM CHLORIDE: 2 INJECTION, SOLUTION, CONCENTRATE INTRAVENOUS at 08:55

## 2024-09-25 RX ADMIN — SODIUM CHLORIDE 150 MG: 9 INJECTION, SOLUTION INTRAVENOUS at 10:09

## 2024-09-25 RX ADMIN — SODIUM CHLORIDE, PRESERVATIVE FREE 10 ML: 5 INJECTION INTRAVENOUS at 12:51

## 2024-09-25 RX ADMIN — POTASSIUM CHLORIDE: 2 INJECTION, SOLUTION, CONCENTRATE INTRAVENOUS at 11:44

## 2024-09-25 RX ADMIN — HEPARIN 500 UNITS: 100 SYRINGE at 12:51

## 2024-09-25 RX ADMIN — SODIUM CHLORIDE, PRESERVATIVE FREE 10 ML: 5 INJECTION INTRAVENOUS at 08:28

## 2024-09-25 RX ADMIN — DEXAMETHASONE SODIUM PHOSPHATE 10 MG: 10 INJECTION INTRAMUSCULAR; INTRAVENOUS at 10:00

## 2024-09-25 RX ADMIN — PALONOSETRON 0.25 MG: 0.05 INJECTION, SOLUTION INTRAVENOUS at 10:00

## 2024-09-25 NOTE — PROGRESS NOTES
Pt here for C1D15 Cisplatin. Denies any new complaints. Labs drawn from port and results reviewed. Pt seen by Dr Ag at chair side for f/u, refer to his note. Pt was treated without incident and d/c'd in stable condition. Pt will return on 10-2-24 for MD ambrose and C1D22.

## 2024-09-26 ENCOUNTER — HOSPITAL ENCOUNTER (OUTPATIENT)
Dept: RADIATION ONCOLOGY | Age: 59
Discharge: HOME | End: 2024-09-26
Payer: COMMERCIAL

## 2024-09-27 ENCOUNTER — HOSPITAL ENCOUNTER (OUTPATIENT)
Dept: RADIATION ONCOLOGY | Age: 59
Discharge: HOME | End: 2024-09-27
Payer: COMMERCIAL

## 2024-09-30 ENCOUNTER — HOSPITAL ENCOUNTER (OUTPATIENT)
Dept: RADIATION ONCOLOGY | Age: 59
Discharge: HOME | End: 2024-09-30
Payer: COMMERCIAL

## 2024-09-30 PROCEDURE — 77386 HC NTSTY MODUL RAD TX DLVR CPLX: CPT | Performed by: RADIOLOGY

## 2024-10-01 ENCOUNTER — HOSPITAL ENCOUNTER (OUTPATIENT)
Dept: RADIATION ONCOLOGY | Age: 59
Discharge: HOME | End: 2024-10-01
Payer: COMMERCIAL

## 2024-10-01 PROCEDURE — 77386 HC NTSTY MODUL RAD TX DLVR CPLX: CPT | Performed by: RADIOLOGY

## 2024-10-02 ENCOUNTER — HOSPITAL ENCOUNTER (OUTPATIENT)
Dept: RADIATION ONCOLOGY | Age: 59
Discharge: HOME | End: 2024-10-02
Payer: COMMERCIAL

## 2024-10-02 ENCOUNTER — OFFICE VISIT (OUTPATIENT)
Dept: ONCOLOGY | Age: 59
End: 2024-10-02
Payer: COMMERCIAL

## 2024-10-02 ENCOUNTER — HOSPITAL ENCOUNTER (OUTPATIENT)
Dept: INFUSION THERAPY | Age: 59
Discharge: HOME | End: 2024-10-02
Payer: COMMERCIAL

## 2024-10-02 VITALS
WEIGHT: 226.2 LBS | BODY MASS INDEX: 29.84 KG/M2 | DIASTOLIC BLOOD PRESSURE: 89 MMHG | RESPIRATION RATE: 18 BRPM | SYSTOLIC BLOOD PRESSURE: 145 MMHG | TEMPERATURE: 96.8 F | HEART RATE: 70 BPM

## 2024-10-02 VITALS
DIASTOLIC BLOOD PRESSURE: 89 MMHG | OXYGEN SATURATION: 100 % | HEART RATE: 70 BPM | BODY MASS INDEX: 29.84 KG/M2 | WEIGHT: 226.2 LBS | RESPIRATION RATE: 18 BRPM | TEMPERATURE: 96.8 F | SYSTOLIC BLOOD PRESSURE: 145 MMHG

## 2024-10-02 DIAGNOSIS — Z51.11 CHEMOTHERAPY MANAGEMENT, ENCOUNTER FOR: ICD-10-CM

## 2024-10-02 DIAGNOSIS — C32.2 SQUAMOUS CELL CARCINOMA OF SUBGLOTTIS (HCC): Primary | ICD-10-CM

## 2024-10-02 DIAGNOSIS — E87.1 HYPONATREMIA: ICD-10-CM

## 2024-10-02 DIAGNOSIS — G89.3 CANCER ASSOCIATED PAIN: ICD-10-CM

## 2024-10-02 DIAGNOSIS — C32.2 SQUAMOUS CELL CARCINOMA OF SUBGLOTTIS (HCC): ICD-10-CM

## 2024-10-02 DIAGNOSIS — C32.9 LARYNGEAL CANCER (HCC): ICD-10-CM

## 2024-10-02 LAB
ALBUMIN SERPL-MCNC: 3.6 G/DL (ref 3.5–5.2)
ALBUMIN/GLOB SERPL: 1.2 {RATIO} (ref 1–2.5)
ALP SERPL-CCNC: 102 U/L (ref 40–129)
ALT SERPL-CCNC: 12 U/L (ref 5–41)
ANION GAP SERPL CALCULATED.3IONS-SCNC: 12 MMOL/L (ref 9–17)
AST SERPL-CCNC: 12 U/L
BASOPHILS # BLD: 0 K/UL (ref 0–0.2)
BASOPHILS NFR BLD: 0 % (ref 0–2)
BILIRUB SERPL-MCNC: 0.6 MG/DL (ref 0.3–1.2)
BUN SERPL-MCNC: 13 MG/DL (ref 6–20)
CALCIUM SERPL-MCNC: 9.1 MG/DL (ref 8.6–10.4)
CHLORIDE SERPL-SCNC: 92 MMOL/L (ref 98–107)
CO2 SERPL-SCNC: 23 MMOL/L (ref 20–31)
CREAT SERPL-MCNC: 0.8 MG/DL (ref 0.7–1.2)
EOSINOPHIL # BLD: 0 K/UL (ref 0–0.4)
EOSINOPHILS RELATIVE PERCENT: 0 % (ref 1–4)
ERYTHROCYTE [DISTWIDTH] IN BLOOD BY AUTOMATED COUNT: 13.5 % (ref 12.5–15.4)
GFR, ESTIMATED: >90 ML/MIN/1.73M2
GLUCOSE SERPL-MCNC: 163 MG/DL (ref 70–99)
HCT VFR BLD AUTO: 30.9 % (ref 41–53)
HGB BLD-MCNC: 10.7 G/DL (ref 13.5–17.5)
LYMPHOCYTES NFR BLD: 0.5 K/UL (ref 1–4.8)
LYMPHOCYTES RELATIVE PERCENT: 8 % (ref 24–44)
MAGNESIUM SERPL-MCNC: 2 MG/DL (ref 1.6–2.6)
MCH RBC QN AUTO: 30.8 PG (ref 26–34)
MCHC RBC AUTO-ENTMCNC: 34.7 G/DL (ref 31–37)
MCV RBC AUTO: 88.7 FL (ref 80–100)
MONOCYTES NFR BLD: 0.6 K/UL (ref 0.1–1.2)
MONOCYTES NFR BLD: 10 % (ref 2–11)
NEUTROPHILS NFR BLD: 82 % (ref 36–66)
NEUTS SEG NFR BLD: 5.4 K/UL (ref 1.8–7.7)
PHOSPHATE SERPL-MCNC: 4 MG/DL (ref 2.5–4.5)
PLATELET # BLD AUTO: 258 K/UL (ref 140–450)
PMV BLD AUTO: 6.2 FL (ref 6–12)
POTASSIUM SERPL-SCNC: 4.5 MMOL/L (ref 3.7–5.3)
PROT SERPL-MCNC: 6.7 G/DL (ref 6.4–8.3)
RBC # BLD AUTO: 3.48 M/UL (ref 4.5–5.9)
SODIUM SERPL-SCNC: 127 MMOL/L (ref 135–144)
WBC OTHER # BLD: 6.6 K/UL (ref 3.5–11)

## 2024-10-02 PROCEDURE — 6370000000 HC RX 637 (ALT 250 FOR IP): Performed by: INTERNAL MEDICINE

## 2024-10-02 PROCEDURE — 80053 COMPREHEN METABOLIC PANEL: CPT

## 2024-10-02 PROCEDURE — 85025 COMPLETE CBC W/AUTO DIFF WBC: CPT

## 2024-10-02 PROCEDURE — 96367 TX/PROPH/DG ADDL SEQ IV INF: CPT

## 2024-10-02 PROCEDURE — 3077F SYST BP >= 140 MM HG: CPT | Performed by: INTERNAL MEDICINE

## 2024-10-02 PROCEDURE — 3079F DIAST BP 80-89 MM HG: CPT | Performed by: INTERNAL MEDICINE

## 2024-10-02 PROCEDURE — 77336 RADIATION PHYSICS CONSULT: CPT | Performed by: RADIOLOGY

## 2024-10-02 PROCEDURE — 96375 TX/PRO/DX INJ NEW DRUG ADDON: CPT

## 2024-10-02 PROCEDURE — 96366 THER/PROPH/DIAG IV INF ADDON: CPT

## 2024-10-02 PROCEDURE — 2580000003 HC RX 258: Performed by: INTERNAL MEDICINE

## 2024-10-02 PROCEDURE — 84100 ASSAY OF PHOSPHORUS: CPT

## 2024-10-02 PROCEDURE — 96368 THER/DIAG CONCURRENT INF: CPT

## 2024-10-02 PROCEDURE — 77386 HC NTSTY MODUL RAD TX DLVR CPLX: CPT | Performed by: RADIOLOGY

## 2024-10-02 PROCEDURE — 96365 THER/PROPH/DIAG IV INF INIT: CPT

## 2024-10-02 PROCEDURE — 99211 OFF/OP EST MAY X REQ PHY/QHP: CPT | Performed by: INTERNAL MEDICINE

## 2024-10-02 PROCEDURE — 6360000002 HC RX W HCPCS: Performed by: INTERNAL MEDICINE

## 2024-10-02 PROCEDURE — 96413 CHEMO IV INFUSION 1 HR: CPT

## 2024-10-02 PROCEDURE — 83735 ASSAY OF MAGNESIUM: CPT

## 2024-10-02 PROCEDURE — 99214 OFFICE O/P EST MOD 30 MIN: CPT | Performed by: INTERNAL MEDICINE

## 2024-10-02 RX ORDER — DIPHENHYDRAMINE HYDROCHLORIDE 50 MG/ML
50 INJECTION INTRAMUSCULAR; INTRAVENOUS
OUTPATIENT
Start: 2024-10-09

## 2024-10-02 RX ORDER — SODIUM CHLORIDE 9 MG/ML
5-250 INJECTION, SOLUTION INTRAVENOUS PRN
Status: DISCONTINUED | OUTPATIENT
Start: 2024-10-02 | End: 2024-10-03 | Stop reason: HOSPADM

## 2024-10-02 RX ORDER — SODIUM CHLORIDE 9 MG/ML
5-250 INJECTION, SOLUTION INTRAVENOUS PRN
OUTPATIENT
Start: 2024-10-09

## 2024-10-02 RX ORDER — MAGNESIUM SULFATE 1 G/100ML
1000 INJECTION INTRAVENOUS ONCE
Status: COMPLETED | OUTPATIENT
Start: 2024-10-02 | End: 2024-10-02

## 2024-10-02 RX ORDER — DEXAMETHASONE SODIUM PHOSPHATE 10 MG/ML
10 INJECTION INTRAMUSCULAR; INTRAVENOUS ONCE
Status: COMPLETED | OUTPATIENT
Start: 2024-10-02 | End: 2024-10-02

## 2024-10-02 RX ORDER — ACETAMINOPHEN 325 MG/1
650 TABLET ORAL
OUTPATIENT
Start: 2024-10-09

## 2024-10-02 RX ORDER — SODIUM CHLORIDE 0.9 % (FLUSH) 0.9 %
5-40 SYRINGE (ML) INJECTION PRN
OUTPATIENT
Start: 2024-10-09

## 2024-10-02 RX ORDER — SODIUM CHLORIDE 0.9 % (FLUSH) 0.9 %
5-40 SYRINGE (ML) INJECTION PRN
Status: DISCONTINUED | OUTPATIENT
Start: 2024-10-02 | End: 2024-10-03 | Stop reason: HOSPADM

## 2024-10-02 RX ORDER — HEPARIN 100 UNIT/ML
500 SYRINGE INTRAVENOUS PRN
Status: DISCONTINUED | OUTPATIENT
Start: 2024-10-02 | End: 2024-10-03 | Stop reason: HOSPADM

## 2024-10-02 RX ORDER — PALONOSETRON 0.05 MG/ML
0.25 INJECTION, SOLUTION INTRAVENOUS ONCE
Status: COMPLETED | OUTPATIENT
Start: 2024-10-02 | End: 2024-10-02

## 2024-10-02 RX ORDER — HEPARIN SODIUM (PORCINE) LOCK FLUSH IV SOLN 100 UNIT/ML 100 UNIT/ML
500 SOLUTION INTRAVENOUS PRN
OUTPATIENT
Start: 2024-10-09

## 2024-10-02 RX ORDER — SODIUM CHLORIDE AND POTASSIUM CHLORIDE 150; 900 MG/100ML; MG/100ML
INJECTION, SOLUTION INTRAVENOUS ONCE
Status: COMPLETED | OUTPATIENT
Start: 2024-10-02 | End: 2024-10-02

## 2024-10-02 RX ORDER — SODIUM CHLORIDE 9 MG/ML
INJECTION, SOLUTION INTRAVENOUS CONTINUOUS
OUTPATIENT
Start: 2024-10-09

## 2024-10-02 RX ORDER — FAMOTIDINE 10 MG/ML
20 INJECTION, SOLUTION INTRAVENOUS
OUTPATIENT
Start: 2024-10-09

## 2024-10-02 RX ORDER — PALONOSETRON 0.05 MG/ML
0.25 INJECTION, SOLUTION INTRAVENOUS ONCE
OUTPATIENT
Start: 2024-10-09 | End: 2024-10-09

## 2024-10-02 RX ORDER — EPINEPHRINE 1 MG/ML
0.3 INJECTION, SOLUTION, CONCENTRATE INTRAVENOUS PRN
OUTPATIENT
Start: 2024-10-09

## 2024-10-02 RX ORDER — PROCHLORPERAZINE EDISYLATE 5 MG/ML
5 INJECTION INTRAMUSCULAR; INTRAVENOUS
OUTPATIENT
Start: 2024-10-09

## 2024-10-02 RX ORDER — MEPERIDINE HYDROCHLORIDE 50 MG/ML
12.5 INJECTION INTRAMUSCULAR; INTRAVENOUS; SUBCUTANEOUS PRN
OUTPATIENT
Start: 2024-10-09

## 2024-10-02 RX ORDER — ALBUTEROL SULFATE 90 UG/1
4 INHALANT RESPIRATORY (INHALATION) PRN
OUTPATIENT
Start: 2024-10-09

## 2024-10-02 RX ORDER — OXYCODONE HYDROCHLORIDE 5 MG/1
10 TABLET ORAL ONCE
Status: COMPLETED | OUTPATIENT
Start: 2024-10-02 | End: 2024-10-02

## 2024-10-02 RX ORDER — ONDANSETRON 2 MG/ML
8 INJECTION INTRAMUSCULAR; INTRAVENOUS
OUTPATIENT
Start: 2024-10-09

## 2024-10-02 RX ORDER — OXYCODONE HCL 10 MG/1
10 TABLET, FILM COATED, EXTENDED RELEASE ORAL ONCE
Status: COMPLETED | OUTPATIENT
Start: 2024-10-02 | End: 2024-10-02

## 2024-10-02 RX ADMIN — OXYCODONE HYDROCHLORIDE 10 MG: 5 TABLET ORAL at 13:02

## 2024-10-02 RX ADMIN — HEPARIN 500 UNITS: 100 SYRINGE at 14:22

## 2024-10-02 RX ADMIN — MAGNESIUM SULFATE HEPTAHYDRATE 1000 MG: 1 INJECTION, SOLUTION INTRAVENOUS at 09:23

## 2024-10-02 RX ADMIN — SODIUM CHLORIDE 150 MG: 9 INJECTION, SOLUTION INTRAVENOUS at 10:50

## 2024-10-02 RX ADMIN — OXYCODONE HYDROCHLORIDE 10 MG: 10 TABLET, FILM COATED, EXTENDED RELEASE ORAL at 09:06

## 2024-10-02 RX ADMIN — CISPLATIN 94 MG: 1 INJECTION INTRAVENOUS at 11:28

## 2024-10-02 RX ADMIN — SODIUM CHLORIDE 110 ML/HR: 9 INJECTION, SOLUTION INTRAVENOUS at 09:22

## 2024-10-02 RX ADMIN — DEXAMETHASONE SODIUM PHOSPHATE 10 MG: 10 INJECTION INTRAMUSCULAR; INTRAVENOUS at 10:34

## 2024-10-02 RX ADMIN — SODIUM CHLORIDE, PRESERVATIVE FREE 10 ML: 5 INJECTION INTRAVENOUS at 08:07

## 2024-10-02 RX ADMIN — PALONOSETRON 0.25 MG: 0.05 INJECTION, SOLUTION INTRAVENOUS at 10:34

## 2024-10-02 RX ADMIN — POTASSIUM CHLORIDE AND SODIUM CHLORIDE: 900; 150 INJECTION, SOLUTION INTRAVENOUS at 09:23

## 2024-10-02 RX ADMIN — SODIUM CHLORIDE, PRESERVATIVE FREE 10 ML: 5 INJECTION INTRAVENOUS at 14:22

## 2024-10-02 RX ADMIN — MAGNESIUM SULFATE HEPTAHYDRATE 1000 MG: 1 INJECTION, SOLUTION INTRAVENOUS at 12:44

## 2024-10-02 ASSESSMENT — PAIN SCALES - GENERAL
PAINLEVEL_OUTOF10: 7
PAINLEVEL_OUTOF10: 8
PAINLEVEL_OUTOF10: 6
PAINLEVEL_OUTOF10: 6

## 2024-10-02 ASSESSMENT — PAIN DESCRIPTION - LOCATION
LOCATION: THROAT

## 2024-10-02 NOTE — PROGRESS NOTES
92 (L) 98 - 107 mmol/L    CO2 23 20 - 31 mmol/L    Anion Gap 12 9 - 17 mmol/L    Glucose 163 (H) 70 - 99 mg/dL    BUN 13 6 - 20 mg/dL    Creatinine 0.8 0.7 - 1.2 mg/dL    Est, Glom Filt Rate >90 >60 mL/min/1.73m2    Calcium 9.1 8.6 - 10.4 mg/dL    Total Protein 6.7 6.4 - 8.3 g/dL    Albumin 3.6 3.5 - 5.2 g/dL    Albumin/Globulin Ratio 1.2 1.0 - 2.5    Total Bilirubin 0.6 0.3 - 1.2 mg/dL    Alkaline Phosphatase 102 40 - 129 U/L    ALT 12 5 - 41 U/L    AST 12 <40 U/L   CBC with Auto Differential   Result Value Ref Range    WBC 6.6 3.5 - 11.0 k/uL    RBC 3.48 (L) 4.5 - 5.9 m/uL    Hemoglobin 10.7 (L) 13.5 - 17.5 g/dL    Hematocrit 30.9 (L) 41 - 53 %    MCV 88.7 80 - 100 fL    MCH 30.8 26 - 34 pg    MCHC 34.7 31 - 37 g/dL    RDW 13.5 12.5 - 15.4 %    Platelets 258 140 - 450 k/uL    MPV 6.2 6.0 - 12.0 fL    Neutrophils % 82 (H) 36 - 66 %    Lymphocytes % 8 (L) 24 - 44 %    Monocytes % 10 2 - 11 %    Eosinophils % 0 (L) 1 - 4 %    Basophils % 0 0 - 2 %    Neutrophils Absolute 5.40 1.8 - 7.7 k/uL    Lymphocytes Absolute 0.50 (L) 1.0 - 4.8 k/uL    Monocytes Absolute 0.60 0.1 - 1.2 k/uL    Eosinophils Absolute 0.00 0.0 - 0.4 k/uL    Basophils Absolute 0.00 0.0 - 0.2 k/uL     No results found.    Impression:  Subglottic squamous cell carcinoma, clinical stage T3 N2 M0  Tobacco dependence  S/p tracheostomy    Plan:  I had a detailed discussion with the patient and we went over results of lab work-up imaging studies and other relevant clinical data  Toxicity check performed.  Patient given refill on Magic mouthwash  Counseled on use of pain medication.  Patient is currently taking oxycodone 10 mg every 4 hours.  He will be running out of his pain meds next week.  Sodium low likely due to ongoing treatment with cisplatin.  Patient is symptomatic from hyponatremia which is mild.  Will continue to monitor  Labs adequate for treatment.  Continue close monitoring  Reviewed logistics prognostic data and potential side

## 2024-10-02 NOTE — PROGRESS NOTES
Dylan [facility]CHILL Arms  10/2/2024  Wt Readings from Last 3 Encounters:   10/02/24 102.6 kg (226 lb 3.2 oz)   10/02/24 102.6 kg (226 lb 3.2 oz)   09/25/24 104 kg (229 lb 4.8 oz)     Body mass index is 29.84 kg/m².        Treatment Area:  Larynx w/ chemotherapy    Patient was seen today for weekly visit.     Comfort Alteration  Fatigue: Mild    Mucous Membrane Alteration (Trach)  Mucositis Due to Radiation: No  Thrush: No  Voice Changes: Nonverbal    Nutritional Alteration-No Feeding Tube  Anorexia: No   Nausea: No   Vomiting: No     Ventilation Alteration  Cough:No    Skin Alteration   Sensation:  WNL    Radiation Dermatitis:  Intact [x]     Erythema  [x]   slight  Discoloration  []     Rash []     Dry desquamation  []     Moist desquamation []       Emotional  Coping: effective      Injury, potential bleeding or infection:     Lab Results   Component Value Date    WBC 6.6 10/02/2024    HGB 10.7 (L) 10/02/2024    HCT 30.9 (L) 10/02/2024     10/02/2024         BP (!) 145/89   Pulse 70   Temp 96.8 °F (36 °C) (Temporal)   Resp 18   Wt 102.6 kg (226 lb 3.2 oz)   BMI 29.84 kg/m²         Pain Level: 8         Assessment/Plan: Patient was seen today for weekly visit.  States he is using Magic Mouthwash and Oxycodone for pain.  Taking Oxycodone every 4 hours.  He states these are helpful for throat pain.  He is feeling more tired.  General support offered.  Drinking Ensure for supplements.  Plan of care ongoing.   Maggie Koenig RN  
release capsule, TAKE 1 CAPSULE DAILY, Disp: 90 capsule, Rfl: 0    HYDROcodone-acetaminophen (NORCO) 5-325 MG per tablet, Take 1 tablet by mouth every 8 hours as needed for Pain for up to 15 days. Max Daily Amount: 3 tablets, Disp: 45 tablet, Rfl: 0    ondansetron (ZOFRAN-ODT) 4 MG disintegrating tablet, Take 1 tablet by mouth 3 times daily as needed for Nausea or Vomiting, Disp: 90 tablet, Rfl: 3    prochlorperazine (COMPAZINE) 10 MG tablet, Take 1 tablet by mouth every 6 hours as needed (nausea vomiting), Disp: 120 tablet, Rfl: 3    lidocaine-prilocaine (EMLA) 2.5-2.5 % cream, Apply topically as needed daily to port sight 1 hour prior to port access, Disp: 30 g, Rfl: 2    hydroCHLOROthiazide (HYDRODIURIL) 25 MG tablet, Take 1 tablet by mouth daily, Disp: 90 tablet, Rfl: 0    ipratropium 0.5 mg-albuterol 2.5 mg (DUONEB) 0.5-2.5 (3) MG/3ML SOLN nebulizer solution, Inhale 3 mLs into the lungs every 4 hours as needed for Shortness of Breath, Disp: 360 mL, Rfl: 1    metFORMIN (GLUCOPHAGE) 500 MG tablet, Take 1 tablet by mouth 2 times daily (with meals), Disp: 60 tablet, Rfl: 3    losartan-hydroCHLOROthiazide (HYZAAR) 100-25 MG per tablet, TAKE 1 TABLET DAILY, Disp: 90 tablet, Rfl: 0    betamethasone valerate (VALISONE) 0.1 % cream, Apply topically 2 times daily., Disp: 45 g, Rfl: 2    pimozide (ORAP) 1 MG tablet, TAKE 2 TABLETS TWICE A DAY, Disp: 360 tablet, Rfl: 0    losartan (COZAAR) 100 MG tablet, Take 1 tablet by mouth daily 100-25mg, Disp: , Rfl:     docusate sodium (COLACE) 100 MG capsule, Take 1 capsule by mouth 2 times daily, Disp: , Rfl:     acetaminophen (TYLENOL) 500 MG tablet, Take 1 tablet by mouth as needed for Pain 2 tablets as needed, Disp: , Rfl:     Nebulizers (COMPRESSOR/NEBULIZER) MISC, Use four times per day for SOB, Disp: 1 each, Rfl: 3    amLODIPine (NORVASC) 10 MG tablet, Take 1 tablet by mouth daily, Disp: 30 tablet, Rfl: 3    Diabetic Shoe MISC, by Does not apply route Dispense DM shoe and

## 2024-10-02 NOTE — PROGRESS NOTES
Pt here for C.1D.22. cisplatin  Arrives ambulatory.  C/O throat pain due to radiation.   Labs drawn from port, results reviewed.  Pt was seen by Dr. Ag, order rec'd to proceed with tx and to give oxycodone 10 mg prior to treatment and again 4 hours later.   Tx complete without incident.  Pt d/c'd in stable condition.  Returns 10/9/2024 for office visit and C1D29.

## 2024-10-03 ENCOUNTER — HOSPITAL ENCOUNTER (OUTPATIENT)
Dept: RADIATION ONCOLOGY | Age: 59
Discharge: HOME | End: 2024-10-03
Payer: COMMERCIAL

## 2024-10-03 ENCOUNTER — TELEPHONE (OUTPATIENT)
Dept: ONCOLOGY | Age: 59
End: 2024-10-03

## 2024-10-03 PROCEDURE — 77386 HC NTSTY MODUL RAD TX DLVR CPLX: CPT | Performed by: RADIOLOGY

## 2024-10-03 NOTE — TELEPHONE ENCOUNTER
Name: Dylan Dolan  : 1965  MRN: [id number]    Oncology Navigation Follow-Up Note    Contact Type:  Telephone    Notes:   Phone call to livier Medina to check on patient status. She states he has been getting more tired as treatment continues. They have a person to drive him to his treatments. They also received financial help from local agency to pay  for gas. She is having trouble contacting [employer] to get more trach supplies. She is only able to leave voice messages and has not received a return call.   She denies any other needs at present. Encouraged her to call if there are questions or concerns.     Writer attempts to call [employer]. Wait time to get an answer was 36 minutes.       Electronically signed by Bree Spears RN on 10/3/2024 at 9:54 AM

## 2024-10-04 ENCOUNTER — HOSPITAL ENCOUNTER (OUTPATIENT)
Dept: RADIATION ONCOLOGY | Age: 59
Discharge: HOME | End: 2024-10-04
Payer: COMMERCIAL

## 2024-10-04 PROCEDURE — 77386 HC NTSTY MODUL RAD TX DLVR CPLX: CPT | Performed by: RADIOLOGY

## 2024-10-07 ENCOUNTER — HOSPITAL ENCOUNTER (OUTPATIENT)
Dept: RADIATION ONCOLOGY | Age: 59
Discharge: HOME | End: 2024-10-07
Payer: COMMERCIAL

## 2024-10-07 ENCOUNTER — TELEPHONE (OUTPATIENT)
Dept: PALLATIVE CARE | Age: 59
End: 2024-10-07

## 2024-10-07 PROCEDURE — 77386 HC NTSTY MODUL RAD TX DLVR CPLX: CPT | Performed by: RADIOLOGY

## 2024-10-07 NOTE — TELEPHONE ENCOUNTER
Palliative Care reminder call placed to patient's niece Carol.  Left message introducing myself and reminding Carol and Dylan of palliative care clinic appointment with Crissy Barrera NP tomorrow.  Gave them date, time and location of appointment.     [facility] Palliative Care Coordinator  Bethany LANGSTONN, RN, ONN-CG  [facility] [place] [phone]/ [facility] [place] [phone]/ [facility] [phone]

## 2024-10-08 ENCOUNTER — INITIAL CONSULT (OUTPATIENT)
Dept: PALLATIVE CARE | Age: 59
End: 2024-10-08
Payer: COMMERCIAL

## 2024-10-08 ENCOUNTER — HOSPITAL ENCOUNTER (OUTPATIENT)
Dept: RADIATION ONCOLOGY | Age: 59
Discharge: HOME | End: 2024-10-08
Payer: COMMERCIAL

## 2024-10-08 VITALS
SYSTOLIC BLOOD PRESSURE: 128 MMHG | BODY MASS INDEX: 29.22 KG/M2 | DIASTOLIC BLOOD PRESSURE: 83 MMHG | WEIGHT: 221.5 LBS | TEMPERATURE: 98 F | OXYGEN SATURATION: 97 % | HEART RATE: 77 BPM

## 2024-10-08 DIAGNOSIS — Z51.11 CHEMOTHERAPY MANAGEMENT, ENCOUNTER FOR: ICD-10-CM

## 2024-10-08 DIAGNOSIS — K59.03 DRUG-INDUCED CONSTIPATION: ICD-10-CM

## 2024-10-08 DIAGNOSIS — Z71.89 GOALS OF CARE, COUNSELING/DISCUSSION: ICD-10-CM

## 2024-10-08 DIAGNOSIS — Z93.0 TRACHEOSTOMY IN PLACE (HCC): ICD-10-CM

## 2024-10-08 DIAGNOSIS — C32.2 SQUAMOUS CELL CARCINOMA OF SUBGLOTTIS (HCC): ICD-10-CM

## 2024-10-08 DIAGNOSIS — Z51.5 PALLIATIVE CARE ENCOUNTER: ICD-10-CM

## 2024-10-08 DIAGNOSIS — C32.9 LARYNX CARCINOMA (HCC): Primary | ICD-10-CM

## 2024-10-08 DIAGNOSIS — Z71.89 ACP (ADVANCE CARE PLANNING): ICD-10-CM

## 2024-10-08 DIAGNOSIS — G63 POLYNEUROPATHY ASSOCIATED WITH UNDERLYING DISEASE (HCC): ICD-10-CM

## 2024-10-08 PROCEDURE — 99215 OFFICE O/P EST HI 40 MIN: CPT | Performed by: NURSE PRACTITIONER

## 2024-10-08 PROCEDURE — 77386 HC NTSTY MODUL RAD TX DLVR CPLX: CPT | Performed by: RADIOLOGY

## 2024-10-08 PROCEDURE — 3074F SYST BP LT 130 MM HG: CPT | Performed by: NURSE PRACTITIONER

## 2024-10-08 PROCEDURE — 3079F DIAST BP 80-89 MM HG: CPT | Performed by: NURSE PRACTITIONER

## 2024-10-08 RX ORDER — FENTANYL 12.5 UG/1
1 PATCH TRANSDERMAL
Qty: 5 PATCH | Refills: 0 | Status: SHIPPED | OUTPATIENT
Start: 2024-10-08 | End: 2024-10-23

## 2024-10-08 RX ORDER — OXYCODONE HYDROCHLORIDE 10 MG/1
10 TABLET ORAL EVERY 4 HOURS PRN
Qty: 84 TABLET | Refills: 0 | Status: SHIPPED | OUTPATIENT
Start: 2024-10-08 | End: 2024-10-22

## 2024-10-08 RX ORDER — LORAZEPAM 1 MG/1
1 TABLET ORAL EVERY 6 HOURS
COMMUNITY
Start: 2024-08-30

## 2024-10-08 RX ORDER — SENNOSIDES A AND B 8.6 MG/1
1 TABLET, FILM COATED ORAL DAILY
Qty: 30 TABLET | Refills: 1 | Status: SHIPPED | OUTPATIENT
Start: 2024-10-08 | End: 2024-12-07

## 2024-10-08 NOTE — PATIENT INSTRUCTIONS
RTO in 2 weeks  Start Fentanyl Patch Q72 hours   Continue Oxycodone 10 mg every 4 hours as needed for pain  Start Senokot for constipation

## 2024-10-08 NOTE — PROGRESS NOTES
Palliative Care Consultation Note    Patient: Dylan Dolan  1965    Referring physician: No att. providers found  Consulting nurse practitioner: Crissy Barrera       REASON FOR CONSULTATION:   Assist in symptom and pain control   Goals of care evaluation  Distress management  Facilitate communications  Non-pain symptoms:  Recommendations for the above    HISTORY OF PRESENT ILLNESS:   Dylan Dolan is a 58 y.o. male with a history of HTN, Tourette's syndrome, GERD, tobacco abuse, obesity, HLD, tracheostomy, and follows with Alley Vila MD .  Palliative Care was consulted to help manage symptoms, facilitate communications and establish goals of care. Patient presented to the ED on 7/8/24 with complaints of hoarse voice and difficulty breathing. He reported that this has happened over the course of a few months. He reported that week prior he was at  and was given steroids and a antibiotic without improvements. He was given racepinephrine and dexamethasone. CT soft tissue neck revealed nonspecific mass within the left vocal cord measuring 8 mm x 6 mm. No cervical lymphadenopathy noted. Patient was transferred to [facility] for ENT evaluation and was hospitalized 7/8-7/17/24. ENT performed flexible laryngoscopy and findings consistent with glottic malignancy. On 7/9 patient underwent Bx and Tracheostomy. A 5 ET tube was not able to be passed through the subglottis and subglottis was found to have an ulcerative mass. Intubation was converted to open tracheostomy with successful placement of shiley 6 cuffed ETT. Oncology and Rad Onc were consulted for newly Dx left vocal cod SCC. They reccommended definitive course of RT and possibly chemo. On 7/11 patient had BSS and this was WNL. On 7/14 tracheostomy tube was changed. CT chest did not show any evidence of metastatic disease. 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 Small metabolically

## 2024-10-09 ENCOUNTER — HOSPITAL ENCOUNTER (OUTPATIENT)
Dept: RADIATION ONCOLOGY | Age: 59
Discharge: HOME | End: 2024-10-09
Payer: COMMERCIAL

## 2024-10-09 ENCOUNTER — OFFICE VISIT (OUTPATIENT)
Dept: ONCOLOGY | Age: 59
End: 2024-10-09
Payer: COMMERCIAL

## 2024-10-09 ENCOUNTER — HOSPITAL ENCOUNTER (OUTPATIENT)
Dept: INFUSION THERAPY | Age: 59
Discharge: HOME | End: 2024-10-09
Payer: COMMERCIAL

## 2024-10-09 ENCOUNTER — TELEPHONE (OUTPATIENT)
Dept: ONCOLOGY | Age: 59
End: 2024-10-09

## 2024-10-09 VITALS
RESPIRATION RATE: 18 BRPM | WEIGHT: 222.3 LBS | SYSTOLIC BLOOD PRESSURE: 139 MMHG | BODY MASS INDEX: 29.33 KG/M2 | DIASTOLIC BLOOD PRESSURE: 82 MMHG | OXYGEN SATURATION: 100 % | TEMPERATURE: 96.8 F | HEART RATE: 73 BPM

## 2024-10-09 VITALS
WEIGHT: 222.3 LBS | SYSTOLIC BLOOD PRESSURE: 139 MMHG | RESPIRATION RATE: 18 BRPM | TEMPERATURE: 96.8 F | HEART RATE: 73 BPM | OXYGEN SATURATION: 100 % | BODY MASS INDEX: 29.33 KG/M2 | DIASTOLIC BLOOD PRESSURE: 82 MMHG

## 2024-10-09 DIAGNOSIS — C32.2 SQUAMOUS CELL CARCINOMA OF SUBGLOTTIS (HCC): Primary | ICD-10-CM

## 2024-10-09 DIAGNOSIS — Z51.11 CHEMOTHERAPY MANAGEMENT, ENCOUNTER FOR: ICD-10-CM

## 2024-10-09 DIAGNOSIS — C32.9 LARYNGEAL CANCER (HCC): ICD-10-CM

## 2024-10-09 DIAGNOSIS — E87.1 HYPONATREMIA: ICD-10-CM

## 2024-10-09 LAB
ALBUMIN SERPL-MCNC: 3.8 G/DL (ref 3.5–5.2)
ALBUMIN/GLOB SERPL: 1.2 {RATIO} (ref 1–2.5)
ALP SERPL-CCNC: 124 U/L (ref 40–129)
ALT SERPL-CCNC: 11 U/L (ref 5–41)
ANION GAP SERPL CALCULATED.3IONS-SCNC: 14 MMOL/L (ref 9–17)
AST SERPL-CCNC: 10 U/L
BASOPHILS # BLD: 0 K/UL (ref 0–0.2)
BASOPHILS NFR BLD: 0 % (ref 0–2)
BILIRUB SERPL-MCNC: 0.9 MG/DL (ref 0.3–1.2)
BUN SERPL-MCNC: 14 MG/DL (ref 6–20)
CALCIUM SERPL-MCNC: 9.2 MG/DL (ref 8.6–10.4)
CHLORIDE SERPL-SCNC: 90 MMOL/L (ref 98–107)
CO2 SERPL-SCNC: 22 MMOL/L (ref 20–31)
CREAT SERPL-MCNC: 0.9 MG/DL (ref 0.7–1.2)
EOSINOPHIL # BLD: 0 K/UL (ref 0–0.4)
EOSINOPHILS RELATIVE PERCENT: 0 % (ref 1–4)
ERYTHROCYTE [DISTWIDTH] IN BLOOD BY AUTOMATED COUNT: 14.1 % (ref 12.5–15.4)
GFR, ESTIMATED: >90 ML/MIN/1.73M2
GLUCOSE SERPL-MCNC: 208 MG/DL (ref 70–99)
HCT VFR BLD AUTO: 29.9 % (ref 41–53)
HGB BLD-MCNC: 10.4 G/DL (ref 13.5–17.5)
LYMPHOCYTES NFR BLD: 0.36 K/UL (ref 1–4.8)
LYMPHOCYTES RELATIVE PERCENT: 6 % (ref 24–44)
MAGNESIUM SERPL-MCNC: 1.8 MG/DL (ref 1.6–2.6)
MCH RBC QN AUTO: 30.7 PG (ref 26–34)
MCHC RBC AUTO-ENTMCNC: 34.8 G/DL (ref 31–37)
MCV RBC AUTO: 88.2 FL (ref 80–100)
MONOCYTES NFR BLD: 0.36 K/UL (ref 0.1–0.8)
MONOCYTES NFR BLD: 6 % (ref 1–7)
MORPHOLOGY: NORMAL
NEUTROPHILS NFR BLD: 88 % (ref 36–66)
NEUTS SEG NFR BLD: 5.28 K/UL (ref 1.8–7.7)
PHOSPHATE SERPL-MCNC: 3.1 MG/DL (ref 2.5–4.5)
PLATELET # BLD AUTO: 168 K/UL (ref 140–450)
PMV BLD AUTO: 6.3 FL (ref 6–12)
POTASSIUM SERPL-SCNC: 4 MMOL/L (ref 3.7–5.3)
PROT SERPL-MCNC: 7 G/DL (ref 6.4–8.3)
RBC # BLD AUTO: 3.39 M/UL (ref 4.5–5.9)
SODIUM SERPL-SCNC: 126 MMOL/L (ref 135–144)
WBC OTHER # BLD: 6 K/UL (ref 3.5–11)

## 2024-10-09 PROCEDURE — 77386 HC NTSTY MODUL RAD TX DLVR CPLX: CPT | Performed by: RADIOLOGY

## 2024-10-09 PROCEDURE — 96413 CHEMO IV INFUSION 1 HR: CPT

## 2024-10-09 PROCEDURE — 84100 ASSAY OF PHOSPHORUS: CPT

## 2024-10-09 PROCEDURE — 85025 COMPLETE CBC W/AUTO DIFF WBC: CPT

## 2024-10-09 PROCEDURE — 96375 TX/PRO/DX INJ NEW DRUG ADDON: CPT

## 2024-10-09 PROCEDURE — 99214 OFFICE O/P EST MOD 30 MIN: CPT | Performed by: INTERNAL MEDICINE

## 2024-10-09 PROCEDURE — 77336 RADIATION PHYSICS CONSULT: CPT | Performed by: RADIOLOGY

## 2024-10-09 PROCEDURE — 80053 COMPREHEN METABOLIC PANEL: CPT

## 2024-10-09 PROCEDURE — 2580000003 HC RX 258: Performed by: INTERNAL MEDICINE

## 2024-10-09 PROCEDURE — 3075F SYST BP GE 130 - 139MM HG: CPT | Performed by: INTERNAL MEDICINE

## 2024-10-09 PROCEDURE — 6360000002 HC RX W HCPCS: Performed by: INTERNAL MEDICINE

## 2024-10-09 PROCEDURE — 3079F DIAST BP 80-89 MM HG: CPT | Performed by: INTERNAL MEDICINE

## 2024-10-09 PROCEDURE — 83735 ASSAY OF MAGNESIUM: CPT

## 2024-10-09 PROCEDURE — 96366 THER/PROPH/DIAG IV INF ADDON: CPT

## 2024-10-09 RX ORDER — DEXAMETHASONE SODIUM PHOSPHATE 10 MG/ML
10 INJECTION INTRAMUSCULAR; INTRAVENOUS ONCE
Status: COMPLETED | OUTPATIENT
Start: 2024-10-09 | End: 2024-10-09

## 2024-10-09 RX ORDER — EPINEPHRINE 1 MG/ML
0.3 INJECTION, SOLUTION, CONCENTRATE INTRAVENOUS PRN
OUTPATIENT
Start: 2024-10-16

## 2024-10-09 RX ORDER — PALONOSETRON 0.05 MG/ML
0.25 INJECTION, SOLUTION INTRAVENOUS ONCE
Status: COMPLETED | OUTPATIENT
Start: 2024-10-09 | End: 2024-10-09

## 2024-10-09 RX ORDER — DIPHENHYDRAMINE HYDROCHLORIDE 50 MG/ML
50 INJECTION INTRAMUSCULAR; INTRAVENOUS
OUTPATIENT
Start: 2024-10-16

## 2024-10-09 RX ORDER — 0.9 % SODIUM CHLORIDE 0.9 %
1000 INTRAVENOUS SOLUTION INTRAVENOUS ONCE
Start: 2024-10-18

## 2024-10-09 RX ORDER — SODIUM CHLORIDE 9 MG/ML
5-250 INJECTION, SOLUTION INTRAVENOUS PRN
OUTPATIENT
Start: 2024-10-16

## 2024-10-09 RX ORDER — HEPARIN SODIUM (PORCINE) LOCK FLUSH IV SOLN 100 UNIT/ML 100 UNIT/ML
500 SOLUTION INTRAVENOUS PRN
OUTPATIENT
Start: 2024-10-16

## 2024-10-09 RX ORDER — MEPERIDINE HYDROCHLORIDE 50 MG/ML
12.5 INJECTION INTRAMUSCULAR; INTRAVENOUS; SUBCUTANEOUS PRN
OUTPATIENT
Start: 2024-10-16

## 2024-10-09 RX ORDER — 0.9 % SODIUM CHLORIDE 0.9 %
500 INTRAVENOUS SOLUTION INTRAVENOUS ONCE
Status: CANCELLED
Start: 2024-10-11

## 2024-10-09 RX ORDER — SODIUM CHLORIDE 9 MG/ML
INJECTION, SOLUTION INTRAVENOUS CONTINUOUS
OUTPATIENT
Start: 2024-10-16

## 2024-10-09 RX ORDER — HEPARIN 100 UNIT/ML
500 SYRINGE INTRAVENOUS PRN
Status: DISCONTINUED | OUTPATIENT
Start: 2024-10-09 | End: 2024-10-10 | Stop reason: HOSPADM

## 2024-10-09 RX ORDER — ALBUTEROL SULFATE 90 UG/1
4 INHALANT RESPIRATORY (INHALATION) PRN
OUTPATIENT
Start: 2024-10-16

## 2024-10-09 RX ORDER — SODIUM CHLORIDE 9 MG/ML
5-250 INJECTION, SOLUTION INTRAVENOUS PRN
Status: DISCONTINUED | OUTPATIENT
Start: 2024-10-09 | End: 2024-10-10 | Stop reason: HOSPADM

## 2024-10-09 RX ORDER — SODIUM CHLORIDE 0.9 % (FLUSH) 0.9 %
5-40 SYRINGE (ML) INJECTION PRN
OUTPATIENT
Start: 2024-10-16

## 2024-10-09 RX ORDER — ACETAMINOPHEN 325 MG/1
650 TABLET ORAL
OUTPATIENT
Start: 2024-10-16

## 2024-10-09 RX ORDER — FAMOTIDINE 10 MG/ML
20 INJECTION, SOLUTION INTRAVENOUS
OUTPATIENT
Start: 2024-10-16

## 2024-10-09 RX ORDER — PROCHLORPERAZINE EDISYLATE 5 MG/ML
5 INJECTION INTRAMUSCULAR; INTRAVENOUS
OUTPATIENT
Start: 2024-10-16

## 2024-10-09 RX ORDER — SODIUM CHLORIDE 0.9 % (FLUSH) 0.9 %
5-40 SYRINGE (ML) INJECTION PRN
Status: DISCONTINUED | OUTPATIENT
Start: 2024-10-09 | End: 2024-10-10 | Stop reason: HOSPADM

## 2024-10-09 RX ORDER — PALONOSETRON 0.05 MG/ML
0.25 INJECTION, SOLUTION INTRAVENOUS ONCE
OUTPATIENT
Start: 2024-10-16 | End: 2024-10-16

## 2024-10-09 RX ORDER — 0.9 % SODIUM CHLORIDE 0.9 %
1000 INTRAVENOUS SOLUTION INTRAVENOUS ONCE
Status: CANCELLED
Start: 2024-10-11

## 2024-10-09 RX ORDER — ONDANSETRON 2 MG/ML
8 INJECTION INTRAMUSCULAR; INTRAVENOUS
OUTPATIENT
Start: 2024-10-16

## 2024-10-09 RX ADMIN — SODIUM CHLORIDE, PRESERVATIVE FREE 10 ML: 5 INJECTION INTRAVENOUS at 07:48

## 2024-10-09 RX ADMIN — POTASSIUM CHLORIDE: 2 INJECTION, SOLUTION, CONCENTRATE INTRAVENOUS at 12:30

## 2024-10-09 RX ADMIN — PALONOSETRON 0.25 MG: 0.05 INJECTION, SOLUTION INTRAVENOUS at 10:24

## 2024-10-09 RX ADMIN — SODIUM CHLORIDE 150 MG: 9 INJECTION, SOLUTION INTRAVENOUS at 10:37

## 2024-10-09 RX ADMIN — SODIUM CHLORIDE 25 ML/HR: 9 INJECTION, SOLUTION INTRAVENOUS at 09:10

## 2024-10-09 RX ADMIN — CISPLATIN 94 MG: 1 INJECTION INTRAVENOUS at 11:23

## 2024-10-09 RX ADMIN — SODIUM CHLORIDE, PRESERVATIVE FREE 10 ML: 5 INJECTION INTRAVENOUS at 13:38

## 2024-10-09 RX ADMIN — POTASSIUM CHLORIDE: 2 INJECTION, SOLUTION, CONCENTRATE INTRAVENOUS at 09:12

## 2024-10-09 RX ADMIN — HEPARIN 500 UNITS: 100 SYRINGE at 13:38

## 2024-10-09 RX ADMIN — DEXAMETHASONE SODIUM PHOSPHATE 10 MG: 10 INJECTION INTRAMUSCULAR; INTRAVENOUS at 10:25

## 2024-10-09 ASSESSMENT — PAIN SCALES - GENERAL: PAINLEVEL_OUTOF10: 6

## 2024-10-09 NOTE — TELEPHONE ENCOUNTER
Instructions   from Dr. Jadon Ag MD    Tx today   Iv hydration added on Fridays  Rv in 1 week       Tx today  Hydration added to Fridays  RV 10/16/24 at 9:15 am during tx at 8 am

## 2024-10-09 NOTE — PROGRESS NOTES
Pt here for C1D29 Cisplatin.  Pt was seen by joshua Dubose to proceed with treatment.  Labs reviewed and are adequate for treatment.  Infusion completed without incident.  Pt to return 10/11/24 for hydration.  Pt discharged.

## 2024-10-09 NOTE — PROGRESS NOTES
Dylan [facility]  10/9/2024  Wt Readings from Last 3 Encounters:   10/09/24 100.8 kg (222 lb 4.8 oz)   10/09/24 100.8 kg (222 lb 4.8 oz)   10/08/24 100.5 kg (221 lb 8 oz)     Body mass index is 29.33 kg/m².        Treatment Area:  Larynx w/ chemotherapy    Patient was seen today for weekly visit.     Comfort Alteration  Fatigue: Mild    Mucous Membrane Alteration (Trach)  Mucositis Due to Radiation: No  Thrush: No  Voice Changes: Nonverbal    Nutritional Alteration-No Feeding Tube  Anorexia: No   Nausea: No   Vomiting: No     Ventilation Alteration  Cough:No    Skin Alteration   Sensation:  WNL    Radiation Dermatitis:  Intact [x]     Erythema  [x]   slight  Discoloration  []     Rash []     Dry desquamation  []     Moist desquamation []       Emotional  Coping: effective      Injury, potential bleeding or infection:     Lab Results   Component Value Date    WBC 6.0 10/09/2024    HGB 10.4 (L) 10/09/2024    HCT 29.9 (L) 10/09/2024     10/09/2024         /82   Pulse 73   Temp 96.8 °F (36 °C) (Temporal)   Resp 18   Wt 100.8 kg (222 lb 4.8 oz)   SpO2 100%   BMI 29.33 kg/m²         Pain Level: 6         Assessment/Plan: Patient was seen today for weekly visit.  States he is using Magic Mouthwash and Oxycodone for pain.  Taking Oxycodone every 4 hours.  Now he is on a Fentanyl Patch for pain which he thinks is helping.  Occasional upset stomach and discussed how to take antiemetics.  Drinking Ensure for supplements.  Plan of care ongoing.   Maggie Koenig RN  
and insoles.  custom accomodative offloading insole with toe filler L side.   Amputated great toe of left foot (HCC)  (primary encounter diagnosis) Equinus deformity of both feet Diabetic polyneuropathy associated with type 2 diabetes mellitus (HCC), Disp: 1 each, Rfl: 0  No current facility-administered medications for this encounter.    Facility-Administered Medications Ordered in Other Encounters:     sodium chloride flush 0.9 % injection 5-40 mL, 5-40 mL, IntraVENous, Ancelmo MOODY Nauman, MD, 10 mL at 10/09/24 1338    heparin (PF) 100 UNIT/ML injection 500 Units, 500 Units, IntraCATHeter, PRAncelmo FREEMAN Nauman, MD, 500 Units at 10/09/24 1338    0.9 % sodium chloride infusion, 5-250 mL/hr, IntraVENous, Ancelmo MOODY Nauman, MD, Stopped at 10/09/24 1338      ASSESSMENT PLAN:   Treatment setup and plan reviewed. Port images/CBCT images reviewed. Appropriate laboratory work was reviewed. Treatment side effects and toxicities reviewed with the patient, and appropriate management was advised. Will continue radiation treatment as planned, and recommend patient contact us if they have any questions or concerns. Continue monitoring for side effects and maintaining good nutrition and hydration.  Continue following with medical oncology and pain management for pain medication control.  Continue managing pain symptoms and skin care.  Patient is due to see palliative care next week we will we recommend he discuss long-acting pain medication in addition to his current regimen.  Will refill his Magic mouthwash.    Electronically signed by Pasha Starks MD on 10/9/2024 at 4:38 PM      Drugs Prescribed:  New Prescriptions    No medications on file       Other Orders Placed:  No orders of the defined types were placed in this encounter.

## 2024-10-09 NOTE — PROGRESS NOTES
entry  Heart - normal rate, regular rhythm, normal S1, S2, no murmurs  Abdomen - soft, nontender, nondistended, no masses or organomegaly  Neurological - alert, oriented, normal speech, no focal findings or movement disorder noted  Extremities - peripheral pulses normal, no pedal edema, no clubbing or cyanosis  Skin - normal coloration and turgor, no rashes, no suspicious skin lesions noted     DATA:    Results for orders placed or performed during the hospital encounter of 10/09/24   Comprehensive Metabolic Panel   Result Value Ref Range    Sodium 126 (L) 135 - 144 mmol/L    Potassium 4.0 3.7 - 5.3 mmol/L    Chloride 90 (L) 98 - 107 mmol/L    CO2 22 20 - 31 mmol/L    Anion Gap 14 9 - 17 mmol/L    Glucose 208 (H) 70 - 99 mg/dL    BUN 14 6 - 20 mg/dL    Creatinine 0.9 0.7 - 1.2 mg/dL    Est, Glom Filt Rate >90 >60 mL/min/1.73m2    Calcium 9.2 8.6 - 10.4 mg/dL    Total Protein 7.0 6.4 - 8.3 g/dL    Albumin 3.8 3.5 - 5.2 g/dL    Albumin/Globulin Ratio 1.2 1.0 - 2.5    Total Bilirubin 0.9 0.3 - 1.2 mg/dL    Alkaline Phosphatase 124 40 - 129 U/L    ALT 11 5 - 41 U/L    AST 10 <40 U/L   CBC with Auto Differential   Result Value Ref Range    WBC 6.0 3.5 - 11.0 k/uL    RBC 3.39 (L) 4.5 - 5.9 m/uL    Hemoglobin 10.4 (L) 13.5 - 17.5 g/dL    Hematocrit 29.9 (L) 41 - 53 %    MCV 88.2 80 - 100 fL    MCH 30.7 26 - 34 pg    MCHC 34.8 31 - 37 g/dL    RDW 14.1 12.5 - 15.4 %    Platelets 168 140 - 450 k/uL    MPV 6.3 6.0 - 12.0 fL    Neutrophils % 88 (H) 36 - 66 %    Lymphocytes % 6 (L) 24 - 44 %    Monocytes % 6 1 - 7 %    Eosinophils % 0 (L) 1 - 4 %    Basophils % 0 0 - 2 %    Neutrophils Absolute 5.28 1.8 - 7.7 k/uL    Lymphocytes Absolute 0.36 (L) 1.0 - 4.8 k/uL    Monocytes Absolute 0.36 0.1 - 0.8 k/uL    Eosinophils Absolute 0.00 0.0 - 0.4 k/uL    Basophils Absolute 0.00 0.0 - 0.2 k/uL    Morphology Normal    Magnesium   Result Value Ref Range    Magnesium 1.8 1.6 - 2.6 mg/dL   Phosphorus   Result Value Ref Range    Phosphorus

## 2024-10-10 ENCOUNTER — HOSPITAL ENCOUNTER (OUTPATIENT)
Dept: RADIATION ONCOLOGY | Age: 59
Discharge: HOME | End: 2024-10-10
Payer: COMMERCIAL

## 2024-10-10 PROCEDURE — 77386 HC NTSTY MODUL RAD TX DLVR CPLX: CPT | Performed by: RADIOLOGY

## 2024-10-11 ENCOUNTER — HOSPITAL ENCOUNTER (OUTPATIENT)
Dept: INFUSION THERAPY | Age: 59
Discharge: HOME | End: 2024-10-11
Payer: COMMERCIAL

## 2024-10-11 ENCOUNTER — HOSPITAL ENCOUNTER (OUTPATIENT)
Dept: RADIATION ONCOLOGY | Age: 59
Discharge: HOME | End: 2024-10-11
Payer: COMMERCIAL

## 2024-10-11 VITALS
TEMPERATURE: 97.7 F | RESPIRATION RATE: 20 BRPM | DIASTOLIC BLOOD PRESSURE: 87 MMHG | HEART RATE: 73 BPM | SYSTOLIC BLOOD PRESSURE: 145 MMHG | OXYGEN SATURATION: 95 %

## 2024-10-11 DIAGNOSIS — C32.2 SQUAMOUS CELL CARCINOMA OF SUBGLOTTIS (HCC): Primary | ICD-10-CM

## 2024-10-11 DIAGNOSIS — C32.9 LARYNGEAL CANCER (HCC): ICD-10-CM

## 2024-10-11 PROCEDURE — 2580000003 HC RX 258: Performed by: INTERNAL MEDICINE

## 2024-10-11 PROCEDURE — 96360 HYDRATION IV INFUSION INIT: CPT

## 2024-10-11 PROCEDURE — 6360000002 HC RX W HCPCS: Performed by: INTERNAL MEDICINE

## 2024-10-11 RX ORDER — EPINEPHRINE 1 MG/ML
0.3 INJECTION, SOLUTION, CONCENTRATE INTRAVENOUS PRN
Status: CANCELLED | OUTPATIENT
Start: 2024-10-11

## 2024-10-11 RX ORDER — ACETAMINOPHEN 325 MG/1
650 TABLET ORAL
Status: CANCELLED | OUTPATIENT
Start: 2024-10-11

## 2024-10-11 RX ORDER — SODIUM CHLORIDE 9 MG/ML
25 INJECTION, SOLUTION INTRAVENOUS PRN
Status: CANCELLED | OUTPATIENT
Start: 2024-10-11

## 2024-10-11 RX ORDER — SODIUM CHLORIDE 0.9 % (FLUSH) 0.9 %
5-40 SYRINGE (ML) INJECTION PRN
Status: CANCELLED | OUTPATIENT
Start: 2024-10-11

## 2024-10-11 RX ORDER — DIPHENHYDRAMINE HYDROCHLORIDE 50 MG/ML
50 INJECTION INTRAMUSCULAR; INTRAVENOUS
Status: CANCELLED | OUTPATIENT
Start: 2024-10-11

## 2024-10-11 RX ORDER — FAMOTIDINE 10 MG/ML
20 INJECTION, SOLUTION INTRAVENOUS
Status: CANCELLED | OUTPATIENT
Start: 2024-10-11

## 2024-10-11 RX ORDER — HEPARIN 100 UNIT/ML
500 SYRINGE INTRAVENOUS PRN
Status: CANCELLED | OUTPATIENT
Start: 2024-10-11

## 2024-10-11 RX ORDER — HEPARIN 100 UNIT/ML
500 SYRINGE INTRAVENOUS PRN
Status: DISCONTINUED | OUTPATIENT
Start: 2024-10-11 | End: 2024-10-12 | Stop reason: HOSPADM

## 2024-10-11 RX ORDER — ONDANSETRON 2 MG/ML
8 INJECTION INTRAMUSCULAR; INTRAVENOUS
Status: CANCELLED | OUTPATIENT
Start: 2024-10-11

## 2024-10-11 RX ORDER — SODIUM CHLORIDE 0.9 % (FLUSH) 0.9 %
5-40 SYRINGE (ML) INJECTION PRN
Status: DISCONTINUED | OUTPATIENT
Start: 2024-10-11 | End: 2024-10-12 | Stop reason: HOSPADM

## 2024-10-11 RX ORDER — ALBUTEROL SULFATE 90 UG/1
4 INHALANT RESPIRATORY (INHALATION) PRN
Status: CANCELLED | OUTPATIENT
Start: 2024-10-11

## 2024-10-11 RX ORDER — 0.9 % SODIUM CHLORIDE 0.9 %
1000 INTRAVENOUS SOLUTION INTRAVENOUS ONCE
Status: COMPLETED | OUTPATIENT
Start: 2024-10-11 | End: 2024-10-11

## 2024-10-11 RX ORDER — LIDOCAINE HYDROCHLORIDE 10 MG/ML
0.25 INJECTION, SOLUTION EPIDURAL; INFILTRATION; INTRACAUDAL; PERINEURAL
Status: CANCELLED | OUTPATIENT
Start: 2024-10-11

## 2024-10-11 RX ORDER — SODIUM CHLORIDE 9 MG/ML
INJECTION, SOLUTION INTRAVENOUS CONTINUOUS
Status: CANCELLED | OUTPATIENT
Start: 2024-10-11

## 2024-10-11 RX ADMIN — SODIUM CHLORIDE 1000 ML: 9 INJECTION, SOLUTION INTRAVENOUS at 14:40

## 2024-10-11 RX ADMIN — SODIUM CHLORIDE, PRESERVATIVE FREE 10 ML: 5 INJECTION INTRAVENOUS at 16:16

## 2024-10-11 RX ADMIN — HEPARIN 500 UNITS: 100 SYRINGE at 16:16

## 2024-10-11 ASSESSMENT — PAIN DESCRIPTION - LOCATION: LOCATION: THROAT;HEAD

## 2024-10-11 ASSESSMENT — PAIN DESCRIPTION - DESCRIPTORS: DESCRIPTORS: BURNING

## 2024-10-11 ASSESSMENT — PAIN SCALES - GENERAL: PAINLEVEL_OUTOF10: 10

## 2024-10-11 ASSESSMENT — PAIN DESCRIPTION - ONSET: ONSET: ON-GOING

## 2024-10-11 NOTE — PROGRESS NOTES
Pt presented to infusion room with  for IV fluids.    Pt wrote on notebook \"worst day ever\".   Pt rates throat pain 10 out of 10 and wrote \"it feels like fire\".   Notes using Oxy IR 10 mg every 4 hours around the clock.  Pt notes relief for about 2 hours.    Pt has Duragesic patch to left  upper arm.  Dylan notes that he can not tell if it is working.    Dylan notes he continues to use magic mouth wash.   Pt states he did not feel this way until this morning, and wrote \"I'm sorry I'm having a bad day\".  Last treatment 10/9/24.  Reassurance given.   Pt's next appointment with Carolyn 10/22/24.  Pt informed that writer would send message to Carolyn Barrera with update.

## 2024-10-12 DIAGNOSIS — F95.2 TOURETTE'S SYNDROME: ICD-10-CM

## 2024-10-14 ENCOUNTER — HOSPITAL ENCOUNTER (OUTPATIENT)
Dept: RADIATION ONCOLOGY | Age: 59
Discharge: HOME | End: 2024-10-14
Payer: COMMERCIAL

## 2024-10-14 PROCEDURE — 77386 HC NTSTY MODUL RAD TX DLVR CPLX: CPT | Performed by: RADIOLOGY

## 2024-10-14 NOTE — TELEPHONE ENCOUNTER
Dylan called requesting a refill of the below medication which has been pended for you:     Requested Prescriptions     Pending Prescriptions Disp Refills    pimozide (ORAP) 1 MG tablet [Pharmacy Med Name: PIMOZIDE TAB 1MG] 360 tablet 0     Sig: TAKE 2 TABLETS TWICE A DAY       Last Appointment Date: 8/22/2024  Next Appointment Date: 11/27/2024    Allergies   Allergen Reactions    Chantix [Varenicline] Other (See Comments)     Unusual dreams.

## 2024-10-15 ENCOUNTER — HOSPITAL ENCOUNTER (OUTPATIENT)
Dept: RADIATION ONCOLOGY | Age: 59
Discharge: HOME | End: 2024-10-15
Payer: COMMERCIAL

## 2024-10-15 PROCEDURE — 77386 HC NTSTY MODUL RAD TX DLVR CPLX: CPT | Performed by: RADIOLOGY

## 2024-10-15 NOTE — TELEPHONE ENCOUNTER
Please advise Dylan that Orap and Fosaprepitant are contraindicated to be taken together due to risk of heart rhythm abnormalities (QT prolongation).    (Fosaprepitant is prescribed to be given prior to his chemotherapy infusions.)

## 2024-10-16 ENCOUNTER — HOSPITAL ENCOUNTER (OUTPATIENT)
Dept: INFUSION THERAPY | Age: 59
Discharge: HOME | End: 2024-10-16
Payer: COMMERCIAL

## 2024-10-16 ENCOUNTER — TELEPHONE (OUTPATIENT)
Dept: ONCOLOGY | Age: 59
End: 2024-10-16

## 2024-10-16 ENCOUNTER — OFFICE VISIT (OUTPATIENT)
Dept: ONCOLOGY | Age: 59
End: 2024-10-16
Payer: COMMERCIAL

## 2024-10-16 ENCOUNTER — HOSPITAL ENCOUNTER (OUTPATIENT)
Dept: RADIATION ONCOLOGY | Age: 59
Discharge: HOME | End: 2024-10-16
Payer: COMMERCIAL

## 2024-10-16 VITALS
DIASTOLIC BLOOD PRESSURE: 96 MMHG | HEART RATE: 71 BPM | RESPIRATION RATE: 18 BRPM | TEMPERATURE: 97.7 F | SYSTOLIC BLOOD PRESSURE: 149 MMHG | WEIGHT: 221.9 LBS | BODY MASS INDEX: 29.28 KG/M2

## 2024-10-16 VITALS
DIASTOLIC BLOOD PRESSURE: 96 MMHG | HEART RATE: 71 BPM | WEIGHT: 221.9 LBS | BODY MASS INDEX: 29.28 KG/M2 | SYSTOLIC BLOOD PRESSURE: 149 MMHG | TEMPERATURE: 97.7 F | RESPIRATION RATE: 18 BRPM | OXYGEN SATURATION: 96 %

## 2024-10-16 DIAGNOSIS — Z51.11 CHEMOTHERAPY MANAGEMENT, ENCOUNTER FOR: ICD-10-CM

## 2024-10-16 DIAGNOSIS — C32.2 SQUAMOUS CELL CARCINOMA OF SUBGLOTTIS (HCC): Primary | ICD-10-CM

## 2024-10-16 DIAGNOSIS — E87.1 HYPONATREMIA: ICD-10-CM

## 2024-10-16 DIAGNOSIS — C32.9 LARYNGEAL CANCER (HCC): ICD-10-CM

## 2024-10-16 DIAGNOSIS — D69.6 THROMBOCYTOPENIA, UNSPECIFIED (HCC): ICD-10-CM

## 2024-10-16 LAB
ALBUMIN SERPL-MCNC: 3.7 G/DL (ref 3.5–5.2)
ALBUMIN/GLOB SERPL: 1.2 {RATIO} (ref 1–2.5)
ALP SERPL-CCNC: 107 U/L (ref 40–129)
ALT SERPL-CCNC: 10 U/L (ref 5–41)
ANION GAP SERPL CALCULATED.3IONS-SCNC: 9 MMOL/L (ref 9–17)
AST SERPL-CCNC: 10 U/L
BASOPHILS # BLD: 0 K/UL (ref 0–0.2)
BASOPHILS NFR BLD: 0 % (ref 0–2)
BILIRUB SERPL-MCNC: 0.7 MG/DL (ref 0.3–1.2)
BUN SERPL-MCNC: 10 MG/DL (ref 6–20)
CALCIUM SERPL-MCNC: 9.5 MG/DL (ref 8.6–10.4)
CHLORIDE SERPL-SCNC: 92 MMOL/L (ref 98–107)
CO2 SERPL-SCNC: 26 MMOL/L (ref 20–31)
CREAT SERPL-MCNC: 0.8 MG/DL (ref 0.7–1.2)
EOSINOPHIL # BLD: 0 K/UL (ref 0–0.4)
EOSINOPHILS RELATIVE PERCENT: 0 % (ref 1–4)
ERYTHROCYTE [DISTWIDTH] IN BLOOD BY AUTOMATED COUNT: 14.5 % (ref 12.5–15.4)
GFR, ESTIMATED: >90 ML/MIN/1.73M2
GLUCOSE SERPL-MCNC: 131 MG/DL (ref 70–99)
HCT VFR BLD AUTO: 24.8 % (ref 41–53)
HGB BLD-MCNC: 8.6 G/DL (ref 13.5–17.5)
LYMPHOCYTES NFR BLD: 0.2 K/UL (ref 1–4.8)
LYMPHOCYTES RELATIVE PERCENT: 7 % (ref 24–44)
MAGNESIUM SERPL-MCNC: 1.9 MG/DL (ref 1.6–2.6)
MCH RBC QN AUTO: 30.5 PG (ref 26–34)
MCHC RBC AUTO-ENTMCNC: 34.7 G/DL (ref 31–37)
MCV RBC AUTO: 87.7 FL (ref 80–100)
MONOCYTES NFR BLD: 0.2 K/UL (ref 0.1–0.8)
MONOCYTES NFR BLD: 7 % (ref 1–7)
MORPHOLOGY: NORMAL
NEUTROPHILS NFR BLD: 86 % (ref 36–66)
NEUTS SEG NFR BLD: 2.4 K/UL (ref 1.8–7.7)
PHOSPHATE SERPL-MCNC: 3.7 MG/DL (ref 2.5–4.5)
PLATELET # BLD AUTO: 122 K/UL (ref 140–450)
PMV BLD AUTO: 6.6 FL (ref 6–12)
POTASSIUM SERPL-SCNC: 4.3 MMOL/L (ref 3.7–5.3)
PROT SERPL-MCNC: 6.7 G/DL (ref 6.4–8.3)
RBC # BLD AUTO: 2.82 M/UL (ref 4.5–5.9)
SODIUM SERPL-SCNC: 127 MMOL/L (ref 135–144)
WBC OTHER # BLD: 2.8 K/UL (ref 3.5–11)

## 2024-10-16 PROCEDURE — 96375 TX/PRO/DX INJ NEW DRUG ADDON: CPT

## 2024-10-16 PROCEDURE — 96413 CHEMO IV INFUSION 1 HR: CPT

## 2024-10-16 PROCEDURE — 80053 COMPREHEN METABOLIC PANEL: CPT

## 2024-10-16 PROCEDURE — 3080F DIAST BP >= 90 MM HG: CPT | Performed by: INTERNAL MEDICINE

## 2024-10-16 PROCEDURE — 3077F SYST BP >= 140 MM HG: CPT | Performed by: INTERNAL MEDICINE

## 2024-10-16 PROCEDURE — 84100 ASSAY OF PHOSPHORUS: CPT

## 2024-10-16 PROCEDURE — 96366 THER/PROPH/DIAG IV INF ADDON: CPT

## 2024-10-16 PROCEDURE — 6360000002 HC RX W HCPCS: Performed by: INTERNAL MEDICINE

## 2024-10-16 PROCEDURE — 99214 OFFICE O/P EST MOD 30 MIN: CPT | Performed by: INTERNAL MEDICINE

## 2024-10-16 PROCEDURE — 2580000003 HC RX 258: Performed by: INTERNAL MEDICINE

## 2024-10-16 PROCEDURE — 77336 RADIATION PHYSICS CONSULT: CPT | Performed by: RADIOLOGY

## 2024-10-16 PROCEDURE — 96368 THER/DIAG CONCURRENT INF: CPT

## 2024-10-16 PROCEDURE — 85025 COMPLETE CBC W/AUTO DIFF WBC: CPT

## 2024-10-16 PROCEDURE — 83735 ASSAY OF MAGNESIUM: CPT

## 2024-10-16 PROCEDURE — 36415 COLL VENOUS BLD VENIPUNCTURE: CPT

## 2024-10-16 PROCEDURE — 77386 HC NTSTY MODUL RAD TX DLVR CPLX: CPT | Performed by: RADIOLOGY

## 2024-10-16 RX ORDER — ONDANSETRON 2 MG/ML
8 INJECTION INTRAMUSCULAR; INTRAVENOUS
OUTPATIENT
Start: 2024-10-23

## 2024-10-16 RX ORDER — HEPARIN 100 UNIT/ML
500 SYRINGE INTRAVENOUS PRN
Status: DISCONTINUED | OUTPATIENT
Start: 2024-10-16 | End: 2024-10-17 | Stop reason: HOSPADM

## 2024-10-16 RX ORDER — SODIUM CHLORIDE AND POTASSIUM CHLORIDE 150; 900 MG/100ML; MG/100ML
INJECTION, SOLUTION INTRAVENOUS ONCE
Status: COMPLETED | OUTPATIENT
Start: 2024-10-16 | End: 2024-10-16

## 2024-10-16 RX ORDER — MAGNESIUM SULFATE 1 G/100ML
1000 INJECTION INTRAVENOUS ONCE
Status: COMPLETED | OUTPATIENT
Start: 2024-10-16 | End: 2024-10-16

## 2024-10-16 RX ORDER — PROCHLORPERAZINE EDISYLATE 5 MG/ML
5 INJECTION INTRAMUSCULAR; INTRAVENOUS
OUTPATIENT
Start: 2024-10-23

## 2024-10-16 RX ORDER — EPINEPHRINE 1 MG/ML
0.3 INJECTION, SOLUTION, CONCENTRATE INTRAVENOUS PRN
OUTPATIENT
Start: 2024-10-23

## 2024-10-16 RX ORDER — ACETAMINOPHEN 325 MG/1
650 TABLET ORAL
OUTPATIENT
Start: 2024-10-23

## 2024-10-16 RX ORDER — DIPHENHYDRAMINE HYDROCHLORIDE 50 MG/ML
50 INJECTION INTRAMUSCULAR; INTRAVENOUS
OUTPATIENT
Start: 2024-10-23

## 2024-10-16 RX ORDER — ALBUTEROL SULFATE 90 UG/1
4 INHALANT RESPIRATORY (INHALATION) PRN
OUTPATIENT
Start: 2024-10-23

## 2024-10-16 RX ORDER — PALONOSETRON 0.05 MG/ML
0.25 INJECTION, SOLUTION INTRAVENOUS ONCE
OUTPATIENT
Start: 2024-10-23 | End: 2024-10-23

## 2024-10-16 RX ORDER — SODIUM CHLORIDE 0.9 % (FLUSH) 0.9 %
5-40 SYRINGE (ML) INJECTION PRN
Status: DISCONTINUED | OUTPATIENT
Start: 2024-10-16 | End: 2024-10-17 | Stop reason: HOSPADM

## 2024-10-16 RX ORDER — SODIUM CHLORIDE 9 MG/ML
5-250 INJECTION, SOLUTION INTRAVENOUS PRN
OUTPATIENT
Start: 2024-10-23

## 2024-10-16 RX ORDER — SODIUM CHLORIDE 9 MG/ML
INJECTION, SOLUTION INTRAVENOUS CONTINUOUS
OUTPATIENT
Start: 2024-10-23

## 2024-10-16 RX ORDER — FAMOTIDINE 10 MG/ML
20 INJECTION, SOLUTION INTRAVENOUS
OUTPATIENT
Start: 2024-10-23

## 2024-10-16 RX ORDER — HEPARIN SODIUM (PORCINE) LOCK FLUSH IV SOLN 100 UNIT/ML 100 UNIT/ML
500 SOLUTION INTRAVENOUS PRN
OUTPATIENT
Start: 2024-10-23

## 2024-10-16 RX ORDER — PALONOSETRON 0.05 MG/ML
0.25 INJECTION, SOLUTION INTRAVENOUS ONCE
Status: COMPLETED | OUTPATIENT
Start: 2024-10-16 | End: 2024-10-16

## 2024-10-16 RX ORDER — MEPERIDINE HYDROCHLORIDE 50 MG/ML
12.5 INJECTION INTRAMUSCULAR; INTRAVENOUS; SUBCUTANEOUS PRN
OUTPATIENT
Start: 2024-10-23

## 2024-10-16 RX ORDER — PIMOZIDE 1 MG/1
2 TABLET ORAL 2 TIMES DAILY
Qty: 360 TABLET | Refills: 0 | Status: SHIPPED | OUTPATIENT
Start: 2024-10-16

## 2024-10-16 RX ORDER — SODIUM CHLORIDE 9 MG/ML
5-250 INJECTION, SOLUTION INTRAVENOUS PRN
Status: DISCONTINUED | OUTPATIENT
Start: 2024-10-16 | End: 2024-10-17 | Stop reason: HOSPADM

## 2024-10-16 RX ORDER — DEXAMETHASONE SODIUM PHOSPHATE 10 MG/ML
10 INJECTION INTRAMUSCULAR; INTRAVENOUS ONCE
Status: COMPLETED | OUTPATIENT
Start: 2024-10-16 | End: 2024-10-16

## 2024-10-16 RX ORDER — SODIUM CHLORIDE 0.9 % (FLUSH) 0.9 %
5-40 SYRINGE (ML) INJECTION PRN
OUTPATIENT
Start: 2024-10-23

## 2024-10-16 RX ADMIN — PALONOSETRON 0.25 MG: 0.05 INJECTION, SOLUTION INTRAVENOUS at 09:36

## 2024-10-16 RX ADMIN — HEPARIN 500 UNITS: 100 SYRINGE at 12:32

## 2024-10-16 RX ADMIN — SODIUM CHLORIDE 150 MG: 9 INJECTION, SOLUTION INTRAVENOUS at 09:50

## 2024-10-16 RX ADMIN — MAGNESIUM SULFATE HEPTAHYDRATE 1000 MG: 1 INJECTION, SOLUTION INTRAVENOUS at 11:28

## 2024-10-16 RX ADMIN — SODIUM CHLORIDE, PRESERVATIVE FREE 10 ML: 5 INJECTION INTRAVENOUS at 12:33

## 2024-10-16 RX ADMIN — CISPLATIN 94 MG: 1 INJECTION INTRAVENOUS at 10:18

## 2024-10-16 RX ADMIN — SODIUM CHLORIDE, PRESERVATIVE FREE 10 ML: 5 INJECTION INTRAVENOUS at 08:05

## 2024-10-16 RX ADMIN — POTASSIUM CHLORIDE AND SODIUM CHLORIDE: 900; 150 INJECTION, SOLUTION INTRAVENOUS at 08:16

## 2024-10-16 RX ADMIN — SODIUM CHLORIDE 150 ML/HR: 9 INJECTION, SOLUTION INTRAVENOUS at 08:14

## 2024-10-16 RX ADMIN — MAGNESIUM SULFATE HEPTAHYDRATE 1000 MG: 1 INJECTION, SOLUTION INTRAVENOUS at 08:16

## 2024-10-16 RX ADMIN — DEXAMETHASONE SODIUM PHOSPHATE 10 MG: 10 INJECTION INTRAMUSCULAR; INTRAVENOUS at 09:36

## 2024-10-16 NOTE — TELEPHONE ENCOUNTER
Instructions   from Dr. Jadon Ag MD    Tx Today   Hydration on Friday   Rv in 1 week         Tx today  Hydration already scheduled for Friday  RV 10/23/24 during tx at 8 am

## 2024-10-16 NOTE — PROGRESS NOTES
were reviewed and discussed with the patient.  The diagnosis and care plan were discussed with the patient in detail. I discussed the natural history of the disease, prognosis, risks and goals of therapy and answered all the patients questions to the best of my ability.  Patient expressed understanding and was in agreement.    ANDREW JI MD    This note is created with the assistance of a speech recognition program.  While intending to generate a document that actually reflects the content of the visit, the document can still have some errors including those of syntax and sound a like substitutions which may escape proof reading.  It such instances, actual meaning can be extrapolated by contextual diversion.

## 2024-10-16 NOTE — PROGRESS NOTES
Patient arrived for C1D36 cisplatin. Pt denies any new complaints. Labs reviewed and within treatable limits, received order to proceed with treatment today. Treatment completed without issue. Pt stable at discharge. Scheduled to return 10/18/24 for hydration.

## 2024-10-16 NOTE — TELEPHONE ENCOUNTER
Please advise the patient and/or his POA that I do not recommend taking Orap while he is receiving Emend.   At a minimum, he should not take Orap on the days that he is getting chemotherapy.  A refill of Orap was sent to [facility].

## 2024-10-16 NOTE — TELEPHONE ENCOUNTER
Spoke to patients caregiver/SHILPI Medina, and she states no current abnormal symptoms and he has been taking both of these medications. He has two more treatments of chemo (today and the 23rd) and his last radiation treatment in 10/28.     Any medication changes?

## 2024-10-16 NOTE — PROGRESS NOTES
Dylan CHURCHILL Arms  10/16/2024  Wt Readings from Last 3 Encounters:   10/16/24 100.7 kg (221 lb 14.4 oz)   10/16/24 100.7 kg (221 lb 14.4 oz)   10/09/24 100.8 kg (222 lb 4.8 oz)     Body mass index is 29.28 kg/m².        Treatment Area:  Larynx w/ chemotherapy    Patient was seen today for weekly visit.     Comfort Alteration  Fatigue: Mild-Moderate    Mucous Membrane Alteration (Trach)  Mucositis Due to Radiation: No  Thrush: No  Voice Changes: Nonverbal    Nutritional Alteration-No Feeding Tube  Anorexia: No   Nausea: No   Vomiting: No     Ventilation Alteration  Cough:No    Skin Alteration   Sensation:  WNL    Radiation Dermatitis:  Intact [x]     Erythema  [x]   slight  Discoloration  []     Rash []     Dry desquamation  []     Moist desquamation []       Emotional  Coping: effective      Injury, potential bleeding or infection: Skin care reinforced.    Lab Results   Component Value Date    WBC 2.8 (L) 10/16/2024    HGB 8.6 (L) 10/16/2024    HCT 24.8 (L) 10/16/2024     (L) 10/16/2024         BP (!) 149/96   Pulse 71   Temp 97.7 °F (36.5 °C) (Temporal)   Resp 18   Wt 100.7 kg (221 lb 14.4 oz)   SpO2 96%   BMI 29.28 kg/m²                   Assessment/Plan: Patient was seen today for weekly visit. He arrives ambulatory with steady gait.  Speaks in whispers or writes on pad of paper to communicate.  States he is using Magic Mouthwash. He is taking Oxycodone every 4 hours for breakthrough pain despite Fentanyl patch.  He is eating soft/slippery foods. Drinking Ensure for supplements. He reports feeling \"worn out\". Writer provided active listening and emotional support. He will receive IV hydration on Friday.  Dr. Starks evaluated patient.  Continue plan of care. Patient has follow up appointment with Palliative care next Tuesday.  He was encouraged to review pain management plan at that time and review his goals of care.  He voices understanding..       Padmini Saldana RN  
Shoe MISC, by Does not apply route Dispense DM shoe and insoles.  custom accomodative offloading insole with toe filler L side.   Amputated great toe of left foot (HCC)  (primary encounter diagnosis) Equinus deformity of both feet Diabetic polyneuropathy associated with type 2 diabetes mellitus (HCC), Disp: 1 each, Rfl: 0  No current facility-administered medications for this encounter.    Facility-Administered Medications Ordered in Other Encounters:     0.9 % sodium chloride infusion, 5-250 mL/hr, IntraVENous, Ancelmo MOODY Nauman, MD, Stopped at 10/16/24 1232    sodium chloride flush 0.9 % injection 5-40 mL, 5-40 mL, IntraVENous, Ancelmo MOODY Nauman, MD, 10 mL at 10/16/24 1233    heparin (PF) 100 UNIT/ML injection 500 Units, 500 Units, IntraCATHeter, Ancelmo MOODY Nauman, MD, 500 Units at 10/16/24 1232      ASSESSMENT PLAN:   Treatment setup and plan reviewed. Port images/CBCT images reviewed. Appropriate laboratory work was reviewed. Treatment side effects and toxicities reviewed with the patient, and appropriate management was advised. Will continue radiation treatment as planned, and recommend patient contact us if they have any questions or concerns. Continue monitoring for side effects and maintaining good nutrition and hydration.  Recommend patient discuss increasing his fentanyl patch dose to help with increasing pain symptoms as he is now using his breakthrough medication every 4 hours.    Electronically signed by Pasha Starks MD on 10/16/2024 at 1:13 PM      Drugs Prescribed:  New Prescriptions    No medications on file       Other Orders Placed:  No orders of the defined types were placed in this encounter.

## 2024-10-17 ENCOUNTER — HOSPITAL ENCOUNTER (OUTPATIENT)
Dept: RADIATION ONCOLOGY | Age: 59
Discharge: HOME | End: 2024-10-17
Payer: COMMERCIAL

## 2024-10-17 ENCOUNTER — TELEPHONE (OUTPATIENT)
Dept: ONCOLOGY | Age: 59
End: 2024-10-17

## 2024-10-17 PROCEDURE — 77386 HC NTSTY MODUL RAD TX DLVR CPLX: CPT | Performed by: RADIOLOGY

## 2024-10-17 NOTE — TELEPHONE ENCOUNTER
Name: Dylan Dolan  : 1965  MRN: [id number]    Oncology Navigation Follow-Up Note    Contact Type:  Telephone    Notes:   Reviewing chart. Patient is continuing concurrent chemo/RT, with last RT scheduled for 10/28/24.  Phone call to livier Medina to assess for navigational needs. She denies any needs at this time. Patient is sleeping more, intake decreased but he is still able to get nourishment in.  Encouraged them to call if they do have questions/concerns. Understanding verbalized.        Electronically signed by Bree Spears RN on 10/17/2024 at 8:47 AM  
Patient/Caregiver provided printed discharge information.

## 2024-10-18 ENCOUNTER — HOSPITAL ENCOUNTER (OUTPATIENT)
Dept: RADIATION ONCOLOGY | Age: 59
Discharge: HOME | End: 2024-10-18
Payer: COMMERCIAL

## 2024-10-18 ENCOUNTER — HOSPITAL ENCOUNTER (OUTPATIENT)
Dept: INFUSION THERAPY | Age: 59
Discharge: HOME | End: 2024-10-18
Payer: COMMERCIAL

## 2024-10-18 VITALS — HEART RATE: 64 BPM | RESPIRATION RATE: 16 BRPM | DIASTOLIC BLOOD PRESSURE: 89 MMHG | SYSTOLIC BLOOD PRESSURE: 153 MMHG

## 2024-10-18 DIAGNOSIS — C32.2 SQUAMOUS CELL CARCINOMA OF SUBGLOTTIS (HCC): Primary | ICD-10-CM

## 2024-10-18 DIAGNOSIS — C32.9 LARYNGEAL CANCER (HCC): ICD-10-CM

## 2024-10-18 PROCEDURE — 2580000003 HC RX 258: Performed by: INTERNAL MEDICINE

## 2024-10-18 PROCEDURE — 96374 THER/PROPH/DIAG INJ IV PUSH: CPT

## 2024-10-18 PROCEDURE — 96361 HYDRATE IV INFUSION ADD-ON: CPT

## 2024-10-18 PROCEDURE — 77386 HC NTSTY MODUL RAD TX DLVR CPLX: CPT | Performed by: RADIOLOGY

## 2024-10-18 PROCEDURE — 6360000002 HC RX W HCPCS: Performed by: INTERNAL MEDICINE

## 2024-10-18 RX ORDER — DIPHENHYDRAMINE HYDROCHLORIDE 50 MG/ML
50 INJECTION INTRAMUSCULAR; INTRAVENOUS
OUTPATIENT
Start: 2024-10-18

## 2024-10-18 RX ORDER — SODIUM CHLORIDE 9 MG/ML
INJECTION, SOLUTION INTRAVENOUS CONTINUOUS
OUTPATIENT
Start: 2024-10-18

## 2024-10-18 RX ORDER — 0.9 % SODIUM CHLORIDE 0.9 %
1000 INTRAVENOUS SOLUTION INTRAVENOUS ONCE
Status: COMPLETED | OUTPATIENT
Start: 2024-10-18 | End: 2024-10-18

## 2024-10-18 RX ORDER — SODIUM CHLORIDE 0.9 % (FLUSH) 0.9 %
5-40 SYRINGE (ML) INJECTION PRN
Status: DISCONTINUED | OUTPATIENT
Start: 2024-10-18 | End: 2024-10-19 | Stop reason: HOSPADM

## 2024-10-18 RX ORDER — ONDANSETRON 2 MG/ML
8 INJECTION INTRAMUSCULAR; INTRAVENOUS
Status: COMPLETED | OUTPATIENT
Start: 2024-10-18 | End: 2024-10-18

## 2024-10-18 RX ORDER — LIDOCAINE HYDROCHLORIDE 10 MG/ML
0.25 INJECTION, SOLUTION EPIDURAL; INFILTRATION; INTRACAUDAL; PERINEURAL
OUTPATIENT
Start: 2024-10-18

## 2024-10-18 RX ORDER — ONDANSETRON 2 MG/ML
8 INJECTION INTRAMUSCULAR; INTRAVENOUS
OUTPATIENT
Start: 2024-10-18

## 2024-10-18 RX ORDER — HEPARIN 100 UNIT/ML
500 SYRINGE INTRAVENOUS PRN
OUTPATIENT
Start: 2024-10-18

## 2024-10-18 RX ORDER — ACETAMINOPHEN 325 MG/1
650 TABLET ORAL
OUTPATIENT
Start: 2024-10-18

## 2024-10-18 RX ORDER — EPINEPHRINE 1 MG/ML
0.3 INJECTION, SOLUTION, CONCENTRATE INTRAVENOUS PRN
OUTPATIENT
Start: 2024-10-18

## 2024-10-18 RX ORDER — FAMOTIDINE 10 MG/ML
20 INJECTION, SOLUTION INTRAVENOUS
OUTPATIENT
Start: 2024-10-18

## 2024-10-18 RX ORDER — SODIUM CHLORIDE 0.9 % (FLUSH) 0.9 %
5-40 SYRINGE (ML) INJECTION PRN
OUTPATIENT
Start: 2024-10-18

## 2024-10-18 RX ORDER — ALBUTEROL SULFATE 90 UG/1
4 INHALANT RESPIRATORY (INHALATION) PRN
OUTPATIENT
Start: 2024-10-18

## 2024-10-18 RX ORDER — HEPARIN 100 UNIT/ML
500 SYRINGE INTRAVENOUS PRN
Status: DISCONTINUED | OUTPATIENT
Start: 2024-10-18 | End: 2024-10-19 | Stop reason: HOSPADM

## 2024-10-18 RX ORDER — SODIUM CHLORIDE 9 MG/ML
25 INJECTION, SOLUTION INTRAVENOUS PRN
OUTPATIENT
Start: 2024-10-18

## 2024-10-18 RX ADMIN — SODIUM CHLORIDE 1000 ML: 9 INJECTION, SOLUTION INTRAVENOUS at 13:52

## 2024-10-18 RX ADMIN — SODIUM CHLORIDE, PRESERVATIVE FREE 10 ML: 5 INJECTION INTRAVENOUS at 15:22

## 2024-10-18 RX ADMIN — HEPARIN 500 UNITS: 100 SYRINGE at 15:22

## 2024-10-18 RX ADMIN — ONDANSETRON 8 MG: 2 INJECTION INTRAMUSCULAR; INTRAVENOUS at 13:59

## 2024-10-18 NOTE — PROGRESS NOTES
Pt arrives ambulatory for hydration  Pt states he has not been feeling well.  Feels nauseated.    Hydration complete and patient discharged in stable condition  Next appt  10/23 Cisplatin / MD visit

## 2024-10-21 ENCOUNTER — TELEPHONE (OUTPATIENT)
Dept: PALLATIVE CARE | Age: 59
End: 2024-10-21

## 2024-10-21 ENCOUNTER — HOSPITAL ENCOUNTER (OUTPATIENT)
Dept: RADIATION ONCOLOGY | Age: 59
End: 2024-10-21
Payer: COMMERCIAL

## 2024-10-21 NOTE — TELEPHONE ENCOUNTER
Called and spoke with niece Carol.  Introduced myself and role.  Confirmed appointment date time and location.  Forwarded email note with information that Carol wanted shared regarding her uncle to Ms. Barrera NP.  Asked Ms. Mcconnellk to call Carol.      [facility] Palliative Care Coordinator  Bethany LANGSTONN, RN, ONN-CG  [facility] [place] [place] [phone]/ [facility] [phone]/ [facility] [phone]       Patient discharged to home on 7/28/2021 at 6:53 PM via wheelchair. All belongings with patient. Patient education given and all questions answered. Medication regimen discussed with patient. Upcoming appointments also discussed. Patient verbalized understanding.

## 2024-10-21 NOTE — PROGRESS NOTES
PALLIATIVE CARE PROGRESS NOTE     Patient: Dylan Dolan  1965    Reason For Consult:  Goals of care evaluation  Distress management  Symptom Management  Guidance and support  Facilitate communications  Assistance in coordinating care  Recommendations for the above    Subjective: Dylan Dolan is a 58 y.o. male with a history of HTN, Tourette's syndrome, GERD, tobacco abuse, obesity, HLD, tracheostomy, and follows with Alley Vila MD .  Palliative Care was consulted to help manage symptoms, facilitate communications and establish goals of care. Patient presented to the ED on 7/8/24 with complaints of hoarse voice and difficulty breathing. He reported that this has happened over the course of a few months. He reported that week prior he was at  and was given steroids and a antibiotic without improvements. He was given racepinephrine and dexamethasone. CT soft tissue neck revealed nonspecific mass within the left vocal cord measuring 8 mm x 6 mm. No cervical lymphadenopathy noted. Patient was transferred to [facility] for ENT evaluation and was hospitalized 7/8-7/17/24. ENT performed flexible laryngoscopy and findings consistent with glottic malignancy. On 7/9 patient underwent Bx and Tracheostomy. A 5 ET tube was not able to be passed through the subglottis and subglottis was found to have an ulcerative mass. Intubation was converted to open tracheostomy with successful placement of shiley 6 cuffed ETT. Oncology and Rad Onc were consulted for newly Dx left vocal cod SCC. They reccommended definitive course of RT and possibly chemo. On 7/11 patient had BSS and this was WNL. On 7/14 tracheostomy tube was changed. CT chest did not show any evidence of metastatic disease. 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 Small metabolically active left-sided cervical lymph nodes are present left levels 2 through 5. No metabolically active

## 2024-10-22 ENCOUNTER — HOSPITAL ENCOUNTER (OUTPATIENT)
Dept: RADIATION ONCOLOGY | Age: 59
Discharge: HOME | End: 2024-10-22
Payer: COMMERCIAL

## 2024-10-22 ENCOUNTER — OFFICE VISIT (OUTPATIENT)
Dept: PALLATIVE CARE | Age: 59
End: 2024-10-22
Payer: COMMERCIAL

## 2024-10-22 VITALS
SYSTOLIC BLOOD PRESSURE: 164 MMHG | OXYGEN SATURATION: 97 % | TEMPERATURE: 97.6 F | HEART RATE: 77 BPM | DIASTOLIC BLOOD PRESSURE: 82 MMHG | BODY MASS INDEX: 28.5 KG/M2 | WEIGHT: 216 LBS

## 2024-10-22 DIAGNOSIS — K11.7 SIALORRHEA: ICD-10-CM

## 2024-10-22 DIAGNOSIS — Z93.0 TRACHEOSTOMY IN PLACE (HCC): ICD-10-CM

## 2024-10-22 DIAGNOSIS — Z51.5 PALLIATIVE CARE ENCOUNTER: ICD-10-CM

## 2024-10-22 DIAGNOSIS — R11.0 NAUSEA: ICD-10-CM

## 2024-10-22 DIAGNOSIS — C32.2 SQUAMOUS CELL CARCINOMA OF SUBGLOTTIS (HCC): Primary | ICD-10-CM

## 2024-10-22 DIAGNOSIS — R52 UNCONTROLLED PAIN: ICD-10-CM

## 2024-10-22 PROCEDURE — 3077F SYST BP >= 140 MM HG: CPT | Performed by: NURSE PRACTITIONER

## 2024-10-22 PROCEDURE — 99213 OFFICE O/P EST LOW 20 MIN: CPT | Performed by: NURSE PRACTITIONER

## 2024-10-22 PROCEDURE — 3079F DIAST BP 80-89 MM HG: CPT | Performed by: NURSE PRACTITIONER

## 2024-10-22 RX ORDER — SCOLOPAMINE TRANSDERMAL SYSTEM 1 MG/1
1 PATCH, EXTENDED RELEASE TRANSDERMAL
Qty: 10 PATCH | Refills: 0 | Status: SHIPPED | OUTPATIENT
Start: 2024-10-22 | End: 2024-11-21

## 2024-10-22 RX ORDER — OXYCODONE HYDROCHLORIDE 15 MG/1
15 TABLET ORAL EVERY 4 HOURS PRN
Qty: 84 TABLET | Refills: 0 | Status: SHIPPED | OUTPATIENT
Start: 2024-10-22 | End: 2024-11-05

## 2024-10-22 RX ORDER — FENTANYL 12.5 UG/1
1 PATCH TRANSDERMAL
Qty: 5 PATCH | Refills: 0 | Status: SHIPPED | OUTPATIENT
Start: 2024-10-22 | End: 2024-10-23 | Stop reason: SDUPTHER

## 2024-10-22 NOTE — PATIENT INSTRUCTIONS
RTO in 3 weeks  Increase Oxycodone to 15 mg for better breakthrough pain control PRN  Continue Fentanyl patch  Start Scopolamine for nausea and secretions

## 2024-10-23 ENCOUNTER — HOSPITAL ENCOUNTER (OUTPATIENT)
Dept: RADIATION ONCOLOGY | Age: 59
Discharge: HOME | End: 2024-10-23
Payer: COMMERCIAL

## 2024-10-23 ENCOUNTER — HOSPITAL ENCOUNTER (OUTPATIENT)
Dept: INFUSION THERAPY | Age: 59
Discharge: HOME | End: 2024-10-23
Payer: COMMERCIAL

## 2024-10-23 ENCOUNTER — OFFICE VISIT (OUTPATIENT)
Dept: ONCOLOGY | Age: 59
End: 2024-10-23
Payer: COMMERCIAL

## 2024-10-23 ENCOUNTER — TELEPHONE (OUTPATIENT)
Dept: ONCOLOGY | Age: 59
End: 2024-10-23

## 2024-10-23 VITALS
DIASTOLIC BLOOD PRESSURE: 80 MMHG | RESPIRATION RATE: 20 BRPM | TEMPERATURE: 98 F | HEART RATE: 67 BPM | BODY MASS INDEX: 28.66 KG/M2 | SYSTOLIC BLOOD PRESSURE: 167 MMHG | WEIGHT: 217.2 LBS

## 2024-10-23 VITALS
SYSTOLIC BLOOD PRESSURE: 167 MMHG | WEIGHT: 217.2 LBS | RESPIRATION RATE: 20 BRPM | OXYGEN SATURATION: 97 % | BODY MASS INDEX: 28.66 KG/M2 | HEART RATE: 67 BPM | DIASTOLIC BLOOD PRESSURE: 80 MMHG | TEMPERATURE: 98 F

## 2024-10-23 DIAGNOSIS — C32.2 SQUAMOUS CELL CARCINOMA OF SUBGLOTTIS (HCC): ICD-10-CM

## 2024-10-23 DIAGNOSIS — C32.2 SQUAMOUS CELL CARCINOMA OF SUBGLOTTIS (HCC): Primary | ICD-10-CM

## 2024-10-23 DIAGNOSIS — C32.9 LARYNGEAL CANCER (HCC): ICD-10-CM

## 2024-10-23 DIAGNOSIS — E87.1 HYPONATREMIA: ICD-10-CM

## 2024-10-23 DIAGNOSIS — Z51.11 CHEMOTHERAPY MANAGEMENT, ENCOUNTER FOR: ICD-10-CM

## 2024-10-23 LAB
ALBUMIN SERPL-MCNC: 3.7 G/DL (ref 3.5–5.2)
ALBUMIN/GLOB SERPL: 1.3 {RATIO} (ref 1–2.5)
ALP SERPL-CCNC: 113 U/L (ref 40–129)
ALT SERPL-CCNC: 10 U/L (ref 5–41)
ANION GAP SERPL CALCULATED.3IONS-SCNC: 11 MMOL/L (ref 9–17)
AST SERPL-CCNC: 9 U/L
BASOPHILS # BLD: 0 K/UL (ref 0–0.2)
BASOPHILS NFR BLD: 0 % (ref 0–2)
BILIRUB SERPL-MCNC: 0.6 MG/DL (ref 0.3–1.2)
BUN SERPL-MCNC: 22 MG/DL (ref 6–20)
CALCIUM SERPL-MCNC: 9 MG/DL (ref 8.6–10.4)
CHLORIDE SERPL-SCNC: 92 MMOL/L (ref 98–107)
CO2 SERPL-SCNC: 22 MMOL/L (ref 20–31)
CREAT SERPL-MCNC: 1.1 MG/DL (ref 0.7–1.2)
EOSINOPHIL # BLD: 0 K/UL (ref 0–0.4)
EOSINOPHILS RELATIVE PERCENT: 0 % (ref 1–4)
ERYTHROCYTE [DISTWIDTH] IN BLOOD BY AUTOMATED COUNT: 14.7 % (ref 12.5–15.4)
GFR, ESTIMATED: 78 ML/MIN/1.73M2
GLUCOSE SERPL-MCNC: 154 MG/DL (ref 70–99)
HCT VFR BLD AUTO: 21 % (ref 41–53)
HGB BLD-MCNC: 7.5 G/DL (ref 13.5–17.5)
LYMPHOCYTES NFR BLD: 0.28 K/UL (ref 1–4.8)
LYMPHOCYTES RELATIVE PERCENT: 14 % (ref 24–44)
MAGNESIUM SERPL-MCNC: 1.8 MG/DL (ref 1.6–2.6)
MCH RBC QN AUTO: 31.9 PG (ref 26–34)
MCHC RBC AUTO-ENTMCNC: 36 G/DL (ref 31–37)
MCV RBC AUTO: 88.5 FL (ref 80–100)
MONOCYTES NFR BLD: 0.18 K/UL (ref 0.1–0.8)
MONOCYTES NFR BLD: 9 % (ref 1–7)
MORPHOLOGY: NORMAL
NEUTROPHILS NFR BLD: 77 % (ref 36–66)
NEUTS SEG NFR BLD: 1.54 K/UL (ref 1.8–7.7)
PHOSPHATE SERPL-MCNC: 3.5 MG/DL (ref 2.5–4.5)
PLATELET # BLD AUTO: 130 K/UL (ref 140–450)
PMV BLD AUTO: 6.4 FL (ref 6–12)
POTASSIUM SERPL-SCNC: 4.7 MMOL/L (ref 3.7–5.3)
PROT SERPL-MCNC: 6.5 G/DL (ref 6.4–8.3)
RBC # BLD AUTO: 2.37 M/UL (ref 4.5–5.9)
SODIUM SERPL-SCNC: 125 MMOL/L (ref 135–144)
WBC OTHER # BLD: 2 K/UL (ref 3.5–11)

## 2024-10-23 PROCEDURE — 96375 TX/PRO/DX INJ NEW DRUG ADDON: CPT

## 2024-10-23 PROCEDURE — 85025 COMPLETE CBC W/AUTO DIFF WBC: CPT

## 2024-10-23 PROCEDURE — 96366 THER/PROPH/DIAG IV INF ADDON: CPT

## 2024-10-23 PROCEDURE — 84100 ASSAY OF PHOSPHORUS: CPT

## 2024-10-23 PROCEDURE — 83735 ASSAY OF MAGNESIUM: CPT

## 2024-10-23 PROCEDURE — 80053 COMPREHEN METABOLIC PANEL: CPT

## 2024-10-23 PROCEDURE — 96413 CHEMO IV INFUSION 1 HR: CPT

## 2024-10-23 PROCEDURE — 2580000003 HC RX 258: Performed by: INTERNAL MEDICINE

## 2024-10-23 PROCEDURE — 6360000002 HC RX W HCPCS: Performed by: INTERNAL MEDICINE

## 2024-10-23 PROCEDURE — 3077F SYST BP >= 140 MM HG: CPT | Performed by: INTERNAL MEDICINE

## 2024-10-23 PROCEDURE — 77386 HC NTSTY MODUL RAD TX DLVR CPLX: CPT | Performed by: RADIOLOGY

## 2024-10-23 PROCEDURE — 99214 OFFICE O/P EST MOD 30 MIN: CPT | Performed by: INTERNAL MEDICINE

## 2024-10-23 PROCEDURE — 96368 THER/DIAG CONCURRENT INF: CPT

## 2024-10-23 PROCEDURE — 3079F DIAST BP 80-89 MM HG: CPT | Performed by: INTERNAL MEDICINE

## 2024-10-23 RX ORDER — DEXAMETHASONE SODIUM PHOSPHATE 10 MG/ML
10 INJECTION INTRAMUSCULAR; INTRAVENOUS ONCE
Status: COMPLETED | OUTPATIENT
Start: 2024-10-23 | End: 2024-10-23

## 2024-10-23 RX ORDER — SODIUM CHLORIDE 9 MG/ML
5-250 INJECTION, SOLUTION INTRAVENOUS PRN
Status: DISCONTINUED | OUTPATIENT
Start: 2024-10-23 | End: 2024-10-24 | Stop reason: HOSPADM

## 2024-10-23 RX ORDER — HEPARIN 100 UNIT/ML
500 SYRINGE INTRAVENOUS PRN
Status: DISCONTINUED | OUTPATIENT
Start: 2024-10-23 | End: 2024-10-24 | Stop reason: HOSPADM

## 2024-10-23 RX ORDER — PALONOSETRON 0.05 MG/ML
0.25 INJECTION, SOLUTION INTRAVENOUS ONCE
Status: COMPLETED | OUTPATIENT
Start: 2024-10-23 | End: 2024-10-23

## 2024-10-23 RX ORDER — SODIUM CHLORIDE 1 G/1
1 TABLET ORAL 3 TIMES DAILY
Qty: 90 TABLET | Refills: 0 | Status: SHIPPED | OUTPATIENT
Start: 2024-10-23

## 2024-10-23 RX ORDER — SODIUM CHLORIDE 0.9 % (FLUSH) 0.9 %
5-40 SYRINGE (ML) INJECTION PRN
Status: DISCONTINUED | OUTPATIENT
Start: 2024-10-23 | End: 2024-10-24 | Stop reason: HOSPADM

## 2024-10-23 RX ADMIN — HEPARIN 500 UNITS: 100 SYRINGE at 13:34

## 2024-10-23 RX ADMIN — CISPLATIN 94 MG: 1 INJECTION INTRAVENOUS at 11:04

## 2024-10-23 RX ADMIN — SODIUM CHLORIDE 150 MG: 9 INJECTION, SOLUTION INTRAVENOUS at 10:23

## 2024-10-23 RX ADMIN — PALONOSETRON 0.25 MG: 0.05 INJECTION, SOLUTION INTRAVENOUS at 10:10

## 2024-10-23 RX ADMIN — POTASSIUM CHLORIDE: 2 INJECTION, SOLUTION, CONCENTRATE INTRAVENOUS at 12:21

## 2024-10-23 RX ADMIN — DEXAMETHASONE SODIUM PHOSPHATE 10 MG: 10 INJECTION INTRAMUSCULAR; INTRAVENOUS at 10:11

## 2024-10-23 RX ADMIN — SODIUM CHLORIDE, PRESERVATIVE FREE 10 ML: 5 INJECTION INTRAVENOUS at 13:34

## 2024-10-23 RX ADMIN — SODIUM CHLORIDE 150 ML/HR: 9 INJECTION, SOLUTION INTRAVENOUS at 08:48

## 2024-10-23 RX ADMIN — POTASSIUM CHLORIDE: 2 INJECTION, SOLUTION, CONCENTRATE INTRAVENOUS at 08:48

## 2024-10-23 RX ADMIN — SODIUM CHLORIDE, PRESERVATIVE FREE 10 ML: 5 INJECTION INTRAVENOUS at 08:08

## 2024-10-23 NOTE — PROGRESS NOTES
Patient arrived for C1D43 cisplatin. Pt had visit with Dr Ag, labs reviewed and within treatable limits, treatment completed without issue. Pt stable at discharge. Scheduled to follow up 11/12/24 in [place].

## 2024-10-23 NOTE — PROGRESS NOTES
Dylan CHURCHILL Arms  10/23/2024  Wt Readings from Last 3 Encounters:   10/23/24 98.5 kg (217 lb 3.2 oz)   10/23/24 98.5 kg (217 lb 3.2 oz)   10/22/24 98 kg (216 lb)     Body mass index is 28.66 kg/m².        Treatment Area:  Larynx w/ chemotherapy    Patient was seen today for weekly visit.     Comfort Alteration  Fatigue: Mild-Moderate    Mucous Membrane Alteration (Trach)  Mucositis Due to Radiation: No  Thrush: No  Voice Changes: Nonverbal    Nutritional Alteration-No Feeding Tube  Anorexia: No   Nausea: No   Vomiting: No     Ventilation Alteration  Cough:No    Skin Alteration   Sensation:  WNL    Radiation Dermatitis:  Intact [x]     Erythema  [x]   slight  Discoloration  []     Rash []     Dry desquamation  []     Moist desquamation []       Emotional  Coping: effective      Injury, potential bleeding or infection: Skin care reinforced.    Lab Results   Component Value Date    WBC 2.0 (L) 10/23/2024    HGB 7.5 (L) 10/23/2024    HCT 21.0 (L) 10/23/2024     (L) 10/23/2024         BP (!) 167/80   Pulse 67   Temp 98 °F (36.7 °C)   Resp 20   Wt 98.5 kg (217 lb 3.2 oz)   SpO2 97%   BMI 28.66 kg/m²                   Assessment/Plan: Patient was seen today for weekly visit. He arrives ambulatory with steady gait.  Speaks in whispers or writes on pad of paper to communicate.  States he is using Magic Mouthwash. He is taking less Oxycodone since established on Fentanyl patch. He reports that his patch is due to be changed today and multiple pharmacies are reporting not available.  He states that [facility] is ordering for him.  He is eating soft/slippery foods and drinking Ensure for supplements. He reports feeling \"worn out\". Writer provided active listening and emotional support. He will receive IV hydration on Friday.  Dr. Coe evaluated patient.  Continue plan of care.      Padmini Saldana RN

## 2024-10-24 ENCOUNTER — HOSPITAL ENCOUNTER (OUTPATIENT)
Dept: RADIATION ONCOLOGY | Age: 59
Discharge: HOME | End: 2024-10-24
Payer: COMMERCIAL

## 2024-10-25 ENCOUNTER — HOSPITAL ENCOUNTER (OUTPATIENT)
Dept: RADIATION ONCOLOGY | Age: 59
Discharge: HOME | End: 2024-10-25
Payer: COMMERCIAL

## 2024-10-25 ENCOUNTER — HOSPITAL ENCOUNTER (OUTPATIENT)
Dept: INFUSION THERAPY | Age: 59
Discharge: HOME | End: 2024-10-25
Payer: COMMERCIAL

## 2024-10-25 VITALS — DIASTOLIC BLOOD PRESSURE: 105 MMHG | RESPIRATION RATE: 20 BRPM | SYSTOLIC BLOOD PRESSURE: 196 MMHG | HEART RATE: 67 BPM

## 2024-10-25 DIAGNOSIS — C32.9 LARYNGEAL CANCER (HCC): Primary | ICD-10-CM

## 2024-10-25 PROCEDURE — 2580000003 HC RX 258: Performed by: INTERNAL MEDICINE

## 2024-10-25 PROCEDURE — 96360 HYDRATION IV INFUSION INIT: CPT

## 2024-10-25 PROCEDURE — 6360000002 HC RX W HCPCS: Performed by: INTERNAL MEDICINE

## 2024-10-25 PROCEDURE — 77386 HC NTSTY MODUL RAD TX DLVR CPLX: CPT | Performed by: RADIOLOGY

## 2024-10-25 PROCEDURE — 96361 HYDRATE IV INFUSION ADD-ON: CPT

## 2024-10-25 PROCEDURE — 96374 THER/PROPH/DIAG INJ IV PUSH: CPT

## 2024-10-25 RX ORDER — SODIUM CHLORIDE 9 MG/ML
5-250 INJECTION, SOLUTION INTRAVENOUS PRN
Status: CANCELLED | OUTPATIENT
Start: 2024-10-25

## 2024-10-25 RX ORDER — ACETAMINOPHEN 325 MG/1
650 TABLET ORAL
Status: CANCELLED | OUTPATIENT
Start: 2024-10-25

## 2024-10-25 RX ORDER — DIPHENHYDRAMINE HYDROCHLORIDE 50 MG/ML
50 INJECTION INTRAMUSCULAR; INTRAVENOUS
Status: CANCELLED | OUTPATIENT
Start: 2024-10-25

## 2024-10-25 RX ORDER — ALBUTEROL SULFATE 90 UG/1
4 INHALANT RESPIRATORY (INHALATION) PRN
Status: CANCELLED | OUTPATIENT
Start: 2024-10-25

## 2024-10-25 RX ORDER — SODIUM CHLORIDE 9 MG/ML
5-250 INJECTION, SOLUTION INTRAVENOUS PRN
OUTPATIENT
Start: 2024-10-25

## 2024-10-25 RX ORDER — FAMOTIDINE 10 MG/ML
20 INJECTION, SOLUTION INTRAVENOUS
OUTPATIENT
Start: 2024-10-25

## 2024-10-25 RX ORDER — HEPARIN 100 UNIT/ML
500 SYRINGE INTRAVENOUS PRN
Status: DISCONTINUED | OUTPATIENT
Start: 2024-10-25 | End: 2024-10-26 | Stop reason: HOSPADM

## 2024-10-25 RX ORDER — 0.9 % SODIUM CHLORIDE 0.9 %
1000 INTRAVENOUS SOLUTION INTRAVENOUS ONCE
Status: COMPLETED | OUTPATIENT
Start: 2024-10-25 | End: 2024-10-25

## 2024-10-25 RX ORDER — ONDANSETRON 2 MG/ML
8 INJECTION INTRAMUSCULAR; INTRAVENOUS
OUTPATIENT
Start: 2024-10-25

## 2024-10-25 RX ORDER — ALBUTEROL SULFATE 90 UG/1
4 INHALANT RESPIRATORY (INHALATION) PRN
OUTPATIENT
Start: 2024-10-25

## 2024-10-25 RX ORDER — SODIUM CHLORIDE 0.9 % (FLUSH) 0.9 %
5-40 SYRINGE (ML) INJECTION PRN
OUTPATIENT
Start: 2024-10-25

## 2024-10-25 RX ORDER — SODIUM CHLORIDE 9 MG/ML
INJECTION, SOLUTION INTRAVENOUS CONTINUOUS
OUTPATIENT
Start: 2024-10-25

## 2024-10-25 RX ORDER — SODIUM CHLORIDE 9 MG/ML
INJECTION, SOLUTION INTRAVENOUS CONTINUOUS
Status: CANCELLED | OUTPATIENT
Start: 2024-10-25

## 2024-10-25 RX ORDER — EPINEPHRINE 1 MG/ML
0.3 INJECTION, SOLUTION, CONCENTRATE INTRAVENOUS PRN
OUTPATIENT
Start: 2024-10-25

## 2024-10-25 RX ORDER — ONDANSETRON 8 MG/1
8 TABLET, ORALLY DISINTEGRATING ORAL 3 TIMES DAILY PRN
Qty: 90 TABLET | Refills: 2 | Status: SHIPPED | OUTPATIENT
Start: 2024-10-25

## 2024-10-25 RX ORDER — HEPARIN 100 UNIT/ML
500 SYRINGE INTRAVENOUS PRN
OUTPATIENT
Start: 2024-10-25

## 2024-10-25 RX ORDER — EPINEPHRINE 1 MG/ML
0.3 INJECTION, SOLUTION, CONCENTRATE INTRAVENOUS PRN
Status: CANCELLED | OUTPATIENT
Start: 2024-10-25

## 2024-10-25 RX ORDER — SODIUM CHLORIDE 0.9 % (FLUSH) 0.9 %
5-40 SYRINGE (ML) INJECTION PRN
Status: DISCONTINUED | OUTPATIENT
Start: 2024-10-25 | End: 2024-10-26 | Stop reason: HOSPADM

## 2024-10-25 RX ORDER — FAMOTIDINE 10 MG/ML
20 INJECTION, SOLUTION INTRAVENOUS
Status: CANCELLED | OUTPATIENT
Start: 2024-10-25

## 2024-10-25 RX ORDER — ONDANSETRON 2 MG/ML
8 INJECTION INTRAMUSCULAR; INTRAVENOUS
Status: COMPLETED | OUTPATIENT
Start: 2024-10-25 | End: 2024-10-25

## 2024-10-25 RX ORDER — ACETAMINOPHEN 325 MG/1
650 TABLET ORAL
OUTPATIENT
Start: 2024-10-25

## 2024-10-25 RX ORDER — FENTANYL 12.5 UG/1
1 PATCH TRANSDERMAL
Qty: 5 PATCH | Refills: 0 | Status: SHIPPED | OUTPATIENT
Start: 2024-10-25 | End: 2024-11-09

## 2024-10-25 RX ORDER — 0.9 % SODIUM CHLORIDE 0.9 %
1000 INTRAVENOUS SOLUTION INTRAVENOUS ONCE
Status: CANCELLED | OUTPATIENT
Start: 2024-10-25 | End: 2024-10-25

## 2024-10-25 RX ORDER — DIPHENHYDRAMINE HYDROCHLORIDE 50 MG/ML
50 INJECTION INTRAMUSCULAR; INTRAVENOUS
OUTPATIENT
Start: 2024-10-25

## 2024-10-25 RX ADMIN — SODIUM CHLORIDE, PRESERVATIVE FREE 10 ML: 5 INJECTION INTRAVENOUS at 13:57

## 2024-10-25 RX ADMIN — HEPARIN 500 UNITS: 100 SYRINGE at 15:26

## 2024-10-25 RX ADMIN — SODIUM CHLORIDE, PRESERVATIVE FREE 10 ML: 5 INJECTION INTRAVENOUS at 15:26

## 2024-10-25 RX ADMIN — ONDANSETRON 8 MG: 2 INJECTION INTRAMUSCULAR; INTRAVENOUS at 14:00

## 2024-10-25 RX ADMIN — SODIUM CHLORIDE 1000 ML: 9 INJECTION, SOLUTION INTRAVENOUS at 13:58

## 2024-10-25 NOTE — PROGRESS NOTES
Pt here for hydration.   Pt c/o nausea, Zofran administered and Rx sent to pharmacy.  Bp noted to be high, pt states he is having difficulty swalling the pills, encouraged him to reach out to PCP to see if med is available in another form.   Infusion complete without incident.  Pt d/c'd in stable condition.   Returns 10-29-24 for potential hydration prior to last radiation tx, pt to call and cancel if he is feeling ok does not feel he needs it.

## 2024-10-28 ENCOUNTER — HOSPITAL ENCOUNTER (OUTPATIENT)
Dept: RADIATION ONCOLOGY | Age: 59
Discharge: HOME | End: 2024-10-28
Payer: COMMERCIAL

## 2024-10-28 PROCEDURE — 77386 HC NTSTY MODUL RAD TX DLVR CPLX: CPT | Performed by: RADIOLOGY

## 2024-10-29 ENCOUNTER — HOSPITAL ENCOUNTER (OUTPATIENT)
Dept: RADIATION ONCOLOGY | Age: 59
Discharge: HOME | End: 2024-10-29
Payer: COMMERCIAL

## 2024-10-29 ENCOUNTER — HOSPITAL ENCOUNTER (OUTPATIENT)
Dept: INFUSION THERAPY | Age: 59
Discharge: HOME | End: 2024-10-29
Payer: COMMERCIAL

## 2024-10-29 DIAGNOSIS — C32.9 LARYNGEAL CANCER (HCC): Primary | ICD-10-CM

## 2024-10-29 PROCEDURE — 77386 HC NTSTY MODUL RAD TX DLVR CPLX: CPT | Performed by: RADIOLOGY

## 2024-10-29 PROCEDURE — 96360 HYDRATION IV INFUSION INIT: CPT

## 2024-10-29 PROCEDURE — 6360000002 HC RX W HCPCS: Performed by: INTERNAL MEDICINE

## 2024-10-29 PROCEDURE — 2580000003 HC RX 258: Performed by: INTERNAL MEDICINE

## 2024-10-29 PROCEDURE — 96375 TX/PRO/DX INJ NEW DRUG ADDON: CPT

## 2024-10-29 RX ORDER — HEPARIN 100 UNIT/ML
500 SYRINGE INTRAVENOUS PRN
Status: DISCONTINUED | OUTPATIENT
Start: 2024-10-29 | End: 2024-10-30 | Stop reason: HOSPADM

## 2024-10-29 RX ORDER — ONDANSETRON 2 MG/ML
8 INJECTION INTRAMUSCULAR; INTRAVENOUS
Status: COMPLETED | OUTPATIENT
Start: 2024-10-29 | End: 2024-10-29

## 2024-10-29 RX ORDER — SODIUM CHLORIDE 9 MG/ML
5-250 INJECTION, SOLUTION INTRAVENOUS PRN
Status: DISCONTINUED | OUTPATIENT
Start: 2024-10-29 | End: 2024-10-30 | Stop reason: HOSPADM

## 2024-10-29 RX ORDER — ONDANSETRON 2 MG/ML
8 INJECTION INTRAMUSCULAR; INTRAVENOUS
OUTPATIENT
Start: 2024-10-29

## 2024-10-29 RX ORDER — ALBUTEROL SULFATE 90 UG/1
4 INHALANT RESPIRATORY (INHALATION) PRN
OUTPATIENT
Start: 2024-10-29

## 2024-10-29 RX ORDER — HEPARIN 100 UNIT/ML
500 SYRINGE INTRAVENOUS PRN
OUTPATIENT
Start: 2024-10-29

## 2024-10-29 RX ORDER — EPINEPHRINE 1 MG/ML
0.3 INJECTION, SOLUTION, CONCENTRATE INTRAVENOUS PRN
OUTPATIENT
Start: 2024-10-29

## 2024-10-29 RX ORDER — SODIUM CHLORIDE 9 MG/ML
INJECTION, SOLUTION INTRAVENOUS CONTINUOUS
OUTPATIENT
Start: 2024-10-29

## 2024-10-29 RX ORDER — SODIUM CHLORIDE 0.9 % (FLUSH) 0.9 %
5-40 SYRINGE (ML) INJECTION PRN
OUTPATIENT
Start: 2024-10-29

## 2024-10-29 RX ORDER — ACETAMINOPHEN 325 MG/1
650 TABLET ORAL
OUTPATIENT
Start: 2024-10-29

## 2024-10-29 RX ORDER — SODIUM CHLORIDE 9 MG/ML
5-250 INJECTION, SOLUTION INTRAVENOUS PRN
OUTPATIENT
Start: 2024-10-29

## 2024-10-29 RX ORDER — FAMOTIDINE 10 MG/ML
20 INJECTION, SOLUTION INTRAVENOUS
OUTPATIENT
Start: 2024-10-29

## 2024-10-29 RX ORDER — SODIUM CHLORIDE 0.9 % (FLUSH) 0.9 %
5-40 SYRINGE (ML) INJECTION PRN
Status: DISCONTINUED | OUTPATIENT
Start: 2024-10-29 | End: 2024-10-30 | Stop reason: HOSPADM

## 2024-10-29 RX ORDER — DIPHENHYDRAMINE HYDROCHLORIDE 50 MG/ML
50 INJECTION INTRAMUSCULAR; INTRAVENOUS
OUTPATIENT
Start: 2024-10-29

## 2024-10-29 RX ADMIN — SODIUM CHLORIDE, PRESERVATIVE FREE 10 ML: 5 INJECTION INTRAVENOUS at 15:59

## 2024-10-29 RX ADMIN — SODIUM CHLORIDE, PRESERVATIVE FREE 10 ML: 5 INJECTION INTRAVENOUS at 14:55

## 2024-10-29 RX ADMIN — ONDANSETRON 8 MG: 2 INJECTION INTRAMUSCULAR; INTRAVENOUS at 15:02

## 2024-10-29 RX ADMIN — HEPARIN 500 UNITS: 100 SYRINGE at 15:59

## 2024-10-29 RX ADMIN — SODIUM CHLORIDE 999 ML/HR: 9 INJECTION, SOLUTION INTRAVENOUS at 14:55

## 2024-10-29 NOTE — PROGRESS NOTES
Pt here for hydration.   Pt still having nausea, Zofran given.   Infusion complete without incident.  Pt d/c'd in stable condition.   Returns 11-5-24 for more hydration.

## 2024-11-04 DIAGNOSIS — C32.2 SQUAMOUS CELL CARCINOMA OF SUBGLOTTIS (HCC): ICD-10-CM

## 2024-11-05 ENCOUNTER — HOSPITAL ENCOUNTER (EMERGENCY)
Age: 59
Discharge: HOME | End: 2024-11-05
Attending: EMERGENCY MEDICINE
Payer: COMMERCIAL

## 2024-11-05 ENCOUNTER — APPOINTMENT (OUTPATIENT)
Dept: CT IMAGING | Age: 59
End: 2024-11-05
Payer: COMMERCIAL

## 2024-11-05 ENCOUNTER — HOSPITAL ENCOUNTER (OUTPATIENT)
Dept: INFUSION THERAPY | Age: 59
End: 2024-11-05

## 2024-11-05 ENCOUNTER — TELEPHONE (OUTPATIENT)
Dept: ONCOLOGY | Age: 59
End: 2024-11-05

## 2024-11-05 VITALS
SYSTOLIC BLOOD PRESSURE: 156 MMHG | DIASTOLIC BLOOD PRESSURE: 76 MMHG | TEMPERATURE: 98 F | HEART RATE: 76 BPM | OXYGEN SATURATION: 100 % | RESPIRATION RATE: 18 BRPM

## 2024-11-05 DIAGNOSIS — D64.9 ANEMIA, UNSPECIFIED TYPE: ICD-10-CM

## 2024-11-05 DIAGNOSIS — K56.41 FECAL IMPACTION IN RECTUM (HCC): Primary | ICD-10-CM

## 2024-11-05 LAB
ABSOLUTE BANDS: 0.2 K/UL
ALBUMIN SERPL-MCNC: 4 G/DL (ref 3.5–5.2)
ALBUMIN/GLOB SERPL: 1.3 {RATIO} (ref 1–2.5)
ALP SERPL-CCNC: 112 U/L (ref 40–129)
ALT SERPL-CCNC: 8 U/L (ref 5–41)
ANION GAP SERPL CALCULATED.3IONS-SCNC: 12 MMOL/L (ref 9–17)
AST SERPL-CCNC: 12 U/L
BACTERIA URNS QL MICRO: ABNORMAL
BANDS: 3 %
BASOPHILS # BLD: 0 K/UL (ref 0–0.2)
BASOPHILS NFR BLD: 0 % (ref 0–2)
BILIRUB DIRECT SERPL-MCNC: 0.2 MG/DL
BILIRUB INDIRECT SERPL-MCNC: 0.5 MG/DL (ref 0–1)
BILIRUB SERPL-MCNC: 0.7 MG/DL (ref 0.3–1.2)
BILIRUB UR QL STRIP: NEGATIVE
BUN SERPL-MCNC: 32 MG/DL (ref 6–20)
BUN/CREAT SERPL: 23 (ref 9–20)
CALCIUM SERPL-MCNC: 9.6 MG/DL (ref 8.6–10.4)
CHARACTER UR: ABNORMAL
CHLORIDE SERPL-SCNC: 95 MMOL/L (ref 98–107)
CLARITY UR: CLEAR
CO2 SERPL-SCNC: 24 MMOL/L (ref 20–31)
COLOR UR: YELLOW
COMMENT: ABNORMAL
CREAT SERPL-MCNC: 1.4 MG/DL (ref 0.7–1.2)
EOSINOPHIL # BLD: 0 K/UL (ref 0–0.4)
EOSINOPHILS RELATIVE PERCENT: 0 % (ref 1–8)
EPI CELLS #/AREA URNS HPF: ABNORMAL /HPF (ref 0–5)
ERYTHROCYTE [DISTWIDTH] IN BLOOD BY AUTOMATED COUNT: 20.7 % (ref 11.8–14.4)
GFR, ESTIMATED: 58 ML/MIN/1.73M2
GLOBULIN SER CALC-MCNC: 3.2 G/DL (ref 1.5–3.8)
GLUCOSE SERPL-MCNC: 155 MG/DL (ref 70–99)
GLUCOSE UR STRIP-MCNC: NEGATIVE MG/DL
HCT VFR BLD AUTO: 19.8 % (ref 40.7–50.3)
HCT VFR BLD AUTO: 21.7 % (ref 40.7–50.3)
HGB BLD-MCNC: 6.7 G/DL (ref 13–17)
HGB BLD-MCNC: 7.4 G/DL (ref 13–17)
HGB UR QL STRIP.AUTO: NEGATIVE
IMM GRANULOCYTES # BLD AUTO: 0 K/UL (ref 0–0.3)
IMM GRANULOCYTES NFR BLD: 0 %
INR PPP: 1.2
KETONES UR STRIP-MCNC: NEGATIVE MG/DL
LEUKOCYTE ESTERASE UR QL STRIP: NEGATIVE
LIPASE SERPL-CCNC: 8 U/L (ref 13–60)
LYMPHOCYTES NFR BLD: 0 K/UL (ref 1–4.8)
LYMPHOCYTES RELATIVE PERCENT: 0 % (ref 15–43)
MCH RBC QN AUTO: 31 PG (ref 25.2–33.5)
MCHC RBC AUTO-ENTMCNC: 33.8 G/DL (ref 25.2–33.5)
MCV RBC AUTO: 91.7 FL (ref 82.6–102.9)
MONOCYTES NFR BLD: 0.26 K/UL (ref 0.1–1.2)
MONOCYTES NFR BLD: 4 % (ref 6–14)
MORPHOLOGY: ABNORMAL
MORPHOLOGY: ABNORMAL
MUCOUS THREADS URNS QL MICRO: ABNORMAL
NEUTROPHILS NFR BLD: 93 % (ref 44–74)
NEUTS SEG NFR BLD: 6.04 K/UL (ref 1.5–8.1)
NITRITE UR QL STRIP: NEGATIVE
NRBC BLD-RTO: 0 PER 100 WBC
PARTIAL THROMBOPLASTIN TIME: 28.4 SEC (ref 23.9–33.8)
PH UR STRIP: 7.5 [PH] (ref 5–6)
PLATELET # BLD AUTO: 160 K/UL (ref 138–453)
PMV BLD AUTO: 9.7 FL (ref 8.1–13.5)
POC OCCULT BLOOD, FECAL: NEGATIVE
POTASSIUM SERPL-SCNC: 5 MMOL/L (ref 3.7–5.3)
PROT SERPL-MCNC: 7.2 G/DL (ref 6.4–8.3)
PROT UR STRIP-MCNC: ABNORMAL MG/DL
PROTHROMBIN TIME: 14.2 SEC (ref 11.5–14.2)
RBC # BLD AUTO: 2.16 M/UL (ref 4.21–5.77)
RBC #/AREA URNS HPF: ABNORMAL /HPF (ref 0–4)
SODIUM SERPL-SCNC: 131 MMOL/L (ref 135–144)
SP GR UR STRIP: 1.01 (ref 1.01–1.02)
UROBILINOGEN UR STRIP-ACNC: NORMAL EU/DL (ref 0–1)
WBC #/AREA URNS HPF: ABNORMAL /HPF (ref 0–4)
WBC OTHER # BLD: 6.5 K/UL (ref 3.5–11.3)

## 2024-11-05 PROCEDURE — 2580000003 HC RX 258: Performed by: EMERGENCY MEDICINE

## 2024-11-05 PROCEDURE — 83690 ASSAY OF LIPASE: CPT

## 2024-11-05 PROCEDURE — 86920 COMPATIBILITY TEST SPIN: CPT

## 2024-11-05 PROCEDURE — 86900 BLOOD TYPING SEROLOGIC ABO: CPT

## 2024-11-05 PROCEDURE — 86850 RBC ANTIBODY SCREEN: CPT

## 2024-11-05 PROCEDURE — P9016 RBC LEUKOCYTES REDUCED: HCPCS

## 2024-11-05 PROCEDURE — 85014 HEMATOCRIT: CPT

## 2024-11-05 PROCEDURE — 85025 COMPLETE CBC W/AUTO DIFF WBC: CPT

## 2024-11-05 PROCEDURE — 85018 HEMOGLOBIN: CPT

## 2024-11-05 PROCEDURE — 36415 COLL VENOUS BLD VENIPUNCTURE: CPT

## 2024-11-05 PROCEDURE — 80048 BASIC METABOLIC PNL TOTAL CA: CPT

## 2024-11-05 PROCEDURE — 85730 THROMBOPLASTIN TIME PARTIAL: CPT

## 2024-11-05 PROCEDURE — 82272 OCCULT BLD FECES 1-3 TESTS: CPT

## 2024-11-05 PROCEDURE — 99285 EMERGENCY DEPT VISIT HI MDM: CPT

## 2024-11-05 PROCEDURE — 85610 PROTHROMBIN TIME: CPT

## 2024-11-05 PROCEDURE — 86901 BLOOD TYPING SEROLOGIC RH(D): CPT

## 2024-11-05 PROCEDURE — 2709999900 CT ABDOMEN PELVIS W IV CONTRAST

## 2024-11-05 PROCEDURE — 6360000004 HC RX CONTRAST MEDICATION: Performed by: EMERGENCY MEDICINE

## 2024-11-05 PROCEDURE — 80076 HEPATIC FUNCTION PANEL: CPT

## 2024-11-05 PROCEDURE — 36430 TRANSFUSION BLD/BLD COMPNT: CPT

## 2024-11-05 PROCEDURE — 81001 URINALYSIS AUTO W/SCOPE: CPT

## 2024-11-05 RX ORDER — OXYCODONE HYDROCHLORIDE 15 MG/1
15 TABLET ORAL EVERY 4 HOURS PRN
Qty: 84 TABLET | Refills: 0 | Status: SHIPPED | OUTPATIENT
Start: 2024-11-05 | End: 2024-11-19

## 2024-11-05 RX ORDER — SODIUM CHLORIDE 9 MG/ML
INJECTION, SOLUTION INTRAVENOUS PRN
Status: DISCONTINUED | OUTPATIENT
Start: 2024-11-05 | End: 2024-11-06 | Stop reason: HOSPADM

## 2024-11-05 RX ORDER — IOPAMIDOL 755 MG/ML
100 INJECTION, SOLUTION INTRAVASCULAR
Status: COMPLETED | OUTPATIENT
Start: 2024-11-05 | End: 2024-11-05

## 2024-11-05 RX ORDER — HEPARIN SODIUM (PORCINE) LOCK FLUSH IV SOLN 100 UNIT/ML 100 UNIT/ML
500 SOLUTION INTRAVENOUS PRN
Status: DISCONTINUED | OUTPATIENT
Start: 2024-11-05 | End: 2024-11-06 | Stop reason: HOSPADM

## 2024-11-05 RX ORDER — FENTANYL 12.5 UG/1
1 PATCH TRANSDERMAL
Qty: 5 PATCH | Refills: 0 | Status: SHIPPED | OUTPATIENT
Start: 2024-11-06 | End: 2024-11-21

## 2024-11-05 RX ORDER — 0.9 % SODIUM CHLORIDE 0.9 %
1000 INTRAVENOUS SOLUTION INTRAVENOUS ONCE
Status: COMPLETED | OUTPATIENT
Start: 2024-11-05 | End: 2024-11-05

## 2024-11-05 RX ADMIN — SODIUM CHLORIDE 1000 ML: 9 INJECTION, SOLUTION INTRAVENOUS at 16:46

## 2024-11-05 RX ADMIN — IOPAMIDOL 100 ML: 755 INJECTION, SOLUTION INTRAVENOUS at 17:20

## 2024-11-05 ASSESSMENT — PAIN DESCRIPTION - PAIN TYPE: TYPE: ACUTE PAIN

## 2024-11-05 ASSESSMENT — PAIN DESCRIPTION - ONSET: ONSET: GRADUAL

## 2024-11-05 ASSESSMENT — PAIN DESCRIPTION - LOCATION: LOCATION: RECTUM

## 2024-11-05 ASSESSMENT — PAIN DESCRIPTION - DESCRIPTORS: DESCRIPTORS: PRESSURE

## 2024-11-05 ASSESSMENT — PAIN SCALES - GENERAL: PAINLEVEL_OUTOF10: 10

## 2024-11-05 ASSESSMENT — LIFESTYLE VARIABLES: HOW OFTEN DO YOU HAVE A DRINK CONTAINING ALCOHOL: NEVER

## 2024-11-05 ASSESSMENT — PAIN DESCRIPTION - FREQUENCY: FREQUENCY: CONTINUOUS

## 2024-11-05 NOTE — CONSENT
Informed Consent for Blood Component Transfusion Note    I have discussed with the patient and mother the rationale for blood component transfusion; its benefits in treating or preventing fatigue, organ damage, or death; and its risk which includes mild transfusion reactions, rare risk of blood borne infection, or more serious but rare reactions. I have discussed the alternatives to transfusion, including the risk and consequences of not receiving transfusion. The patient and mother had an opportunity to ask questions and had agreed to proceed with transfusion of blood components.    Electronically signed by Simon Figueroa DO on 11/5/24 at 5:55 PM EST

## 2024-11-05 NOTE — TELEPHONE ENCOUNTER
Name: Dylan Dolan  : 1965  MRN: [id number]    Oncology Navigation Follow-Up Note    Contact Type:  Telephone    Notes:   Writer receives call from patient's niece Carol. She states patient is in [facility] ED today and will miss his hydration infusion at [facility]. She is wondering if the fluids can be run while he is in the ED today.  She also wants to check on status of pain med requests sent to Palliative care yesterday.     Writer calls ED and speaks with Carol. She will pass the request to the doctor.    Writer calls Crissy Barrera, CNP office and spoke to Windy. She states she will remind Wendie about need for scripts today.    Phone call to Carol and updated her on actions taken. She verbalized understanding.          Electronically signed by Bree Spears RN on 2024 at 3:19 PM

## 2024-11-06 LAB
ABO/RH: NORMAL
ANTIBODY SCREEN: NEGATIVE
ARM BAND NUMBER: NORMAL
BLOOD BANK BLOOD PRODUCT EXPIRATION DATE: NORMAL
BLOOD BANK DISPENSE STATUS: NORMAL
BLOOD BANK ISBT PRODUCT BLOOD TYPE: 6200
BLOOD BANK PRODUCT CODE: NORMAL
BLOOD BANK SAMPLE EXPIRATION: NORMAL
BLOOD BANK UNIT TYPE AND RH: NORMAL
BPU ID: NORMAL
COMPONENT: NORMAL
CROSSMATCH RESULT: NORMAL
TRANSFUSION STATUS: NORMAL
UNIT DIVISION: 0
UNIT ISSUE DATE/TIME: NORMAL

## 2024-11-06 NOTE — ED PROVIDER NOTES
[facility] ED  [address] [address]  Phone: [phone]        Pt Name: Dylan Dolan  MRN: [id number]  Birthdate 1965  Date of evaluation: 11/5/24      CHIEF COMPLAINT     Chief Complaint   Patient presents with    Constipation     Last BM 3 days ago         HISTORY OF PRESENT ILLNESS    Dylan Dolan is a 59 y.o. male who presents to our Emergency Department.    Complains of constipation. States ongoing for the past few days. Patient states he has constipation and rectal pain. Denies any abdominal pain. Patient Denies any nausea or vomiting.  Denies any fevers or chills.  Denies any chest pain shortness of breath.  States that he has not had this happen before.  He states that he has rectal pain and thinks that he just has to poop but cannot poop.            REVIEW OF SYSTEMS       Review of Systems   Constitutional:  Negative for chills, diaphoresis and fever.   HENT:  Negative for drooling.    Eyes:  Negative for redness.   Respiratory:  Negative for cough, chest tightness and shortness of breath.    Cardiovascular:  Negative for chest pain and palpitations.   Gastrointestinal:  Positive for constipation. Negative for abdominal pain, diarrhea, nausea and vomiting.   Genitourinary:  Negative for dysuria and hematuria.   Musculoskeletal:  Negative for neck stiffness.   Skin:  Negative for rash.   Neurological:  Negative for weakness, numbness and headaches.   Psychiatric/Behavioral:  Negative for agitation.        PAST MEDICAL HISTORY     Past Medical History:   Diagnosis Date    Ankle pain, left     chronic; s/p fracture in 1999    Gastroesophageal reflux disease     Hyperlipemia     Hypertension     Obesity     Tobacco abuse     Tourette's syndrome        SURGICAL HISTORY       Past Surgical History:   Procedure Laterality Date    ANKLE FRACTURE SURGERY Left 01/01/1999    LARYNGOSCOPY N/A 07/09/2024    DIRECT LARYNGOSCOPY performed by Rayo Jensen MD at [facility] OR    [facility]

## 2024-11-06 NOTE — ED PROVIDER NOTES
ATTENDING  ADDENDUM     Care of this patient was assumed from  To    The patient was seen for Constipation (Last BM 3 days ago)  .      The patient's initial evaluation and plan have been discussed with the prior provider who initially evaluated the patient.  Nursing Notes, Past Medical Hx, Past Surgical Hx, Social Hx, Allergies, and Family Hx were all reviewed.      ED COURSE      The patient was given the following medications:  Orders Placed This Encounter   Medications    sodium chloride 0.9 % bolus 1,000 mL    DISCONTD: heparin 100 UNIT/ML injection 500 Units    iopamidol (ISOVUE-370) 76 % injection 100 mL    DISCONTD: 0.9 % sodium chloride infusion       RECENT VITALS:   Temp: 98 °F (36.7 °C), Pulse: 76, Respirations: 18, BP: (!) 156/76    MEDICAL DECISION MAKING       Patient signed out to me by outgoing physician.  Patient receiving blood transfusion now.  Pending repeat H&H and patient reevaluation will decide on disposition.  Suspect discharge to home per patient request.    ED Course as of 11/06/24 0348   Tue Nov 05, 2024   2254 Hemoglobin Quant(!): 7.4  Repeat hemoglobin uptrending.  Patient feeling much better.  He prefers discharge home.  Patient already has follow-up appointment scheduled.  Previous physician talked with him about SFA occlusion.  Information provided in discharge paperwork to follow-up on abnormalities discussed and CT imaging prior to my arrival.  Per previous physicians signout repeat H&H uptrending.  Patient feeling better.  Plan for discharge home. [MA]      ED Course User Index  [MA] Magaly Lucio DO           FINAL IMPRESSION      1. Fecal impaction in rectum (HCC)    2. Anemia, unspecified type          DISPOSITION / PLAN     DISPOSITION Decision To Discharge 11/05/2024 10:52:43 PM           PATIENT REFERRED TO:  Rolan Mahmood MD  [address]  [place] [address]  [phone]    Schedule an appointment as soon as possible for a visit       Alley Vila  MD CYN  [address] [address]  [phone]    Schedule an appointment as soon as possible for a visit in 2 days      [facility] ED  [address] [place] [place] [address]  [phone]  Go to   As needed, If symptoms worsen      DISCHARGE MEDICATIONS:  Discharge Medication List as of 11/5/2024 10:54 PM        START taking these medications    Details   fentaNYL (DURAGESIC) 12 MCG/HR Place 1 patch onto the skin every 3 days for 15 days. Intended supply: 30 days Max Daily Amount: 1 patch, Disp-5 patch, R-0Normal      oxyCODONE (OXY-IR) 15 MG immediate release tablet Take 1 tablet by mouth every 4 hours as needed for Pain for up to 14 days. Intended supply: 14 days Max Daily Amount: 90 mg, Disp-84 tablet, R-0Normal             Magaly Lucio DO  Emergency Medicine Physician    (Please note that portions of this note were completed with a voice recognition program.  Efforts were made to edit the dictations but occasionally words are mis-transcribed.)               Magaly Lucio DO  11/06/24 0348

## 2024-11-06 NOTE — DISCHARGE INSTRUCTIONS
SUMMARY OF YOUR VISIT    Today you were seen for constipation.  We discussed your labs and imaging.  You did receive 1 unit of blood while in the emergency department.  I recommend you follow-up with your primary care provider and your already scheduled appointments.  If you have any new, changing, worsening or return of symptoms you can return the emerged part for reevaluation.    Please continue to take your home medication as previously prescribed, I have made no changes to your home medications.        You can return to our or another Emergency Department as needed or for worsening symptoms of chest pain, shortness of breath, high fevers not relieved by acetaminophen (Tylenol) and/or ibuprofen (Motrin / Advil), chills, feeling of your heart fluttering or racing, persistent nausea and/or vomiting, vomiting up blood, blood in your stool, loss of consciousness, numbness, weakness or tingling in the arms or legs or change in color of the extremities, changes in mental status, persistent headache, blurry vision, loss of bladder / bowel control, if you are unable to follow up with your physician, or other any other care or concern.    Thank You!    On behalf of the Emergency Department staff and team, I would like to thank you for allowing us the opportunity to participate in your health care and evaluation today.

## 2024-11-09 ENCOUNTER — HOSPITAL ENCOUNTER (OUTPATIENT)
Age: 59
Discharge: HOME | End: 2024-11-09
Payer: COMMERCIAL

## 2024-11-09 DIAGNOSIS — E87.1 HYPONATREMIA: ICD-10-CM

## 2024-11-09 DIAGNOSIS — C32.2 SQUAMOUS CELL CARCINOMA OF SUBGLOTTIS (HCC): ICD-10-CM

## 2024-11-09 DIAGNOSIS — Z51.11 CHEMOTHERAPY MANAGEMENT, ENCOUNTER FOR: ICD-10-CM

## 2024-11-09 DIAGNOSIS — C32.9 LARYNGEAL CANCER (HCC): ICD-10-CM

## 2024-11-09 LAB
ALBUMIN SERPL-MCNC: 3.6 G/DL (ref 3.5–5.2)
ALBUMIN/GLOB SERPL: 1 {RATIO} (ref 1–2.5)
ALP SERPL-CCNC: 118 U/L (ref 40–129)
ALT SERPL-CCNC: 9 U/L (ref 5–41)
ANION GAP SERPL CALCULATED.3IONS-SCNC: 13 MMOL/L (ref 9–17)
AST SERPL-CCNC: 12 U/L
BASOPHILS # BLD: 0 K/UL (ref 0–0.2)
BASOPHILS NFR BLD: 0 % (ref 0–2)
BILIRUB SERPL-MCNC: 0.7 MG/DL (ref 0.3–1.2)
BUN SERPL-MCNC: 20 MG/DL (ref 6–20)
BUN/CREAT SERPL: 15 (ref 9–20)
CALCIUM SERPL-MCNC: 9 MG/DL (ref 8.6–10.4)
CHLORIDE SERPL-SCNC: 95 MMOL/L (ref 98–107)
CO2 SERPL-SCNC: 23 MMOL/L (ref 20–31)
CREAT SERPL-MCNC: 1.3 MG/DL (ref 0.7–1.2)
EOSINOPHIL # BLD: 0 K/UL (ref 0–0.4)
EOSINOPHILS RELATIVE PERCENT: 0 % (ref 1–8)
ERYTHROCYTE [DISTWIDTH] IN BLOOD BY AUTOMATED COUNT: 20 % (ref 11.8–14.4)
GFR, ESTIMATED: 64 ML/MIN/1.73M2
GLUCOSE SERPL-MCNC: 124 MG/DL (ref 70–99)
HCT VFR BLD AUTO: 24.1 % (ref 40.7–50.3)
HGB BLD-MCNC: 8.2 G/DL (ref 13–17)
IMM GRANULOCYTES # BLD AUTO: 0 K/UL (ref 0–0.3)
IMM GRANULOCYTES NFR BLD: 0 %
LYMPHOCYTES NFR BLD: 0.51 K/UL (ref 1–4.8)
LYMPHOCYTES RELATIVE PERCENT: 9 % (ref 15–43)
MAGNESIUM SERPL-MCNC: 1.9 MG/DL (ref 1.6–2.6)
MCH RBC QN AUTO: 32.2 PG (ref 25.2–33.5)
MCHC RBC AUTO-ENTMCNC: 34 G/DL (ref 25.2–33.5)
MCV RBC AUTO: 94.5 FL (ref 82.6–102.9)
MONOCYTES NFR BLD: 0.4 K/UL (ref 0.1–1.2)
MONOCYTES NFR BLD: 7 % (ref 6–14)
MORPHOLOGY: ABNORMAL
NEUTROPHILS NFR BLD: 84 % (ref 44–74)
NEUTS SEG NFR BLD: 4.79 K/UL (ref 1.5–8.1)
NRBC BLD-RTO: 0 PER 100 WBC
PHOSPHATE SERPL-MCNC: 4.7 MG/DL (ref 2.5–4.5)
PLATELET # BLD AUTO: 155 K/UL (ref 138–453)
PMV BLD AUTO: 9 FL (ref 8.1–13.5)
POTASSIUM SERPL-SCNC: 4.5 MMOL/L (ref 3.7–5.3)
PROT SERPL-MCNC: 7.1 G/DL (ref 6.4–8.3)
RBC # BLD AUTO: 2.55 M/UL (ref 4.21–5.77)
SODIUM SERPL-SCNC: 131 MMOL/L (ref 135–144)
WBC OTHER # BLD: 5.7 K/UL (ref 3.5–11.3)

## 2024-11-09 PROCEDURE — 36415 COLL VENOUS BLD VENIPUNCTURE: CPT

## 2024-11-09 PROCEDURE — 85025 COMPLETE CBC W/AUTO DIFF WBC: CPT

## 2024-11-09 PROCEDURE — 84100 ASSAY OF PHOSPHORUS: CPT

## 2024-11-09 PROCEDURE — 83735 ASSAY OF MAGNESIUM: CPT

## 2024-11-09 PROCEDURE — 80053 COMPREHEN METABOLIC PANEL: CPT

## 2024-11-09 ASSESSMENT — ENCOUNTER SYMPTOMS
SHORTNESS OF BREATH: 0
COUGH: 0
NAUSEA: 0
CHEST TIGHTNESS: 0
EYE REDNESS: 0
VOMITING: 0
DIARRHEA: 0
CONSTIPATION: 1
ABDOMINAL PAIN: 0

## 2024-11-12 ENCOUNTER — TELEPHONE (OUTPATIENT)
Dept: ONCOLOGY | Age: 59
End: 2024-11-12

## 2024-11-12 ENCOUNTER — OFFICE VISIT (OUTPATIENT)
Dept: ONCOLOGY | Age: 59
End: 2024-11-12
Payer: COMMERCIAL

## 2024-11-12 VITALS
RESPIRATION RATE: 18 BRPM | DIASTOLIC BLOOD PRESSURE: 78 MMHG | BODY MASS INDEX: 27.16 KG/M2 | HEIGHT: 73 IN | OXYGEN SATURATION: 99 % | WEIGHT: 204.9 LBS | TEMPERATURE: 98.2 F | HEART RATE: 80 BPM | SYSTOLIC BLOOD PRESSURE: 138 MMHG

## 2024-11-12 DIAGNOSIS — E87.1 HYPONATREMIA: ICD-10-CM

## 2024-11-12 DIAGNOSIS — G89.3 CANCER ASSOCIATED PAIN: ICD-10-CM

## 2024-11-12 DIAGNOSIS — C32.2 SQUAMOUS CELL CARCINOMA OF SUBGLOTTIS (HCC): Primary | ICD-10-CM

## 2024-11-12 PROCEDURE — 3075F SYST BP GE 130 - 139MM HG: CPT | Performed by: INTERNAL MEDICINE

## 2024-11-12 PROCEDURE — 3078F DIAST BP <80 MM HG: CPT | Performed by: INTERNAL MEDICINE

## 2024-11-12 PROCEDURE — 99214 OFFICE O/P EST MOD 30 MIN: CPT | Performed by: INTERNAL MEDICINE

## 2024-11-12 RX ORDER — NYSTATIN 100000 [USP'U]/ML
SUSPENSION ORAL
COMMUNITY
Start: 2024-10-14

## 2024-11-12 NOTE — PATIENT INSTRUCTIONS
Ct pet in first week of jan and then follow up in person     IV fluid 1 lit this week ( already left message for infusion center )

## 2024-11-12 NOTE — TELEPHONE ENCOUNTER
Name: Dylan Dolan  : 1965  MRN: [id number]    Oncology Navigation Follow-Up Note    Contact Type:  Medical Oncology    Notes:  Writer met with patient and mother while in office for oncology appt and was present during med onc visit. He denies any navigation needs at present. He struggles to get in enough fluid and nutrition. He is supplementing with protein drinks. Encouraged him to increase the drinks to at least two daily and to increase fluids. Dr. Ag orders IV fluids this week to supplement.   Encouraged him to call with questions or concerns.     Navigator following for continuity of care.        Electronically signed by Bree Spears RN on 2024 at 4:54 PM

## 2024-11-13 ENCOUNTER — HOSPITAL ENCOUNTER (OUTPATIENT)
Dept: INFUSION THERAPY | Age: 59
Discharge: HOME | End: 2024-11-13
Payer: COMMERCIAL

## 2024-11-13 VITALS
SYSTOLIC BLOOD PRESSURE: 162 MMHG | DIASTOLIC BLOOD PRESSURE: 97 MMHG | HEART RATE: 91 BPM | TEMPERATURE: 97.8 F | RESPIRATION RATE: 16 BRPM | OXYGEN SATURATION: 100 %

## 2024-11-13 DIAGNOSIS — C32.9 LARYNGEAL CANCER (HCC): Primary | ICD-10-CM

## 2024-11-13 DIAGNOSIS — I10 ESSENTIAL HYPERTENSION: ICD-10-CM

## 2024-11-13 PROCEDURE — 2580000003 HC RX 258: Performed by: INTERNAL MEDICINE

## 2024-11-13 PROCEDURE — 6360000002 HC RX W HCPCS: Performed by: INTERNAL MEDICINE

## 2024-11-13 PROCEDURE — 96360 HYDRATION IV INFUSION INIT: CPT

## 2024-11-13 RX ORDER — SODIUM CHLORIDE 0.9 % (FLUSH) 0.9 %
5-40 SYRINGE (ML) INJECTION PRN
OUTPATIENT
Start: 2024-11-13

## 2024-11-13 RX ORDER — DIPHENHYDRAMINE HYDROCHLORIDE 50 MG/ML
50 INJECTION INTRAMUSCULAR; INTRAVENOUS
OUTPATIENT
Start: 2024-11-13

## 2024-11-13 RX ORDER — ACETAMINOPHEN 325 MG/1
650 TABLET ORAL
OUTPATIENT
Start: 2024-11-13

## 2024-11-13 RX ORDER — ONDANSETRON 2 MG/ML
8 INJECTION INTRAMUSCULAR; INTRAVENOUS
OUTPATIENT
Start: 2024-11-13

## 2024-11-13 RX ORDER — SODIUM CHLORIDE 0.9 % (FLUSH) 0.9 %
5-40 SYRINGE (ML) INJECTION PRN
Status: DISCONTINUED | OUTPATIENT
Start: 2024-11-13 | End: 2024-11-14 | Stop reason: HOSPADM

## 2024-11-13 RX ORDER — HYDROCORTISONE SODIUM SUCCINATE 100 MG/2ML
100 INJECTION INTRAMUSCULAR; INTRAVENOUS
OUTPATIENT
Start: 2024-11-13

## 2024-11-13 RX ORDER — ALBUTEROL SULFATE 90 UG/1
4 INHALANT RESPIRATORY (INHALATION) PRN
OUTPATIENT
Start: 2024-11-13

## 2024-11-13 RX ORDER — SODIUM CHLORIDE 9 MG/ML
INJECTION, SOLUTION INTRAVENOUS CONTINUOUS
OUTPATIENT
Start: 2024-11-13

## 2024-11-13 RX ORDER — EPINEPHRINE 1 MG/ML
0.3 INJECTION, SOLUTION, CONCENTRATE INTRAVENOUS PRN
OUTPATIENT
Start: 2024-11-13

## 2024-11-13 RX ORDER — SODIUM CHLORIDE 9 MG/ML
5-250 INJECTION, SOLUTION INTRAVENOUS PRN
OUTPATIENT
Start: 2024-11-13

## 2024-11-13 RX ORDER — HEPARIN 100 UNIT/ML
500 SYRINGE INTRAVENOUS PRN
OUTPATIENT
Start: 2024-11-13

## 2024-11-13 RX ORDER — HEPARIN 100 UNIT/ML
500 SYRINGE INTRAVENOUS PRN
Status: DISCONTINUED | OUTPATIENT
Start: 2024-11-13 | End: 2024-11-14 | Stop reason: HOSPADM

## 2024-11-13 RX ORDER — 0.9 % SODIUM CHLORIDE 0.9 %
1000 INTRAVENOUS SOLUTION INTRAVENOUS PRN
Status: DISCONTINUED | OUTPATIENT
Start: 2024-11-13 | End: 2024-11-14 | Stop reason: HOSPADM

## 2024-11-13 RX ORDER — LOSARTAN POTASSIUM AND HYDROCHLOROTHIAZIDE 25; 100 MG/1; MG/1
1 TABLET ORAL DAILY
Qty: 90 TABLET | Refills: 0 | Status: SHIPPED | OUTPATIENT
Start: 2024-11-13

## 2024-11-13 RX ADMIN — SODIUM CHLORIDE, PRESERVATIVE FREE 10 ML: 5 INJECTION INTRAVENOUS at 15:05

## 2024-11-13 RX ADMIN — HEPARIN 500 UNITS: 100 SYRINGE at 15:06

## 2024-11-13 RX ADMIN — SODIUM CHLORIDE 1000 ML: 9 INJECTION, SOLUTION INTRAVENOUS at 13:58

## 2024-11-13 ASSESSMENT — PAIN SCALES - GENERAL: PAINLEVEL_OUTOF10: 3

## 2024-11-13 ASSESSMENT — PAIN DESCRIPTION - LOCATION: LOCATION: THROAT

## 2024-11-13 ASSESSMENT — PAIN DESCRIPTION - DESCRIPTORS: DESCRIPTORS: BURNING

## 2024-11-13 NOTE — PROGRESS NOTES
Hydration completed and no sign of reaction at this time.  Pt ambulated out infusion center without difficulty in stable condition.

## 2024-11-13 NOTE — TELEPHONE ENCOUNTER
Dylan called requesting a refill of the below medication which has been pended for you:     Requested Prescriptions     Pending Prescriptions Disp Refills    losartan-hydroCHLOROthiazide (HYZAAR) 100-25 MG per tablet 90 tablet 0     Sig: Take 1 tablet by mouth daily       Last Appointment Date: 8/22/2024  Next Appointment Date: 11/27/2024    Allergies   Allergen Reactions    Chantix [Varenicline] Other (See Comments)     Unusual dreams.

## 2024-11-18 DIAGNOSIS — K11.7 SIALORRHEA: ICD-10-CM

## 2024-11-18 DIAGNOSIS — C32.2 SQUAMOUS CELL CARCINOMA OF SUBGLOTTIS (HCC): ICD-10-CM

## 2024-11-18 DIAGNOSIS — R11.0 NAUSEA: ICD-10-CM

## 2024-11-18 NOTE — TELEPHONE ENCOUNTER
Dr. Vila is not the prescriber of this medication.    Routed to correct prescribers office to address

## 2024-11-19 ENCOUNTER — TELEPHONE (OUTPATIENT)
Dept: ONCOLOGY | Age: 59
End: 2024-11-19

## 2024-11-19 RX ORDER — FENTANYL 12.5 UG/1
1 PATCH TRANSDERMAL
Qty: 5 PATCH | Refills: 0 | Status: SHIPPED | OUTPATIENT
Start: 2024-11-19 | End: 2024-12-04

## 2024-11-19 RX ORDER — OXYCODONE HYDROCHLORIDE 15 MG/1
15 TABLET ORAL EVERY 4 HOURS PRN
Qty: 84 TABLET | Refills: 0 | Status: SHIPPED | OUTPATIENT
Start: 2024-11-19 | End: 2024-12-03

## 2024-11-19 RX ORDER — SCOLOPAMINE TRANSDERMAL SYSTEM 1 MG/1
1 PATCH, EXTENDED RELEASE TRANSDERMAL
Qty: 10 PATCH | Refills: 0 | Status: SHIPPED | OUTPATIENT
Start: 2024-11-19 | End: 2024-12-19

## 2024-11-25 ENCOUNTER — TELEPHONE (OUTPATIENT)
Dept: PALLATIVE CARE | Age: 59
End: 2024-11-25

## 2024-11-25 NOTE — TELEPHONE ENCOUNTER
Called patient with reminder for [facility] appointment with Crissy Barrera NP.  No answer and Voicemail is full, no way to leave message.   [facility] Palliative Care Coordinator  Bethany LANGSTONN, RN  [facility] [phone]/ [facility] [phone]/ [facility] [phone]

## 2024-11-26 ENCOUNTER — CLINICAL DOCUMENTATION (OUTPATIENT)
Dept: ONCOLOGY | Age: 59
End: 2024-11-26

## 2024-11-26 ENCOUNTER — OFFICE VISIT (OUTPATIENT)
Dept: PALLATIVE CARE | Age: 59
End: 2024-11-26
Payer: COMMERCIAL

## 2024-11-26 VITALS
WEIGHT: 205.4 LBS | DIASTOLIC BLOOD PRESSURE: 85 MMHG | SYSTOLIC BLOOD PRESSURE: 170 MMHG | HEART RATE: 73 BPM | OXYGEN SATURATION: 99 % | BODY MASS INDEX: 27.1 KG/M2 | TEMPERATURE: 97.8 F

## 2024-11-26 DIAGNOSIS — Z51.5 PALLIATIVE CARE ENCOUNTER: ICD-10-CM

## 2024-11-26 DIAGNOSIS — C32.2 SQUAMOUS CELL CARCINOMA OF SUBGLOTTIS (HCC): Primary | ICD-10-CM

## 2024-11-26 DIAGNOSIS — Z93.0 TRACHEOSTOMY IN PLACE (HCC): ICD-10-CM

## 2024-11-26 DIAGNOSIS — K59.03 DRUG-INDUCED CONSTIPATION: ICD-10-CM

## 2024-11-26 DIAGNOSIS — G63 POLYNEUROPATHY ASSOCIATED WITH UNDERLYING DISEASE (HCC): ICD-10-CM

## 2024-11-26 DIAGNOSIS — R07.0 THROAT PAIN: ICD-10-CM

## 2024-11-26 PROCEDURE — 3079F DIAST BP 80-89 MM HG: CPT | Performed by: NURSE PRACTITIONER

## 2024-11-26 PROCEDURE — 99213 OFFICE O/P EST LOW 20 MIN: CPT | Performed by: NURSE PRACTITIONER

## 2024-11-26 PROCEDURE — 3077F SYST BP >= 140 MM HG: CPT | Performed by: NURSE PRACTITIONER

## 2024-11-26 NOTE — PROGRESS NOTES
treatment    Progress towards goal: progressing        Constance Garcia RD, LD, CNSC  Registered Dietitian  [facility]  [phone]

## 2024-11-26 NOTE — PATIENT INSTRUCTIONS
RTO in 8 weeks  Refill Fentanyl and Oxycodone   Increase Senokot to 2 tabs daily  Miralax PRN for continued constipation

## 2024-11-27 ENCOUNTER — OFFICE VISIT (OUTPATIENT)
Dept: FAMILY MEDICINE CLINIC | Age: 59
End: 2024-11-27

## 2024-11-27 VITALS
OXYGEN SATURATION: 100 % | HEART RATE: 80 BPM | WEIGHT: 204 LBS | BODY MASS INDEX: 27.04 KG/M2 | SYSTOLIC BLOOD PRESSURE: 132 MMHG | DIASTOLIC BLOOD PRESSURE: 84 MMHG | HEIGHT: 73 IN

## 2024-11-27 DIAGNOSIS — R06.02 SHORTNESS OF BREATH: ICD-10-CM

## 2024-11-27 DIAGNOSIS — F95.2 TOURETTE'S SYNDROME: ICD-10-CM

## 2024-11-27 DIAGNOSIS — K21.9 GASTROESOPHAGEAL REFLUX DISEASE, UNSPECIFIED WHETHER ESOPHAGITIS PRESENT: ICD-10-CM

## 2024-11-27 DIAGNOSIS — C32.9 LARYNX CARCINOMA (HCC): ICD-10-CM

## 2024-11-27 DIAGNOSIS — I10 ESSENTIAL HYPERTENSION: ICD-10-CM

## 2024-11-27 DIAGNOSIS — E11.40 TYPE 2 DIABETES MELLITUS WITH DIABETIC NEUROPATHY, WITHOUT LONG-TERM CURRENT USE OF INSULIN (HCC): Primary | ICD-10-CM

## 2024-11-27 DIAGNOSIS — Z23 NEED FOR INFLUENZA VACCINATION: ICD-10-CM

## 2024-11-27 DIAGNOSIS — E78.2 MIXED HYPERLIPIDEMIA: ICD-10-CM

## 2024-11-27 PROBLEM — L97.524 SKIN ULCER OF TOE OF LEFT FOOT WITH NECROSIS OF BONE (HCC): Status: RESOLVED | Noted: 2020-07-27 | Resolved: 2024-11-27

## 2024-11-27 RX ORDER — LOSARTAN POTASSIUM AND HYDROCHLOROTHIAZIDE 25; 100 MG/1; MG/1
1 TABLET ORAL DAILY
Qty: 90 TABLET | Refills: 1 | Status: SHIPPED | OUTPATIENT
Start: 2024-11-27

## 2024-11-27 RX ORDER — OXYCODONE HYDROCHLORIDE 15 MG/1
15 TABLET ORAL EVERY 4 HOURS PRN
Qty: 84 TABLET | Refills: 0 | Status: SHIPPED | OUTPATIENT
Start: 2024-12-03 | End: 2024-12-17

## 2024-11-27 RX ORDER — SENNOSIDES A AND B 8.6 MG/1
1 TABLET, FILM COATED ORAL 2 TIMES DAILY
Qty: 60 TABLET | Refills: 1 | Status: SHIPPED | OUTPATIENT
Start: 2024-11-27 | End: 2025-01-26

## 2024-11-27 RX ORDER — ATORVASTATIN CALCIUM 40 MG/1
40 TABLET, FILM COATED ORAL DAILY
Qty: 90 TABLET | Refills: 1 | Status: SHIPPED | OUTPATIENT
Start: 2024-11-27

## 2024-11-27 RX ORDER — PIMOZIDE 1 MG/1
2 TABLET ORAL 2 TIMES DAILY
Qty: 360 TABLET | Refills: 1 | Status: SHIPPED | OUTPATIENT
Start: 2024-11-27

## 2024-11-27 RX ORDER — IPRATROPIUM BROMIDE AND ALBUTEROL SULFATE 2.5; .5 MG/3ML; MG/3ML
3 SOLUTION RESPIRATORY (INHALATION) EVERY 4 HOURS PRN
Qty: 360 ML | Refills: 1 | Status: SHIPPED | OUTPATIENT
Start: 2024-11-27

## 2024-11-27 RX ORDER — FENTANYL 12.5 UG/1
1 PATCH TRANSDERMAL
Qty: 5 PATCH | Refills: 0 | Status: SHIPPED | OUTPATIENT
Start: 2024-12-03 | End: 2024-12-18

## 2024-11-27 NOTE — PROGRESS NOTES
Patient would like flu vaccine today. Declines all other vaccines   
15 MG immediate release tablet Take 1 tablet by mouth every 4 hours as needed for Pain for up to 14 days. Intended supply: 14 days Max Daily Amount: 90 mg 84 tablet 0    scopolamine (TRANSDERM-SCOP) transdermal patch Place 1 patch onto the skin every 72 hours 10 patch 0    losartan-hydroCHLOROthiazide (HYZAAR) 100-25 MG per tablet Take 1 tablet by mouth daily 90 tablet 0    nystatin (MYCOSTATIN) 527015 UNIT/ML suspension       ondansetron (ZOFRAN-ODT) 8 MG TBDP disintegrating tablet Take 1 tablet by mouth 3 times daily as needed for Nausea or Vomiting 90 tablet 2    Magic Mouthwash (MIRACLE MOUTHWASH) Swish and swallow 5 mLs 4 times daily as needed for Irritation Benadryl Elixir, Maalox, viscous xylocaine, nystatin 1:1 mix 480 mL 3    pimozide (ORAP) 1 MG tablet TAKE 2 TABLETS TWICE A  tablet 0    LORazepam (ATIVAN) 1 MG tablet Take 1 tablet by mouth every 6 hours.      atorvastatin (LIPITOR) 40 MG tablet TAKE 1 TABLET DAILY 90 tablet 0    omeprazole (PRILOSEC) 20 MG delayed release capsule TAKE 1 CAPSULE DAILY 90 capsule 0    prochlorperazine (COMPAZINE) 10 MG tablet Take 1 tablet by mouth every 6 hours as needed (nausea vomiting) 120 tablet 3    lidocaine-prilocaine (EMLA) 2.5-2.5 % cream Apply topically as needed daily to port sight 1 hour prior to port access 30 g 2    ipratropium 0.5 mg-albuterol 2.5 mg (DUONEB) 0.5-2.5 (3) MG/3ML SOLN nebulizer solution Inhale 3 mLs into the lungs every 4 hours as needed for Shortness of Breath 360 mL 1    metFORMIN (GLUCOPHAGE) 500 MG tablet Take 1 tablet by mouth 2 times daily (with meals) 60 tablet 3    betamethasone valerate (VALISONE) 0.1 % cream Apply topically 2 times daily. 45 g 2    losartan (COZAAR) 100 MG tablet Take 1 tablet by mouth daily 100-25mg      docusate sodium (COLACE) 100 MG capsule Take 1 capsule by mouth 2 times daily      acetaminophen (TYLENOL) 500 MG tablet Take 1 tablet by mouth as needed for Pain 2 tablets as needed      Nebulizers

## 2024-12-02 ENCOUNTER — HOSPITAL ENCOUNTER (OUTPATIENT)
Dept: RADIATION ONCOLOGY | Age: 59
Discharge: HOME | End: 2024-12-02
Payer: COMMERCIAL

## 2024-12-02 VITALS
WEIGHT: 200.6 LBS | OXYGEN SATURATION: 100 % | TEMPERATURE: 98.2 F | HEART RATE: 81 BPM | BODY MASS INDEX: 26.47 KG/M2 | DIASTOLIC BLOOD PRESSURE: 96 MMHG | RESPIRATION RATE: 18 BRPM | SYSTOLIC BLOOD PRESSURE: 138 MMHG

## 2024-12-02 DIAGNOSIS — C32.2 SQUAMOUS CELL CARCINOMA OF SUBGLOTTIS (HCC): ICD-10-CM

## 2024-12-02 DIAGNOSIS — C32.9 LARYNGEAL CANCER (HCC): Primary | ICD-10-CM

## 2024-12-02 PROCEDURE — 99212 OFFICE O/P EST SF 10 MIN: CPT | Performed by: RADIOLOGY

## 2024-12-02 ASSESSMENT — PAIN DESCRIPTION - LOCATION: LOCATION: THROAT

## 2024-12-02 ASSESSMENT — PAIN SCALES - GENERAL: PAINLEVEL_OUTOF10: 6

## 2024-12-02 NOTE — PROGRESS NOTES
Dylan CHURCHILL Arms  12/2/2024  3:16 PM      Vitals:    12/02/24 1459   BP: (!) 138/96   Pulse: 81   Resp: 18   Temp: 98.2 °F (36.8 °C)   SpO2: 100%    :     Pain Assessment: 0-10  Pain Level: 6       Wt Readings from Last 1 Encounters:   12/02/24 91 kg (200 lb 9.6 oz)                Current Outpatient Medications:     senna (SENOKOT) 8.6 MG tablet, Take 1 tablet by mouth 2 times daily, Disp: 60 tablet, Rfl: 1    [START ON 12/3/2024] fentaNYL (DURAGESIC) 12 MCG/HR, Place 1 patch onto the skin every 3 days for 15 days. Intended supply: 30 days Max Daily Amount: 1 patch, Disp: 5 patch, Rfl: 0    [START ON 12/3/2024] oxyCODONE (OXY-IR) 15 MG immediate release tablet, Take 1 tablet by mouth every 4 hours as needed for Pain for up to 14 days. Intended supply: 14 days Max Daily Amount: 90 mg, Disp: 84 tablet, Rfl: 0    atorvastatin (LIPITOR) 40 MG tablet, Take 1 tablet by mouth daily, Disp: 90 tablet, Rfl: 1    ipratropium 0.5 mg-albuterol 2.5 mg (DUONEB) 0.5-2.5 (3) MG/3ML SOLN nebulizer solution, Inhale 3 mLs into the lungs every 4 hours as needed for Shortness of Breath, Disp: 360 mL, Rfl: 1    losartan-hydroCHLOROthiazide (HYZAAR) 100-25 MG per tablet, Take 1 tablet by mouth daily, Disp: 90 tablet, Rfl: 1    metFORMIN (GLUCOPHAGE) 500 MG tablet, Take 1 tablet by mouth 2 times daily (with meals), Disp: 180 tablet, Rfl: 1    omeprazole (PRILOSEC) 20 MG delayed release capsule, TAKE 1 CAPSULE DAILY, Disp: 90 capsule, Rfl: 1    pimozide (ORAP) 1 MG tablet, Take 2 tablets by mouth 2 times daily, Disp: 360 tablet, Rfl: 1    scopolamine (TRANSDERM-SCOP) transdermal patch, Place 1 patch onto the skin every 72 hours, Disp: 10 patch, Rfl: 0    nystatin (MYCOSTATIN) 256915 UNIT/ML suspension, , Disp: , Rfl:     ondansetron (ZOFRAN-ODT) 8 MG TBDP disintegrating tablet, Take 1 tablet by mouth 3 times daily as needed for Nausea or Vomiting, Disp: 90 tablet, Rfl: 2    Magic Mouthwash (MIRACLE MOUTHWASH), Swish and swallow 5 mLs 4 times

## 2024-12-04 NOTE — PROGRESS NOTES
[facility]            Radiation Oncology          [address] [place] [address]        O: [phone]        F: [phone]       [email or web address]           Date of Service: 2024     Location:  [facility] Radiation Oncology,   [address] [place], [place] [address]   [phone]       RADIATION ONCOLOGY FOLLOW UP NOTE    Patient ID:   Dylan Dolan  : 1965   MRN: [id number]    DIAGNOSIS:   Cancer Staging   Larynx carcinoma (HCC)  Staging form: Larynx - Glottis, AJCC 8th Edition  - Clinical stage from 2024: Stage MIKE (cT3, cN2, cM0) - Signed by Pasha Starks MD on 2024    -s/p biopsy and trach 24  -s/p ChemoRT cisplatin + 70Gy 10/29/24    INTERVAL HISTORY:   Dylan Dolan is a 59 y.o.. male with symptoms of coughing and trouble breathing as well as hoarseness in his voice for several months.  Patient presented to the ED in [place] and was noted to have a stridor.  Patient had a CT neck done on 2024 which showed a 8 mm mass on the left vocal cord.  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  His intubation was transition to an open tracheostomy for airway protection.  Patient does have a history of smoking a pack and half a day since age 17.  Patient comes in today with his niece who is his POA to review his recent diagnosis.  He had a PET scan on 2024 which showed active mass in the left vocal cord as well as left cervical metabolically active lymph nodes from levels 2 through 5.  There is no evidence of metastases seen.  Patient has been seen by ENT at [facility] to discuss options of surgery and deferred laryngectomy for organ preservation treatment.  Patient went on to complete a course of definitive chemoradiation therapy completing at the end of October and is here

## 2024-12-17 DIAGNOSIS — C32.2 SQUAMOUS CELL CARCINOMA OF SUBGLOTTIS (HCC): ICD-10-CM

## 2024-12-17 RX ORDER — FENTANYL 12.5 UG/1
1 PATCH TRANSDERMAL
Qty: 5 PATCH | Refills: 0 | Status: CANCELLED | OUTPATIENT
Start: 2024-12-17 | End: 2025-01-01

## 2024-12-18 DIAGNOSIS — C32.2 SQUAMOUS CELL CARCINOMA OF SUBGLOTTIS (HCC): ICD-10-CM

## 2024-12-19 DIAGNOSIS — C32.2 SQUAMOUS CELL CARCINOMA OF SUBGLOTTIS (HCC): Primary | ICD-10-CM

## 2024-12-19 DIAGNOSIS — C32.2 SQUAMOUS CELL CARCINOMA OF SUBGLOTTIS (HCC): ICD-10-CM

## 2024-12-19 RX ORDER — OXYCODONE HYDROCHLORIDE 15 MG/1
15 TABLET ORAL EVERY 4 HOURS PRN
Qty: 84 TABLET | Refills: 0 | Status: SHIPPED | OUTPATIENT
Start: 2024-12-19 | End: 2025-01-02

## 2024-12-19 RX ORDER — FENTANYL 12.5 UG/1
1 PATCH TRANSDERMAL
Qty: 5 PATCH | Refills: 0 | OUTPATIENT
Start: 2024-12-19 | End: 2025-01-03

## 2024-12-19 RX ORDER — OXYCODONE HYDROCHLORIDE 15 MG/1
15 TABLET ORAL EVERY 4 HOURS PRN
Qty: 84 TABLET | Refills: 0 | OUTPATIENT
Start: 2024-12-19 | End: 2025-01-02

## 2024-12-19 RX ORDER — FENTANYL 12.5 UG/1
1 PATCH TRANSDERMAL
Qty: 10 PATCH | Refills: 0 | Status: SHIPPED | OUTPATIENT
Start: 2024-12-19 | End: 2025-01-18

## 2024-12-20 RX ORDER — FENTANYL 12.5 UG/1
1 PATCH TRANSDERMAL
Qty: 10 PATCH | Refills: 0 | OUTPATIENT
Start: 2024-12-20 | End: 2025-01-19

## 2024-12-27 ENCOUNTER — TELEPHONE (OUTPATIENT)
Dept: ONCOLOGY | Age: 59
End: 2024-12-27

## 2024-12-27 NOTE — TELEPHONE ENCOUNTER
Last Appt:  11/12/2024  Next Appt:   1/14/2025  Med verified in Epic     Patient stopped at front window requesting to be seen sooner due to having throat pain and swelling. Patient states he does not have difficulty swallowing and denies fever, but the pain is persistent. Writer advised patient to go to urgent care to rule out possible infection, patient declined to do so. Patient stated, \"They won't do anything for me.\"    Patient would like to see Dr. Ag on Tuesday, December 31st.    Please call patient at [phone]

## 2024-12-31 DIAGNOSIS — R06.02 SHORTNESS OF BREATH: ICD-10-CM

## 2024-12-31 DIAGNOSIS — C32.2 SQUAMOUS CELL CARCINOMA OF SUBGLOTTIS (HCC): ICD-10-CM

## 2024-12-31 RX ORDER — IPRATROPIUM BROMIDE AND ALBUTEROL SULFATE 2.5; .5 MG/3ML; MG/3ML
3 SOLUTION RESPIRATORY (INHALATION) EVERY 4 HOURS PRN
Qty: 360 ML | Refills: 1 | Status: SHIPPED | OUTPATIENT
Start: 2024-12-31

## 2024-12-31 NOTE — TELEPHONE ENCOUNTER
Dylan called requesting a refill of the below medication which has been pended for you:     Requested Prescriptions     Pending Prescriptions Disp Refills    ipratropium 0.5 mg-albuterol 2.5 mg (DUONEB) 0.5-2.5 (3) MG/3ML SOLN nebulizer solution 360 mL 1     Sig: Inhale 3 mLs into the lungs every 4 hours as needed for Shortness of Breath       Last Appointment Date: 11/27/2024  Next Appointment Date: 5/29/2025    Allergies   Allergen Reactions    Chantix [Varenicline] Other (See Comments)     Unusual dreams.

## 2025-01-01 ENCOUNTER — HOSPITAL ENCOUNTER (EMERGENCY)
Age: 60
Discharge: HOME | End: 2025-01-01
Attending: EMERGENCY MEDICINE
Payer: COMMERCIAL

## 2025-01-01 ENCOUNTER — APPOINTMENT (OUTPATIENT)
Dept: CT IMAGING | Age: 60
End: 2025-01-01
Payer: COMMERCIAL

## 2025-01-01 VITALS
HEART RATE: 81 BPM | DIASTOLIC BLOOD PRESSURE: 89 MMHG | RESPIRATION RATE: 17 BRPM | WEIGHT: 202 LBS | HEIGHT: 73 IN | BODY MASS INDEX: 26.77 KG/M2 | SYSTOLIC BLOOD PRESSURE: 145 MMHG | OXYGEN SATURATION: 100 % | TEMPERATURE: 98.1 F

## 2025-01-01 DIAGNOSIS — J21.9 ACUTE BRONCHIOLITIS DUE TO UNSPECIFIED ORGANISM: ICD-10-CM

## 2025-01-01 DIAGNOSIS — M47.896 OTHER OSTEOARTHRITIS OF SPINE, LUMBAR REGION: ICD-10-CM

## 2025-01-01 DIAGNOSIS — I31.39 PERICARDIAL EFFUSION: ICD-10-CM

## 2025-01-01 DIAGNOSIS — K52.9 ENTEROCOLITIS: Primary | ICD-10-CM

## 2025-01-01 DIAGNOSIS — D64.9 CHRONIC ANEMIA: ICD-10-CM

## 2025-01-01 DIAGNOSIS — K80.00 CHOLELITHIASIS AND ACUTE CHOLECYSTITIS WITHOUT OBSTRUCTION: ICD-10-CM

## 2025-01-01 DIAGNOSIS — E87.1 CHRONIC HYPONATREMIA: ICD-10-CM

## 2025-01-01 LAB
ALBUMIN SERPL-MCNC: 3.9 G/DL (ref 3.5–5.2)
ALBUMIN/GLOB SERPL: 1.2 {RATIO} (ref 1–2.5)
ALP SERPL-CCNC: 111 U/L (ref 40–129)
ALT SERPL-CCNC: 7 U/L (ref 5–41)
ANION GAP SERPL CALCULATED.3IONS-SCNC: 16 MMOL/L (ref 9–17)
AST SERPL-CCNC: 11 U/L
BASOPHILS # BLD: <0.03 K/UL (ref 0–0.2)
BASOPHILS NFR BLD: 0 % (ref 0–2)
BILIRUB SERPL-MCNC: 0.4 MG/DL (ref 0.3–1.2)
BILIRUB UR QL STRIP: NEGATIVE
BNP SERPL-MCNC: 252 PG/ML
BUN SERPL-MCNC: 14 MG/DL (ref 6–20)
CALCIUM SERPL-MCNC: 9.2 MG/DL (ref 8.6–10.4)
CHLORIDE SERPL-SCNC: 89 MMOL/L (ref 98–107)
CLARITY UR: CLEAR
CO2 SERPL-SCNC: 21 MMOL/L (ref 20–31)
COLOR UR: YELLOW
COMMENT: ABNORMAL
CREAT SERPL-MCNC: 1 MG/DL (ref 0.7–1.2)
D DIMER PPP FEU-MCNC: 0.77 UG/ML FEU (ref 0–0.59)
EOSINOPHILS RELATIVE PERCENT: 1 % (ref 1–4)
ERYTHROCYTE [DISTWIDTH] IN BLOOD BY AUTOMATED COUNT: 14.5 % (ref 11.8–14.4)
FLUBV AG SPEC QL: NEGATIVE
GFR, ESTIMATED: 87 ML/MIN/1.73M2
GLUCOSE SERPL-MCNC: 116 MG/DL (ref 70–99)
GLUCOSE UR STRIP-MCNC: NEGATIVE MG/DL
HCT VFR BLD AUTO: 25.1 % (ref 40.7–50.3)
HGB BLD-MCNC: 9 G/DL (ref 13–17)
IMM GRANULOCYTES # BLD AUTO: 0.09 K/UL (ref 0–0.3)
IMM GRANULOCYTES NFR BLD: 1 %
INR PPP: 1
KETONES UR STRIP-MCNC: NEGATIVE MG/DL
LACTATE BLDV-SCNC: 1.4 MMOL/L (ref 0.5–2.2)
LEUKOCYTE ESTERASE UR QL STRIP: NEGATIVE
LIPASE SERPL-CCNC: 17 U/L (ref 13–60)
LYMPHOCYTES NFR BLD: 0.7 K/UL (ref 1.1–3.7)
LYMPHOCYTES RELATIVE PERCENT: 7 % (ref 24–43)
MCH RBC QN AUTO: 34.2 PG (ref 25.2–33.5)
MCHC RBC AUTO-ENTMCNC: 35.9 G/DL (ref 25.2–33.5)
MCV RBC AUTO: 95.4 FL (ref 82.6–102.9)
MONOCYTES NFR BLD: 0.61 K/UL (ref 0.1–1.2)
MONOCYTES NFR BLD: 7 % (ref 3–12)
NEUTROPHILS NFR BLD: 84 % (ref 36–65)
NITRITE UR QL STRIP: NEGATIVE
NRBC BLD-RTO: 0 PER 100 WBC
PARTIAL THROMBOPLASTIN TIME: 32.1 SEC (ref 23.9–33.8)
PH UR STRIP: 7.5 [PH] (ref 5–6)
PLATELET # BLD AUTO: 290 K/UL (ref 138–453)
PMV BLD AUTO: 8.2 FL (ref 8.1–13.5)
POTASSIUM SERPL-SCNC: 3.7 MMOL/L (ref 3.7–5.3)
PROT UR STRIP-MCNC: NEGATIVE MG/DL
PROTHROMBIN TIME: 13.2 SEC (ref 11.5–14.2)
RBC # BLD AUTO: 2.63 M/UL (ref 4.21–5.77)
RBC # BLD: ABNORMAL 10*6/UL
SARS-COV-2 RDRP RESP QL NAA+PROBE: NOT DETECTED
SODIUM SERPL-SCNC: 126 MMOL/L (ref 135–144)
SP GR UR STRIP: 1.01 (ref 1.01–1.02)
SPECIMEN DESCRIPTION: NORMAL
TROPONIN I SERPL HS-MCNC: 25 NG/L (ref 0–22)
TROPONIN I SERPL HS-MCNC: 28 NG/L (ref 0–22)
UROBILINOGEN UR STRIP-ACNC: NORMAL EU/DL (ref 0–1)

## 2025-01-01 PROCEDURE — 74177 CT ABD & PELVIS W/CONTRAST: CPT

## 2025-01-01 PROCEDURE — 84484 ASSAY OF TROPONIN QUANT: CPT

## 2025-01-01 PROCEDURE — 85025 COMPLETE CBC W/AUTO DIFF WBC: CPT

## 2025-01-01 PROCEDURE — 83690 ASSAY OF LIPASE: CPT

## 2025-01-01 PROCEDURE — 6360000004 HC RX CONTRAST MEDICATION: Performed by: EMERGENCY MEDICINE

## 2025-01-01 PROCEDURE — 94640 AIRWAY INHALATION TREATMENT: CPT

## 2025-01-01 PROCEDURE — 81003 URINALYSIS AUTO W/O SCOPE: CPT

## 2025-01-01 PROCEDURE — 6360000002 HC RX W HCPCS: Performed by: EMERGENCY MEDICINE

## 2025-01-01 PROCEDURE — 83880 ASSAY OF NATRIURETIC PEPTIDE: CPT

## 2025-01-01 PROCEDURE — 2709999900 CT CHEST PULMONARY EMBOLISM W CONTRAST

## 2025-01-01 PROCEDURE — 6370000000 HC RX 637 (ALT 250 FOR IP): Performed by: EMERGENCY MEDICINE

## 2025-01-01 PROCEDURE — 80053 COMPREHEN METABOLIC PANEL: CPT

## 2025-01-01 PROCEDURE — 85610 PROTHROMBIN TIME: CPT

## 2025-01-01 PROCEDURE — 87635 SARS-COV-2 COVID-19 AMP PRB: CPT

## 2025-01-01 PROCEDURE — 93005 ELECTROCARDIOGRAM TRACING: CPT | Performed by: EMERGENCY MEDICINE

## 2025-01-01 PROCEDURE — 96375 TX/PRO/DX INJ NEW DRUG ADDON: CPT

## 2025-01-01 PROCEDURE — 99285 EMERGENCY DEPT VISIT HI MDM: CPT

## 2025-01-01 PROCEDURE — 87506 IADNA-DNA/RNA PROBE TQ 6-11: CPT

## 2025-01-01 PROCEDURE — 83605 ASSAY OF LACTIC ACID: CPT

## 2025-01-01 PROCEDURE — 85379 FIBRIN DEGRADATION QUANT: CPT

## 2025-01-01 PROCEDURE — 87804 INFLUENZA ASSAY W/OPTIC: CPT

## 2025-01-01 PROCEDURE — 85730 THROMBOPLASTIN TIME PARTIAL: CPT

## 2025-01-01 PROCEDURE — 96374 THER/PROPH/DIAG INJ IV PUSH: CPT

## 2025-01-01 RX ORDER — LORAZEPAM 2 MG/ML
1 INJECTION INTRAMUSCULAR ONCE
Status: COMPLETED | OUTPATIENT
Start: 2025-01-01 | End: 2025-01-01

## 2025-01-01 RX ORDER — PROCHLORPERAZINE EDISYLATE 5 MG/ML
10 INJECTION INTRAMUSCULAR; INTRAVENOUS ONCE
Status: COMPLETED | OUTPATIENT
Start: 2025-01-01 | End: 2025-01-01

## 2025-01-01 RX ORDER — IPRATROPIUM BROMIDE AND ALBUTEROL SULFATE 2.5; .5 MG/3ML; MG/3ML
1 SOLUTION RESPIRATORY (INHALATION) ONCE
Status: COMPLETED | OUTPATIENT
Start: 2025-01-01 | End: 2025-01-01

## 2025-01-01 RX ORDER — IOPAMIDOL 755 MG/ML
80 INJECTION, SOLUTION INTRAVASCULAR
Status: COMPLETED | OUTPATIENT
Start: 2025-01-01 | End: 2025-01-01

## 2025-01-01 RX ADMIN — PROCHLORPERAZINE EDISYLATE 10 MG: 5 INJECTION INTRAMUSCULAR; INTRAVENOUS at 02:15

## 2025-01-01 RX ADMIN — IOPAMIDOL 80 ML: 755 INJECTION, SOLUTION INTRAVENOUS at 03:06

## 2025-01-01 RX ADMIN — IPRATROPIUM BROMIDE AND ALBUTEROL SULFATE 1 DOSE: .5; 3 SOLUTION RESPIRATORY (INHALATION) at 02:15

## 2025-01-01 RX ADMIN — LORAZEPAM 1 MG: 2 INJECTION INTRAMUSCULAR; INTRAVENOUS at 04:19

## 2025-01-01 RX ADMIN — HYDROMORPHONE HYDROCHLORIDE 1 MG: 1 INJECTION, SOLUTION INTRAMUSCULAR; INTRAVENOUS; SUBCUTANEOUS at 02:15

## 2025-01-01 RX ADMIN — AMOXICILLIN AND CLAVULANATE POTASSIUM 1 TABLET: 875; 125 TABLET, FILM COATED ORAL at 04:32

## 2025-01-01 ASSESSMENT — PAIN DESCRIPTION - DESCRIPTORS
DESCRIPTORS: ACHING;SHARP
DESCRIPTORS: ACHING;CRAMPING

## 2025-01-01 ASSESSMENT — PAIN SCALES - GENERAL
PAINLEVEL_OUTOF10: 10
PAINLEVEL_OUTOF10: 10
PAINLEVEL_OUTOF10: 4

## 2025-01-01 ASSESSMENT — PAIN DESCRIPTION - LOCATION
LOCATION: ABDOMEN
LOCATION: ABDOMEN

## 2025-01-01 ASSESSMENT — PAIN - FUNCTIONAL ASSESSMENT: PAIN_FUNCTIONAL_ASSESSMENT: 0-10

## 2025-01-01 NOTE — ED PROVIDER NOTES
[facility] Emergency Department  [address] [address]  Phone: [phone]      Pt Name: Dylan Dolan  MRN:[id number]  Birthdate 1965  Date of evaluation: 1/1/2025      CHIEF COMPLAINT       Chief Complaint   Patient presents with    Abdominal Pain    Shortness of Breath       HISTORY OF PRESENT ILLNESS   Dylan Dolan is a 59 y.o. male who presents for evaluation of shortness of breath.  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  He last completed radiation and chemotherapy in November 2024.  He is scheduled for upcoming CT scans to see if his cancer has improved.  The patient reports that starting yesterday morning he developed gradual onset, constant, progressive, shortness of breath and worsening mucus at his tracheostomy site.  He states that he has felt like there is increased mucus in his throat for the past few weeks.  He has been using his albuterol nebulizer without improvement.  The patient reports that starting yesterday morning he also developed gradual onset, constant, progressive, sharp, stabbing, generalized, waxing waning, abdominal pain with associated nausea.  He took Zofran without improvement.  He denies any history of any abdominal surgeries.  Starting tonight he developed watery nonbloody diarrhea with approximately 4 episodes.  He states that he ran out of Ativan and Percocet yesterday and is waiting to get his refills.  He is currently using a fentanyl patch.  The patient denies any personal or family history of cardiac disease.  He does have history of a blood clot in the past but is not currently on any anticoagulation.  The patient denies any recent antibiotic use, recent travel or history of C. difficile. He has history of anemia requiring blood transfusions. He denies fever, chills, headache, vision changes, neck pain, back pain, chest pain, abdominal injury, urinary symptoms, recent injury or illness.  He has been exposed to sick

## 2025-01-01 NOTE — DISCHARGE INSTRUCTIONS
Make sure you follow your nephrologist's previous instructions on fluid restriction to treat your chronic hyponatremia.     If given narcotics (opiates) during this Emergency Department visit, please do not drink, drive or operate any machinery for at least 4 - 6 hours.    Take your antibiotic as prescribed.     Avoid eating any spicy food, milk type products or drinks that have caffeine in it.  Take all medications as prescribed.  For pain use ibuprofen (Motrin) or acetaminophen (Tylenol), unless prescribed medications that have acetaminophen in it.  You can take over the counter acetaminophen tablets (1 - 2 tablets of the 500-mg strength every 6 hours) or ibuprofen tablets (2 tablets every 4 hours).    PLEASE RETURN TO THE EMERGENCY DEPARTMENT IMMEDIATELY for worsening symptoms, or if you develop any concerning symptoms such as: high fever not relieved by acetaminophen (Tylenol) and/or ibuprofen (Motrin), chills, shortness of breath, chest pain, persistent nausea and/or vomiting, numbness, weakness or tingling in the arms or legs or change in color of the extremities, changes in mental status, persistent headache, blurry vision.    Return within 8 - 12 hours if you have any of the following: worsening of pain in your abdomen, no food sounds good to you, you continue to vomit, pain goes to your back or testicles, have pain in the abdomen when going over a bump in the car or when you jump up and down, inability to urinate, unable to follow up with your physician, or other any other care or concern.

## 2025-01-02 DIAGNOSIS — C32.2 SQUAMOUS CELL CARCINOMA OF SUBGLOTTIS (HCC): Primary | ICD-10-CM

## 2025-01-02 LAB
CAMPYLOBACTER DNA SPEC NAA+PROBE: NORMAL
EKG ATRIAL RATE: 78 BPM
EKG P-R INTERVAL: 216 MS
EKG Q-T INTERVAL: 412 MS
EKG QTC CALCULATION (BAZETT): 469 MS
EKG R AXIS: -11 DEGREES
EKG T AXIS: 46 DEGREES
EKG VENTRICULAR RATE: 78 BPM
ETEC ELTA+ESTB GENES STL QL NAA+PROBE: NORMAL
P SHIGELLOIDES DNA STL QL NAA+PROBE: NORMAL
SALMONELLA DNA SPEC QL NAA+PROBE: NORMAL
SHIGA TOXIN STX GENE SPEC NAA+PROBE: NORMAL
SHIGELLA DNA SPEC QL NAA+PROBE: NORMAL
SPECIMEN DESCRIPTION: NORMAL
V CHOL+PARA RFBL+TRKH+TNAA STL QL NAA+PR: NORMAL

## 2025-01-02 RX ORDER — OXYCODONE HYDROCHLORIDE 15 MG/1
15 TABLET ORAL EVERY 4 HOURS PRN
Qty: 84 TABLET | Refills: 0 | Status: SHIPPED | OUTPATIENT
Start: 2025-01-02 | End: 2025-01-16

## 2025-01-02 RX ORDER — LORAZEPAM 0.5 MG/1
0.5 TABLET ORAL EVERY 8 HOURS PRN
Qty: 30 TABLET | Refills: 0 | Status: SHIPPED | OUTPATIENT
Start: 2025-01-02 | End: 2025-01-17

## 2025-01-03 DIAGNOSIS — R29.898 MUSCULAR DECONDITIONING: ICD-10-CM

## 2025-01-03 DIAGNOSIS — C32.9 LARYNGEAL CANCER (HCC): Primary | ICD-10-CM

## 2025-01-03 DIAGNOSIS — I89.0 LYMPHEDEMA: ICD-10-CM

## 2025-01-03 DIAGNOSIS — R13.12 OROPHARYNGEAL DYSPHAGIA: ICD-10-CM

## 2025-01-07 ENCOUNTER — TELEPHONE (OUTPATIENT)
Dept: RADIATION ONCOLOGY | Age: 60
End: 2025-01-07

## 2025-01-07 DIAGNOSIS — C32.9 LARYNGEAL CANCER (HCC): Primary | ICD-10-CM

## 2025-01-07 NOTE — TELEPHONE ENCOUNTER
Notified Carol, pt's niece, that his disability paperwork was signed by Dr. Starks and faxed to Carol at her work per her request.  Dr. Starks has pt returning to work tentatively in May pending if he will be able to have his trach removed and healed from this.  Discussed referral to  if disability is more long term.      Carol states pt has been reporting more fluid collection around his neck that is worse when he wakes up and not as noticeable by Carol when she sees him later in the day.  Dr. Starks aware and discussed referral to Lymphedema.  Carol would like to have this appt coordinated with when pt will be in town for appts as he lives near [place].  Referral placed with this information.

## 2025-01-10 ENCOUNTER — TELEPHONE (OUTPATIENT)
Dept: RADIATION ONCOLOGY | Age: 60
End: 2025-01-10

## 2025-01-10 DIAGNOSIS — C32.2 SQUAMOUS CELL CARCINOMA OF SUBGLOTTIS (HCC): ICD-10-CM

## 2025-01-10 DIAGNOSIS — C32.9 LARYNGEAL CANCER (HCC): Primary | ICD-10-CM

## 2025-01-13 RX ORDER — FENTANYL 12.5 UG/1
1 PATCH TRANSDERMAL
Qty: 10 PATCH | Refills: 0 | Status: SHIPPED | OUTPATIENT
Start: 2025-01-18 | End: 2025-02-17

## 2025-01-14 ENCOUNTER — OFFICE VISIT (OUTPATIENT)
Dept: ONCOLOGY | Age: 60
End: 2025-01-14
Payer: COMMERCIAL

## 2025-01-14 ENCOUNTER — TELEPHONE (OUTPATIENT)
Dept: ONCOLOGY | Age: 60
End: 2025-01-14

## 2025-01-14 VITALS
DIASTOLIC BLOOD PRESSURE: 84 MMHG | HEART RATE: 81 BPM | BODY MASS INDEX: 28.23 KG/M2 | SYSTOLIC BLOOD PRESSURE: 136 MMHG | HEIGHT: 73 IN | RESPIRATION RATE: 20 BRPM | TEMPERATURE: 96.8 F | OXYGEN SATURATION: 97 % | WEIGHT: 213 LBS

## 2025-01-14 DIAGNOSIS — C32.2 SQUAMOUS CELL CARCINOMA OF SUBGLOTTIS (HCC): Primary | ICD-10-CM

## 2025-01-14 DIAGNOSIS — I89.0 LYMPHEDEMA: ICD-10-CM

## 2025-01-14 PROCEDURE — 3075F SYST BP GE 130 - 139MM HG: CPT | Performed by: INTERNAL MEDICINE

## 2025-01-14 PROCEDURE — 99214 OFFICE O/P EST MOD 30 MIN: CPT | Performed by: INTERNAL MEDICINE

## 2025-01-14 PROCEDURE — 3079F DIAST BP 80-89 MM HG: CPT | Performed by: INTERNAL MEDICINE

## 2025-01-14 RX ORDER — LORAZEPAM 0.5 MG/1
0.5 TABLET ORAL DAILY PRN
Qty: 30 TABLET | Refills: 0 | Status: SHIPPED | OUTPATIENT
Start: 2025-01-14 | End: 2025-02-13

## 2025-01-14 RX ORDER — LORAZEPAM 0.5 MG/1
1 TABLET ORAL EVERY 6 HOURS PRN
Qty: 30 TABLET | Refills: 0 | Status: CANCELLED | OUTPATIENT
Start: 2025-01-14 | End: 2025-01-29

## 2025-01-14 NOTE — PROGRESS NOTES
Dylan Dolan                                                                                                                  1/14/2025  MRN:   [id number]  YOB: 1965  PCP:                           Alley Vila MD  Referring Physician: No ref. provider found  Treating Physician Name: ANDREW JI MD      Reason for visit:  Chief Complaint   Patient presents with    squamous cell cancer     Throat swelling, no pain, feels like \"fluid filled\". Having a lot of coughing in the morning - productive   Discussed treatment plan    Current problems:  Subglottic squamous cell carcinoma, clinical stage T3 N2 M0  Tobacco dependence  S/p tracheostomy    Active and recent treatments:  Concurrent chemoradiation using cisplatin-9/2024    Summary of Case/History:  Dylan jack 59 y.o.male is a patient with symptoms of cough shortness of breath and hoarseness of voice presented to the hospital with in [place] and was noted to have stridor.  Subsequently patient underwent CT neck on July 8, 2024 which showed a 8 mm mass in the left vocal cord.  Patient was transferred to [facility] for further workup.  Patient was evaluated by ENT on July 9, 2024 underwent a biopsy of the left vocal cord mass which came back as a squamous cell carcinoma.  Patient was intubated and transition to an open tracheostomy for airway protection.  Patient does have a history of smoking 1/2 pack since age 17.    Interim History:  Patient requested to be seen in office due to swelling of his throat.  Patient also in the ER couple weeks ago with enterocolitis which is now better.  Scheduled for PET scan later this month.  Requesting for refill on anxiety medication.  During this visit patient's allergy, social, medical, surgical history and medications were reviewed and updated.    Past Medical History:   Past Medical History:   Diagnosis Date    Ankle pain, left     chronic; s/p fracture in 1999    Cancer (HCC)     vocal

## 2025-01-14 NOTE — TELEPHONE ENCOUNTER
Name: Dylan Dolan  : 1965  MRN: [id number]    Oncology Navigation Follow-Up Note    Contact Type:  Medical Oncology    Notes:   Writer met with patient while in office for oncology appt. He has questions about lymphedema of neck post treatment.   He denies any navigation needs at present.   Encouraged him to call with questions or concerns.   Navigator following for continuity of care.   Writer reviews EHR. He has been referred to lymphedema clinic/OT at [facility].      Electronically signed by Bree Spears RN on 2025 at 3:51 PM

## 2025-01-15 ENCOUNTER — HOSPITAL ENCOUNTER (OUTPATIENT)
Dept: GENERAL RADIOLOGY | Age: 60
Discharge: HOME | End: 2025-01-17
Payer: COMMERCIAL

## 2025-01-15 ENCOUNTER — HOSPITAL ENCOUNTER (OUTPATIENT)
Dept: SPEECH THERAPY | Age: 60
Setting detail: THERAPIES SERIES
Discharge: HOME | End: 2025-01-15
Payer: COMMERCIAL

## 2025-01-15 ENCOUNTER — HOSPITAL ENCOUNTER (OUTPATIENT)
Dept: PHYSICAL THERAPY | Age: 60
Setting detail: THERAPIES SERIES
Discharge: HOME | End: 2025-01-15
Payer: COMMERCIAL

## 2025-01-15 DIAGNOSIS — C32.9 LARYNGEAL CANCER (HCC): ICD-10-CM

## 2025-01-15 DIAGNOSIS — C32.2 SQUAMOUS CELL CARCINOMA OF SUBGLOTTIS (HCC): ICD-10-CM

## 2025-01-15 DIAGNOSIS — C32.9 LARYNGEAL CANCER (HCC): Primary | ICD-10-CM

## 2025-01-15 DIAGNOSIS — R13.12 OROPHARYNGEAL DYSPHAGIA: ICD-10-CM

## 2025-01-15 PROCEDURE — 2500000003 HC RX 250 WO HCPCS: Performed by: RADIOLOGY

## 2025-01-15 PROCEDURE — 9990000011 HC NO CHARGE THERAPY VISIT

## 2025-01-15 PROCEDURE — 74230 X-RAY XM SWLNG FUNCJ C+: CPT

## 2025-01-15 PROCEDURE — 92611 MOTION FLUOROSCOPY/SWALLOW: CPT

## 2025-01-15 RX ADMIN — BARIUM SULFATE 140 ML: 980 POWDER, FOR SUSPENSION ORAL at 13:19

## 2025-01-15 NOTE — PROGRESS NOTES
SPEECH THERAPY  MODIFIED BARIUM SWALLOW STUDY    [x] Outpatient Rehab Center- [facility]  [] Inpatient- [facility]    Patient: Dylan Dolan  YOB: 1965  Gender: male  MRN:  [id number]  Referring Physician: Pasha Starks Md  [address] [place] [address]  Diagnosis:  Laryngeal Cancer; Squamous cell carcinoma of subglottis; Oropharyngeal Dysphagia     Date of Study: 1/15/25    History of Present Illness/Injury: The patient has history of a tracheostomy secondary to vocal cord cancer. He last completed radiation and chemotherapy in November 2024. The following information was gathered from his EMR:     Symptoms started in 2024 with initial reports of coughing, trouble breathing, and hoarseness in his voice lasting several months. Patient presented to the ED in [place] and was noted to have a stridor. Patient had a CT neck done on July 8, 2024 which showed a 8 mm mass on the left vocal cord. Patient was transferred to [facility] and was taken to the OR by ENT on July 9, 2024 patient had a biopsy which on frozen section was indicated of squamous cell carcinoma. His intubation was transition to an open tracheostomy for airway protection. Patient does have a history of smoking a pack and half a day since age 17. He had a PET scan on July 24, 2024 which showed active mass in the left vocal cord as well as left cervical metabolically active lymph nodes from levels 2 through 5.  There is no evidence of metastases seen.  Patient has been seen by ENT at [facility] to discuss options of surgery and deferred laryngectomy for organ preservation treatment.  Patient went on to complete a course of definitive chemoradiation therapy completing at the end of October.He was seen by oncology on 12/2/24 for a follow-up visit approximately 1 month later. Overall he does note improvement in his pain and dysphagia.  He also notes a reduction in his lymphedema on his neck.  He

## 2025-01-15 NOTE — PROGRESS NOTES
Physical Therapy    Patient to the clinic this date for PT Eval.  Discussed with patient current deficits, I.e. strength, balance, endurance.  Patient states \"I am strong.\"  Patient stating does not feel like he needs any PT.  Patient noting he needs to work on swelling and speaking.  Cancelled Eval this date, patient was scheduled for OT for lymphedema.  Patient also saw ST this date for modified barium study.

## 2025-01-16 NOTE — PROGRESS NOTES
PALLIATIVE CARE PROGRESS NOTE     Patient: Dylan Dolan  1965    Reason For Consult:  Goals of care evaluation  Distress management  Symptom Management  Guidance and support  Facilitate communications  Assistance in coordinating care  Recommendations for the above    Subjective: Dylan Dolan is a 58 y.o. male with a history of HTN, Tourette's syndrome, GERD, tobacco abuse, obesity, HLD, tracheostomy, and follows with Alley Vila MD .  Palliative Care was consulted to help manage symptoms, facilitate communications and establish goals of care. Patient presented to the ED on 7/8/24 with complaints of hoarse voice and difficulty breathing. He reported that this has happened over the course of a few months. He reported that week prior he was at  and was given steroids and a antibiotic without improvements. He was given racepinephrine and dexamethasone. CT soft tissue neck revealed nonspecific mass within the left vocal cord measuring 8 mm x 6 mm. No cervical lymphadenopathy noted. Patient was transferred to [facility] for ENT evaluation and was hospitalized 7/8-7/17/24. ENT performed flexible laryngoscopy and findings consistent with glottic malignancy. On 7/9 patient underwent Bx and Tracheostomy. A 5 ET tube was not able to be passed through the subglottis and subglottis was found to have an ulcerative mass. Intubation was converted to open tracheostomy with successful placement of shiley 6 cuffed ETT. Oncology and Rad Onc were consulted for newly Dx left vocal cod SCC. They reccommended definitive course of RT and possibly chemo. On 7/11 patient had BSS and this was WNL. On 7/14 tracheostomy tube was changed. CT chest did not show any evidence of metastatic disease. 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 Small metabolically active left-sided cervical lymph nodes are present left levels 2 through 5. No metabolically active

## 2025-01-17 DIAGNOSIS — C32.2 SQUAMOUS CELL CARCINOMA OF SUBGLOTTIS (HCC): ICD-10-CM

## 2025-01-17 RX ORDER — OXYCODONE HYDROCHLORIDE 15 MG/1
15 TABLET ORAL EVERY 4 HOURS PRN
Qty: 84 TABLET | Refills: 0 | Status: SHIPPED | OUTPATIENT
Start: 2025-01-17 | End: 2025-01-31

## 2025-01-20 ENCOUNTER — HOSPITAL ENCOUNTER (OUTPATIENT)
Dept: OCCUPATIONAL THERAPY | Age: 60
Setting detail: THERAPIES SERIES
Discharge: HOME | End: 2025-01-20
Attending: INTERNAL MEDICINE
Payer: COMMERCIAL

## 2025-01-20 ENCOUNTER — TELEPHONE (OUTPATIENT)
Dept: PALLATIVE CARE | Age: 60
End: 2025-01-20

## 2025-01-20 DIAGNOSIS — I89.0 LYMPHEDEMA: ICD-10-CM

## 2025-01-20 DIAGNOSIS — C32.2 SQUAMOUS CELL CARCINOMA OF SUBGLOTTIS (HCC): Primary | ICD-10-CM

## 2025-01-20 PROCEDURE — 97166 OT EVAL MOD COMPLEX 45 MIN: CPT | Performed by: OCCUPATIONAL THERAPIST

## 2025-01-20 NOTE — TELEPHONE ENCOUNTER
Called patient to remind him of his pc appointment tomorrow with Carolyn Barrera NP at [facility].  No answer and voicemail full no way to leave a message.      [facility] Palliative Care Coordinator  Bethany LANGSTONN, RN  [facility] [phone]/ [facility] [phone]/ Health system [phone]

## 2025-01-20 NOTE — PLAN OF CARE
Occupational Therapy     [place]  Phone: [phone]  Fax: [phone]             To:  Jadon Ag MD       Patient: Dylan Dolan  : 1965  MRN: [id number]  Evaluation Date: 2025      Diagnosis Information:  Diagnosis: Squamous cell carcinoma of subglottis, Lymphedema         Occupational Therapy Certification/Re-Certification Form  Dear   The following patient has been evaluated for occupational therapy services and for therapy to continue, insurance requires physician review of the treatment plan. Please review the attached evaluation and/or summary of the patient's plan of care, and verify that you agree therapy should continue by signing the attached document and sending it back to our office.    Plan of Care/Treatment to date: 25-3/17/25     [x] Therapeutic Exercise    [] Modalities:  [x] Therapeutic Activity    [] Ultrasound  [] Electrical Stimulation   [x] Activities of Daily Living    [] Paraffin   [x] Kinesiotaping  [] Neuromuscular Re-education   [] Iontophoresis [] Coldpack/hotpack   [x] Instruction in HEP     [] Orthotics/splint []   [x] Manual Therapy       [] Aquatic Therapy            Frequency/Duration:   (patient plans to wait to start treatment until after scans)    # Days per week: [x] 1-2 day # Weeks: [] 1 week [] 5 weeks      [] 2 days   [] 2 weeks [] 6 weeks     [] 3 days   [] 3 weeks [] 7 weeks     [] 4 days   [] 4 weeks [x] 8 weeks  Goals:  Short Term Goals  Time Frame for Short Term Goals: 4 bronson  Short Term Goal 1: Provide patient education regarding complete decongestive therapy (CDT)  Short Term Goal 2: Initiate home exercise program   Long Term Goals  Time Frame for Long Term Goals : 8 weeks  Long Term Goal 1: Patient to demonstrate decreased fluid volume circumferential measurement at cricoid notch  > 2cm for decreased risk cellulitis and decreased discomfort.  Long Term Goal 2: Assess, determine, fit, and obtain most appropriate long term compression

## 2025-01-20 NOTE — FLOWSHEET NOTE
[facility] [place] [facility]  Rehabilitation and Sports Medicine    [place]  Phone: [phone]  Fax: [phone]          Occupational Therapy Daily Treatment Note  Date:  2025    Patient Name:  Dylan Dolan    :  1965  MRN: [id number]  Restrictions/Precautions:      Medical/Treatment Diagnosis Information:   Diagnosis: Squamous cell carcinoma of subglottis, Lymphedema   Insurance/Certification information: [facility] [employer]  Physician Information:  Jadon Ag MD   Plan of care signed (Y/N):  n    Visit# / total visits:  1    Pain level: /10     Progress Note: [x]  Yes  []  No  Next due by: Visit #10      Date of evaluation/re-evaluation:  25-3/17/25    Time In:  110  Time Out: 145     Subjective:    See progress note     Objective/Assessment:  See progress note       Exercises:       Therapeutic Exercise  [] Provided verbal/tactile cueing for activities related to strengthening, flexibility, endurance, ROM. (47061)  Neuro  Re-Ed  [] Provided verbal/tactile cueing for activities related to improving balance, coordination, kinesthetic sense, posture, motor skill, proprioception. (48560)     Therapeutic Activities/ADL:   [] Provided use of dynamic activities to improve functional performance (79189)  [] Provided self-care/home management training for activities of daily living and compensatory training (87403)     Manual Treatments:   [] Provided manual therapy to mobilize soft tissue/joints for the purpose of modulating pain, promoting relaxation, increasing ROM, reducing/eliminating soft tissue swelling/inflammation/restriction, improving soft tissue extensibility. (06152)     Orthotic Management:   [] Provided assessment and fitting orthotic device for improved functional performance. (02833)    Service Based Modalities:      Timed Code Treatment Minutes: 0      Total Treatment Minutes:  35     Treatment/Activity Tolerance:  [x] Patient tolerated treatment well [] Patient limited by fatique  []

## 2025-01-20 NOTE — PROGRESS NOTES
Occupational Therapy  Occupational Therapy Initial Assessment  Date:  2025    Patient Name: Dylan Dolan  MRN: [id number]     :  1965     Treatment Diagnosis: neck lymphedema    Restrictions:   Tracheostomy   Subjective:  General  Chart Reviewed: Yes  History obtained from:: Patient, Chart Review  Family/Caregiver Present: No  Diagnosis: Squamous cell carcinoma of subglottis, Lymphedema  Referring Provider (secondary): Jadon Ag MD  Follows Commands: Within Functional Limits  OT Visit Information  Onset Date: 25     Social History:   Social History  Lives With: Family  Type of Home: House  Functional Status:  Functional Status  Prior level of function: Independent  Prior Level of Assist for ADLs: Independent  Prior Level of Assist for Homemaking: Independent  Homemaking Responsibilities: Yes  Ambulation Assistance: Independent  Prior Level of Assist for Transfers: Independent  Active : Yes    Objective:    Stage of Lymphedema: Stage 1: Reversible Stage  Lymphedema Classification:   Type: Post-Surgical  Patient presents with area of lymphedema in the muscular triangle of the anterior cervical region between B sternocleidomastoid regions.  Circumferential measurements: (cm)  Nose/cheekbone 61.6  Mouth 58.5  Jaw 55.0  Upper neck 59.8  Mid neck (above tracheostomy) 58.0  Lower neck (below tracheostomy) 50.0    Patient states he has several scans scheduled in the next week and the tracheostomy may get removed. Patient would prefer to wait to address line of lymphedema in the muscular triangle of the anterior cervical region until he knows whether or not tracheostomy to be removed.    Initiated patient education regarding complete decongestive therapy, patient also shown several examples of compression garments.     Assessment:   Assessment  Treatment Diagnosis: neck lymphedema  Prognosis: Good  Decision Making: Medium Complexity  REQUIRES OT FOLLOW-UP: Yes     Plan:   Occupational Therapy

## 2025-01-21 ENCOUNTER — CLINICAL DOCUMENTATION (OUTPATIENT)
Dept: ONCOLOGY | Age: 60
End: 2025-01-21

## 2025-01-21 ENCOUNTER — OFFICE VISIT (OUTPATIENT)
Dept: PALLATIVE CARE | Age: 60
End: 2025-01-21
Payer: COMMERCIAL

## 2025-01-21 VITALS
HEART RATE: 86 BPM | TEMPERATURE: 98.2 F | WEIGHT: 216.6 LBS | DIASTOLIC BLOOD PRESSURE: 64 MMHG | BODY MASS INDEX: 28.58 KG/M2 | RESPIRATION RATE: 18 BRPM | OXYGEN SATURATION: 98 % | SYSTOLIC BLOOD PRESSURE: 164 MMHG

## 2025-01-21 DIAGNOSIS — Z51.5 PALLIATIVE CARE ENCOUNTER: ICD-10-CM

## 2025-01-21 DIAGNOSIS — K59.03 DRUG-INDUCED CONSTIPATION: ICD-10-CM

## 2025-01-21 DIAGNOSIS — C32.9 LARYNGEAL CANCER (HCC): Primary | ICD-10-CM

## 2025-01-21 DIAGNOSIS — G63 POLYNEUROPATHY ASSOCIATED WITH UNDERLYING DISEASE (HCC): ICD-10-CM

## 2025-01-21 DIAGNOSIS — Z93.0 TRACHEOSTOMY IN PLACE (HCC): ICD-10-CM

## 2025-01-21 DIAGNOSIS — Z71.89 ACP (ADVANCE CARE PLANNING): ICD-10-CM

## 2025-01-21 DIAGNOSIS — R07.0 THROAT PAIN: ICD-10-CM

## 2025-01-21 DIAGNOSIS — C32.2 SQUAMOUS CELL CARCINOMA OF SUBGLOTTIS (HCC): ICD-10-CM

## 2025-01-21 PROCEDURE — 99214 OFFICE O/P EST MOD 30 MIN: CPT | Performed by: NURSE PRACTITIONER

## 2025-01-21 PROCEDURE — 3078F DIAST BP <80 MM HG: CPT | Performed by: NURSE PRACTITIONER

## 2025-01-21 PROCEDURE — 3077F SYST BP >= 140 MM HG: CPT | Performed by: NURSE PRACTITIONER

## 2025-01-21 RX ORDER — AMITRIPTYLINE HYDROCHLORIDE 10 MG/1
10 TABLET ORAL NIGHTLY
Qty: 90 TABLET | Refills: 0 | Status: CANCELLED | OUTPATIENT
Start: 2025-01-21

## 2025-01-21 RX ORDER — SENNOSIDES A AND B 8.6 MG/1
1 TABLET, FILM COATED ORAL 2 TIMES DAILY
Qty: 60 TABLET | Refills: 1 | Status: SHIPPED | OUTPATIENT
Start: 2025-01-21 | End: 2025-03-22

## 2025-01-21 RX ORDER — DULOXETIN HYDROCHLORIDE 30 MG/1
30 CAPSULE, DELAYED RELEASE ORAL DAILY
Qty: 30 CAPSULE | Refills: 1 | Status: SHIPPED | OUTPATIENT
Start: 2025-01-21 | End: 2025-03-22

## 2025-01-21 RX ORDER — OXYCODONE HYDROCHLORIDE 15 MG/1
15 TABLET ORAL EVERY 4 HOURS PRN
Qty: 84 TABLET | Refills: 0 | Status: SHIPPED | OUTPATIENT
Start: 2025-01-29 | End: 2025-02-12

## 2025-01-21 RX ORDER — FENTANYL 12.5 UG/1
1 PATCH TRANSDERMAL
Qty: 1 PATCH | Refills: 0 | Status: SHIPPED | OUTPATIENT
Start: 2025-01-21 | End: 2025-01-24

## 2025-01-21 RX ORDER — SCOPOLAMINE 1 MG/3D
PATCH, EXTENDED RELEASE TRANSDERMAL
COMMUNITY
Start: 2024-12-19

## 2025-01-21 NOTE — PROGRESS NOTES
[facility]- MEDICAL NUTRITION THERAPY     Visit Type: Follow-up     NUTRITION RECOMMENDATIONS / MONITORING / EVALUATION  Continue Regular diet as tolerated.   Writer will sign off at this time, but encouraged patient to contact writer as needed with any future nutrition questions/concerns.     Subjective/Current Data:  Dylan Dolan is a 59 y.o. male with subglottic squamous cell carcinoma, s/p tracheostomy, s/p chemotherapy and radiation.   Chart reviewed and met with patient prior to appt with Palliative Care CNP. Pt reports he is tolerating a regular diet well and that appetite is good. States he has regained some weight. No nutrition concerns at this time.     Objective Data:  Patient Active Problem List    Diagnosis Date Noted    Opioid dependence with current use ([facility]) 03/14/2023    Thrombocytopenia, unspecified ([facility]) 10/16/2024    Laryngeal cancer ([facility]) 08/10/2024    Squamous cell carcinoma of subglottis ([facility]) 07/30/2024    Acute neck pain 07/15/2024    ACP (advance care planning) 07/15/2024    Palliative care encounter 07/15/2024    Tracheostomy in place ([facility]) 07/13/2024    Larynx carcinoma ([facility]) 07/09/2024    Acute respiratory failure with hypoxia 07/09/2024    Tobacco dependence 07/09/2024    Vocal cord mass 07/08/2024    Vocal cord paralysis 07/08/2024    Polyneuropathy associated with underlying disease ([facility]) 04/02/2024    Type 2 diabetes mellitus with diabetic neuropathy 06/27/2023    Mixed hyperlipidemia 01/26/2022    History of 2019 novel coronavirus disease (COVID-19) 12/16/2020    Hypertriglyceridemia 11/30/2017    Type 2 diabetes mellitus without complication ([facility]) 08/30/2017    Chronic pain of left ankle     Essential hypertension     Tourette's syndrome     Gastroesophageal reflux disease     Tobacco abuse      Anthropometric Measures:  Height: 6' 1\"  Weight: 216 lb 9.6 oz (98.2 kg)   Ideal Body Weight: 184 lb (83.6 kg)  BMI: 28.58 kg/m2 (Overweight)   Usual Weight: 240 lbs (108.9 kg) per

## 2025-01-21 NOTE — PATIENT INSTRUCTIONS
-RTO in 6 weeks  -Refill Percocet and Senokot   -Start Cymbalta for neuropathy- take at night   -Fentanyl patch to be delivered 1/24- will send 1 patch to [facility] today to bridge until delivery

## 2025-01-27 ENCOUNTER — HOSPITAL ENCOUNTER (OUTPATIENT)
Dept: NUCLEAR MEDICINE | Age: 60
Discharge: HOME | End: 2025-01-29
Attending: INTERNAL MEDICINE
Payer: COMMERCIAL

## 2025-01-27 DIAGNOSIS — G89.3 CANCER ASSOCIATED PAIN: ICD-10-CM

## 2025-01-27 DIAGNOSIS — C32.2 SQUAMOUS CELL CARCINOMA OF SUBGLOTTIS (HCC): ICD-10-CM

## 2025-01-27 DIAGNOSIS — E87.1 HYPONATREMIA: ICD-10-CM

## 2025-01-27 LAB — GLUCOSE BLD-MCNC: 111 MG/DL (ref 75–110)

## 2025-01-27 PROCEDURE — 82947 ASSAY GLUCOSE BLOOD QUANT: CPT

## 2025-01-27 PROCEDURE — 3430000000 HC RX DIAGNOSTIC RADIOPHARMACEUTICAL: Performed by: INTERNAL MEDICINE

## 2025-01-27 PROCEDURE — 2500000003 HC RX 250 WO HCPCS: Performed by: INTERNAL MEDICINE

## 2025-01-27 PROCEDURE — 78815 PET IMAGE W/CT SKULL-THIGH: CPT

## 2025-01-27 PROCEDURE — A9609 HC RX DIAGNOSTIC RADIOPHARMACEUTICAL: HCPCS | Performed by: INTERNAL MEDICINE

## 2025-01-27 RX ORDER — FLUDEOXYGLUCOSE F 18 200 MCI/ML
10 INJECTION, SOLUTION INTRAVENOUS
Status: COMPLETED | OUTPATIENT
Start: 2025-01-27 | End: 2025-01-27

## 2025-01-27 RX ORDER — SODIUM CHLORIDE 0.9 % (FLUSH) 0.9 %
10 SYRINGE (ML) INJECTION
Status: COMPLETED | OUTPATIENT
Start: 2025-01-27 | End: 2025-01-27

## 2025-01-27 RX ADMIN — SODIUM CHLORIDE, PRESERVATIVE FREE 10 ML: 5 INJECTION INTRAVENOUS at 13:07

## 2025-01-27 RX ADMIN — FLUDEOXYGLUCOSE F 18 10.22 MILLICURIE: 200 INJECTION, SOLUTION INTRAVENOUS at 13:07

## 2025-02-03 ENCOUNTER — HOSPITAL ENCOUNTER (OUTPATIENT)
Dept: RADIATION ONCOLOGY | Age: 60
Discharge: HOME | End: 2025-02-03
Payer: COMMERCIAL

## 2025-02-03 VITALS
DIASTOLIC BLOOD PRESSURE: 91 MMHG | HEART RATE: 97 BPM | SYSTOLIC BLOOD PRESSURE: 162 MMHG | BODY MASS INDEX: 27.44 KG/M2 | OXYGEN SATURATION: 100 % | WEIGHT: 208 LBS | TEMPERATURE: 98 F | RESPIRATION RATE: 16 BRPM

## 2025-02-03 PROCEDURE — 99212 OFFICE O/P EST SF 10 MIN: CPT | Performed by: RADIOLOGY

## 2025-02-03 ASSESSMENT — PAIN SCALES - GENERAL: PAINLEVEL_OUTOF10: 5

## 2025-02-03 ASSESSMENT — PAIN DESCRIPTION - ORIENTATION: ORIENTATION: RIGHT;LEFT

## 2025-02-03 ASSESSMENT — PAIN DESCRIPTION - LOCATION: LOCATION: THROAT;LEG

## 2025-02-03 NOTE — PROGRESS NOTES
Dylan CHURCHILL Arms  2/3/2025  2:54 PM      Vitals:    02/03/25 1452   BP: (!) 162/91   Pulse: 97   Resp: 16   Temp: 98 °F (36.7 °C)   SpO2: 100%    :     Pain Assessment: 0-10  Pain Level: 5       Wt Readings from Last 1 Encounters:   02/03/25 94.3 kg (208 lb)                Current Outpatient Medications:     scopolamine (TRANSDERM-SCOP) transdermal patch, , Disp: , Rfl:     oxyCODONE (OXY-IR) 15 MG immediate release tablet, Take 1 tablet by mouth every 4 hours as needed for Pain for up to 14 days. Intended supply: 14 days Max Daily Amount: 90 mg, Disp: 84 tablet, Rfl: 0    senna (SENOKOT) 8.6 MG tablet, Take 1 tablet by mouth 2 times daily, Disp: 60 tablet, Rfl: 1    DULoxetine (CYMBALTA) 30 MG extended release capsule, Take 1 capsule by mouth daily, Disp: 30 capsule, Rfl: 1    LORazepam (ATIVAN) 0.5 MG tablet, Take 1 tablet by mouth daily as needed for Anxiety for up to 30 days. Max Daily Amount: 0.5 mg, Disp: 30 tablet, Rfl: 0    fentaNYL (DURAGESIC) 12 MCG/HR, Place 1 patch onto the skin every 3 days for 30 days. Intended supply: 30 days Max Daily Amount: 1 patch, Disp: 10 patch, Rfl: 0    ipratropium 0.5 mg-albuterol 2.5 mg (DUONEB) 0.5-2.5 (3) MG/3ML SOLN nebulizer solution, Inhale 3 mLs into the lungs every 4 hours as needed for Shortness of Breath, Disp: 360 mL, Rfl: 1    atorvastatin (LIPITOR) 40 MG tablet, Take 1 tablet by mouth daily, Disp: 90 tablet, Rfl: 1    losartan-hydroCHLOROthiazide (HYZAAR) 100-25 MG per tablet, Take 1 tablet by mouth daily, Disp: 90 tablet, Rfl: 1    metFORMIN (GLUCOPHAGE) 500 MG tablet, Take 1 tablet by mouth 2 times daily (with meals), Disp: 180 tablet, Rfl: 1    omeprazole (PRILOSEC) 20 MG delayed release capsule, TAKE 1 CAPSULE DAILY, Disp: 90 capsule, Rfl: 1    pimozide (ORAP) 1 MG tablet, Take 2 tablets by mouth 2 times daily, Disp: 360 tablet, Rfl: 1    nystatin (MYCOSTATIN) 961330 UNIT/ML suspension, , Disp: , Rfl:     ondansetron (ZOFRAN-ODT) 8 MG TBDP disintegrating tablet,

## 2025-02-04 ENCOUNTER — TELEPHONE (OUTPATIENT)
Dept: ONCOLOGY | Age: 60
End: 2025-02-04

## 2025-02-04 ENCOUNTER — OFFICE VISIT (OUTPATIENT)
Dept: ONCOLOGY | Age: 60
End: 2025-02-04
Payer: COMMERCIAL

## 2025-02-04 VITALS
HEART RATE: 80 BPM | SYSTOLIC BLOOD PRESSURE: 168 MMHG | BODY MASS INDEX: 27.89 KG/M2 | HEIGHT: 73 IN | OXYGEN SATURATION: 96 % | WEIGHT: 210.4 LBS | DIASTOLIC BLOOD PRESSURE: 98 MMHG | TEMPERATURE: 97 F | RESPIRATION RATE: 20 BRPM

## 2025-02-04 DIAGNOSIS — C32.2 SQUAMOUS CELL CARCINOMA OF SUBGLOTTIS (HCC): Primary | ICD-10-CM

## 2025-02-04 DIAGNOSIS — I89.0 LYMPHEDEMA: ICD-10-CM

## 2025-02-04 PROCEDURE — 99214 OFFICE O/P EST MOD 30 MIN: CPT | Performed by: INTERNAL MEDICINE

## 2025-02-04 PROCEDURE — 3079F DIAST BP 80-89 MM HG: CPT | Performed by: INTERNAL MEDICINE

## 2025-02-04 PROCEDURE — 3077F SYST BP >= 140 MM HG: CPT | Performed by: INTERNAL MEDICINE

## 2025-02-04 NOTE — PROGRESS NOTES
abdominal pain, Dysphagia, hematemesis and hematochezia   Genitourinary: negative for frequency, dysuria, nocturia, urinary incontinence, and hematuria   Integument: negative for rash, skin lesions, bruises.   Hematologic/Lymphatic: negative for easy bruising, bleeding, lymphadenopathy, or petechiae   Endocrine: negative for heat or cold intolerance,weight changes, change in bowel habits and hair loss   Musculoskeletal: negative for myalgias, arthralgias, pain, joint swelling,and bone pain   Neurological: negative for headaches, dizziness, seizures, weakness, numbness    Physical Exam:  Vitals: BP (!) 140/82 (Site: Left Upper Arm, Position: Sitting, Cuff Size: Medium Adult)   Pulse 80   Temp 97 °F (36.1 °C) (Temporal)   Resp 20   Ht 1.854 m (6' 1\")   Wt 95.4 kg (210 lb 6.4 oz)   SpO2 96%   BMI 27.76 kg/m²   General appearance - well appearing, no in pain or distress  Mental status - AAO X3  Eyes - pupils equal and reactive, extraocular eye movements intact  Mouth - mucous membranes moist, pharynx normal without lesions  Neck -tracheostomy in place.  Positive for lymphedema of neck  Lymphatics - no palpable lymphadenopathy, no hepatosplenomegaly  Chest - clear to auscultation, no wheezes, rales or rhonchi, symmetric air entry  Heart - normal rate, regular rhythm, normal S1, S2, no murmurs  Abdomen - soft, nontender, nondistended, no masses or organomegaly  Neurological - alert, oriented, normal speech, no focal findings or movement disorder noted  Extremities - peripheral pulses normal, no pedal edema, no clubbing or cyanosis  Skin - normal coloration and turgor, no rashes, no suspicious skin lesions noted     DATA:    Results for orders placed or performed during the hospital encounter of 01/27/25   POC Glucose Fingerstick   Result Value Ref Range    POC Glucose 111 (H) 75 - 110 mg/dL     PET CT SKULL BASE TO MID THIGH    Result Date: 1/30/2025  EXAMINATION: WHOLE BODY PET/CT 1/27/2025 TECHNIQUE: Following IV

## 2025-02-04 NOTE — TELEPHONE ENCOUNTER
Name: Dyaln Dolan  : 1965  MRN: [id number]    Oncology Navigation Follow-Up Note    Contact Type:  Medical Oncology    Notes:   Writer met with patient while in office for appt and was present during med onc visit.  He states he is doing well. He saw Dr. Hernandez ENT 25, Dr. Coe RO 2/3/25. Dr. Hernandez will have his case discussed at their tumor board. Patient states that is to be this 25.  He struggles with using his speaking valve. He says he cannot get enough air when it is on. He had his lymphedema therapy consult. Therapy will begin once trach is out.  Encouraged patient call writer if he has questions or concerns. Understanding verbalized.  Navigator following for continuity of care.        Electronically signed by Bree Spears RN on 2025 at 12:38 PM

## 2025-02-04 NOTE — PROGRESS NOTES
[facility]            Radiation Oncology          [address] [place] [address]        O: [phone]        F: [phone]       [email or web address]           Date of Service: 2/3/2025     Location:  [facility] Radiation Oncology,   [address] [place], [place] [address]   [phone]       RADIATION ONCOLOGY FOLLOW UP NOTE    Patient ID:   Dylan Dolan  : 1965   MRN: [id number]    DIAGNOSIS:   Cancer Staging   Larynx carcinoma (HCC)  Staging form: Larynx - Glottis, AJCC 8th Edition  - Clinical stage from 2024: Stage MIKE (cT3, cN2, cM0) - Signed by Pasha Starks MD on 2024    -s/p biopsy and trach 24  -s/p ChemoRT cisplatin + 70Gy 10/29/24    INTERVAL HISTORY:   Dylan Dolan is a 59 y.o.. male with symptoms of coughing and trouble breathing as well as hoarseness in his voice for several months.  Patient presented to the ED in [place] and was noted to have a stridor.  Patient had a CT neck done on 2024 which showed a 8 mm mass on the left vocal cord.  Patient was transferred to [facility] and was taken to the OR by ENT on 2024 patient had a biopsy which on frozen section was indicated of squamous cell carcinoma.  His intubation was transition to an open tracheostomy for airway protection.  Patient does have a history of smoking a pack and half a day since age 17.  Patient comes in today with his niece who is his POA to review his recent diagnosis.  He had a PET scan on 2024 which showed active mass in the left vocal cord as well as left cervical metabolically active lymph nodes from levels 2 through 5.  There is no evidence of metastases seen.  Patient has been seen by ENT at [facility] to discuss options of surgery and deferred laryngectomy for organ preservation treatment.  Patient went on to complete a course of definitive chemoradiation therapy completing at the end of October and is here  Gastroesophageal Reflux Disease (GERD): Care Instructions  Your Care Instructions    Gastroesophageal reflux disease (GERD) is the backward flow of stomach acid into the esophagus. The esophagus is the tube that leads from your throat to your stomach. A one-way valve prevents the stomach acid from moving up into this tube. When you have GERD, this valve does not close tightly enough. If you have mild GERD symptoms including heartburn, you may be able to control the problem with antacids or over-the-counter medicine. Changing your diet, losing weight, and making other lifestyle changes can also help reduce symptoms. Follow-up care is a key part of your treatment and safety. Be sure to make and go to all appointments, and call your doctor if you are having problems. It's also a good idea to know your test results and keep a list of the medicines you take. How can you care for yourself at home? · Take your medicines exactly as prescribed. Call your doctor if you think you are having a problem with your medicine. · Your doctor may recommend over-the-counter medicine. For mild or occasional indigestion, antacids, such as Tums, Gaviscon, Mylanta, or Maalox, may help. Your doctor also may recommend over-the-counter acid reducers, such as Pepcid AC, Tagamet HB, Zantac 75, or Prilosec. Read and follow all instructions on the label. If you use these medicines often, talk with your doctor. · Change your eating habits. ¨ It's best to eat several small meals instead of two or three large meals. ¨ After you eat, wait 2 to 3 hours before you lie down. ¨ Chocolate, mint, and alcohol can make GERD worse. ¨ Spicy foods, foods that have a lot of acid (like tomatoes and oranges), and coffee can make GERD symptoms worse in some people. If your symptoms are worse after you eat a certain food, you may want to stop eating that food to see if your symptoms get better.   · Do not smoke or chew tobacco. Smoking can make GERD worse. If you need help quitting, talk to your doctor about stop-smoking programs and medicines. These can increase your chances of quitting for good. · If you have GERD symptoms at night, raise the head of your bed 6 to 8 inches by putting the frame on blocks or placing a foam wedge under the head of your mattress. (Adding extra pillows does not work.)  · Do not wear tight clothing around your middle. · Lose weight if you need to. Losing just 5 to 10 pounds can help. When should you call for help? Call your doctor now or seek immediate medical care if:  ? · You have new or different belly pain. ? · Your stools are black and tarlike or have streaks of blood. ? Watch closely for changes in your health, and be sure to contact your doctor if:  ? · Your symptoms have not improved after 2 days. ? · Food seems to catch in your throat or chest.   Where can you learn more? Go to http://kirk-angelika.info/. Enter F585 in the search box to learn more about \"Gastroesophageal Reflux Disease (GERD): Care Instructions. \"  Current as of: May 12, 2017  Content Version: 11.4  © 8945-1919 [employer]. Care instructions adapted under license by [employer] (which disclaims liability or warranty for this information). If you have questions about a medical condition or this instruction, always ask your healthcare professional. [address] any warranty or liability for your use of this information.

## 2025-02-10 ENCOUNTER — TELEPHONE (OUTPATIENT)
Dept: FAMILY MEDICINE CLINIC | Age: 60
End: 2025-02-10

## 2025-02-10 NOTE — TELEPHONE ENCOUNTER
Pt's sister calling requesting you to no longer refill the Duoneb even if pharmacy requests, pt is using a different med.

## 2025-02-11 DIAGNOSIS — C32.2 SQUAMOUS CELL CARCINOMA OF SUBGLOTTIS (HCC): ICD-10-CM

## 2025-02-11 NOTE — TELEPHONE ENCOUNTER
Spoke to sister and she is not sure what he is currently using in replace of the duo neb. She will call back with what he currently is using

## 2025-02-12 RX ORDER — OXYCODONE HYDROCHLORIDE 15 MG/1
15 TABLET ORAL EVERY 4 HOURS PRN
Qty: 84 TABLET | Refills: 0 | Status: SHIPPED | OUTPATIENT
Start: 2025-02-12 | End: 2025-02-26

## 2025-02-14 DIAGNOSIS — C32.2 SQUAMOUS CELL CARCINOMA OF SUBGLOTTIS (HCC): ICD-10-CM

## 2025-02-14 RX ORDER — FENTANYL 12.5 UG/1
1 PATCH TRANSDERMAL
Qty: 10 PATCH | Refills: 0 | Status: SHIPPED | OUTPATIENT
Start: 2025-02-14 | End: 2025-03-16

## 2025-02-27 DIAGNOSIS — C32.2 SQUAMOUS CELL CARCINOMA OF SUBGLOTTIS (HCC): ICD-10-CM

## 2025-02-27 RX ORDER — OXYCODONE HYDROCHLORIDE 15 MG/1
15 TABLET ORAL EVERY 4 HOURS PRN
Qty: 84 TABLET | Refills: 0 | Status: SHIPPED | OUTPATIENT
Start: 2025-02-27 | End: 2025-03-13

## 2025-03-03 ENCOUNTER — TELEPHONE (OUTPATIENT)
Dept: PALLATIVE CARE | Age: 60
End: 2025-03-03

## 2025-03-03 NOTE — PROGRESS NOTES
PALLIATIVE CARE PROGRESS NOTE     Patient: Dylan Dolan  1965    Reason For Consult:  Goals of care evaluation  Distress management  Symptom Management  Guidance and support  Facilitate communications  Assistance in coordinating care  Recommendations for the above    Subjective: Dylan Dolan is a 58 y.o. male with a history of HTN, Tourette's syndrome, GERD, tobacco abuse, obesity, HLD, tracheostomy, and follows with Alley Vila MD .  Palliative Care was consulted to help manage symptoms, facilitate communications and establish goals of care. Patient presented to the ED on 7/8/24 with complaints of hoarse voice and difficulty breathing. He reported that this has happened over the course of a few months. He reported that week prior he was at  and was given steroids and a antibiotic without improvements. He was given racepinephrine and dexamethasone. CT soft tissue neck revealed nonspecific mass within the left vocal cord measuring 8 mm x 6 mm. No cervical lymphadenopathy noted. Patient was transferred to [facility] for ENT evaluation and was hospitalized 7/8-7/17/24. ENT performed flexible laryngoscopy and findings consistent with glottic malignancy. On 7/9 patient underwent Bx and Tracheostomy. A 5 ET tube was not able to be passed through the subglottis and subglottis was found to have an ulcerative mass. Intubation was converted to open tracheostomy with successful placement of shiley 6 cuffed ETT. Oncology and Rad Onc were consulted for newly Dx left vocal cod SCC. They reccommended definitive course of RT and possibly chemo. On 7/11 patient had BSS and this was WNL. On 7/14 tracheostomy tube was changed. CT chest did not show any evidence of metastatic disease. 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 Small metabolically active left-sided cervical lymph nodes are present left levels 2 through 5. No metabolically active

## 2025-03-03 NOTE — TELEPHONE ENCOUNTER
Called patient with reminder for upcoming Palliative Care Clinic appointment.  No answer and no way to leave message as voicemail is full.    Call placed to patients sister to remind her of appointment.  Unable to get through to sister's number \"circuits all busy at this time\"  called x 2.        Bethany LANGSTONN, RN  [facility] [phone]/ [facility] [phone]/ [facility] [phone]

## 2025-03-04 ENCOUNTER — OFFICE VISIT (OUTPATIENT)
Dept: PALLATIVE CARE | Age: 60
End: 2025-03-04
Payer: COMMERCIAL

## 2025-03-04 VITALS
RESPIRATION RATE: 18 BRPM | WEIGHT: 224.6 LBS | BODY MASS INDEX: 29.63 KG/M2 | DIASTOLIC BLOOD PRESSURE: 95 MMHG | HEART RATE: 103 BPM | TEMPERATURE: 97.5 F | OXYGEN SATURATION: 97 % | SYSTOLIC BLOOD PRESSURE: 184 MMHG

## 2025-03-04 DIAGNOSIS — I89.0 LYMPHEDEMA: ICD-10-CM

## 2025-03-04 DIAGNOSIS — Z93.0 TRACHEOSTOMY IN PLACE (HCC): ICD-10-CM

## 2025-03-04 DIAGNOSIS — R07.0 THROAT PAIN: ICD-10-CM

## 2025-03-04 DIAGNOSIS — C32.9 LARYNGEAL CANCER (HCC): Primary | ICD-10-CM

## 2025-03-04 DIAGNOSIS — Z51.5 PALLIATIVE CARE ENCOUNTER: ICD-10-CM

## 2025-03-04 PROCEDURE — 3080F DIAST BP >= 90 MM HG: CPT | Performed by: NURSE PRACTITIONER

## 2025-03-04 PROCEDURE — 99213 OFFICE O/P EST LOW 20 MIN: CPT | Performed by: NURSE PRACTITIONER

## 2025-03-04 PROCEDURE — 3077F SYST BP >= 140 MM HG: CPT | Performed by: NURSE PRACTITIONER

## 2025-03-10 ENCOUNTER — HOSPITAL ENCOUNTER (OUTPATIENT)
Dept: SPEECH THERAPY | Age: 60
Setting detail: THERAPIES SERIES
Discharge: HOME | End: 2025-03-10
Attending: INTERNAL MEDICINE

## 2025-03-10 DIAGNOSIS — C32.2 SQUAMOUS CELL CARCINOMA OF SUBGLOTTIS (HCC): ICD-10-CM

## 2025-03-10 DIAGNOSIS — C32.9 LARYNGEAL CANCER (HCC): Primary | ICD-10-CM

## 2025-03-10 DIAGNOSIS — R13.12 OROPHARYNGEAL DYSPHAGIA: ICD-10-CM

## 2025-03-10 PROCEDURE — 9990000011 HC NO CHARGE THERAPY VISIT

## 2025-03-10 NOTE — PROGRESS NOTES
Speech Language Pathology   [place] [facility]      NAME:  Dylan Dolan  :   1965  MRN:  [id number]  Date of Eval:  3/10/2025  Evaluating Therapist:   Donna Joshi M.S., CCC-SLP  Referring physician:  Pasha Starks [place]  [address] [place] [address]  PCP:  Donna Joshi M.S., FARIHA-SLP    Patient presented on this date for scheduled ST evaluation. Upon arrival, patient indicated that he thought this was the treatment for neck lymphedema. SLP indicated that he had seen OT for initial evaluation. He expressed understanding and indicated that he wanted to follow up for treatment. He expressed that he was doing well with swallowing. He continues to have his trach in, with plans for decannulation on 25. He is aware that following decannulation, it may be warranted to request updated order for repeat MBSS to further assess swallow function without the presence of the trach.     No charge today, as bedside swallow evaluation was not completed. Assisted patient in scheduling OT treatment for lymphedema, as this was the treatment that patient thought he was to obtain this date.     Patient/family involved in developing treatment plan: y    Therapist Signature:  Donna Joshi M.S., CCC-SLP     Date: 3/10/2025

## 2025-03-13 DIAGNOSIS — C32.2 SQUAMOUS CELL CARCINOMA OF SUBGLOTTIS (HCC): ICD-10-CM

## 2025-03-13 RX ORDER — OXYCODONE HYDROCHLORIDE 15 MG/1
15 TABLET ORAL EVERY 4 HOURS PRN
Qty: 84 TABLET | Refills: 0 | Status: SHIPPED | OUTPATIENT
Start: 2025-03-13 | End: 2025-03-27

## 2025-03-26 ENCOUNTER — HOSPITAL ENCOUNTER (OUTPATIENT)
Dept: OCCUPATIONAL THERAPY | Age: 60
Setting detail: THERAPIES SERIES
Discharge: HOME | End: 2025-03-26
Attending: INTERNAL MEDICINE
Payer: COMMERCIAL

## 2025-03-26 PROCEDURE — 97140 MANUAL THERAPY 1/> REGIONS: CPT | Performed by: OCCUPATIONAL THERAPIST

## 2025-03-26 NOTE — FLOWSHEET NOTE
[facility] [place] [facility]  Rehabilitation and Sports Medicine    [place]  Phone: [phone]  Fax: [phone]          Occupational Therapy Daily Treatment Note  Date:  3/26/2025    Patient Name:  Dylan Dolan    :  1965  MRN: [id number]  Restrictions/Precautions:      Medical/Treatment Diagnosis Information:   Diagnosis: Squamous cell carcinoma of subglottis, Lymphedema   Insurance/Certification information: [facility] [employer]  Physician Information:  Jadon Ag MD   Plan of care signed (Y/N):  y    Visit# / total visits:  2    Progress Note: []  Yes  [x]  No  Next due by: Visit #10      Date of evaluation/re-evaluation:  3/17/25-25    Time In:  945  Time Out: 1030    Subjective:    Patient rec'd in waiting room, pleasant and cooperative 59 yr old male with head and neck lymphedema.    Objective/Assessment:  3/26/25  Circumferential measurements: (cm)  Nose/cheekbone 59.9  Mouth 58.4  Jaw 56.5  Upper neck 50.5  Mid neck (above tracheostomy) 49.7  Lower neck (below tracheostomy) 50.0    25 Circumferential measurements: (cm)  Nose/cheekbone 61.6  Mouth 58.5  Jaw 55.0  Upper neck 59.8  Mid neck (above tracheostomy) 58.0  Lower neck (below tracheostomy) 50.0    Manual lymph drainage head and neck for edema reduction:  initiated at neck chain (clavicle) > anterior cervical > lateral cervical > waterwheel > face/cheek > mouth/jaw > B eye's > forehead > cheek/face > mouth > waterwheel > lateral cervical > anterior cervical > neck chain.    Compression bandaging on with stockinet and short stretch bandages, 1/4\" foam added to inferior jaw.    Patient education for potential garments completed.    Exercises:       Therapeutic Exercise  [] Provided verbal/tactile cueing for activities related to strengthening, flexibility, endurance, ROM. (19772)  Neuro  Re-Ed  [] Provided verbal/tactile cueing for activities related to improving balance, coordination, kinesthetic sense, posture, motor skill,

## 2025-03-26 NOTE — PLAN OF CARE
garments for management of lymphedema  Long Term Goal 3: Patient to be independent with donning and doffing compression garments using a/e as needed  Long Term Goal 4: Patient to be independent with home program  Long Term Goal 5: Patient to demonstrate improved life impact with LLIS < 15    Rehab Potential: [] excellent [x] good [] fair  [] poor     Electronically signed by:  HANNAH DOYLE OTR, OT      If you have any questions or concerns, please don't hesitate to call.  Thank you for your referral.      Physician Signature:________________________________Date:__________________  By signing above, therapist’s plan is approved by physician

## 2025-03-27 DIAGNOSIS — C32.2 SQUAMOUS CELL CARCINOMA OF SUBGLOTTIS: ICD-10-CM

## 2025-03-28 DIAGNOSIS — C32.9 LARYNGEAL CANCER (HCC): Primary | ICD-10-CM

## 2025-03-28 DIAGNOSIS — G63 POLYNEUROPATHY ASSOCIATED WITH UNDERLYING DISEASE: ICD-10-CM

## 2025-03-28 DIAGNOSIS — C32.2 SQUAMOUS CELL CARCINOMA OF SUBGLOTTIS: ICD-10-CM

## 2025-03-28 RX ORDER — OXYCODONE HYDROCHLORIDE 15 MG/1
15 TABLET ORAL EVERY 4 HOURS PRN
Qty: 84 TABLET | Refills: 0 | Status: SHIPPED | OUTPATIENT
Start: 2025-03-28 | End: 2025-04-11

## 2025-03-31 ENCOUNTER — HOSPITAL ENCOUNTER (OUTPATIENT)
Dept: OCCUPATIONAL THERAPY | Age: 60
Setting detail: THERAPIES SERIES
Discharge: HOME | End: 2025-03-31
Attending: INTERNAL MEDICINE
Payer: COMMERCIAL

## 2025-03-31 PROCEDURE — 97140 MANUAL THERAPY 1/> REGIONS: CPT | Performed by: OCCUPATIONAL THERAPIST

## 2025-03-31 RX ORDER — OXYCODONE HYDROCHLORIDE 15 MG/1
15 TABLET ORAL EVERY 4 HOURS PRN
Qty: 84 TABLET | Refills: 0 | OUTPATIENT
Start: 2025-03-31 | End: 2025-04-14

## 2025-03-31 NOTE — FLOWSHEET NOTE
[facility] [place] [facility]  Rehabilitation and Sports Medicine    [place]  Phone: [phone]  Fax: [phone]          Occupational Therapy Daily Treatment Note  Date:  3/31/2025    Patient Name:  Dylan Dolan    :  1965  MRN: [id number]  Restrictions/Precautions:      Medical/Treatment Diagnosis Information:   Diagnosis: Squamous cell carcinoma of subglottis, Lymphedema   Insurance/Certification information: [facility] [employer]  Physician Information:  Jadon Ag MD   Plan of care signed (Y/N):  y    Visit# / total visits:  3    Progress Note: []  Yes  [x]  No  Next due by: Visit #10      Date of evaluation/re-evaluation:  3/17/25-25    Time In:  925   Time Out: 955    Subjective:    Patient rec'd in waiting room, pleasant and cooperative 59 yr old male with head and neck lymphedema.    Objective/Assessment:  3/31/25  Circumferential measurements: (cm)  Nose/cheekbone 59.9  Mouth 58.9  Jaw 56.7  Upper neck 49.8  Mid neck (above tracheostomy) 49.6  Lower neck (below tracheostomy) 48.7    3/26/25  Circumferential measurements: (cm)  Nose/cheekbone 59.9  Mouth 58.4  Jaw 56.5  Upper neck 50.5  Mid neck (above tracheostomy) 49.7  Lower neck (below tracheostomy) 50.0    Manual lymph drainage head and neck for edema reduction:  initiated at neck chain (clavicle) > anterior cervical > lateral cervical > waterwheel > face/cheek > mouth/jaw > B eye's > forehead > cheek/face > mouth > waterwheel > lateral cervical > anterior cervical > neck chain.    Compression bandaging on with stockinet and short stretch bandages, 1/4\" foam added to inferior jaw.    Patient education self manual lymph drainage, handouts provided. Patient verbalized and demonstrated understanding.    Exercises:       Therapeutic Exercise  [] Provided verbal/tactile cueing for activities related to strengthening, flexibility, endurance, ROM. (54733)  Neuro  Re-Ed  [] Provided verbal/tactile cueing for activities related to improving balance,

## 2025-04-07 ENCOUNTER — HOSPITAL ENCOUNTER (OUTPATIENT)
Dept: OCCUPATIONAL THERAPY | Age: 60
Setting detail: THERAPIES SERIES
Discharge: HOME | End: 2025-04-07
Attending: INTERNAL MEDICINE
Payer: COMMERCIAL

## 2025-04-07 ENCOUNTER — TELEPHONE (OUTPATIENT)
Dept: FAMILY MEDICINE CLINIC | Age: 60
End: 2025-04-07

## 2025-04-07 PROCEDURE — 97140 MANUAL THERAPY 1/> REGIONS: CPT | Performed by: OCCUPATIONAL THERAPIST

## 2025-04-07 NOTE — FLOWSHEET NOTE
[facility] [place] [facility]  Rehabilitation and Sports Medicine    [place]  Phone: [phone]  Fax: [phone]          Occupational Therapy Daily Treatment Note  Date:  2025    Patient Name:  Dylan Dolan    :  1965  MRN: [id number]  Restrictions/Precautions:      Medical/Treatment Diagnosis Information:   Diagnosis: Squamous cell carcinoma of subglottis, Lymphedema   Insurance/Certification information: [facility] [employer]  Physician Information:  Jadon Ag MD   Plan of care signed (Y/N):  y    Visit# / total visits:  4    Progress Note: []  Yes  [x]  No  Next due by: Visit #10      Date of evaluation/re-evaluation:  3/17/25-25    Time In:  1005   Time Out: 1035    Subjective:    Patient rec'd in waiting room, pleasant and cooperative 59 yr old male with head and neck lymphedema.    Objective/Assessment:  25  Circumferential measurements: (cm)  Nose/cheekbone 59.5  Mouth 57.6  Jaw 56.6  Upper neck 49.9  Mid neck (above tracheostomy) 46.8  Lower neck (below tracheostomy) 48.7    3/31/25  Circumferential measurements: (cm)  Nose/cheekbone 59.9  Mouth 58.9  Jaw 56.7  Upper neck 49.8  Mid neck (above tracheostomy) 49.6  Lower neck (below tracheostomy) 48.7    Manual lymph drainage head and neck for edema reduction:  initiated at neck chain (clavicle) > anterior cervical > lateral cervical > waterwheel > face/cheek > mouth/jaw > B eye's > forehead > cheek/face > mouth > waterwheel > lateral cervical > anterior cervical > neck chain.    Compression bandaging on with stockinet and short stretch bandages, 1/2\" foam (medium density) added to inferior jaw.    Patient reports self manual lymph drainage several times per day and nightly self bandaging.    Exercises:       Therapeutic Exercise  [] Provided verbal/tactile cueing for activities related to strengthening, flexibility, endurance, ROM. (79472)  Neuro  Re-Ed  [] Provided verbal/tactile cueing for activities related to improving balance,

## 2025-04-07 NOTE — TELEPHONE ENCOUNTER
Patient stated that he has a bad cough and needs a stronger cough medication. Stated he does not want to got to urgent care. Offered an appointment tomorrow for PCP and patient is not able to come back tomorrow.     [facility]

## 2025-04-08 NOTE — TELEPHONE ENCOUNTER
Attempted to reach patient- no answer and VM box full   What is he currently taking for the cough? Any other symptoms?

## 2025-04-08 NOTE — TELEPHONE ENCOUNTER
Spoke to patient and he states she has had a cough for over 2 days. He reports no fever, shortness or breath, or chills.   He reports a productive cough at times and feeling \"achy\".  He is taking OTC cough medicine.   He would like to know if there is something else he can try for this,   Declines appointment.     Please advise.

## 2025-04-10 DIAGNOSIS — C32.9 LARYNGEAL CANCER (HCC): ICD-10-CM

## 2025-04-10 DIAGNOSIS — G63 POLYNEUROPATHY ASSOCIATED WITH UNDERLYING DISEASE: ICD-10-CM

## 2025-04-10 DIAGNOSIS — C32.2 SQUAMOUS CELL CARCINOMA OF SUBGLOTTIS: ICD-10-CM

## 2025-04-10 DIAGNOSIS — C32.2 SQUAMOUS CELL CARCINOMA OF SUBGLOTTIS (HCC): ICD-10-CM

## 2025-04-10 RX ORDER — OXYCODONE HYDROCHLORIDE 15 MG/1
15 TABLET ORAL EVERY 4 HOURS PRN
Qty: 84 TABLET | Refills: 0 | Status: SHIPPED | OUTPATIENT
Start: 2025-04-10 | End: 2025-04-24

## 2025-04-14 ENCOUNTER — HOSPITAL ENCOUNTER (OUTPATIENT)
Dept: OCCUPATIONAL THERAPY | Age: 60
Setting detail: THERAPIES SERIES
Discharge: HOME | End: 2025-04-14
Attending: INTERNAL MEDICINE
Payer: COMMERCIAL

## 2025-04-14 PROCEDURE — 97140 MANUAL THERAPY 1/> REGIONS: CPT | Performed by: OCCUPATIONAL THERAPIST

## 2025-04-14 NOTE — PLAN OF CARE
Occupational Therapy     [place]  Phone: [phone]  Fax: [phone]             To:  Jadon Ag MD       Patient: Dylan Dolan  : 1965  MRN: [id number]  Evaluation Date: 2025      Diagnosis Information:  Diagnosis: Squamous cell carcinoma of subglottis, Lymphedema         Occupational Therapy Certification/Re-Certification Form  Dear   The following patient has been evaluated for occupational therapy services and for therapy to continue, insurance requires physician review of the treatment plan. Please review the attached evaluation and/or summary of the patient's plan of care, and verify that you agree therapy should continue by signing the attached document and sending it back to our office.    Plan of Care/Treatment to date: 25-25    [x] Therapeutic Exercise    [] Modalities:  [x] Therapeutic Activity    [] Ultrasound  [] Electrical Stimulation   [x] Activities of Daily Living    [] Paraffin   [x] Kinesiotaping  [] Neuromuscular Re-education   [] Iontophoresis [] Coldpack/hotpack   [x] Instruction in HEP     [] Orthotics/splint []   [x] Manual Therapy       [] Aquatic Therapy            Frequency/Duration:   (patient plans to wait to start treatment until after scans)    # Days per week: [] 1 day # Weeks: [] 1 week [] 5 weeks      [] 2 days   [] 2 weeks [x] 6 weeks     [] 3 days   [] 3 weeks [] 7 weeks     [x] 4x as needed  [] 4 weeks [] 8 weeks  Goals:  Long Term Goals  Time Frame for Long Term Goals : 6 weeks  Long Term Goal 1: Patient to demonstrate decreased fluid volume circumferential measurement at cricoid notch  > 2cm for decreased risk cellulitis and decreased discomfort.      Goal met   Long Term Goal 2: Assess, determine, fit, and obtain most appropriate long term compression garments for management of lymphedema    Long Term Goal 3: Patient to be independent with donning and doffing compression garments using a/e as needed  Long Term Goal 4: Patient to be

## 2025-04-14 NOTE — FLOWSHEET NOTE
[facility] [place] [facility]  Rehabilitation and Sports Medicine    [place]  Phone: [phone]  Fax: [phone]          Occupational Therapy Daily Treatment Note  Date:  2025    Patient Name:  Dylan Dolan    :  1965  MRN: [id number]  Restrictions/Precautions:      Medical/Treatment Diagnosis Information:   Diagnosis: Squamous cell carcinoma of subglottis, Lymphedema   Insurance/Certification information: [facility] [employer]  Physician Information:  Jadon Ag MD   Plan of care signed (Y/N):  y    Visit# / total visits:  5    Progress Note: []  Yes  [x]  No  Next due by: Visit #10      Date of evaluation/re-evaluation:  25-25    Time In:  1030   Time Out: 1110    Subjective:    Patient rec'd in waiting room, pleasant and cooperative 59 yr old male with head and neck lymphedema.    Objective/Assessment:  25  Circumferential measurements: (cm)  Nose/cheekbone 59.7  Mouth 57.9  Jaw 55.9  Upper neck 50.2  Mid neck (above tracheostomy) 47.3  Lower neck (below tracheostomy) 47.4    25  Circumferential measurements: (cm)  Nose/cheekbone 59.5  Mouth 57.6  Jaw 56.6  Upper neck 49.9  Mid neck (above tracheostomy) 46.8  Lower neck (below tracheostomy) 48.7      Manual lymph drainage head and neck for edema reduction:  initiated at neck chain (clavicle) > anterior cervical > lateral cervical > waterwheel > face/cheek > mouth/jaw > B eye's > forehead > cheek/face > mouth > waterwheel > lateral cervical > anterior cervical > neck chain.    Compression bandaging on with stockinet and short stretch bandages, 1/2\" foam (medium density) added to inferior jaw.    Patient reports self manual lymph drainage several times per day and nightly self bandaging.    Patient provided with information on [employer] Tribute Wrap Head & Neck with Tracheostomy Accommodation and how and where to order.    Patient is overall doing well managing lymphedema at home, recommend decreasing visits to as needed. Patient in

## 2025-04-21 DIAGNOSIS — E11.40 TYPE 2 DIABETES MELLITUS WITH DIABETIC NEUROPATHY, WITHOUT LONG-TERM CURRENT USE OF INSULIN (HCC): ICD-10-CM

## 2025-04-21 NOTE — TELEPHONE ENCOUNTER
Last filled 11/27/24 #180/1 refill.  Due for labs  Next OV is 5/29/25  LM for patient to return call

## 2025-04-25 DIAGNOSIS — C32.2 SQUAMOUS CELL CARCINOMA OF SUBGLOTTIS (HCC): ICD-10-CM

## 2025-04-25 DIAGNOSIS — C32.9 LARYNGEAL CANCER (HCC): ICD-10-CM

## 2025-04-25 DIAGNOSIS — G63 POLYNEUROPATHY ASSOCIATED WITH UNDERLYING DISEASE: ICD-10-CM

## 2025-04-25 RX ORDER — OXYCODONE HYDROCHLORIDE 15 MG/1
15 TABLET ORAL EVERY 4 HOURS PRN
Qty: 84 TABLET | Refills: 0 | Status: SHIPPED | OUTPATIENT
Start: 2025-04-25 | End: 2025-05-09

## 2025-04-25 NOTE — TELEPHONE ENCOUNTER
Dylan called requesting a refill of the below medication which has been pended for you: patient has labs and follow up in May. Will wait till we see labwork before refilling. Should have enough until end of May    Requested Prescriptions     Pending Prescriptions Disp Refills    metFORMIN (GLUCOPHAGE) 500 MG tablet 180 tablet 1     Sig: Take 1 tablet by mouth 2 times daily (with meals)       Last Appointment Date: 11/27/2024  Next Appointment Date: 5/29/2025    Allergies   Allergen Reactions    Chantix [Varenicline] Other (See Comments)     Unusual dreams.

## 2025-04-28 NOTE — PROGRESS NOTES
Amputation site looks good. Wound is clean. Sutures intact.   No sign of ischemia or infections        Cleaned the skin up today    Follow up in 1 week  No weight bearing 157.48

## 2025-04-29 ENCOUNTER — OFFICE VISIT (OUTPATIENT)
Dept: PALLATIVE CARE | Age: 60
End: 2025-04-29
Payer: COMMERCIAL

## 2025-04-29 VITALS
OXYGEN SATURATION: 99 % | BODY MASS INDEX: 29.79 KG/M2 | DIASTOLIC BLOOD PRESSURE: 76 MMHG | WEIGHT: 225.8 LBS | HEART RATE: 81 BPM | RESPIRATION RATE: 18 BRPM | TEMPERATURE: 97.8 F | SYSTOLIC BLOOD PRESSURE: 146 MMHG

## 2025-04-29 DIAGNOSIS — Z02.83 ENCOUNTER FOR DRUG SCREENING: ICD-10-CM

## 2025-04-29 DIAGNOSIS — K59.03 DRUG-INDUCED CONSTIPATION: ICD-10-CM

## 2025-04-29 DIAGNOSIS — Z51.5 PALLIATIVE CARE ENCOUNTER: ICD-10-CM

## 2025-04-29 DIAGNOSIS — I89.0 LYMPHEDEMA: ICD-10-CM

## 2025-04-29 DIAGNOSIS — C32.9 LARYNGEAL CANCER (HCC): Primary | ICD-10-CM

## 2025-04-29 DIAGNOSIS — Z93.0 TRACHEOSTOMY IN PLACE (HCC): ICD-10-CM

## 2025-04-29 DIAGNOSIS — G63 POLYNEUROPATHY ASSOCIATED WITH UNDERLYING DISEASE: ICD-10-CM

## 2025-04-29 PROCEDURE — 3077F SYST BP >= 140 MM HG: CPT | Performed by: NURSE PRACTITIONER

## 2025-04-29 PROCEDURE — 3078F DIAST BP <80 MM HG: CPT | Performed by: NURSE PRACTITIONER

## 2025-04-29 PROCEDURE — 99213 OFFICE O/P EST LOW 20 MIN: CPT | Performed by: NURSE PRACTITIONER

## 2025-04-29 NOTE — PROGRESS NOTES
PALLIATIVE CARE PROGRESS NOTE     Patient: Dylan Dolan  1965    Reason For Consult:  Goals of care evaluation  Distress management  Symptom Management  Guidance and support  Facilitate communications  Assistance in coordinating care  Recommendations for the above    Subjective: Dylan Dolan is a 58 y.o. male with a history of HTN, Tourette's syndrome, GERD, tobacco abuse, obesity, HLD, tracheostomy, and follows with Alley Vila MD .  Palliative Care was consulted to help manage symptoms, facilitate communications and establish goals of care. Patient presented to the ED on 7/8/24 with complaints of hoarse voice and difficulty breathing. He reported that this has happened over the course of a few months. He reported that week prior he was at  and was given steroids and a antibiotic without improvements. He was given racepinephrine and dexamethasone. CT soft tissue neck revealed nonspecific mass within the left vocal cord measuring 8 mm x 6 mm. No cervical lymphadenopathy noted. Patient was transferred to [facility] for ENT evaluation and was hospitalized 7/8-7/17/24. ENT performed flexible laryngoscopy and findings consistent with glottic malignancy. On 7/9 patient underwent Bx and Tracheostomy. A 5 ET tube was not able to be passed through the subglottis and subglottis was found to have an ulcerative mass. Intubation was converted to open tracheostomy with successful placement of shiley 6 cuffed ETT. Oncology and Rad Onc were consulted for newly Dx left vocal cod SCC. They reccommended definitive course of RT and possibly chemo. On 7/11 patient had BSS and this was WNL. On 7/14 tracheostomy tube was changed. CT chest did not show any evidence of metastatic disease. 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 Small metabolically active left-sided cervical lymph nodes are present left levels 2 through 5. No metabolically active

## 2025-04-30 RX ORDER — SENNOSIDES 8.6 MG
1 TABLET ORAL DAILY
Qty: 120 TABLET | Refills: 0 | Status: SHIPPED | OUTPATIENT
Start: 2025-04-30

## 2025-04-30 RX ORDER — DULOXETIN HYDROCHLORIDE 30 MG/1
30 CAPSULE, DELAYED RELEASE ORAL DAILY
Qty: 30 CAPSULE | Refills: 1 | Status: SHIPPED | OUTPATIENT
Start: 2025-04-30 | End: 2025-06-29

## 2025-05-09 DIAGNOSIS — C32.9 LARYNGEAL CANCER (HCC): ICD-10-CM

## 2025-05-09 DIAGNOSIS — G63 POLYNEUROPATHY ASSOCIATED WITH UNDERLYING DISEASE: ICD-10-CM

## 2025-05-09 DIAGNOSIS — C32.2 SQUAMOUS CELL CARCINOMA OF SUBGLOTTIS (HCC): ICD-10-CM

## 2025-05-09 RX ORDER — OXYCODONE HYDROCHLORIDE 15 MG/1
15 TABLET ORAL EVERY 4 HOURS PRN
Qty: 84 TABLET | Refills: 0 | Status: SHIPPED | OUTPATIENT
Start: 2025-05-09 | End: 2025-05-23

## 2025-05-19 ENCOUNTER — HOSPITAL ENCOUNTER (OUTPATIENT)
Dept: GENERAL RADIOLOGY | Age: 60
Discharge: HOME | End: 2025-05-21
Payer: COMMERCIAL

## 2025-05-19 ENCOUNTER — HOSPITAL ENCOUNTER (OUTPATIENT)
Dept: WOUND CARE | Age: 60
Discharge: HOME | End: 2025-05-20
Payer: COMMERCIAL

## 2025-05-19 VITALS
HEART RATE: 88 BPM | DIASTOLIC BLOOD PRESSURE: 80 MMHG | WEIGHT: 221 LBS | SYSTOLIC BLOOD PRESSURE: 138 MMHG | RESPIRATION RATE: 18 BRPM | BODY MASS INDEX: 29.29 KG/M2 | TEMPERATURE: 97 F | HEIGHT: 73 IN

## 2025-05-19 DIAGNOSIS — M86.171 OSTEOMYELITIS OF FOOT, RIGHT, ACUTE (HCC): ICD-10-CM

## 2025-05-19 DIAGNOSIS — L97.514 ULCER OF RIGHT FOOT WITH NECROSIS OF BONE (HCC): ICD-10-CM

## 2025-05-19 DIAGNOSIS — L97.516 ULCER OF RIGHT FOOT WITH BONE INVOLVEMENT WITHOUT EVIDENCE OF NECROSIS (HCC): Primary | ICD-10-CM

## 2025-05-19 DIAGNOSIS — L97.516 ULCER OF RIGHT FOOT WITH BONE INVOLVEMENT WITHOUT EVIDENCE OF NECROSIS (HCC): ICD-10-CM

## 2025-05-19 PROCEDURE — 99214 OFFICE O/P EST MOD 30 MIN: CPT

## 2025-05-19 PROCEDURE — 87077 CULTURE AEROBIC IDENTIFY: CPT

## 2025-05-19 PROCEDURE — 87075 CULTR BACTERIA EXCEPT BLOOD: CPT

## 2025-05-19 PROCEDURE — 11047 DBRDMT BONE EACH ADDL: CPT

## 2025-05-19 PROCEDURE — 88304 TISSUE EXAM BY PATHOLOGIST: CPT

## 2025-05-19 PROCEDURE — 87205 SMEAR GRAM STAIN: CPT

## 2025-05-19 PROCEDURE — 73630 X-RAY EXAM OF FOOT: CPT

## 2025-05-19 PROCEDURE — 87070 CULTURE OTHR SPECIMN AEROBIC: CPT

## 2025-05-19 PROCEDURE — 87186 SC STD MICRODIL/AGAR DIL: CPT

## 2025-05-19 PROCEDURE — 88311 DECALCIFY TISSUE: CPT

## 2025-05-19 RX ORDER — SODIUM CHLOR/HYPOCHLOROUS ACID 0.033 %
SOLUTION, IRRIGATION IRRIGATION PRN
OUTPATIENT
Start: 2025-05-19

## 2025-05-19 RX ORDER — LIDOCAINE 50 MG/G
OINTMENT TOPICAL PRN
OUTPATIENT
Start: 2025-05-19

## 2025-05-19 RX ORDER — LIDOCAINE HYDROCHLORIDE 20 MG/ML
JELLY TOPICAL PRN
OUTPATIENT
Start: 2025-05-19

## 2025-05-19 RX ORDER — BACITRACIN ZINC AND POLYMYXIN B SULFATE 500; 1000 [USP'U]/G; [USP'U]/G
OINTMENT TOPICAL PRN
OUTPATIENT
Start: 2025-05-19

## 2025-05-19 RX ORDER — MUPIROCIN 20 MG/G
OINTMENT TOPICAL PRN
OUTPATIENT
Start: 2025-05-19

## 2025-05-19 RX ORDER — CLINDAMYCIN HYDROCHLORIDE 300 MG/1
300 CAPSULE ORAL 3 TIMES DAILY
Qty: 30 CAPSULE | Refills: 3 | Status: SHIPPED | OUTPATIENT
Start: 2025-05-19 | End: 2025-05-29

## 2025-05-19 RX ORDER — GINSENG 100 MG
CAPSULE ORAL PRN
OUTPATIENT
Start: 2025-05-19

## 2025-05-19 RX ORDER — NEOMYCIN/BACITRACIN/POLYMYXINB 3.5-400-5K
OINTMENT (GRAM) TOPICAL PRN
OUTPATIENT
Start: 2025-05-19

## 2025-05-19 RX ORDER — LIDOCAINE 40 MG/G
CREAM TOPICAL PRN
OUTPATIENT
Start: 2025-05-19

## 2025-05-19 RX ORDER — LIDOCAINE HYDROCHLORIDE 40 MG/ML
SOLUTION TOPICAL PRN
OUTPATIENT
Start: 2025-05-19

## 2025-05-19 RX ORDER — GENTAMICIN SULFATE 1 MG/G
OINTMENT TOPICAL PRN
OUTPATIENT
Start: 2025-05-19

## 2025-05-19 RX ORDER — SILVER SULFADIAZINE 10 MG/G
CREAM TOPICAL PRN
OUTPATIENT
Start: 2025-05-19

## 2025-05-19 RX ORDER — TRIAMCINOLONE ACETONIDE 1 MG/G
OINTMENT TOPICAL PRN
OUTPATIENT
Start: 2025-05-19

## 2025-05-19 RX ORDER — BETAMETHASONE DIPROPIONATE 0.5 MG/G
CREAM TOPICAL PRN
OUTPATIENT
Start: 2025-05-19

## 2025-05-19 RX ORDER — CLOBETASOL PROPIONATE 0.5 MG/G
OINTMENT TOPICAL PRN
OUTPATIENT
Start: 2025-05-19

## 2025-05-19 NOTE — DISCHARGE INSTRUCTIONS
Packing Iodoform to right foot wound. Covering with dry dressing and changing daily.   Wear offloading shoe at all times when ambulating.      antibiotic at [facility].     Get Xray done of right foot ( no appt needed)     Vascular studied are as scheduled.     Follow up with Dr. Banks in 1 week as scheduled.     SIGNS OF INFECTION  - Redness, swelling, skin hot  - Wound bed turns black or stringy yellow  - Foul odor  - Increased drainage or pus  - Increased pain  - Fever greater than 100F    CALL YOUR DOCTOR OR SEEK MEDICAL ATTENTION IF SIGNS OF INFECTION.  DO NOT WAIT UNTIL YOUR NEXT APPOINTMENT    Call the Wound Care Nurse with any other questions or concerns- [phone]

## 2025-05-19 NOTE — PLAN OF CARE
Problem: Cognitive:  Goal: Knowledge of wound care  Description: Knowledge of wound care  Outcome: Progressing  Goal: Understands risk factors for wounds  Description: Understands risk factors for wounds  Outcome: Progressing     Problem: Wound:  Goal: Will show signs of wound healing; wound closure and no evidence of infection  Description: Will show signs of wound healing; wound closure and no evidence of infection  Outcome: Progressing     Problem: Pressure Ulcer:  Goal: Signs of wound healing will improve  Description: Signs of wound healing will improve  Outcome: Progressing  Goal: Absence of new pressure ulcer  Description: Absence of new pressure ulcer  Outcome: Progressing  Goal: Will show no infection signs and symptoms  Description: Will show no infection signs and symptoms  Outcome: Progressing     Problem: Smoking cessation:  Goal: Ability to formulate a plan to maintain a tobacco-free life will be supported  Description: Ability to formulate a plan to maintain a tobacco-free life will be supported  Outcome: Progressing     Problem: Weight control:  Goal: Ability to maintain an optimal weight for height and age will be supported  Description: Ability to maintain an optimal weight for height and age will be supported  Outcome: Progressing     Problem: Falls - Risk of:  Goal: Will remain free from falls  Description: Will remain free from falls  Outcome: Progressing     Problem: Blood Glucose:  Goal: Ability to maintain appropriate glucose levels will improve  Description: Ability to maintain appropriate glucose levels will improve  Outcome: Progressing

## 2025-05-19 NOTE — PROGRESS NOTES
Subjective:    Dylan Dolan is a 59 y.o. male who presents with the ulceration of the right foot.  Started 6 months ago.  Patient states been doing a lot of other cancer issues.  Patient has not been compliant with off loading. Patient relates pain is Absent .  Pain is rated 0 out of 10 and is described as none.  Currently denies F/C/N/V. Overall diabetic control is not changed.    Allergies   Allergen Reactions    Chantix [Varenicline] Other (See Comments)     Unusual dreams.       Past Medical History:   Diagnosis Date    Ankle pain, left     chronic; s/p fracture in 1999    Cancer (HCC)     vocal cord    Gastroesophageal reflux disease     Hyperlipemia     Hypertension     Obesity     Tobacco abuse     Tourette's syndrome        Prior to Admission medications    Medication Sig Start Date End Date Taking? Authorizing Provider   clindamycin (CLEOCIN) 300 MG capsule Take 1 capsule by mouth 3 times daily for 10 days 5/19/25 5/29/25 Yes Ivan Banks DPM   oxyCODONE (OXY-IR) 15 MG immediate release tablet Take 1 tablet by mouth every 4 hours as needed for Pain for up to 14 days. Max Daily Amount: 90 mg 5/9/25 5/23/25 Yes Crissy Barrera APRN - CNP   senna (SENOKOT) 8.6 MG TABS tablet Take 1 tablet by mouth daily 4/30/25  Yes Crissy Barrera APRN - CNP   DULoxetine (CYMBALTA) 30 MG extended release capsule Take 1 capsule by mouth daily 4/30/25 6/29/25 Yes Crissy Barrera APRN - CNP   scopolamine (TRANSDERM-SCOP) transdermal patch  12/19/24  Yes Provider, MD Dave   ipratropium 0.5 mg-albuterol 2.5 mg (DUONEB) 0.5-2.5 (3) MG/3ML SOLN nebulizer solution Inhale 3 mLs into the lungs every 4 hours as needed for Shortness of Breath 12/31/24  Yes Alley Vila MD   atorvastatin (LIPITOR) 40 MG tablet Take 1 tablet by mouth daily 11/27/24  Yes Alley Vila MD   losartan-hydroCHLOROthiazide (HYZAAR) 100-25 MG per tablet Take 1 tablet by mouth daily 11/27/24  Yes Alley Vila MD   metFORMIN (GLUCOPHAGE) 500 MG

## 2025-05-20 LAB — SURGICAL PATHOLOGY REPORT: NORMAL

## 2025-05-21 DIAGNOSIS — F95.2 TOURETTE'S SYNDROME: ICD-10-CM

## 2025-05-21 RX ORDER — OXYCODONE HYDROCHLORIDE 15 MG/1
TABLET ORAL
Qty: 84 TABLET | Refills: 0 | OUTPATIENT
Start: 2025-05-21

## 2025-05-21 RX ORDER — PIMOZIDE 1 MG/1
2 TABLET ORAL 2 TIMES DAILY
Qty: 360 TABLET | Refills: 0 | Status: SHIPPED | OUTPATIENT
Start: 2025-05-21

## 2025-05-21 NOTE — TELEPHONE ENCOUNTER
Dylan called requesting a refill of the below medication which has been pended for you:     Requested Prescriptions     Pending Prescriptions Disp Refills    pimozide (ORAP) 1 MG tablet 360 tablet 0     Sig: Take 2 tablets by mouth 2 times daily       Last Appointment Date: 11/27/2024  Next Appointment Date: 5/29/2025    Allergies   Allergen Reactions    Chantix [Varenicline] Other (See Comments)     Unusual dreams.

## 2025-05-23 DIAGNOSIS — C32.9 LARYNGEAL CANCER (HCC): ICD-10-CM

## 2025-05-23 DIAGNOSIS — G63 POLYNEUROPATHY ASSOCIATED WITH UNDERLYING DISEASE: ICD-10-CM

## 2025-05-23 DIAGNOSIS — C32.2 SQUAMOUS CELL CARCINOMA OF SUBGLOTTIS (HCC): ICD-10-CM

## 2025-05-23 LAB
MICROORGANISM SPEC CULT: ABNORMAL
MICROORGANISM/AGENT SPEC: ABNORMAL
MICROORGANISM/AGENT SPEC: ABNORMAL
SERVICE CMNT-IMP: ABNORMAL
SPECIMEN DESCRIPTION: ABNORMAL

## 2025-05-23 RX ORDER — OXYCODONE HYDROCHLORIDE 15 MG/1
15 TABLET ORAL EVERY 4 HOURS PRN
Qty: 84 TABLET | Refills: 0 | Status: SHIPPED | OUTPATIENT
Start: 2025-05-23 | End: 2025-06-06

## 2025-05-27 ENCOUNTER — HOSPITAL ENCOUNTER (OUTPATIENT)
Dept: WOUND CARE | Age: 60
Discharge: HOME | End: 2025-05-28
Payer: COMMERCIAL

## 2025-05-27 VITALS
WEIGHT: 222 LBS | BODY MASS INDEX: 29.42 KG/M2 | DIASTOLIC BLOOD PRESSURE: 88 MMHG | HEIGHT: 73 IN | TEMPERATURE: 96.7 F | RESPIRATION RATE: 20 BRPM | SYSTOLIC BLOOD PRESSURE: 122 MMHG | HEART RATE: 84 BPM

## 2025-05-27 DIAGNOSIS — L97.516 ULCER OF RIGHT FOOT WITH BONE INVOLVEMENT WITHOUT EVIDENCE OF NECROSIS (HCC): Primary | ICD-10-CM

## 2025-05-27 PROCEDURE — 11042 DBRDMT SUBQ TIS 1ST 20SQCM/<: CPT

## 2025-05-27 PROCEDURE — 11042 DBRDMT SUBQ TIS 1ST 20SQCM/<: CPT | Performed by: PODIATRIST

## 2025-05-27 RX ORDER — LIDOCAINE 40 MG/G
CREAM TOPICAL PRN
OUTPATIENT
Start: 2025-05-27

## 2025-05-27 RX ORDER — GINSENG 100 MG
CAPSULE ORAL PRN
OUTPATIENT
Start: 2025-05-27

## 2025-05-27 RX ORDER — SODIUM CHLOR/HYPOCHLOROUS ACID 0.033 %
SOLUTION, IRRIGATION IRRIGATION PRN
OUTPATIENT
Start: 2025-05-27

## 2025-05-27 RX ORDER — GENTAMICIN SULFATE 1 MG/G
OINTMENT TOPICAL PRN
OUTPATIENT
Start: 2025-05-27

## 2025-05-27 RX ORDER — MUPIROCIN 20 MG/G
OINTMENT TOPICAL PRN
OUTPATIENT
Start: 2025-05-27

## 2025-05-27 RX ORDER — SILVER SULFADIAZINE 10 MG/G
CREAM TOPICAL PRN
OUTPATIENT
Start: 2025-05-27

## 2025-05-27 RX ORDER — LIDOCAINE 50 MG/G
OINTMENT TOPICAL PRN
OUTPATIENT
Start: 2025-05-27

## 2025-05-27 RX ORDER — BACITRACIN ZINC AND POLYMYXIN B SULFATE 500; 1000 [USP'U]/G; [USP'U]/G
OINTMENT TOPICAL PRN
OUTPATIENT
Start: 2025-05-27

## 2025-05-27 RX ORDER — NEOMYCIN/BACITRACIN/POLYMYXINB 3.5-400-5K
OINTMENT (GRAM) TOPICAL PRN
OUTPATIENT
Start: 2025-05-27

## 2025-05-27 RX ORDER — BETAMETHASONE DIPROPIONATE 0.5 MG/G
CREAM TOPICAL PRN
OUTPATIENT
Start: 2025-05-27

## 2025-05-27 RX ORDER — CLOBETASOL PROPIONATE 0.5 MG/G
OINTMENT TOPICAL PRN
OUTPATIENT
Start: 2025-05-27

## 2025-05-27 RX ORDER — LIDOCAINE HYDROCHLORIDE 20 MG/ML
JELLY TOPICAL PRN
OUTPATIENT
Start: 2025-05-27

## 2025-05-27 RX ORDER — LIDOCAINE HYDROCHLORIDE 40 MG/ML
SOLUTION TOPICAL PRN
OUTPATIENT
Start: 2025-05-27

## 2025-05-27 RX ORDER — TRIAMCINOLONE ACETONIDE 1 MG/G
OINTMENT TOPICAL PRN
OUTPATIENT
Start: 2025-05-27

## 2025-05-27 NOTE — PROGRESS NOTES
Subjective:    Dylan Dolan is a 59 y.o. male who presents with the ulceration right foot.  Patient's tolerate the antibiotic with no issues.  Patient has had help with family changing the dressing.  Patient is offloading as advised.  Patient thinks his foot overall feels better than it did.  Currently denies F/C/N/V. Overall diabetic control is not changed.    Allergies   Allergen Reactions    Chantix [Varenicline] Other (See Comments)     Unusual dreams.       Past Medical History:   Diagnosis Date    Ankle pain, left     chronic; s/p fracture in 1999    Cancer (HCC)     vocal cord    Gastroesophageal reflux disease     Hyperlipemia     Hypertension     Obesity     Tobacco abuse     Tourette's syndrome        Prior to Admission medications    Medication Sig Start Date End Date Taking? Authorizing Provider   oxyCODONE (OXY-IR) 15 MG immediate release tablet Take 1 tablet by mouth every 4 hours as needed for Pain for up to 14 days. Max Daily Amount: 90 mg 5/23/25 6/6/25 Yes Crissy Barrera APRN - CNP   pimozide (ORAP) 1 MG tablet Take 2 tablets by mouth 2 times daily 5/21/25  Yes Alley Vila MD   clindamycin (CLEOCIN) 300 MG capsule Take 1 capsule by mouth 3 times daily for 10 days 5/19/25 5/29/25 Yes Ivan Banks DPNELIDA   senna (SENOKOT) 8.6 MG TABS tablet Take 1 tablet by mouth daily 4/30/25  Yes Crissy Barrera APRN - CNP   DULoxetine (CYMBALTA) 30 MG extended release capsule Take 1 capsule by mouth daily 4/30/25 6/29/25 Yes Crissy Barrera APRN - CNP   scopolamine (TRANSDERM-SCOP) transdermal patch  12/19/24  Yes ProviderDave MD   ipratropium 0.5 mg-albuterol 2.5 mg (DUONEB) 0.5-2.5 (3) MG/3ML SOLN nebulizer solution Inhale 3 mLs into the lungs every 4 hours as needed for Shortness of Breath 12/31/24  Yes Alley Vila MD   atorvastatin (LIPITOR) 40 MG tablet Take 1 tablet by mouth daily 11/27/24  Yes Alley Vila MD   losartan-hydroCHLOROthiazide (HYZAAR) 100-25 MG per tablet Take 1 tablet by

## 2025-05-29 ENCOUNTER — OFFICE VISIT (OUTPATIENT)
Dept: FAMILY MEDICINE CLINIC | Age: 60
End: 2025-05-29
Payer: COMMERCIAL

## 2025-05-29 VITALS
OXYGEN SATURATION: 97 % | HEART RATE: 72 BPM | SYSTOLIC BLOOD PRESSURE: 138 MMHG | HEIGHT: 73 IN | DIASTOLIC BLOOD PRESSURE: 80 MMHG | TEMPERATURE: 98.3 F | RESPIRATION RATE: 16 BRPM | WEIGHT: 217 LBS | BODY MASS INDEX: 28.76 KG/M2

## 2025-05-29 DIAGNOSIS — E11.40 TYPE 2 DIABETES MELLITUS WITH DIABETIC NEUROPATHY, WITHOUT LONG-TERM CURRENT USE OF INSULIN (HCC): Primary | ICD-10-CM

## 2025-05-29 DIAGNOSIS — R06.02 SHORTNESS OF BREATH: ICD-10-CM

## 2025-05-29 DIAGNOSIS — E78.2 MIXED HYPERLIPIDEMIA: ICD-10-CM

## 2025-05-29 DIAGNOSIS — I10 ESSENTIAL HYPERTENSION: ICD-10-CM

## 2025-05-29 DIAGNOSIS — K21.9 GASTROESOPHAGEAL REFLUX DISEASE, UNSPECIFIED WHETHER ESOPHAGITIS PRESENT: ICD-10-CM

## 2025-05-29 DIAGNOSIS — F95.2 TOURETTE'S SYNDROME: ICD-10-CM

## 2025-05-29 PROCEDURE — 99214 OFFICE O/P EST MOD 30 MIN: CPT | Performed by: FAMILY MEDICINE

## 2025-05-29 PROCEDURE — 3079F DIAST BP 80-89 MM HG: CPT | Performed by: FAMILY MEDICINE

## 2025-05-29 PROCEDURE — 3075F SYST BP GE 130 - 139MM HG: CPT | Performed by: FAMILY MEDICINE

## 2025-05-29 RX ORDER — OMEPRAZOLE 20 MG/1
CAPSULE, DELAYED RELEASE ORAL
Qty: 90 CAPSULE | Refills: 1 | Status: SHIPPED | OUTPATIENT
Start: 2025-05-29

## 2025-05-29 RX ORDER — PIMOZIDE 1 MG/1
2 TABLET ORAL 2 TIMES DAILY
Qty: 360 TABLET | Refills: 1 | Status: SHIPPED | OUTPATIENT
Start: 2025-05-29

## 2025-05-29 RX ORDER — IPRATROPIUM BROMIDE AND ALBUTEROL SULFATE 2.5; .5 MG/3ML; MG/3ML
3 SOLUTION RESPIRATORY (INHALATION) EVERY 4 HOURS PRN
Qty: 360 ML | Refills: 1 | Status: SHIPPED | OUTPATIENT
Start: 2025-05-29

## 2025-05-29 RX ORDER — LOSARTAN POTASSIUM AND HYDROCHLOROTHIAZIDE 25; 100 MG/1; MG/1
1 TABLET ORAL DAILY
Qty: 90 TABLET | Refills: 1 | Status: SHIPPED | OUTPATIENT
Start: 2025-05-29

## 2025-05-29 RX ORDER — ATORVASTATIN CALCIUM 40 MG/1
40 TABLET, FILM COATED ORAL DAILY
Qty: 90 TABLET | Refills: 1 | Status: SHIPPED | OUTPATIENT
Start: 2025-05-29

## 2025-05-29 SDOH — ECONOMIC STABILITY: FOOD INSECURITY: WITHIN THE PAST 12 MONTHS, THE FOOD YOU BOUGHT JUST DIDN'T LAST AND YOU DIDN'T HAVE MONEY TO GET MORE.: PATIENT DECLINED

## 2025-05-29 SDOH — ECONOMIC STABILITY: FOOD INSECURITY: WITHIN THE PAST 12 MONTHS, YOU WORRIED THAT YOUR FOOD WOULD RUN OUT BEFORE YOU GOT MONEY TO BUY MORE.: PATIENT DECLINED

## 2025-05-29 ASSESSMENT — PATIENT HEALTH QUESTIONNAIRE - PHQ9
SUM OF ALL RESPONSES TO PHQ QUESTIONS 1-9: 0
2. FEELING DOWN, DEPRESSED OR HOPELESS: NOT AT ALL
SUM OF ALL RESPONSES TO PHQ QUESTIONS 1-9: 0
1. LITTLE INTEREST OR PLEASURE IN DOING THINGS: NOT AT ALL
SUM OF ALL RESPONSES TO PHQ QUESTIONS 1-9: 0
SUM OF ALL RESPONSES TO PHQ QUESTIONS 1-9: 0

## 2025-05-29 NOTE — PROGRESS NOTES
Shoe MISC by Does not apply route Dispense DM shoe and insoles.  custom accomodative offloading insole with toe filler L side.     Amputated great toe of left foot (HCC)  (primary encounter diagnosis)  Equinus deformity of both feet  Diabetic polyneuropathy associated with type 2 diabetes mellitus (HCC) 1 each 0       He is allergic to chantix [varenicline].    He is not currently a smoker.  He  reports that he has quit smoking. His smoking use included cigarettes. He started smoking about 45 years ago. He has a 45.4 pack-year smoking history. He quit smokeless tobacco use about 37 years ago.  His smokeless tobacco use included snuff.      Objective:    Vitals:    05/29/25 1112   BP: 138/80   BP Site: Right Upper Arm   Patient Position: Sitting   BP Cuff Size: Large Adult   Pulse: 72   Resp: 16   Temp: 98.3 °F (36.8 °C)   TempSrc: Tympanic   SpO2: 97%   Weight: 98.4 kg (217 lb)   Height: 1.854 m (6' 1\")     Body mass index is 28.63 kg/m².      Well-nourished, well-developed male, healthy-appearing, alert, cooperative, and in no acute distress.  The tracheostoma stoma appears healthy.  Chest:  Normal expansion.  Clear to auscultation.  No rales, rhonchi, or wheezing.  Heart sounds are normal.  Regular rate and rhythm without murmur, gallop or rub.  Lower extremities have no edema.  He is wearing a post-op shoe on the right foot.    He has had no recent labs.                   The patient (or guardian, if applicable) and other individuals in attendance with the patient were advised that Artificial Intelligence will be utilized during this visit to record, process the conversation to generate a clinical note, and support improvement of the AI technology. The patient (or guardian, if applicable) and other individuals in attendance at the appointment consented to the use of AI, including the recording.            (Please note that portions of this note were completed with a voice-recognition program. Efforts were made to

## 2025-06-06 DIAGNOSIS — G63 POLYNEUROPATHY ASSOCIATED WITH UNDERLYING DISEASE: ICD-10-CM

## 2025-06-06 DIAGNOSIS — C32.2 SQUAMOUS CELL CARCINOMA OF SUBGLOTTIS (HCC): ICD-10-CM

## 2025-06-06 DIAGNOSIS — C32.9 LARYNGEAL CANCER (HCC): ICD-10-CM

## 2025-06-06 RX ORDER — OXYCODONE HYDROCHLORIDE 15 MG/1
15 TABLET ORAL EVERY 4 HOURS PRN
Qty: 84 TABLET | Refills: 0 | Status: SHIPPED | OUTPATIENT
Start: 2025-06-06 | End: 2025-06-20

## 2025-06-09 ENCOUNTER — PREP FOR PROCEDURE (OUTPATIENT)
Dept: PODIATRY | Age: 60
End: 2025-06-09

## 2025-06-09 ENCOUNTER — HOSPITAL ENCOUNTER (OUTPATIENT)
Dept: WOUND CARE | Age: 60
Discharge: HOME | End: 2025-06-10
Attending: PHYSICIAN ASSISTANT | Admitting: PHYSICIAN ASSISTANT
Payer: COMMERCIAL

## 2025-06-09 VITALS
DIASTOLIC BLOOD PRESSURE: 82 MMHG | TEMPERATURE: 97.2 F | WEIGHT: 219 LBS | HEIGHT: 73 IN | HEART RATE: 73 BPM | SYSTOLIC BLOOD PRESSURE: 128 MMHG | BODY MASS INDEX: 29.03 KG/M2 | RESPIRATION RATE: 16 BRPM

## 2025-06-09 DIAGNOSIS — L97.516 ULCER OF RIGHT FOOT WITH BONE INVOLVEMENT WITHOUT EVIDENCE OF NECROSIS (HCC): Primary | ICD-10-CM

## 2025-06-09 PROBLEM — E11.621 DIABETIC ULCER OF TOE OF RIGHT FOOT WITH BONE INVOLVEMENT WITHOUT EVIDENCE OF NECROSIS (HCC): Status: ACTIVE | Noted: 2025-06-09

## 2025-06-09 PROCEDURE — 99214 OFFICE O/P EST MOD 30 MIN: CPT | Performed by: PODIATRIST

## 2025-06-09 PROCEDURE — 11044 DBRDMT BONE 1ST 20 SQ CM/<: CPT

## 2025-06-09 PROCEDURE — 11044 DBRDMT BONE 1ST 20 SQ CM/<: CPT | Performed by: PODIATRIST

## 2025-06-09 PROCEDURE — 99214 OFFICE O/P EST MOD 30 MIN: CPT

## 2025-06-09 RX ORDER — GENTAMICIN SULFATE 1 MG/G
OINTMENT TOPICAL PRN
OUTPATIENT
Start: 2025-06-09

## 2025-06-09 RX ORDER — LIDOCAINE HYDROCHLORIDE 20 MG/ML
JELLY TOPICAL PRN
OUTPATIENT
Start: 2025-06-09

## 2025-06-09 RX ORDER — SODIUM CHLOR/HYPOCHLOROUS ACID 0.033 %
SOLUTION, IRRIGATION IRRIGATION PRN
OUTPATIENT
Start: 2025-06-09

## 2025-06-09 RX ORDER — GINSENG 100 MG
CAPSULE ORAL PRN
OUTPATIENT
Start: 2025-06-09

## 2025-06-09 RX ORDER — LIDOCAINE 40 MG/G
CREAM TOPICAL PRN
OUTPATIENT
Start: 2025-06-09

## 2025-06-09 RX ORDER — BACITRACIN ZINC AND POLYMYXIN B SULFATE 500; 1000 [USP'U]/G; [USP'U]/G
OINTMENT TOPICAL PRN
OUTPATIENT
Start: 2025-06-09

## 2025-06-09 RX ORDER — MUPIROCIN 2 %
OINTMENT (GRAM) TOPICAL PRN
OUTPATIENT
Start: 2025-06-09

## 2025-06-09 RX ORDER — CLOBETASOL PROPIONATE 0.5 MG/G
OINTMENT TOPICAL PRN
OUTPATIENT
Start: 2025-06-09

## 2025-06-09 RX ORDER — LIDOCAINE HYDROCHLORIDE 40 MG/ML
SOLUTION TOPICAL PRN
OUTPATIENT
Start: 2025-06-09

## 2025-06-09 RX ORDER — BETAMETHASONE DIPROPIONATE 0.5 MG/G
CREAM TOPICAL PRN
OUTPATIENT
Start: 2025-06-09

## 2025-06-09 RX ORDER — LIDOCAINE 50 MG/G
OINTMENT TOPICAL PRN
OUTPATIENT
Start: 2025-06-09

## 2025-06-09 RX ORDER — TRIAMCINOLONE ACETONIDE 1 MG/G
OINTMENT TOPICAL PRN
OUTPATIENT
Start: 2025-06-09

## 2025-06-09 RX ORDER — NEOMYCIN/BACITRACIN/POLYMYXINB 3.5-400-5K
OINTMENT (GRAM) TOPICAL PRN
OUTPATIENT
Start: 2025-06-09

## 2025-06-09 RX ORDER — SILVER SULFADIAZINE 10 MG/G
CREAM TOPICAL PRN
OUTPATIENT
Start: 2025-06-09

## 2025-06-09 NOTE — PATIENT INSTRUCTIONS
Plans for surgery in near future. Continue with same packing, followed by gauze dressing.  SIGNS OF INFECTION  - Redness, swelling, skin hot  - Wound bed turns black or stringy yellow  - Foul odor  - Increased drainage or pus  - Increased pain  - Fever greater than 100F    CALL YOUR DOCTOR OR SEEK MEDICAL ATTENTION IF SIGNS OF INFECTION.  DO NOT WAIT UNTIL YOUR NEXT APPOINTMENT    Call the Wound Care Nurse with any other questions or concerns- [phone]

## 2025-06-09 NOTE — PROGRESS NOTES
instructed by the provider, a Nursing visit for Treatment outside of a Provider Visit can be completed by releasing the Wound Care Protocol and documenting the provided Treatment in the nursing flowsheets or the Progress notes as appropriate     Standing Status:   Standing     Number of Occurrences:   1   X rays reviewed with the pt in detail.  All questions answered.    Pt requesting sx intervention at this time for the osteomyelitis R foot.  Procedure will be a excision of osteomyelitic bone R foot with abx bead application right foot.Patient was educated on the pre-op, anesthesia, and post op course.  Risks and complications were discussed such as wound infection, wound dehiscence, re-ocurrence of deformity, hematoma/seroma, neurovascular injury, and RSD.  All questions were answered and patient should call back quicker if any further questions arise.  Patient was also set up for pre-op clearance from their PCP.    The patient was counseled at length about the risks of yolis Covid-19 during their perioperative period and any recovery window from their procedure.  The patient was made aware that yolis Covid-19  may worsen their prognosis for recovering from their procedure  and lend to a higher morbidity and/or mortality risk.  All material risks, benefits, and reasonable alternatives including postponing the procedure were discussed. The patient does wish to proceed with the procedure at this time.     Patient will monitor level medical decision making.  Patient chronic condition with exacerbation.  Patient has no new prescriptions.  1 test reviewed.  Moderate risk morbidity at this is recurrent major elective surgery.  All risk complications possible outcomes discussed in detail with the patient.    Continue po abx  Contact clinic with any questions/problems/concerns or new signs of infection. Follow-up at [facility] in 1week(s).

## 2025-06-10 ENCOUNTER — OFFICE VISIT (OUTPATIENT)
Dept: FAMILY MEDICINE CLINIC | Age: 60
End: 2025-06-10
Payer: COMMERCIAL

## 2025-06-10 ENCOUNTER — HOSPITAL ENCOUNTER (OUTPATIENT)
Age: 60
Discharge: HOME | End: 2025-06-10
Payer: COMMERCIAL

## 2025-06-10 ENCOUNTER — TELEPHONE (OUTPATIENT)
Dept: ONCOLOGY | Age: 60
End: 2025-06-10

## 2025-06-10 ENCOUNTER — OFFICE VISIT (OUTPATIENT)
Dept: ONCOLOGY | Age: 60
End: 2025-06-10
Payer: COMMERCIAL

## 2025-06-10 VITALS
OXYGEN SATURATION: 97 % | BODY MASS INDEX: 28.77 KG/M2 | HEART RATE: 56 BPM | DIASTOLIC BLOOD PRESSURE: 80 MMHG | SYSTOLIC BLOOD PRESSURE: 132 MMHG | WEIGHT: 218 LBS

## 2025-06-10 VITALS
HEIGHT: 73 IN | BODY MASS INDEX: 29.03 KG/M2 | HEART RATE: 70 BPM | RESPIRATION RATE: 16 BRPM | WEIGHT: 219 LBS | DIASTOLIC BLOOD PRESSURE: 80 MMHG | OXYGEN SATURATION: 95 % | SYSTOLIC BLOOD PRESSURE: 130 MMHG | TEMPERATURE: 97 F

## 2025-06-10 DIAGNOSIS — D64.9 ANEMIA, UNSPECIFIED TYPE: ICD-10-CM

## 2025-06-10 DIAGNOSIS — E87.1 HYPONATREMIA: ICD-10-CM

## 2025-06-10 DIAGNOSIS — I89.0 LYMPHEDEMA: ICD-10-CM

## 2025-06-10 DIAGNOSIS — E87.1 HYPONATREMIA: Primary | ICD-10-CM

## 2025-06-10 DIAGNOSIS — C32.2 SQUAMOUS CELL CARCINOMA OF SUBGLOTTIS (HCC): ICD-10-CM

## 2025-06-10 DIAGNOSIS — Z01.818 PRE-OP EVALUATION: ICD-10-CM

## 2025-06-10 DIAGNOSIS — Z92.21 HISTORY OF ANTINEOPLASTIC CHEMOTHERAPY: ICD-10-CM

## 2025-06-10 DIAGNOSIS — C32.2 SQUAMOUS CELL CARCINOMA OF SUBGLOTTIS (HCC): Primary | ICD-10-CM

## 2025-06-10 DIAGNOSIS — G89.3 CANCER ASSOCIATED PAIN: ICD-10-CM

## 2025-06-10 LAB
ALBUMIN SERPL-MCNC: 3.6 G/DL (ref 3.5–5.2)
ALBUMIN/GLOB SERPL: 0.9 {RATIO} (ref 1–2.5)
ALP SERPL-CCNC: 122 U/L (ref 40–129)
ALT SERPL-CCNC: <5 U/L (ref 10–50)
ANION GAP SERPL CALCULATED.3IONS-SCNC: 12 MMOL/L (ref 9–16)
AST SERPL-CCNC: 14 U/L (ref 10–50)
BASOPHILS # BLD: 0 K/UL (ref 0–0.2)
BASOPHILS NFR BLD: 0 % (ref 0–2)
BILIRUB SERPL-MCNC: 0.8 MG/DL (ref 0–1.2)
BUN SERPL-MCNC: 13 MG/DL (ref 6–20)
BUN/CREAT SERPL: 12 (ref 9–20)
CALCIUM SERPL-MCNC: 9.6 MG/DL (ref 8.6–10.4)
CHLORIDE SERPL-SCNC: 95 MMOL/L (ref 98–107)
CO2 SERPL-SCNC: 26 MMOL/L (ref 20–31)
CREAT SERPL-MCNC: 1.1 MG/DL (ref 0.7–1.2)
EOSINOPHIL # BLD: 0 K/UL (ref 0–0.4)
EOSINOPHILS RELATIVE PERCENT: 0 % (ref 1–8)
ERYTHROCYTE [DISTWIDTH] IN BLOOD BY AUTOMATED COUNT: 14.6 % (ref 11.8–14.4)
GFR, ESTIMATED: 78 ML/MIN/1.73M2
GLUCOSE SERPL-MCNC: 166 MG/DL (ref 74–99)
HCT VFR BLD AUTO: 28.9 % (ref 40.7–50.3)
HGB BLD-MCNC: 9.1 G/DL (ref 13–17)
IMM GRANULOCYTES # BLD AUTO: 0 K/UL (ref 0–0.3)
IMM GRANULOCYTES NFR BLD: 0 %
LYMPHOCYTES NFR BLD: 1.09 K/UL (ref 1–4.8)
LYMPHOCYTES RELATIVE PERCENT: 11 % (ref 15–43)
MCH RBC QN AUTO: 28.9 PG (ref 25.2–33.5)
MCHC RBC AUTO-ENTMCNC: 31.5 G/DL (ref 25.2–33.5)
MCV RBC AUTO: 91.7 FL (ref 82.6–102.9)
MONOCYTES NFR BLD: 0.2 K/UL (ref 0.1–1.2)
MONOCYTES NFR BLD: 2 % (ref 6–14)
MORPHOLOGY: ABNORMAL
MORPHOLOGY: ABNORMAL
NEUTROPHILS NFR BLD: 87 % (ref 44–74)
NEUTS SEG NFR BLD: 8.61 K/UL (ref 1.5–8.1)
NRBC BLD-RTO: 0 PER 100 WBC
PLATELET # BLD AUTO: 389 K/UL (ref 138–453)
PMV BLD AUTO: 8.7 FL (ref 8.1–13.5)
POTASSIUM SERPL-SCNC: 4.1 MMOL/L (ref 3.7–5.3)
PROT SERPL-MCNC: 7.5 G/DL (ref 6.6–8.7)
RBC # BLD AUTO: 3.15 M/UL (ref 4.21–5.77)
SODIUM SERPL-SCNC: 133 MMOL/L (ref 136–145)
WBC OTHER # BLD: 9.9 K/UL (ref 3.5–11.3)

## 2025-06-10 PROCEDURE — 36415 COLL VENOUS BLD VENIPUNCTURE: CPT

## 2025-06-10 PROCEDURE — 3075F SYST BP GE 130 - 139MM HG: CPT | Performed by: FAMILY MEDICINE

## 2025-06-10 PROCEDURE — 80053 COMPREHEN METABOLIC PANEL: CPT

## 2025-06-10 PROCEDURE — 3079F DIAST BP 80-89 MM HG: CPT | Performed by: FAMILY MEDICINE

## 2025-06-10 PROCEDURE — 3075F SYST BP GE 130 - 139MM HG: CPT | Performed by: INTERNAL MEDICINE

## 2025-06-10 PROCEDURE — 3079F DIAST BP 80-89 MM HG: CPT | Performed by: INTERNAL MEDICINE

## 2025-06-10 PROCEDURE — 99214 OFFICE O/P EST MOD 30 MIN: CPT | Performed by: INTERNAL MEDICINE

## 2025-06-10 PROCEDURE — 85025 COMPLETE CBC W/AUTO DIFF WBC: CPT

## 2025-06-10 PROCEDURE — 99213 OFFICE O/P EST LOW 20 MIN: CPT | Performed by: FAMILY MEDICINE

## 2025-06-10 RX ORDER — CLINDAMYCIN HYDROCHLORIDE 300 MG/1
CAPSULE ORAL
COMMUNITY
Start: 2025-06-09

## 2025-06-10 NOTE — PROGRESS NOTES
Dylan Dolan                                                                                                                  6/10/2025  MRN:   [id number]  YOB: 1965  PCP:                           Alley Vila MD  Referring Physician: No ref. provider found  Treating Physician Name: ANDREW JI MD      Reason for visit:  Chief Complaint   Patient presents with    Cancer    Follow-up     squamous cell 4 month f/u, labs prior     Discussed treatment plan    Current problems:  Subglottic squamous cell carcinoma, clinical stage T3 N2 M0  Tobacco dependence  S/p tracheostomy    Active and recent treatments:  Concurrent chemoradiation using cisplatin-9/2024    Summary of Case/History:  Dylan Dolan a 59 y.o.male is a patient with symptoms of cough shortness of breath and hoarseness of voice presented to the hospital with in [place] and was noted to have stridor.  Subsequently patient underwent CT neck on July 8, 2024 which showed a 8 mm mass in the left vocal cord.  Patient was transferred to [facility] for further workup.  Patient was evaluated by ENT on July 9, 2024 underwent a biopsy of the left vocal cord mass which came back as a squamous cell carcinoma.  Patient was intubated and transition to an open tracheostomy for airway protection.  Patient does have a history of smoking 1/2 pack since age 17.    Interim History:  Patient presents to the clinic for a follow-up visit.  Was seen by ENT earlier.  At this point not able to remove the trach.  Patient denies pain.  Recovering from the side effects of chemotherapy but remains anemic.  Denies fever or chills.  During this visit patient's allergy, social, medical, surgical history and medications were reviewed and updated.    Past Medical History:   Past Medical History:   Diagnosis Date    Ankle pain, left     chronic; s/p fracture in 1999    Cancer (HCC)     vocal cord    Gastroesophageal reflux disease     Hyperlipemia

## 2025-06-10 NOTE — TELEPHONE ENCOUNTER
Name: Dylan Dolan  : 1965  MRN: [id number]    Oncology Navigation Follow-Up Note    Contact Type:  Medical Oncology    Notes:   Writer met with patient while in office for appt and was present during med onc visit.  Patient denies any navigation needs. He is now on surveillance. He still has port. He does not have an appt for maintenance. Contacted infusion to arrange for next port flush. Informed patient they will call him to schedule appt.  He has reached the point in his cancer journey where navigator's help is not as greatly needed. Navigator will continue to be available if he needs assistance, however will no longer be actively following his care. Patient verbalized understanding and agreement.   ONN removed from Care Team.          Electronically signed by Bree Spears RN on 6/10/2025 at 11:48 AM

## 2025-06-11 ENCOUNTER — HOSPITAL ENCOUNTER (OUTPATIENT)
Dept: NON INVASIVE DIAGNOSTICS | Age: 60
Discharge: HOME | End: 2025-06-11
Payer: COMMERCIAL

## 2025-06-11 ENCOUNTER — HOSPITAL ENCOUNTER (OUTPATIENT)
Age: 60
Discharge: HOME | End: 2025-06-11
Payer: COMMERCIAL

## 2025-06-11 DIAGNOSIS — E87.1 HYPONATREMIA: ICD-10-CM

## 2025-06-11 DIAGNOSIS — Z01.818 PRE-OP EVALUATION: ICD-10-CM

## 2025-06-11 LAB
ANION GAP SERPL CALCULATED.3IONS-SCNC: 13 MMOL/L (ref 9–16)
BUN SERPL-MCNC: 12 MG/DL (ref 6–20)
BUN/CREAT SERPL: 13 (ref 9–20)
CALCIUM SERPL-MCNC: 9.4 MG/DL (ref 8.6–10.4)
CHLORIDE SERPL-SCNC: 94 MMOL/L (ref 98–107)
CO2 SERPL-SCNC: 26 MMOL/L (ref 20–31)
CREAT SERPL-MCNC: 0.9 MG/DL (ref 0.7–1.2)
GFR, ESTIMATED: >90 ML/MIN/1.73M2
GLUCOSE SERPL-MCNC: 156 MG/DL (ref 74–99)
POTASSIUM SERPL-SCNC: 4.2 MMOL/L (ref 3.7–5.3)
SODIUM SERPL-SCNC: 133 MMOL/L (ref 136–145)

## 2025-06-11 PROCEDURE — 36415 COLL VENOUS BLD VENIPUNCTURE: CPT

## 2025-06-11 PROCEDURE — 93005 ELECTROCARDIOGRAM TRACING: CPT

## 2025-06-11 PROCEDURE — 80048 BASIC METABOLIC PNL TOTAL CA: CPT

## 2025-06-12 ENCOUNTER — TELEPHONE (OUTPATIENT)
Dept: FAMILY MEDICINE CLINIC | Age: 60
End: 2025-06-12

## 2025-06-12 ENCOUNTER — RESULTS FOLLOW-UP (OUTPATIENT)
Dept: FAMILY MEDICINE CLINIC | Age: 60
End: 2025-06-12

## 2025-06-12 DIAGNOSIS — I10 ESSENTIAL HYPERTENSION: Primary | ICD-10-CM

## 2025-06-12 DIAGNOSIS — E87.1 HYPONATREMIA: ICD-10-CM

## 2025-06-12 LAB
EKG ATRIAL RATE: 64 BPM
EKG P AXIS: -12 DEGREES
EKG P-R INTERVAL: 208 MS
EKG Q-T INTERVAL: 410 MS
EKG QRS DURATION: 88 MS
EKG QTC CALCULATION (BAZETT): 422 MS
EKG R AXIS: 19 DEGREES
EKG T AXIS: 43 DEGREES
EKG VENTRICULAR RATE: 64 BPM

## 2025-06-12 RX ORDER — DIPHENHYDRAMINE HYDROCHLORIDE 50 MG/ML
12.5 INJECTION, SOLUTION INTRAMUSCULAR; INTRAVENOUS
Status: CANCELLED | OUTPATIENT
Start: 2025-06-12

## 2025-06-12 RX ORDER — ONDANSETRON 2 MG/ML
4 INJECTION INTRAMUSCULAR; INTRAVENOUS
Status: CANCELLED | OUTPATIENT
Start: 2025-06-12

## 2025-06-12 RX ORDER — LOSARTAN POTASSIUM 100 MG/1
100 TABLET ORAL DAILY
Qty: 30 TABLET | Refills: 5 | Status: SHIPPED | OUTPATIENT
Start: 2025-06-12 | End: 2025-08-04 | Stop reason: SDUPTHER

## 2025-06-12 RX ORDER — SODIUM CHLORIDE 9 MG/ML
INJECTION, SOLUTION INTRAVENOUS PRN
Status: CANCELLED | OUTPATIENT
Start: 2025-06-12

## 2025-06-12 RX ORDER — NALOXONE HYDROCHLORIDE 0.4 MG/ML
INJECTION, SOLUTION INTRAMUSCULAR; INTRAVENOUS; SUBCUTANEOUS PRN
Status: CANCELLED | OUTPATIENT
Start: 2025-06-12

## 2025-06-12 RX ORDER — SODIUM CHLORIDE 0.9 % (FLUSH) 0.9 %
5-40 SYRINGE (ML) INJECTION PRN
Status: CANCELLED | OUTPATIENT
Start: 2025-06-12

## 2025-06-12 RX ORDER — SODIUM CHLORIDE 0.9 % (FLUSH) 0.9 %
5-40 SYRINGE (ML) INJECTION EVERY 12 HOURS SCHEDULED
Status: CANCELLED | OUTPATIENT
Start: 2025-06-12

## 2025-06-12 NOTE — PROGRESS NOTES
Addendum 6/12/2025.  I reviewed the basic metabolic panel and ECG done 6/11/2025.  ECG showed a normal sinus rhythm with no acute changes.  The sodium remains low on the basic metabolic panel.  Lab Results   Component Value Date/Time     06/11/2025 01:24 PM    K 4.2 06/11/2025 01:24 PM    CL 94 06/11/2025 01:24 PM    CO2 26 06/11/2025 01:24 PM    BUN 12 06/11/2025 01:24 PM    CREATININE 0.9 06/11/2025 01:24 PM    GLUCOSE 156 06/11/2025 01:24 PM    CALCIUM 9.4 06/11/2025 01:24 PM    LABGLOM >90 06/11/2025 01:24 PM    LABGLOM >60 06/19/2023 12:52 PM         His surgical risk is acceptable.  I recommended that he discontinue Losartan-Hydrochlorothiazide, as Hydrochlorothiazide may be a factor in the hyponatremia.  A prescription for Losartan 100 mg daily was sent to [facility].   
BUN/Creatinine Ratio 06/10/2025 12  9 - 20 Final    Calcium 06/10/2025 9.6  8.6 - 10.4 mg/dL Final    Total Protein 06/10/2025 7.5  6.6 - 8.7 g/dL Final    Albumin 06/10/2025 3.6  3.5 - 5.2 g/dL Final    Albumin/Globulin Ratio 06/10/2025 0.9 (L)  1.0 - 2.5 Final    Total Bilirubin 06/10/2025 0.8  0.0 - 1.2 mg/dL Final    Alkaline Phosphatase 06/10/2025 122  40 - 129 U/L Final    ALT 06/10/2025 <5 (L)  10 - 50 U/L Final    AST 06/10/2025 14  10 - 50 U/L Final                     The patient (or guardian, if applicable) and other individuals in attendance with the patient were advised that Artificial Intelligence will be utilized during this visit to record, process the conversation to generate a clinical note, and support improvement of the AI technology. The patient (or guardian, if applicable) and other individuals in attendance at the appointment consented to the use of AI, including the recording.              (Please note that portions of this note were completed with a voice-recognition program. Efforts were made to edit the dictation but occasionally words are mis-transcribed.)

## 2025-06-12 NOTE — TELEPHONE ENCOUNTER
Podiatry calling to see if patient is cleared for surgery tomorrow?  If so, are you able to addend pre op office visit note from 6/10/25?  Please advise.

## 2025-06-13 ENCOUNTER — ANESTHESIA (OUTPATIENT)
Dept: OPERATING ROOM | Age: 60
End: 2025-06-13
Payer: COMMERCIAL

## 2025-06-13 ENCOUNTER — HOSPITAL ENCOUNTER (OUTPATIENT)
Age: 60
Setting detail: OUTPATIENT SURGERY
Discharge: HOME | End: 2025-06-13
Attending: PODIATRIST | Admitting: PODIATRIST
Payer: COMMERCIAL

## 2025-06-13 ENCOUNTER — ANESTHESIA EVENT (OUTPATIENT)
Dept: OPERATING ROOM | Age: 60
End: 2025-06-13
Payer: COMMERCIAL

## 2025-06-13 VITALS
SYSTOLIC BLOOD PRESSURE: 168 MMHG | HEIGHT: 73 IN | OXYGEN SATURATION: 100 % | RESPIRATION RATE: 16 BRPM | WEIGHT: 219 LBS | TEMPERATURE: 98.2 F | HEART RATE: 69 BPM | DIASTOLIC BLOOD PRESSURE: 103 MMHG | BODY MASS INDEX: 29.03 KG/M2

## 2025-06-13 DIAGNOSIS — E11.621: ICD-10-CM

## 2025-06-13 DIAGNOSIS — L97.516: ICD-10-CM

## 2025-06-13 PROCEDURE — 6360000002 HC RX W HCPCS: Performed by: PODIATRIST

## 2025-06-13 PROCEDURE — 87076 CULTURE ANAEROBE IDENT EACH: CPT

## 2025-06-13 PROCEDURE — 87075 CULTR BACTERIA EXCEPT BLOOD: CPT

## 2025-06-13 PROCEDURE — 87077 CULTURE AEROBIC IDENTIFY: CPT

## 2025-06-13 PROCEDURE — 87176 TISSUE HOMOGENIZATION CULTR: CPT

## 2025-06-13 PROCEDURE — 87185 SC STD ENZYME DETCJ PER NZM: CPT

## 2025-06-13 PROCEDURE — 3600000012 HC SURGERY LEVEL 2 ADDTL 15MIN: Performed by: PODIATRIST

## 2025-06-13 PROCEDURE — 3700000000 HC ANESTHESIA ATTENDED CARE: Performed by: PODIATRIST

## 2025-06-13 PROCEDURE — 3600000002 HC SURGERY LEVEL 2 BASE: Performed by: PODIATRIST

## 2025-06-13 PROCEDURE — 88311 DECALCIFY TISSUE: CPT

## 2025-06-13 PROCEDURE — 87070 CULTURE OTHR SPECIMN AEROBIC: CPT

## 2025-06-13 PROCEDURE — 2709999900 HC NON-CHARGEABLE SUPPLY: Performed by: PODIATRIST

## 2025-06-13 PROCEDURE — 2580000003 HC RX 258: Performed by: PODIATRIST

## 2025-06-13 PROCEDURE — 7100000011 HC PHASE II RECOVERY - ADDTL 15 MIN: Performed by: PODIATRIST

## 2025-06-13 PROCEDURE — 87186 SC STD MICRODIL/AGAR DIL: CPT

## 2025-06-13 PROCEDURE — 7100000010 HC PHASE II RECOVERY - FIRST 15 MIN: Performed by: PODIATRIST

## 2025-06-13 PROCEDURE — C1713 ANCHOR/SCREW BN/BN,TIS/BN: HCPCS | Performed by: PODIATRIST

## 2025-06-13 PROCEDURE — 28122 PARTIAL REMOVAL OF FOOT BONE: CPT | Performed by: PODIATRIST

## 2025-06-13 PROCEDURE — 3700000001 HC ADD 15 MINUTES (ANESTHESIA): Performed by: PODIATRIST

## 2025-06-13 PROCEDURE — 88304 TISSUE EXAM BY PATHOLOGIST: CPT

## 2025-06-13 PROCEDURE — 87205 SMEAR GRAM STAIN: CPT

## 2025-06-13 PROCEDURE — 2580000003 HC RX 258: Performed by: NURSE ANESTHETIST, CERTIFIED REGISTERED

## 2025-06-13 PROCEDURE — 6360000002 HC RX W HCPCS: Performed by: NURSE ANESTHETIST, CERTIFIED REGISTERED

## 2025-06-13 DEVICE — RESORBABLE MINI BEAD KIT
Type: IMPLANTABLE DEVICE | Site: FOOT | Status: FUNCTIONAL
Brand: OSTEOSET

## 2025-06-13 RX ORDER — FENTANYL CITRATE 50 UG/ML
INJECTION, SOLUTION INTRAMUSCULAR; INTRAVENOUS
Status: DISCONTINUED | OUTPATIENT
Start: 2025-06-13 | End: 2025-06-13 | Stop reason: SDUPTHER

## 2025-06-13 RX ORDER — PROPOFOL 10 MG/ML
INJECTION, EMULSION INTRAVENOUS
Status: DISCONTINUED | OUTPATIENT
Start: 2025-06-13 | End: 2025-06-13 | Stop reason: SDUPTHER

## 2025-06-13 RX ORDER — SODIUM CHLORIDE 9 MG/ML
INJECTION, SOLUTION INTRAVENOUS PRN
Status: DISCONTINUED | OUTPATIENT
Start: 2025-06-13 | End: 2025-06-13 | Stop reason: HOSPADM

## 2025-06-13 RX ORDER — MIDAZOLAM HYDROCHLORIDE 1 MG/ML
INJECTION, SOLUTION INTRAMUSCULAR; INTRAVENOUS
Status: DISCONTINUED | OUTPATIENT
Start: 2025-06-13 | End: 2025-06-13 | Stop reason: SDUPTHER

## 2025-06-13 RX ORDER — SODIUM CHLORIDE 0.9 % (FLUSH) 0.9 %
5-40 SYRINGE (ML) INJECTION PRN
Status: DISCONTINUED | OUTPATIENT
Start: 2025-06-13 | End: 2025-06-13 | Stop reason: HOSPADM

## 2025-06-13 RX ORDER — SODIUM CHLORIDE, SODIUM LACTATE, POTASSIUM CHLORIDE, CALCIUM CHLORIDE 600; 310; 30; 20 MG/100ML; MG/100ML; MG/100ML; MG/100ML
INJECTION, SOLUTION INTRAVENOUS CONTINUOUS
Status: DISCONTINUED | OUTPATIENT
Start: 2025-06-13 | End: 2025-06-13 | Stop reason: HOSPADM

## 2025-06-13 RX ORDER — SODIUM CHLORIDE 0.9 % (FLUSH) 0.9 %
5-40 SYRINGE (ML) INJECTION EVERY 12 HOURS SCHEDULED
Status: DISCONTINUED | OUTPATIENT
Start: 2025-06-13 | End: 2025-06-13 | Stop reason: HOSPADM

## 2025-06-13 RX ORDER — VANCOMYCIN HYDROCHLORIDE 1 G/20ML
INJECTION, POWDER, LYOPHILIZED, FOR SOLUTION INTRAVENOUS PRN
Status: DISCONTINUED | OUTPATIENT
Start: 2025-06-13 | End: 2025-06-13 | Stop reason: ALTCHOICE

## 2025-06-13 RX ORDER — SODIUM CHLORIDE, SODIUM LACTATE, POTASSIUM CHLORIDE, CALCIUM CHLORIDE 600; 310; 30; 20 MG/100ML; MG/100ML; MG/100ML; MG/100ML
INJECTION, SOLUTION INTRAVENOUS
Status: DISCONTINUED | OUTPATIENT
Start: 2025-06-13 | End: 2025-06-13 | Stop reason: SDUPTHER

## 2025-06-13 RX ADMIN — FENTANYL CITRATE 25 MCG: 50 INJECTION, SOLUTION INTRAMUSCULAR; INTRAVENOUS at 07:35

## 2025-06-13 RX ADMIN — FENTANYL CITRATE 25 MCG: 50 INJECTION, SOLUTION INTRAMUSCULAR; INTRAVENOUS at 07:33

## 2025-06-13 RX ADMIN — MIDAZOLAM HYDROCHLORIDE 2 MG: 1 INJECTION, SOLUTION INTRAMUSCULAR; INTRAVENOUS at 07:30

## 2025-06-13 RX ADMIN — SODIUM CHLORIDE, POTASSIUM CHLORIDE, SODIUM LACTATE AND CALCIUM CHLORIDE: 600; 310; 30; 20 INJECTION, SOLUTION INTRAVENOUS at 07:28

## 2025-06-13 RX ADMIN — PROPOFOL 70 MG: 10 INJECTION, EMULSION INTRAVENOUS at 07:30

## 2025-06-13 RX ADMIN — SODIUM CHLORIDE, SODIUM LACTATE, POTASSIUM CHLORIDE, AND CALCIUM CHLORIDE: .6; .31; .03; .02 INJECTION, SOLUTION INTRAVENOUS at 07:20

## 2025-06-13 RX ADMIN — FENTANYL CITRATE 25 MCG: 50 INJECTION, SOLUTION INTRAMUSCULAR; INTRAVENOUS at 07:30

## 2025-06-13 RX ADMIN — PROPOFOL 150 MCG/KG/MIN: 10 INJECTION, EMULSION INTRAVENOUS at 07:31

## 2025-06-13 RX ADMIN — FENTANYL CITRATE 25 MCG: 50 INJECTION, SOLUTION INTRAMUSCULAR; INTRAVENOUS at 07:37

## 2025-06-13 RX ADMIN — Medication 2000 MG: at 07:28

## 2025-06-13 ASSESSMENT — PAIN DESCRIPTION - DESCRIPTORS
DESCRIPTORS: DISCOMFORT
DESCRIPTORS: DISCOMFORT

## 2025-06-13 ASSESSMENT — PAIN - FUNCTIONAL ASSESSMENT
PAIN_FUNCTIONAL_ASSESSMENT: 0-10
PAIN_FUNCTIONAL_ASSESSMENT: 0-10

## 2025-06-13 NOTE — INTERVAL H&P NOTE
Update History & Physical    The patient's History and Physical of June 12, 2025 was reviewed with the patient and I examined the patient. There was no change. The surgical site was confirmed by the patient and me.     Plan: The risks, benefits, expected outcome, and alternative to the recommended procedure have been discussed with the patient. Patient understands and wants to proceed with the procedure.     Electronically signed by Ivan Banks DPM on 6/13/2025 at 7:25 AM

## 2025-06-13 NOTE — DISCHARGE INSTRUCTIONS
Follow up appointment with Dr Banks in 1 week at [facility].    Call Surgeon if you have:  Temperature greater than 100.4  Persistent nausea and vomiting  Severe uncontrolled pain  Redness, tenderness, or signs of infection (pain, swelling, redness, odor or green/yellow discharge around the site)  Difficulty breathing, headache or visual disturbances  Hives  Persistent dizziness or light-headedness  Extreme fatigue  Any other questions or concerns you may have after discharge    In an emergency, call 911 or go to an Emergency Department at a nearby hospital    Post procedure: Watch for swelling.  Watch for drainage coming thru the dressing.  If cast or dressing feel too tight please contact the office or present to urgent care.    Medications:  Resume regular pre operative medications as previously prescribed.    Take post operative Rx pain medications as ordered.  When pain subsides you may take OTC tylenol or anti inflammatory PRN as long as no issues of previous allergies with those medications.  It is important to bring a complete, current list of your medications to any medical appointments or hospitalizations.    Activity: Use offloading device (surgical shoe/cam walker/cast) 100% of the time Weightbearing.  Use cane or crutches as needed also.    Surgical site area: Always keep dressings, casts, splints, or offloading devices clean and dry.  Do NOT remove dressing but reinforce as needed.  If the dressing becomes soaked contact our office but if it's an off time you may change the dressing at home.  Ice and elevate the limb on a regular basis.  Ice 4-5 times daily for 20 minutes for first 3-4 days.  Then continue 2-3 times per day till follow up appointment.    Diet: Resume your usual diet. Good nutrition promotes healing. Increase fluid intake.     If any questions or concerns arise please call Dr Banks's office at [phone], [facility] at [phone], or after hours call [facility]  [facility] at [phone].

## 2025-06-13 NOTE — ANESTHESIA PRE PROCEDURE
plan and risks discussed with patient.      Plan discussed with surgical team.                  JOHN Gupta - BRIGETTE   6/13/2025

## 2025-06-13 NOTE — ANESTHESIA POSTPROCEDURE EVALUATION
Department of Anesthesiology  Postprocedure Note    Patient: Dylan Dolan  MRN: [id number]  YOB: 1965  Date of evaluation: 6/13/2025    Procedure Summary       Date: 06/13/25 Room / Location: [address]    Anesthesia Start: 0728 Anesthesia Stop: 0801    Procedure: Excision Osteomyeltic bone right foot 5th metatarsal with antibiotic bead application (Right: Foot) Diagnosis:       Diabetic ulcer of toe of right foot with bone involvement without evidence of necrosis (HCC)      (Diabetic ulcer of toe of right foot with bone involvement without evidence of necrosis (HCC) [E11.621, L97.516])    Surgeons: Ivan Banks DPM Responsible Provider: Philippe Quezada APRN - CRNA    Anesthesia Type: General, TIVA ASA Status: 3            Anesthesia Type: General, TIVA    Anupama Phase I: Anupama Score: 10    Anupama Phase II:      Anesthesia Post Evaluation    Patient location during evaluation: bedside  Level of consciousness: sleepy but conscious  Airway patency: patent  Nausea & Vomiting: no nausea and no vomiting  Cardiovascular status: hemodynamically stable  Respiratory status: spontaneous ventilation  Hydration status: euvolemic  Pain management: satisfactory to patient    No notable events documented.

## 2025-06-13 NOTE — OP NOTE
position.  After induction of anesthesia, a total of 10 cc 50:50 mixture of 1% lidocaine plain and .25% marcaine plain were used to block the operative site.  The Right foot was prepped and draped in the usual sterile manner.  Anesthesia was checked and was adequate.  A #15 blade was used for a linear incision. Proximal side of the ulcer.   Dissection went deep and care was used to protect or ligate any neurovascular structures as needed.  Once bone identified, deep blade used to incise the periosteal layer, bone freed up with blunt and sharp dissection, once freed up, sagittal saw use to remove bone proximally to healthier appearing bone.  Distal aspect noted to show necrotic changes, adequate bleeding seen proximally.  Bone sent to path.  Bone and tissue sent for culture. Area flushed with normal saline,  osteoset beads  made with 1g vancomycin.  These were placed proximally in the incision line on the residual bone, 3-0 vicryl to close sub q tissue, 4-0 nylon to close the proximal most and distal most aspect of incision line..  central aspect left open ot drain secondary to the infection. A dry sterile dressing took place with adaptic, 4X4s, nicolasa and ACE wrap.  Patient had a prompt hyperemic response noted to the digits upon release of the tourniquet.  Patient was taken to the post operative unit with vital signs stable and neurovascular status intact.  They will ice and elevate as directed.  Other orders were: none.  A sx shoe was dispensed for appropriate offloading.  Rx given were none.  Pt already on oxycodone at home..  Post operative directions were dispensed.  Follow up in 3  days.     Electronically signed by Ivan Banks DPM on 6/13/2025 at 8:03 AM

## 2025-06-15 LAB
MICROORGANISM SPEC CULT: ABNORMAL
MICROORGANISM SPEC CULT: ABNORMAL
MICROORGANISM/AGENT SPEC: ABNORMAL
MICROORGANISM/AGENT SPEC: ABNORMAL
SERVICE CMNT-IMP: ABNORMAL
SPECIMEN DESCRIPTION: ABNORMAL

## 2025-06-16 ENCOUNTER — HOSPITAL ENCOUNTER (OUTPATIENT)
Dept: WOUND CARE | Age: 60
Discharge: HOME | End: 2025-06-17
Attending: PHYSICIAN ASSISTANT
Payer: COMMERCIAL

## 2025-06-16 ENCOUNTER — HOSPITAL ENCOUNTER (OUTPATIENT)
Dept: INFUSION THERAPY | Age: 60
Discharge: HOME | End: 2025-06-16
Payer: COMMERCIAL

## 2025-06-16 ENCOUNTER — HOSPITAL ENCOUNTER (OUTPATIENT)
Dept: GENERAL RADIOLOGY | Age: 60
Discharge: HOME | End: 2025-06-18
Payer: COMMERCIAL

## 2025-06-16 VITALS
BODY MASS INDEX: 28.89 KG/M2 | WEIGHT: 218 LBS | HEART RATE: 76 BPM | TEMPERATURE: 98 F | HEIGHT: 73 IN | SYSTOLIC BLOOD PRESSURE: 132 MMHG | DIASTOLIC BLOOD PRESSURE: 80 MMHG | RESPIRATION RATE: 18 BRPM

## 2025-06-16 DIAGNOSIS — E11.621 DIABETIC ULCER OF TOE OF RIGHT FOOT ASSOCIATED WITH TYPE 2 DIABETES MELLITUS, WITH BONE INVOLVEMENT WITHOUT EVIDENCE OF NECROSIS (HCC): ICD-10-CM

## 2025-06-16 DIAGNOSIS — C32.9 LARYNGEAL CANCER (HCC): Primary | ICD-10-CM

## 2025-06-16 DIAGNOSIS — M86.171 OSTEOMYELITIS OF FOOT, RIGHT, ACUTE (HCC): ICD-10-CM

## 2025-06-16 DIAGNOSIS — L97.516 DIABETIC ULCER OF TOE OF RIGHT FOOT ASSOCIATED WITH TYPE 2 DIABETES MELLITUS, WITH BONE INVOLVEMENT WITHOUT EVIDENCE OF NECROSIS (HCC): ICD-10-CM

## 2025-06-16 DIAGNOSIS — L97.516 ULCER OF RIGHT FOOT WITH BONE INVOLVEMENT WITHOUT EVIDENCE OF NECROSIS (HCC): Primary | ICD-10-CM

## 2025-06-16 DIAGNOSIS — L97.516 ULCER OF RIGHT FOOT WITH BONE INVOLVEMENT WITHOUT EVIDENCE OF NECROSIS (HCC): ICD-10-CM

## 2025-06-16 PROCEDURE — 96523 IRRIG DRUG DELIVERY DEVICE: CPT

## 2025-06-16 PROCEDURE — 99024 POSTOP FOLLOW-UP VISIT: CPT | Performed by: PODIATRIST

## 2025-06-16 PROCEDURE — 73630 X-RAY EXAM OF FOOT: CPT

## 2025-06-16 PROCEDURE — 99214 OFFICE O/P EST MOD 30 MIN: CPT

## 2025-06-16 PROCEDURE — 2500000003 HC RX 250 WO HCPCS: Performed by: INTERNAL MEDICINE

## 2025-06-16 PROCEDURE — 6360000002 HC RX W HCPCS: Performed by: INTERNAL MEDICINE

## 2025-06-16 RX ORDER — BETAMETHASONE DIPROPIONATE 0.5 MG/G
CREAM TOPICAL PRN
OUTPATIENT
Start: 2025-06-16

## 2025-06-16 RX ORDER — LIDOCAINE HYDROCHLORIDE 20 MG/ML
JELLY TOPICAL PRN
OUTPATIENT
Start: 2025-06-16

## 2025-06-16 RX ORDER — LIDOCAINE HYDROCHLORIDE 40 MG/ML
SOLUTION TOPICAL PRN
OUTPATIENT
Start: 2025-06-16

## 2025-06-16 RX ORDER — GINSENG 100 MG
CAPSULE ORAL PRN
OUTPATIENT
Start: 2025-06-16

## 2025-06-16 RX ORDER — SODIUM CHLORIDE 9 MG/ML
INJECTION, SOLUTION INTRAVENOUS CONTINUOUS
OUTPATIENT
Start: 2025-06-16

## 2025-06-16 RX ORDER — NEOMYCIN/BACITRACIN/POLYMYXINB 3.5-400-5K
OINTMENT (GRAM) TOPICAL PRN
OUTPATIENT
Start: 2025-06-16

## 2025-06-16 RX ORDER — HEPARIN 100 UNIT/ML
500 SYRINGE INTRAVENOUS PRN
Status: DISCONTINUED | OUTPATIENT
Start: 2025-06-16 | End: 2025-06-17 | Stop reason: HOSPADM

## 2025-06-16 RX ORDER — SODIUM CHLORIDE 0.9 % (FLUSH) 0.9 %
5-40 SYRINGE (ML) INJECTION PRN
Status: DISCONTINUED | OUTPATIENT
Start: 2025-06-16 | End: 2025-06-17 | Stop reason: HOSPADM

## 2025-06-16 RX ORDER — ACETAMINOPHEN 325 MG/1
650 TABLET ORAL
OUTPATIENT
Start: 2025-06-16

## 2025-06-16 RX ORDER — LIDOCAINE 50 MG/G
OINTMENT TOPICAL PRN
OUTPATIENT
Start: 2025-06-16

## 2025-06-16 RX ORDER — ONDANSETRON 2 MG/ML
8 INJECTION INTRAMUSCULAR; INTRAVENOUS
OUTPATIENT
Start: 2025-06-16

## 2025-06-16 RX ORDER — LIDOCAINE 40 MG/G
CREAM TOPICAL PRN
OUTPATIENT
Start: 2025-06-16

## 2025-06-16 RX ORDER — SODIUM CHLOR/HYPOCHLOROUS ACID 0.033 %
SOLUTION, IRRIGATION IRRIGATION PRN
OUTPATIENT
Start: 2025-06-16

## 2025-06-16 RX ORDER — CLOBETASOL PROPIONATE 0.5 MG/G
OINTMENT TOPICAL PRN
OUTPATIENT
Start: 2025-06-16

## 2025-06-16 RX ORDER — BACITRACIN ZINC AND POLYMYXIN B SULFATE 500; 1000 [USP'U]/G; [USP'U]/G
OINTMENT TOPICAL PRN
OUTPATIENT
Start: 2025-06-16

## 2025-06-16 RX ORDER — EPINEPHRINE 1 MG/ML
0.3 INJECTION, SOLUTION, CONCENTRATE INTRAVENOUS PRN
OUTPATIENT
Start: 2025-06-16

## 2025-06-16 RX ORDER — SODIUM CHLORIDE 0.9 % (FLUSH) 0.9 %
5-40 SYRINGE (ML) INJECTION PRN
OUTPATIENT
Start: 2025-06-16

## 2025-06-16 RX ORDER — ALBUTEROL SULFATE 90 UG/1
4 INHALANT RESPIRATORY (INHALATION) PRN
OUTPATIENT
Start: 2025-06-16

## 2025-06-16 RX ORDER — MUPIROCIN 2 %
OINTMENT (GRAM) TOPICAL PRN
OUTPATIENT
Start: 2025-06-16

## 2025-06-16 RX ORDER — HEPARIN 100 UNIT/ML
500 SYRINGE INTRAVENOUS PRN
OUTPATIENT
Start: 2025-06-16

## 2025-06-16 RX ORDER — DIPHENHYDRAMINE HYDROCHLORIDE 50 MG/ML
50 INJECTION, SOLUTION INTRAMUSCULAR; INTRAVENOUS
OUTPATIENT
Start: 2025-06-16

## 2025-06-16 RX ORDER — HYDROCORTISONE SODIUM SUCCINATE 100 MG/2ML
100 INJECTION INTRAMUSCULAR; INTRAVENOUS
OUTPATIENT
Start: 2025-06-16

## 2025-06-16 RX ORDER — LIDOCAINE HYDROCHLORIDE 10 MG/ML
0.25 INJECTION, SOLUTION EPIDURAL; INFILTRATION; INTRACAUDAL; PERINEURAL
OUTPATIENT
Start: 2025-06-16

## 2025-06-16 RX ORDER — SODIUM CHLORIDE 9 MG/ML
25 INJECTION, SOLUTION INTRAVENOUS PRN
OUTPATIENT
Start: 2025-06-16

## 2025-06-16 RX ORDER — GENTAMICIN SULFATE 1 MG/G
OINTMENT TOPICAL PRN
OUTPATIENT
Start: 2025-06-16

## 2025-06-16 RX ORDER — SILVER SULFADIAZINE 10 MG/G
CREAM TOPICAL PRN
OUTPATIENT
Start: 2025-06-16

## 2025-06-16 RX ORDER — TRIAMCINOLONE ACETONIDE 1 MG/G
OINTMENT TOPICAL PRN
OUTPATIENT
Start: 2025-06-16

## 2025-06-16 RX ADMIN — SODIUM CHLORIDE, PRESERVATIVE FREE 10 ML: 5 INJECTION INTRAVENOUS at 13:26

## 2025-06-16 RX ADMIN — HEPARIN 500 UNITS: 100 SYRINGE at 13:26

## 2025-06-16 ASSESSMENT — PAIN SCALES - GENERAL: PAINLEVEL_OUTOF10: 8

## 2025-06-16 NOTE — PLAN OF CARE
Problem: Cognitive:  Goal: Knowledge of wound care  Description: Knowledge of wound care  Outcome: Progressing  Goal: Understands risk factors for wounds  Description: Understands risk factors for wounds  Outcome: Progressing     Problem: Wound:  Goal: Will show signs of wound healing; wound closure and no evidence of infection  Description: Will show signs of wound healing; wound closure and no evidence of infection  Outcome: Progressing     Problem: Pressure Ulcer:  Goal: Signs of wound healing will improve  Description: Signs of wound healing will improve  Outcome: Progressing  Goal: Absence of new pressure ulcer  Description: Absence of new pressure ulcer  Outcome: Progressing  Goal: Will show no infection signs and symptoms  Description: Will show no infection signs and symptoms  Outcome: Progressing     Problem: Smoking cessation:  Goal: Ability to formulate a plan to maintain a tobacco-free life will be supported  Description: Ability to formulate a plan to maintain a tobacco-free life will be supported  Outcome: Progressing     Problem: Compression therapy:  Goal: Will be free from complications associated with compression therapy  Description: Will be free from complications associated with compression therapy  Outcome: Progressing     Problem: Weight control:  Goal: Ability to maintain an optimal weight for height and age will be supported  Description: Ability to maintain an optimal weight for height and age will be supported  Outcome: Progressing     Problem: Falls - Risk of:  Goal: Will remain free from falls  Description: Will remain free from falls  Outcome: Progressing     Problem: Blood Glucose:  Goal: Ability to maintain appropriate glucose levels will improve  Description: Ability to maintain appropriate glucose levels will improve  Outcome: Progressing

## 2025-06-16 NOTE — PROGRESS NOTES
Patient arrived for Port flush.  Port accessed without complication.  Patient tolerated well.  Patient left infusion center with all belongings and steady gate.  New appt set for 6 weeks and printed off for patient.

## 2025-06-16 NOTE — PROGRESS NOTES
Subjective:  Patient presents today status post 3 days from a Right excision osteomyelitic bone.  Pain has been under control.  Takes oxycodone regular basis.  Pain is described as throbbing.  Patient has been compliant with the off loading device.  Patient has no fever or chills no nausea and no vomiting.  Vitals:   Vitals:    06/16/25 1330   BP: (!) 142/88   Pulse: 76   Resp: 18   Temp: 98 °F (36.7 °C)     Objective:  Vascular: DP and PT pulses palpable 2/4, bilateral.  CFT <3 seconds, bilateral.  Hair growth absent to the level of the digits, bilateral.  Edema present, bilateral.  Varicosities present, bilateral. Erythema absent, bilateral. Distal Rubor absent bilateral.  Temperature decreased bilateral. Hyperpigmentation present bilateral. Atrophic skin no  Neuro: abnormal   Musculoskeletal: Unchanged.   Derm: Melissa-incision edema is  present.  Melissa-incision erythema is  absent.  Wound dehiscence is  absent.  Central aspect ulcer was left packed open.  Attic beads are present in place.  Minimal maceration around the area.  Overall incision line looks good.    Assessment:  Status post 3  days from excision osteomyelitic bone right foot    ICD-10-CM    1. Ulcer of right foot with bone involvement without evidence of necrosis ([facility])  L97.516 Initiate Outpatient Wound Care Protocol     Initiate Oxygen Therapy Protocol     Initiate Outpatient Wound Care Protocol     XR FOOT RIGHT (MIN 3 VIEWS)      2. Diabetic ulcer of toe of right foot associated with type 2 diabetes mellitus, with bone involvement without evidence of necrosis (HCC)  E11.621     L97.516       3. Osteomyelitis of foot, right, acute (HCC)  M86.171 XR FOOT RIGHT (MIN 3 VIEWS)          Plan:  Patient had a dry sterile dressing change today.  Patient will continue to leave dry and intact.  Patient will continue off loading device.  Continue ice and elevation.  Patient will continue same pain control regimen.  X-rays were not taken in the hospital postop.

## 2025-06-17 LAB — SURGICAL PATHOLOGY REPORT: NORMAL

## 2025-06-19 DIAGNOSIS — G63 POLYNEUROPATHY ASSOCIATED WITH UNDERLYING DISEASE: ICD-10-CM

## 2025-06-19 DIAGNOSIS — C32.2 SQUAMOUS CELL CARCINOMA OF SUBGLOTTIS (HCC): ICD-10-CM

## 2025-06-19 DIAGNOSIS — C32.9 LARYNGEAL CANCER (HCC): ICD-10-CM

## 2025-06-19 RX ORDER — OXYCODONE HYDROCHLORIDE 15 MG/1
15 TABLET ORAL EVERY 4 HOURS PRN
Qty: 84 TABLET | Refills: 0 | Status: SHIPPED | OUTPATIENT
Start: 2025-06-19 | End: 2025-07-03

## 2025-06-23 ENCOUNTER — TELEPHONE (OUTPATIENT)
Dept: PALLATIVE CARE | Age: 60
End: 2025-06-23

## 2025-06-23 ENCOUNTER — HOSPITAL ENCOUNTER (OUTPATIENT)
Dept: WOUND CARE | Age: 60
Discharge: HOME | End: 2025-06-24
Attending: PHYSICIAN ASSISTANT
Payer: COMMERCIAL

## 2025-06-23 VITALS
HEART RATE: 84 BPM | SYSTOLIC BLOOD PRESSURE: 138 MMHG | RESPIRATION RATE: 18 BRPM | HEIGHT: 73 IN | BODY MASS INDEX: 28.89 KG/M2 | DIASTOLIC BLOOD PRESSURE: 84 MMHG | WEIGHT: 218 LBS | TEMPERATURE: 97.2 F

## 2025-06-23 DIAGNOSIS — L97.516 ULCER OF RIGHT FOOT WITH BONE INVOLVEMENT WITHOUT EVIDENCE OF NECROSIS (HCC): Primary | ICD-10-CM

## 2025-06-23 PROCEDURE — 11042 DBRDMT SUBQ TIS 1ST 20SQCM/<: CPT | Performed by: PODIATRIST

## 2025-06-23 PROCEDURE — 11042 DBRDMT SUBQ TIS 1ST 20SQCM/<: CPT

## 2025-06-23 RX ORDER — GINSENG 100 MG
CAPSULE ORAL PRN
OUTPATIENT
Start: 2025-06-23

## 2025-06-23 RX ORDER — BETAMETHASONE DIPROPIONATE 0.5 MG/G
CREAM TOPICAL PRN
OUTPATIENT
Start: 2025-06-23

## 2025-06-23 RX ORDER — SODIUM CHLOR/HYPOCHLOROUS ACID 0.033 %
SOLUTION, IRRIGATION IRRIGATION PRN
OUTPATIENT
Start: 2025-06-23

## 2025-06-23 RX ORDER — MUPIROCIN 2 %
OINTMENT (GRAM) TOPICAL PRN
OUTPATIENT
Start: 2025-06-23

## 2025-06-23 RX ORDER — LIDOCAINE HYDROCHLORIDE 20 MG/ML
JELLY TOPICAL PRN
OUTPATIENT
Start: 2025-06-23

## 2025-06-23 RX ORDER — LIDOCAINE HYDROCHLORIDE 40 MG/ML
SOLUTION TOPICAL PRN
OUTPATIENT
Start: 2025-06-23

## 2025-06-23 RX ORDER — CLOBETASOL PROPIONATE 0.5 MG/G
OINTMENT TOPICAL PRN
OUTPATIENT
Start: 2025-06-23

## 2025-06-23 RX ORDER — TRIAMCINOLONE ACETONIDE 1 MG/G
OINTMENT TOPICAL PRN
OUTPATIENT
Start: 2025-06-23

## 2025-06-23 RX ORDER — SILVER SULFADIAZINE 10 MG/G
CREAM TOPICAL PRN
OUTPATIENT
Start: 2025-06-23

## 2025-06-23 RX ORDER — GENTAMICIN SULFATE 1 MG/G
OINTMENT TOPICAL PRN
OUTPATIENT
Start: 2025-06-23

## 2025-06-23 RX ORDER — LIDOCAINE 40 MG/G
CREAM TOPICAL PRN
OUTPATIENT
Start: 2025-06-23

## 2025-06-23 RX ORDER — BACITRACIN ZINC AND POLYMYXIN B SULFATE 500; 1000 [USP'U]/G; [USP'U]/G
OINTMENT TOPICAL PRN
OUTPATIENT
Start: 2025-06-23

## 2025-06-23 RX ORDER — NEOMYCIN/BACITRACIN/POLYMYXINB 3.5-400-5K
OINTMENT (GRAM) TOPICAL PRN
OUTPATIENT
Start: 2025-06-23

## 2025-06-23 RX ORDER — LIDOCAINE 50 MG/G
OINTMENT TOPICAL PRN
OUTPATIENT
Start: 2025-06-23

## 2025-06-23 NOTE — PROGRESS NOTES
Subjective:  Patient presents today status post 10 days from a Right excision osteomyelitic bone.  Pain has been under control.  Patient change dressing as advised.  Patient has been compliant with the off loading device.  Patient has no fever or chills no nausea and no vomiting.  Past Medical History:   Diagnosis Date    Ankle pain, left     chronic; s/p fracture in 1999    Cancer (HCC)     vocal cord    Gastroesophageal reflux disease     Hyperlipemia     Hypertension     Obesity     Tobacco abuse     Tourette's syndrome      Allergies   Allergen Reactions    Chantix [Varenicline] Other (See Comments)     Unusual dreams.     Current Outpatient Medications on File Prior to Encounter   Medication Sig Dispense Refill    oxyCODONE (OXY-IR) 15 MG immediate release tablet Take 1 tablet by mouth every 4 hours as needed for Pain for up to 14 days. Max Daily Amount: 90 mg 84 tablet 0    losartan (COZAAR) 100 MG tablet Take 1 tablet by mouth daily 30 tablet 5    clindamycin (CLEOCIN) 300 MG capsule       atorvastatin (LIPITOR) 40 MG tablet Take 1 tablet by mouth daily 90 tablet 1    ipratropium 0.5 mg-albuterol 2.5 mg (DUONEB) 0.5-2.5 (3) MG/3ML SOLN nebulizer solution Inhale 3 mLs into the lungs every 4 hours as needed for Shortness of Breath 360 mL 1    metFORMIN (GLUCOPHAGE) 500 MG tablet Take 1 tablet by mouth 2 times daily (with meals) 180 tablet 1    omeprazole (PRILOSEC) 20 MG delayed release capsule TAKE 1 CAPSULE DAILY 90 capsule 1    pimozide (ORAP) 1 MG tablet Take 2 tablets by mouth 2 times daily 360 tablet 1    senna (SENOKOT) 8.6 MG TABS tablet Take 1 tablet by mouth daily 120 tablet 0    DULoxetine (CYMBALTA) 30 MG extended release capsule Take 1 capsule by mouth daily 30 capsule 1    scopolamine (TRANSDERM-SCOP) transdermal patch       nystatin (MYCOSTATIN) [id number] UNIT/ML suspension       ondansetron (ZOFRAN-ODT) 8 MG TBDP disintegrating tablet Take 1 tablet by mouth 3 times daily as needed for Nausea or

## 2025-06-23 NOTE — TELEPHONE ENCOUNTER
Called patient with reminder for upcoming [facility]iative [facility] appointment.  Left message.  Reminded patient of appointment date,time and location.  Left my contact number to call if they have questions or need to cancel.    [facility] Coordinator  Bethany LANGSTONN, RN  [facility] [phone]/ [facility] [phone]/ [facility] [phone]

## 2025-06-23 NOTE — DISCHARGE INSTRUCTIONS
Follow up as scheduled with Dr. Banks.   Discontinue current dressing. Place betadine around the wound. Fill wound with iodoform   packing strip. Do not pack tightly. Leave a tail out of the wound to make it easier to remove. Cover with super absorbant and wrap with an ace wrap. Change as ordered, usually once daily and change outer dressing as needed if excessive drainage.  When the old material is removed.    SIGNS OF INFECTION  - Redness, swelling, skin hot  - Wound bed turns black or stringy yellow  - Foul odor  - Increased drainage or pus  - Increased pain  - Fever greater than 100F    CALL YOUR DOCTOR OR SEEK MEDICAL ATTENTION IF SIGNS OF INFECTION.  DO NOT WAIT UNTIL YOUR NEXT APPOINTMENT    Call the Wound Care Nurse with any other questions or concerns- [phone]

## 2025-06-23 NOTE — PROGRESS NOTES
PALLIATIVE CARE PROGRESS NOTE     Patient: Dylan Dolan  1965    Reason For Consult:  Goals of care evaluation  Distress management  Symptom Management  Guidance and support  Facilitate communications  Assistance in coordinating care  Recommendations for the above    Subjective: Dylan Dolan is a 58 y.o. male with a history of HTN, Tourette's syndrome, GERD, tobacco abuse, obesity, HLD, tracheostomy, and follows with Alley Vila MD .  Palliative Care was consulted to help manage symptoms, facilitate communications and establish goals of care. Patient presented to the ED on 7/8/24 with complaints of hoarse voice and difficulty breathing. He reported that this has happened over the course of a few months. He reported that week prior he was at  and was given steroids and a antibiotic without improvements. He was given racepinephrine and dexamethasone. CT soft tissue neck revealed nonspecific mass within the left vocal cord measuring 8 mm x 6 mm. No cervical lymphadenopathy noted. Patient was transferred to [facility] for ENT evaluation and was hospitalized 7/8-7/17/24. ENT performed flexible laryngoscopy and findings consistent with glottic malignancy. On 7/9 patient underwent Bx and Tracheostomy. A 5 ET tube was not able to be passed through the subglottis and subglottis was found to have an ulcerative mass. Intubation was converted to open tracheostomy with successful placement of shiley 6 cuffed ETT. Oncology and Rad Onc were consulted for newly Dx left vocal cod SCC. They reccommended definitive course of RT and possibly chemo. On 7/11 patient had BSS and this was WNL. On 7/14 tracheostomy tube was changed. CT chest did not show any evidence of metastatic disease. 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 Small metabolically active left-sided cervical lymph nodes are present left levels 2 through 5. No metabolically active

## 2025-06-24 ENCOUNTER — OFFICE VISIT (OUTPATIENT)
Dept: PALLATIVE CARE | Age: 60
End: 2025-06-24
Payer: COMMERCIAL

## 2025-06-24 ENCOUNTER — HOSPITAL ENCOUNTER (OUTPATIENT)
Age: 60
Discharge: HOME | End: 2025-06-24
Payer: COMMERCIAL

## 2025-06-24 VITALS
TEMPERATURE: 98.1 F | SYSTOLIC BLOOD PRESSURE: 143 MMHG | DIASTOLIC BLOOD PRESSURE: 89 MMHG | WEIGHT: 215.2 LBS | BODY MASS INDEX: 28.52 KG/M2 | HEIGHT: 73 IN | HEART RATE: 80 BPM

## 2025-06-24 DIAGNOSIS — C32.9 LARYNGEAL CANCER (HCC): Primary | ICD-10-CM

## 2025-06-24 DIAGNOSIS — K59.03 DRUG-INDUCED CONSTIPATION: ICD-10-CM

## 2025-06-24 DIAGNOSIS — G63 POLYNEUROPATHY ASSOCIATED WITH UNDERLYING DISEASE: ICD-10-CM

## 2025-06-24 DIAGNOSIS — F11.20 OPIOID DEPENDENCE WITH CURRENT USE (HCC): ICD-10-CM

## 2025-06-24 DIAGNOSIS — Z02.83 ENCOUNTER FOR DRUG SCREENING: ICD-10-CM

## 2025-06-24 DIAGNOSIS — Z51.5 PALLIATIVE CARE ENCOUNTER: ICD-10-CM

## 2025-06-24 LAB
AMPHET UR QL SCN: NEGATIVE
BARBITURATES UR QL SCN: NEGATIVE
BENZODIAZ UR QL: NEGATIVE
CANNABINOIDS UR QL SCN: POSITIVE
COCAINE UR QL SCN: NEGATIVE
FENTANYL UR QL: NEGATIVE
METHADONE UR QL: NEGATIVE
OPIATES UR QL SCN: NEGATIVE
OXYCODONE UR QL SCN: POSITIVE
PCP UR QL SCN: NEGATIVE
TEST INFORMATION: ABNORMAL

## 2025-06-24 PROCEDURE — 3077F SYST BP >= 140 MM HG: CPT | Performed by: NURSE PRACTITIONER

## 2025-06-24 PROCEDURE — 99213 OFFICE O/P EST LOW 20 MIN: CPT | Performed by: NURSE PRACTITIONER

## 2025-06-24 PROCEDURE — 80307 DRUG TEST PRSMV CHEM ANLYZR: CPT

## 2025-06-24 PROCEDURE — 3079F DIAST BP 80-89 MM HG: CPT | Performed by: NURSE PRACTITIONER

## 2025-07-01 ENCOUNTER — TELEPHONE (OUTPATIENT)
Dept: WOUND CARE | Age: 60
End: 2025-07-01

## 2025-07-01 ENCOUNTER — HOSPITAL ENCOUNTER (OUTPATIENT)
Dept: WOUND CARE | Age: 60
Discharge: HOME | End: 2025-07-02
Attending: PHYSICIAN ASSISTANT
Payer: COMMERCIAL

## 2025-07-01 VITALS
HEART RATE: 66 BPM | RESPIRATION RATE: 16 BRPM | TEMPERATURE: 97.3 F | WEIGHT: 216 LBS | BODY MASS INDEX: 28.63 KG/M2 | DIASTOLIC BLOOD PRESSURE: 78 MMHG | SYSTOLIC BLOOD PRESSURE: 132 MMHG | HEIGHT: 73 IN

## 2025-07-01 DIAGNOSIS — L97.516 DIABETIC ULCER OF TOE OF RIGHT FOOT ASSOCIATED WITH TYPE 2 DIABETES MELLITUS, WITH BONE INVOLVEMENT WITHOUT EVIDENCE OF NECROSIS (HCC): ICD-10-CM

## 2025-07-01 DIAGNOSIS — C32.9 LARYNGEAL CANCER (HCC): ICD-10-CM

## 2025-07-01 DIAGNOSIS — E11.621 DIABETIC ULCER OF TOE OF RIGHT FOOT ASSOCIATED WITH TYPE 2 DIABETES MELLITUS, WITH BONE INVOLVEMENT WITHOUT EVIDENCE OF NECROSIS (HCC): ICD-10-CM

## 2025-07-01 DIAGNOSIS — L97.516 ULCER OF RIGHT FOOT WITH BONE INVOLVEMENT WITHOUT EVIDENCE OF NECROSIS (HCC): Primary | ICD-10-CM

## 2025-07-01 DIAGNOSIS — G63 POLYNEUROPATHY ASSOCIATED WITH UNDERLYING DISEASE: ICD-10-CM

## 2025-07-01 PROCEDURE — 11042 DBRDMT SUBQ TIS 1ST 20SQCM/<: CPT

## 2025-07-01 PROCEDURE — 11042 DBRDMT SUBQ TIS 1ST 20SQCM/<: CPT | Performed by: PODIATRIST

## 2025-07-01 RX ORDER — LIDOCAINE HYDROCHLORIDE 20 MG/ML
JELLY TOPICAL PRN
OUTPATIENT
Start: 2025-07-01

## 2025-07-01 RX ORDER — TRIAMCINOLONE ACETONIDE 1 MG/G
OINTMENT TOPICAL PRN
OUTPATIENT
Start: 2025-07-01

## 2025-07-01 RX ORDER — GINSENG 100 MG
CAPSULE ORAL PRN
OUTPATIENT
Start: 2025-07-01

## 2025-07-01 RX ORDER — LIDOCAINE HYDROCHLORIDE 40 MG/ML
SOLUTION TOPICAL PRN
OUTPATIENT
Start: 2025-07-01

## 2025-07-01 RX ORDER — SODIUM CHLOR/HYPOCHLOROUS ACID 0.033 %
SOLUTION, IRRIGATION IRRIGATION PRN
OUTPATIENT
Start: 2025-07-01

## 2025-07-01 RX ORDER — GENTAMICIN SULFATE 1 MG/G
OINTMENT TOPICAL PRN
OUTPATIENT
Start: 2025-07-01

## 2025-07-01 RX ORDER — BACITRACIN ZINC AND POLYMYXIN B SULFATE 500; 1000 [USP'U]/G; [USP'U]/G
OINTMENT TOPICAL PRN
OUTPATIENT
Start: 2025-07-01

## 2025-07-01 RX ORDER — MUPIROCIN 2 %
OINTMENT (GRAM) TOPICAL PRN
OUTPATIENT
Start: 2025-07-01

## 2025-07-01 RX ORDER — LIDOCAINE 50 MG/G
OINTMENT TOPICAL PRN
OUTPATIENT
Start: 2025-07-01

## 2025-07-01 RX ORDER — BETAMETHASONE DIPROPIONATE 0.5 MG/G
CREAM TOPICAL PRN
OUTPATIENT
Start: 2025-07-01

## 2025-07-01 RX ORDER — NEOMYCIN/BACITRACIN/POLYMYXINB 3.5-400-5K
OINTMENT (GRAM) TOPICAL PRN
OUTPATIENT
Start: 2025-07-01

## 2025-07-01 RX ORDER — DULOXETIN HYDROCHLORIDE 30 MG/1
30 CAPSULE, DELAYED RELEASE ORAL DAILY
Qty: 30 CAPSULE | Refills: 1 | Status: SHIPPED | OUTPATIENT
Start: 2025-07-01 | End: 2025-08-30

## 2025-07-01 RX ORDER — LIDOCAINE 40 MG/G
CREAM TOPICAL PRN
OUTPATIENT
Start: 2025-07-01

## 2025-07-01 RX ORDER — SILVER SULFADIAZINE 10 MG/G
CREAM TOPICAL PRN
OUTPATIENT
Start: 2025-07-01

## 2025-07-01 RX ORDER — CLOBETASOL PROPIONATE 0.5 MG/G
OINTMENT TOPICAL PRN
OUTPATIENT
Start: 2025-07-01

## 2025-07-01 ASSESSMENT — PAIN SCALES - GENERAL: PAINLEVEL_OUTOF10: 2

## 2025-07-01 NOTE — PROGRESS NOTES
Tinel's, bilateral.  negative Valleix sign, bilateral.  Vibratory abnormal  bilateral.  Reflexes Decreased bilateral.  Paresthesias positive.  Dysthesias negative.  Sharp/dull abnormal  bilateral.   Musculoskeletal: strength and ROm unchanged.  Equinus deformity b/l.  Derm: David-incision edema is absent david-incision erythema is  absent.  Wound dehiscence is  absent.  Central aspect ulcer was left packed open.  Antibiotic beads not identified in the wound at this time..  Increased maceration in the plantar aspect the foot itself.  Appears at the dressing self got wet recently.  Overall incision line looks good proximal aspect.  Well coapted.  Ulcer itself is positive fibrinous tissue the healthy tissue base.  No probing no malodor healthy tissue identified..  Wound 05/19/25 Foot Right;Plantar (Active)   Wound Image   07/01/25 0824   Dressing Status Old drainage noted 07/01/25 0824   Wound Cleansed Cleansed with saline 07/01/25 0824   Dressing/Treatment Packing 07/01/25 0824   Offloading for Diabetic Foot Ulcers Offloading ordered 07/01/25 0824   Dressing Change Due 07/02/25 07/01/25 0824   Wound Length (cm) 1.7 cm 07/01/25 0824   Wound Width (cm) 0.8 cm 07/01/25 0824   Wound Depth (cm) 2.1 cm 07/01/25 0824   Wound Surface Area (cm^2) 1.36 cm^2 07/01/25 0824   Change in Wound Size % (l*w) 20 07/01/25 0824   Wound Volume (cm^3) 2.856 cm^3 07/01/25 0824   Wound Healing % -460 07/01/25 0824   Post-Procedure Length (cm) 2.4 cm 06/09/25 1315   Post-Procedure Width (cm) 0.7 cm 06/09/25 1315   Post-Procedure Depth (cm) 1.3 cm 06/09/25 1315   Post-Procedure Surface Area (cm^2) 1.68 cm^2 06/09/25 1315   Post-Procedure Volume (cm^3) 2.184 cm^3 06/09/25 1315   Wound Assessment Other (Comment) 06/16/25 1328   Drainage Amount Moderate (25-50%) 07/01/25 0824   Drainage Description Serosanguinous 07/01/25 0824   Odor Mild 07/01/25 0824   David-wound Assessment Maceration 07/01/25 0824   Margins Defined edges 07/01/25 0824   Wound

## 2025-07-01 NOTE — DISCHARGE INSTRUCTIONS
continue current dressing. Fill wound with iodoform packing strip. Do not pack tightly. Leave a tail out of the wound to make it easier to remove. Cover with dry gauze and tape in place. Change as ordered, usually once or twice a day. When the old material is removed, flush the wound with normal saline. Plan for wound vac next visit    HOW TO MAKE NORMAL SALINE  - Add 8 level teaspoons of table salt to one gallon of distilled water  - Cap and shake well to dissolve the salt  - Store in the refrigerator.  - Discard after one month    SIGNS OF INFECTION  - Redness, swelling, skin hot  - Wound bed turns black or stringy yellow  - Foul odor  - Increased drainage or pus  - Increased pain  - Fever greater than 100F    CALL YOUR DOCTOR OR SEEK MEDICAL ATTENTION IF SIGNS OF INFECTION.  DO NOT WAIT UNTIL YOUR NEXT APPOINTMENT    Call the Wound Care Nurse with any other questions or concerns- [phone]  Follow up as scheduled

## 2025-07-01 NOTE — TELEPHONE ENCOUNTER
Signed wound vac order faxed to [facility].     Home Health Referral and patient info faxed to [facility].

## 2025-07-02 DIAGNOSIS — C32.2 SQUAMOUS CELL CARCINOMA OF SUBGLOTTIS (HCC): ICD-10-CM

## 2025-07-02 DIAGNOSIS — G63 POLYNEUROPATHY ASSOCIATED WITH UNDERLYING DISEASE: ICD-10-CM

## 2025-07-02 DIAGNOSIS — C32.9 LARYNGEAL CANCER (HCC): ICD-10-CM

## 2025-07-02 RX ORDER — ONDANSETRON 8 MG/1
TABLET, ORALLY DISINTEGRATING ORAL
Qty: 90 TABLET | Refills: 1 | Status: SHIPPED | OUTPATIENT
Start: 2025-07-02

## 2025-07-02 RX ORDER — OXYCODONE HYDROCHLORIDE 15 MG/1
15 TABLET ORAL EVERY 4 HOURS PRN
Qty: 84 TABLET | Refills: 0 | Status: SHIPPED | OUTPATIENT
Start: 2025-07-02 | End: 2025-07-16

## 2025-07-07 ENCOUNTER — TELEPHONE (OUTPATIENT)
Dept: ONCOLOGY | Age: 60
End: 2025-07-07

## 2025-07-07 ENCOUNTER — HOSPITAL ENCOUNTER (OUTPATIENT)
Dept: WOUND CARE | Age: 60
Discharge: HOME | End: 2025-07-08
Attending: PHYSICIAN ASSISTANT
Payer: COMMERCIAL

## 2025-07-07 VITALS
DIASTOLIC BLOOD PRESSURE: 70 MMHG | HEART RATE: 70 BPM | HEIGHT: 73 IN | BODY MASS INDEX: 29.18 KG/M2 | WEIGHT: 220.2 LBS | SYSTOLIC BLOOD PRESSURE: 126 MMHG | RESPIRATION RATE: 16 BRPM

## 2025-07-07 DIAGNOSIS — L97.516 ULCER OF RIGHT FOOT WITH BONE INVOLVEMENT WITHOUT EVIDENCE OF NECROSIS (HCC): Primary | ICD-10-CM

## 2025-07-07 PROCEDURE — 11042 DBRDMT SUBQ TIS 1ST 20SQCM/<: CPT

## 2025-07-07 PROCEDURE — 97606 NEG PRS WND THER DME>50 SQCM: CPT

## 2025-07-07 PROCEDURE — 11042 DBRDMT SUBQ TIS 1ST 20SQCM/<: CPT | Performed by: PODIATRIST

## 2025-07-07 RX ORDER — CLOBETASOL PROPIONATE 0.5 MG/G
OINTMENT TOPICAL PRN
OUTPATIENT
Start: 2025-07-07

## 2025-07-07 RX ORDER — SILVER SULFADIAZINE 10 MG/G
CREAM TOPICAL PRN
OUTPATIENT
Start: 2025-07-07

## 2025-07-07 RX ORDER — SODIUM CHLOR/HYPOCHLOROUS ACID 0.033 %
SOLUTION, IRRIGATION IRRIGATION PRN
OUTPATIENT
Start: 2025-07-07

## 2025-07-07 RX ORDER — LIDOCAINE HYDROCHLORIDE 40 MG/ML
SOLUTION TOPICAL PRN
OUTPATIENT
Start: 2025-07-07

## 2025-07-07 RX ORDER — LIDOCAINE 50 MG/G
OINTMENT TOPICAL PRN
OUTPATIENT
Start: 2025-07-07

## 2025-07-07 RX ORDER — LIDOCAINE HYDROCHLORIDE 20 MG/ML
JELLY TOPICAL PRN
OUTPATIENT
Start: 2025-07-07

## 2025-07-07 RX ORDER — GINSENG 100 MG
CAPSULE ORAL PRN
OUTPATIENT
Start: 2025-07-07

## 2025-07-07 RX ORDER — LIDOCAINE 40 MG/G
CREAM TOPICAL PRN
OUTPATIENT
Start: 2025-07-07

## 2025-07-07 RX ORDER — TRIAMCINOLONE ACETONIDE 1 MG/G
OINTMENT TOPICAL PRN
OUTPATIENT
Start: 2025-07-07

## 2025-07-07 RX ORDER — MUPIROCIN 2 %
OINTMENT (GRAM) TOPICAL PRN
OUTPATIENT
Start: 2025-07-07

## 2025-07-07 RX ORDER — BACITRACIN ZINC AND POLYMYXIN B SULFATE 500; 1000 [USP'U]/G; [USP'U]/G
OINTMENT TOPICAL PRN
OUTPATIENT
Start: 2025-07-07

## 2025-07-07 RX ORDER — NEOMYCIN/BACITRACIN/POLYMYXINB 3.5-400-5K
OINTMENT (GRAM) TOPICAL PRN
OUTPATIENT
Start: 2025-07-07

## 2025-07-07 RX ORDER — GENTAMICIN SULFATE 1 MG/G
OINTMENT TOPICAL PRN
OUTPATIENT
Start: 2025-07-07

## 2025-07-07 RX ORDER — BETAMETHASONE DIPROPIONATE 0.5 MG/G
CREAM TOPICAL PRN
OUTPATIENT
Start: 2025-07-07

## 2025-07-07 ASSESSMENT — PAIN SCALES - GENERAL: PAINLEVEL_OUTOF10: 0

## 2025-07-07 NOTE — PROGRESS NOTES
Negative Pressure Wound Therapy    NAME:  Dylan Dolan  YOB: 1965  MEDICAL RECORD NUMBER:  [id number]  DATE:  7/7/2025    Applied Negative Pressure to right foot  wound(s)/ulcer(s).  [x] Applied skin barrier prep to david-wound.   [x] Cut strips of plastic drape to picture frame wound so that david-wound is     covered with the drape.   [x] If bridging dressing to less prominent site, cover any intact skin that will come in contact with the Negative Pressure Therapy sponge, gauze or channel drain with plastic drape. The sponge should never touch intact skin.   [x] Cut sponge, gauze or channel drain to size which will fit into the wound/ulcer bed without being forced.   [x] Be sure the sponge is large enough to hold the entire round plastic flange which is attached to the tubing. Never allow flange to be larger than the sponge or it will produce suction damaging intact skin.  Total number of individual pieces of foam used within the wound bed: 2    [x] If bridging the dressing away from the primary site, be sure the bridge leads to a piece of sponge large enough to hold the entire flange without allowing any of the flange to overlap onto intact skin.   [x] Covered sponge, gauze or channel drain with plastic drape.   [x] Cut a hole in this plastic drape directly over the sponge the same size as the plastic drain tubing.   [x] Removed plastic liner from flange and apply it directly over the hole you cut.   [x] Removed the plastic cover from the flange.   [x] Attached the tubing to the wound/ulcer Negative Pressure Therapy and turn it on to be sure a vacuum is created and that there are no leaks.   [x] If air leaks occur, use plastic drape to patch them.   [x] Secured Negative Pressure Therapy dressing with ace wrap loosely if located on an extremity. Maintain tubing outside of ace wrap. Tubing must not exert pressure on intact skin.    Applied per  Guidelines      Electronically signed by Tanisha HEREDIA

## 2025-07-07 NOTE — TELEPHONE ENCOUNTER
Patient needs a  letter sent to   [employer] putting him on sick leave starting May 20th.    If you need to talk to him please call Maricarmen (sister) [phone]

## 2025-07-07 NOTE — PROGRESS NOTES
Subjective:  Patient presents today with ulcer right foot.  Patient had dressings changed.  Take antibiotic with no issues.  Patient did get approved for wound VAC use.  Patient has no fever or chills no nausea and no vomiting.  Past Medical History:   Diagnosis Date    Ankle pain, left     chronic; s/p fracture in 1999    Cancer (HCC)     vocal cord    Gastroesophageal reflux disease     Hyperlipemia     Hypertension     Obesity     Tobacco abuse     Tourette's syndrome      Allergies   Allergen Reactions    Chantix [Varenicline] Other (See Comments)     Unusual dreams.     Current Outpatient Medications on File Prior to Encounter   Medication Sig Dispense Refill    ondansetron (ZOFRAN-ODT) 8 MG TBDP disintegrating tablet DISSOLVE ONE TABLET BY MOUTH THREE TIMES A DAY AS NEEDED FOR NAUSEA OR VOMITING 90 tablet 1    oxyCODONE (OXY-IR) 15 MG immediate release tablet Take 1 tablet by mouth every 4 hours as needed for Pain for up to 14 days. Max Daily Amount: 90 mg 84 tablet 0    DULoxetine (CYMBALTA) 30 MG extended release capsule Take 1 capsule by mouth daily 30 capsule 1    losartan (COZAAR) 100 MG tablet Take 1 tablet by mouth daily 30 tablet 5    clindamycin (CLEOCIN) 300 MG capsule       atorvastatin (LIPITOR) 40 MG tablet Take 1 tablet by mouth daily 90 tablet 1    ipratropium 0.5 mg-albuterol 2.5 mg (DUONEB) 0.5-2.5 (3) MG/3ML SOLN nebulizer solution Inhale 3 mLs into the lungs every 4 hours as needed for Shortness of Breath 360 mL 1    metFORMIN (GLUCOPHAGE) 500 MG tablet Take 1 tablet by mouth 2 times daily (with meals) 180 tablet 1    omeprazole (PRILOSEC) 20 MG delayed release capsule TAKE 1 CAPSULE DAILY 90 capsule 1    pimozide (ORAP) 1 MG tablet Take 2 tablets by mouth 2 times daily 360 tablet 1    senna (SENOKOT) 8.6 MG TABS tablet Take 1 tablet by mouth daily 120 tablet 0    scopolamine (TRANSDERM-SCOP) transdermal patch       nystatin (MYCOSTATIN) [id number] UNIT/ML suspension       Magic Mouthwash

## 2025-07-09 NOTE — TELEPHONE ENCOUNTER
Maricarmen calling back    Андрей fax # [phone]    Claim # [id number]    Sick leave 5/20/2025 through 9/10/2025

## 2025-07-14 ENCOUNTER — HOSPITAL ENCOUNTER (OUTPATIENT)
Dept: WOUND CARE | Age: 60
Discharge: HOME | End: 2025-07-15
Attending: PHYSICIAN ASSISTANT
Payer: COMMERCIAL

## 2025-07-14 VITALS
TEMPERATURE: 97.1 F | RESPIRATION RATE: 16 BRPM | DIASTOLIC BLOOD PRESSURE: 74 MMHG | BODY MASS INDEX: 29.86 KG/M2 | HEART RATE: 74 BPM | SYSTOLIC BLOOD PRESSURE: 118 MMHG | HEIGHT: 72 IN

## 2025-07-14 DIAGNOSIS — L97.516 ULCER OF RIGHT FOOT WITH BONE INVOLVEMENT WITHOUT EVIDENCE OF NECROSIS (HCC): Primary | ICD-10-CM

## 2025-07-14 PROCEDURE — 11042 DBRDMT SUBQ TIS 1ST 20SQCM/<: CPT

## 2025-07-14 PROCEDURE — 11042 DBRDMT SUBQ TIS 1ST 20SQCM/<: CPT | Performed by: PODIATRIST

## 2025-07-14 PROCEDURE — 97606 NEG PRS WND THER DME>50 SQCM: CPT

## 2025-07-14 RX ORDER — LIDOCAINE 50 MG/G
OINTMENT TOPICAL PRN
OUTPATIENT
Start: 2025-07-14

## 2025-07-14 RX ORDER — NEOMYCIN/BACITRACIN/POLYMYXINB 3.5-400-5K
OINTMENT (GRAM) TOPICAL PRN
OUTPATIENT
Start: 2025-07-14

## 2025-07-14 RX ORDER — LIDOCAINE HYDROCHLORIDE 20 MG/ML
JELLY TOPICAL PRN
OUTPATIENT
Start: 2025-07-14

## 2025-07-14 RX ORDER — SILVER SULFADIAZINE 10 MG/G
CREAM TOPICAL PRN
OUTPATIENT
Start: 2025-07-14

## 2025-07-14 RX ORDER — BETAMETHASONE DIPROPIONATE 0.5 MG/G
CREAM TOPICAL PRN
OUTPATIENT
Start: 2025-07-14

## 2025-07-14 RX ORDER — CLOBETASOL PROPIONATE 0.5 MG/G
OINTMENT TOPICAL PRN
OUTPATIENT
Start: 2025-07-14

## 2025-07-14 RX ORDER — MUPIROCIN 2 %
OINTMENT (GRAM) TOPICAL PRN
OUTPATIENT
Start: 2025-07-14

## 2025-07-14 RX ORDER — SODIUM CHLOR/HYPOCHLOROUS ACID 0.033 %
SOLUTION, IRRIGATION IRRIGATION PRN
OUTPATIENT
Start: 2025-07-14

## 2025-07-14 RX ORDER — GENTAMICIN SULFATE 1 MG/G
OINTMENT TOPICAL PRN
OUTPATIENT
Start: 2025-07-14

## 2025-07-14 RX ORDER — LIDOCAINE 40 MG/G
CREAM TOPICAL PRN
OUTPATIENT
Start: 2025-07-14

## 2025-07-14 RX ORDER — TRIAMCINOLONE ACETONIDE 1 MG/G
OINTMENT TOPICAL PRN
OUTPATIENT
Start: 2025-07-14

## 2025-07-14 RX ORDER — GINSENG 100 MG
CAPSULE ORAL PRN
OUTPATIENT
Start: 2025-07-14

## 2025-07-14 RX ORDER — LIDOCAINE HYDROCHLORIDE 40 MG/ML
SOLUTION TOPICAL PRN
OUTPATIENT
Start: 2025-07-14

## 2025-07-14 RX ORDER — BACITRACIN ZINC AND POLYMYXIN B SULFATE 500; 1000 [USP'U]/G; [USP'U]/G
OINTMENT TOPICAL PRN
OUTPATIENT
Start: 2025-07-14

## 2025-07-14 NOTE — PROGRESS NOTES
Negative Pressure Wound Therapy    NAME:  Dylan Dolan  YOB: 1965  MEDICAL RECORD NUMBER:  [id number]  DATE:  7/14/2025    Applied Negative Pressure to right foot wound(s)/ulcer(s).  [x] Applied skin barrier prep to david-wound.   [x] Cut strips of plastic drape to picture frame wound so that david-wound is     covered with the drape.   [x] If bridging dressing to less prominent site, cover any intact skin that will come in contact with the Negative Pressure Therapy sponge, gauze or channel drain with plastic drape. The sponge should never touch intact skin.   [x] Cut sponge, gauze or channel drain to size which will fit into the wound/ulcer bed without being forced.   [x] Be sure the sponge is large enough to hold the entire round plastic flange which is attached to the tubing. Never allow flange to be larger than the sponge or it will produce suction damaging intact skin.  Total number of individual pieces of foam used within the wound bed: 1    [x] If bridging the dressing away from the primary site, be sure the bridge leads to a piece of sponge large enough to hold the entire flange without allowing any of the flange to overlap onto intact skin.   [x] Covered sponge, gauze or channel drain with plastic drape.   [x] Cut a hole in this plastic drape directly over the sponge the same size as the plastic drain tubing.   [x] Removed plastic liner from flange and apply it directly over the hole you cut.   [x] Removed the plastic cover from the flange.   [x] Attached the tubing to the wound/ulcer Negative Pressure Therapy and turn it on to be sure a vacuum is created and that there are no leaks.   [x] If air leaks occur, use plastic drape to patch them.   [x] Secured Negative Pressure Therapy dressing with ace wrap loosely if located on an extremity. Maintain tubing outside of ace wrap. Tubing must not exert pressure on intact skin.    Applied per  Guidelines      Electronically signed by Zahraa

## 2025-07-14 NOTE — PROGRESS NOTES
Subjective:  Patient presents today with ulcer right foot.  Patient has a VAC in place with no issues.  Patient tolerated the antibiotic.  Patient tolerated the new VAC therapy.  Patient has no fever or chills no nausea and no vomiting.  Past Medical History:   Diagnosis Date    Ankle pain, left     chronic; s/p fracture in 1999    Cancer (HCC)     vocal cord    Gastroesophageal reflux disease     Hyperlipemia     Hypertension     Obesity     Tobacco abuse     Tourette's syndrome      Allergies   Allergen Reactions    Chantix [Varenicline] Other (See Comments)     Unusual dreams.     Current Outpatient Medications on File Prior to Encounter   Medication Sig Dispense Refill    ondansetron (ZOFRAN-ODT) 8 MG TBDP disintegrating tablet DISSOLVE ONE TABLET BY MOUTH THREE TIMES A DAY AS NEEDED FOR NAUSEA OR VOMITING 90 tablet 1    oxyCODONE (OXY-IR) 15 MG immediate release tablet Take 1 tablet by mouth every 4 hours as needed for Pain for up to 14 days. Max Daily Amount: 90 mg 84 tablet 0    DULoxetine (CYMBALTA) 30 MG extended release capsule Take 1 capsule by mouth daily 30 capsule 1    losartan (COZAAR) 100 MG tablet Take 1 tablet by mouth daily 30 tablet 5    clindamycin (CLEOCIN) 300 MG capsule       atorvastatin (LIPITOR) 40 MG tablet Take 1 tablet by mouth daily 90 tablet 1    ipratropium 0.5 mg-albuterol 2.5 mg (DUONEB) 0.5-2.5 (3) MG/3ML SOLN nebulizer solution Inhale 3 mLs into the lungs every 4 hours as needed for Shortness of Breath 360 mL 1    metFORMIN (GLUCOPHAGE) 500 MG tablet Take 1 tablet by mouth 2 times daily (with meals) 180 tablet 1    omeprazole (PRILOSEC) 20 MG delayed release capsule TAKE 1 CAPSULE DAILY 90 capsule 1    pimozide (ORAP) 1 MG tablet Take 2 tablets by mouth 2 times daily 360 tablet 1    senna (SENOKOT) 8.6 MG TABS tablet Take 1 tablet by mouth daily 120 tablet 0    scopolamine (TRANSDERM-SCOP) transdermal patch       nystatin (MYCOSTATIN) 696041 UNIT/ML suspension       Magic

## 2025-07-16 DIAGNOSIS — C32.9 LARYNGEAL CANCER (HCC): ICD-10-CM

## 2025-07-16 DIAGNOSIS — C32.2 SQUAMOUS CELL CARCINOMA OF SUBGLOTTIS (HCC): ICD-10-CM

## 2025-07-16 DIAGNOSIS — G63 POLYNEUROPATHY ASSOCIATED WITH UNDERLYING DISEASE: ICD-10-CM

## 2025-07-16 RX ORDER — OXYCODONE HYDROCHLORIDE 15 MG/1
15 TABLET ORAL EVERY 4 HOURS PRN
Qty: 84 TABLET | Refills: 0 | Status: SHIPPED | OUTPATIENT
Start: 2025-07-16 | End: 2025-07-30

## 2025-07-21 ENCOUNTER — TELEPHONE (OUTPATIENT)
Dept: WOUND CARE | Age: 60
End: 2025-07-21

## 2025-07-21 ENCOUNTER — HOSPITAL ENCOUNTER (OUTPATIENT)
Dept: WOUND CARE | Age: 60
Discharge: HOME | End: 2025-07-22
Attending: PHYSICIAN ASSISTANT
Payer: COMMERCIAL

## 2025-07-21 VITALS
HEIGHT: 72 IN | DIASTOLIC BLOOD PRESSURE: 86 MMHG | SYSTOLIC BLOOD PRESSURE: 138 MMHG | HEART RATE: 76 BPM | BODY MASS INDEX: 29.26 KG/M2 | RESPIRATION RATE: 18 BRPM | WEIGHT: 216 LBS

## 2025-07-21 DIAGNOSIS — L97.516 ULCER OF RIGHT FOOT WITH BONE INVOLVEMENT WITHOUT EVIDENCE OF NECROSIS (HCC): Primary | ICD-10-CM

## 2025-07-21 PROCEDURE — 97597 DBRDMT OPN WND 1ST 20 CM/<: CPT

## 2025-07-21 PROCEDURE — 97597 DBRDMT OPN WND 1ST 20 CM/<: CPT | Performed by: PODIATRIST

## 2025-07-21 RX ORDER — SODIUM CHLOR/HYPOCHLOROUS ACID 0.033 %
SOLUTION, IRRIGATION IRRIGATION PRN
OUTPATIENT
Start: 2025-07-21

## 2025-07-21 RX ORDER — CLOBETASOL PROPIONATE 0.5 MG/G
OINTMENT TOPICAL PRN
OUTPATIENT
Start: 2025-07-21

## 2025-07-21 RX ORDER — LIDOCAINE 40 MG/G
CREAM TOPICAL PRN
OUTPATIENT
Start: 2025-07-21

## 2025-07-21 RX ORDER — LIDOCAINE HYDROCHLORIDE 20 MG/ML
JELLY TOPICAL PRN
OUTPATIENT
Start: 2025-07-21

## 2025-07-21 RX ORDER — TRIAMCINOLONE ACETONIDE 1 MG/G
OINTMENT TOPICAL PRN
OUTPATIENT
Start: 2025-07-21

## 2025-07-21 RX ORDER — GINSENG 100 MG
CAPSULE ORAL PRN
OUTPATIENT
Start: 2025-07-21

## 2025-07-21 RX ORDER — BETAMETHASONE DIPROPIONATE 0.5 MG/G
CREAM TOPICAL PRN
OUTPATIENT
Start: 2025-07-21

## 2025-07-21 RX ORDER — BACITRACIN ZINC AND POLYMYXIN B SULFATE 500; 1000 [USP'U]/G; [USP'U]/G
OINTMENT TOPICAL PRN
OUTPATIENT
Start: 2025-07-21

## 2025-07-21 RX ORDER — SILVER SULFADIAZINE 10 MG/G
CREAM TOPICAL PRN
OUTPATIENT
Start: 2025-07-21

## 2025-07-21 RX ORDER — NEOMYCIN/BACITRACIN/POLYMYXINB 3.5-400-5K
OINTMENT (GRAM) TOPICAL PRN
OUTPATIENT
Start: 2025-07-21

## 2025-07-21 RX ORDER — LIDOCAINE 50 MG/G
OINTMENT TOPICAL PRN
OUTPATIENT
Start: 2025-07-21

## 2025-07-21 RX ORDER — GENTAMICIN SULFATE 1 MG/G
OINTMENT TOPICAL PRN
OUTPATIENT
Start: 2025-07-21

## 2025-07-21 RX ORDER — MUPIROCIN 2 %
OINTMENT (GRAM) TOPICAL PRN
OUTPATIENT
Start: 2025-07-21

## 2025-07-21 RX ORDER — LIDOCAINE HYDROCHLORIDE 40 MG/ML
SOLUTION TOPICAL PRN
OUTPATIENT
Start: 2025-07-21

## 2025-07-21 NOTE — TELEPHONE ENCOUNTER
PLEASE CALL VERA [phone].  SHE CALLED AND REQUESTED NOTES FROM [facility]'S VISIT TODAY.  SHE QUESTIONED IF [facility] SHOULD DISCHARGE HIM.

## 2025-07-21 NOTE — DISCHARGE INSTRUCTIONS
Discontinue previous dressing. Apply collagen to your wound bed daily. Cut or tear the material to fit the wound and lay it on the wound. Moisten with normal saline until it turns gel-like unless you have heavy drainage. Cover with dry gauze and hold in place with tape or roll gauze. Change your dressing whenever drainage comes through the outer dressing even if it is sooner than the time prescribed.       SIGNS OF INFECTION  - Redness, swelling, skin hot  - Wound bed turns black or stringy yellow  - Foul odor  - Increased drainage or pus  - Increased pain  - Fever greater than 100F    CALL YOUR DOCTOR OR SEEK MEDICAL ATTENTION IF SIGNS OF INFECTION.  DO NOT WAIT UNTIL YOUR NEXT APPOINTMENT    Call the Wound Care Nurse with any other questions or concerns- [phone]    Follow up as scheduled

## 2025-07-21 NOTE — PROGRESS NOTES
times daily as needed for Irritation Benadryl Elixir, Maalox, viscous xylocaine, nystatin 1:1 mix 480 mL 3    prochlorperazine (COMPAZINE) 10 MG tablet Take 1 tablet by mouth every 6 hours as needed (nausea vomiting) 120 tablet 3    lidocaine-prilocaine (EMLA) 2.5-2.5 % cream Apply topically as needed daily to port sight 1 hour prior to port access 30 g 2    betamethasone valerate (VALISONE) 0.1 % cream Apply topically 2 times daily. 45 g 2    docusate sodium (COLACE) 100 MG capsule Take 1 capsule by mouth 2 times daily      acetaminophen (TYLENOL) 500 MG tablet Take 1 tablet by mouth as needed for Pain 2 tablets as needed      Nebulizers (COMPRESSOR/NEBULIZER) MISC Use four times per day for SOB 1 each 3    amLODIPine (NORVASC) 10 MG tablet Take 1 tablet by mouth daily 30 tablet 3    Diabetic Shoe MISC by Does not apply route Dispense DM shoe and insoles.  custom accomodative offloading insole with toe filler L side.     Amputated great toe of left foot (HCC)  (primary encounter diagnosis)  Equinus deformity of both feet  Diabetic polyneuropathy associated with type 2 diabetes mellitus (HCC) 1 each 0     No current facility-administered medications on file prior to encounter.       Vitals:   Vitals:    07/21/25 0815   BP: 138/86   Pulse: 76   Resp: 18     Objective:  Vascular: DP and PT pulses palpable 2/4, bilateral.  CFT <3 seconds, bilateral.  Hair growth absent to the level of the digits, bilateral.  Edema present, bilateral.  Varicosities present, bilateral. Erythema absent, bilateral. Distal Rubor absent bilateral.  Temperature decreased bilateral. Hyperpigmentation present bilateral. Atrophic skin no  Neurological: Sensation Impaired to light touch to level of digits, bilateral.  Protective sensation intact  0/10 sites via 5.07/10g Colbert-Fiona Monofilament, bilateral.  negative Tinel's, bilateral.  negative Valleix sign, bilateral.  Vibratory abnormal  bilateral.  Reflexes Decreased bilateral.

## 2025-07-28 ENCOUNTER — HOSPITAL ENCOUNTER (OUTPATIENT)
Dept: INFUSION THERAPY | Age: 60
Discharge: HOME | End: 2025-07-28
Payer: COMMERCIAL

## 2025-07-28 DIAGNOSIS — C32.9 LARYNGEAL CANCER (HCC): Primary | ICD-10-CM

## 2025-07-28 PROCEDURE — 2500000003 HC RX 250 WO HCPCS: Performed by: INTERNAL MEDICINE

## 2025-07-28 PROCEDURE — 6360000002 HC RX W HCPCS: Performed by: INTERNAL MEDICINE

## 2025-07-28 PROCEDURE — 96523 IRRIG DRUG DELIVERY DEVICE: CPT

## 2025-07-28 RX ORDER — ALBUTEROL SULFATE 90 UG/1
4 INHALANT RESPIRATORY (INHALATION) PRN
OUTPATIENT
Start: 2025-07-28

## 2025-07-28 RX ORDER — DIPHENHYDRAMINE HYDROCHLORIDE 50 MG/ML
50 INJECTION, SOLUTION INTRAMUSCULAR; INTRAVENOUS
OUTPATIENT
Start: 2025-07-28

## 2025-07-28 RX ORDER — HEPARIN 100 UNIT/ML
500 SYRINGE INTRAVENOUS PRN
OUTPATIENT
Start: 2025-07-28

## 2025-07-28 RX ORDER — EPINEPHRINE 1 MG/ML
0.3 INJECTION, SOLUTION, CONCENTRATE INTRAVENOUS PRN
OUTPATIENT
Start: 2025-07-28

## 2025-07-28 RX ORDER — SODIUM CHLORIDE 9 MG/ML
25 INJECTION, SOLUTION INTRAVENOUS PRN
OUTPATIENT
Start: 2025-07-28

## 2025-07-28 RX ORDER — ONDANSETRON 2 MG/ML
8 INJECTION INTRAMUSCULAR; INTRAVENOUS
OUTPATIENT
Start: 2025-07-28

## 2025-07-28 RX ORDER — LIDOCAINE HYDROCHLORIDE 10 MG/ML
0.25 INJECTION, SOLUTION EPIDURAL; INFILTRATION; INTRACAUDAL; PERINEURAL
OUTPATIENT
Start: 2025-07-28

## 2025-07-28 RX ORDER — HEPARIN 100 UNIT/ML
500 SYRINGE INTRAVENOUS PRN
Status: DISCONTINUED | OUTPATIENT
Start: 2025-07-28 | End: 2025-07-29 | Stop reason: HOSPADM

## 2025-07-28 RX ORDER — SODIUM CHLORIDE 0.9 % (FLUSH) 0.9 %
5-40 SYRINGE (ML) INJECTION PRN
OUTPATIENT
Start: 2025-07-28

## 2025-07-28 RX ORDER — HYDROCORTISONE SODIUM SUCCINATE 100 MG/2ML
100 INJECTION INTRAMUSCULAR; INTRAVENOUS
OUTPATIENT
Start: 2025-07-28

## 2025-07-28 RX ORDER — SODIUM CHLORIDE 0.9 % (FLUSH) 0.9 %
5-40 SYRINGE (ML) INJECTION PRN
Status: DISCONTINUED | OUTPATIENT
Start: 2025-07-28 | End: 2025-07-29 | Stop reason: HOSPADM

## 2025-07-28 RX ORDER — ACETAMINOPHEN 325 MG/1
650 TABLET ORAL
OUTPATIENT
Start: 2025-07-28

## 2025-07-28 RX ORDER — SODIUM CHLORIDE 9 MG/ML
INJECTION, SOLUTION INTRAVENOUS CONTINUOUS
OUTPATIENT
Start: 2025-07-28

## 2025-07-28 RX ADMIN — HEPARIN 500 UNITS: 100 SYRINGE at 13:04

## 2025-07-28 RX ADMIN — SODIUM CHLORIDE, PRESERVATIVE FREE 10 ML: 5 INJECTION INTRAVENOUS at 13:04

## 2025-07-28 NOTE — PROGRESS NOTES
VAD accessed per protocol with 3/4 \" xavier needle.  Flushed easily with saline.  Blood return noted.  Flushed with 10 ml of NSS and 5 ml of Heparin flush.  VAD D/C'd and Band-Aid to site.  Patient stable and discharged to home.

## 2025-07-29 ENCOUNTER — HOSPITAL ENCOUNTER (OUTPATIENT)
Dept: WOUND CARE | Age: 60
Discharge: HOME | End: 2025-07-30
Attending: PHYSICIAN ASSISTANT
Payer: COMMERCIAL

## 2025-07-29 VITALS
SYSTOLIC BLOOD PRESSURE: 128 MMHG | HEART RATE: 66 BPM | BODY MASS INDEX: 28.31 KG/M2 | HEIGHT: 72 IN | RESPIRATION RATE: 18 BRPM | TEMPERATURE: 97.4 F | DIASTOLIC BLOOD PRESSURE: 80 MMHG | WEIGHT: 209 LBS

## 2025-07-29 DIAGNOSIS — L97.516 ULCER OF RIGHT FOOT WITH BONE INVOLVEMENT WITHOUT EVIDENCE OF NECROSIS (HCC): Primary | ICD-10-CM

## 2025-07-29 PROCEDURE — 99213 OFFICE O/P EST LOW 20 MIN: CPT

## 2025-07-29 PROCEDURE — 99213 OFFICE O/P EST LOW 20 MIN: CPT | Performed by: PODIATRIST

## 2025-07-29 RX ORDER — SILVER SULFADIAZINE 10 MG/G
CREAM TOPICAL PRN
Status: CANCELLED | OUTPATIENT
Start: 2025-07-29

## 2025-07-29 RX ORDER — LIDOCAINE 50 MG/G
OINTMENT TOPICAL PRN
Status: CANCELLED | OUTPATIENT
Start: 2025-07-29

## 2025-07-29 RX ORDER — LIDOCAINE 40 MG/G
CREAM TOPICAL PRN
Status: CANCELLED | OUTPATIENT
Start: 2025-07-29

## 2025-07-29 RX ORDER — GENTAMICIN SULFATE 1 MG/G
OINTMENT TOPICAL PRN
Status: CANCELLED | OUTPATIENT
Start: 2025-07-29

## 2025-07-29 RX ORDER — BETAMETHASONE DIPROPIONATE 0.5 MG/G
CREAM TOPICAL PRN
Status: CANCELLED | OUTPATIENT
Start: 2025-07-29

## 2025-07-29 RX ORDER — BACITRACIN ZINC AND POLYMYXIN B SULFATE 500; 1000 [USP'U]/G; [USP'U]/G
OINTMENT TOPICAL PRN
Status: CANCELLED | OUTPATIENT
Start: 2025-07-29

## 2025-07-29 RX ORDER — NEOMYCIN/BACITRACIN/POLYMYXINB 3.5-400-5K
OINTMENT (GRAM) TOPICAL PRN
Status: CANCELLED | OUTPATIENT
Start: 2025-07-29

## 2025-07-29 RX ORDER — CLOBETASOL PROPIONATE 0.5 MG/G
OINTMENT TOPICAL PRN
Status: CANCELLED | OUTPATIENT
Start: 2025-07-29

## 2025-07-29 RX ORDER — LIDOCAINE HYDROCHLORIDE 40 MG/ML
SOLUTION TOPICAL PRN
Status: CANCELLED | OUTPATIENT
Start: 2025-07-29

## 2025-07-29 RX ORDER — MUPIROCIN 2 %
OINTMENT (GRAM) TOPICAL PRN
Status: CANCELLED | OUTPATIENT
Start: 2025-07-29

## 2025-07-29 RX ORDER — LIDOCAINE HYDROCHLORIDE 20 MG/ML
JELLY TOPICAL PRN
Status: CANCELLED | OUTPATIENT
Start: 2025-07-29

## 2025-07-29 RX ORDER — SODIUM CHLOR/HYPOCHLOROUS ACID 0.033 %
SOLUTION, IRRIGATION IRRIGATION PRN
Status: CANCELLED | OUTPATIENT
Start: 2025-07-29

## 2025-07-29 RX ORDER — GINSENG 100 MG
CAPSULE ORAL PRN
Status: CANCELLED | OUTPATIENT
Start: 2025-07-29

## 2025-07-29 RX ORDER — TRIAMCINOLONE ACETONIDE 1 MG/G
OINTMENT TOPICAL PRN
Status: CANCELLED | OUTPATIENT
Start: 2025-07-29

## 2025-07-29 ASSESSMENT — PAIN SCALES - GENERAL: PAINLEVEL_OUTOF10: 0

## 2025-07-29 NOTE — PROGRESS NOTES
antimicrobial solution     For normal periwound tissue without irritation nor maceration, apply topical skin protectant    For periwound tissue with irritation and/or maceration, apply zinc based product, topical steroid cream/ointment, or equivalent     For wounds with dry firm black eschar and/or without exudate, apply betadine and leave open to air      For wounds with scant/small to no exudate or drainage, apply wound gel, hydrocolloid, polymer, or equivalent and cover with secondary dressing/foam      For wounds with moderate/large exudate or drainage, apply alginate, hydrofiber, polymer, or equivalent and cover with secondary dressing/foam    For wounds with nonviable tissue requiring removal, apply chemical or mechanical debrider and cover with secondary dressing/foam    For wounds with tunneling, dead space, or cavity, fill or pack with strip/gauze/kerlex to fit and cover with secondary dressing/foam    For wounds with adequate granulation or epithelization, apply wound gel, hydrocolloid, polymer, collagen, or transparent film, and cover secondary dry dressing/foam    For wounds that need additional secondary dressing to help pad or control additional drainage/exudates, add foam, absorbent pad or hydrocolloid    For wounds with suspected or known infection, apply antimicrobial mesh and/or antimicrobial alginate/hydrofiber, or antimicrobial solution moistened gauze/kerlex, or equivalent and cover with secondary dressing/foam    Compression Management needed for edema control, apply multilayer compression or tubular garment or equivalent    Offloading Management needed for pressure relief, apply offloading shoe/boot or equivalent    As instructed by the provider, a Nursing visit for Treatment outside of a Provider Visit can be completed by releasing the Wound Care Protocol and documenting the provided Treatment in the nursing flowsheets or the Progress notes as appropriate     Standing Status:   Standing

## 2025-07-29 NOTE — DISCHARGE INSTRUCTIONS
Lotion foot 2 times a day    Return to see  as scheduled     If wound would return return wearing the off loading shoe and call the office for a sooner appointment at [phone]

## 2025-07-30 ENCOUNTER — TELEPHONE (OUTPATIENT)
Dept: WOUND CARE | Age: 60
End: 2025-07-30

## 2025-07-30 NOTE — TELEPHONE ENCOUNTER
Patient returning wound vac and cords. Dropped off at [facility] [employer] .     Tracking #: [id number]

## 2025-07-31 DIAGNOSIS — C32.2 SQUAMOUS CELL CARCINOMA OF SUBGLOTTIS (HCC): Primary | ICD-10-CM

## 2025-07-31 RX ORDER — OXYCODONE HYDROCHLORIDE 15 MG/1
15 TABLET ORAL EVERY 4 HOURS PRN
Qty: 84 TABLET | Refills: 0 | Status: SHIPPED | OUTPATIENT
Start: 2025-07-31 | End: 2025-08-14

## 2025-08-04 DIAGNOSIS — I10 ESSENTIAL HYPERTENSION: ICD-10-CM

## 2025-08-04 DIAGNOSIS — F95.2 TOURETTE'S SYNDROME: ICD-10-CM

## 2025-08-04 RX ORDER — LOSARTAN POTASSIUM 100 MG/1
100 TABLET ORAL DAILY
Qty: 90 TABLET | Refills: 0 | Status: SHIPPED | OUTPATIENT
Start: 2025-08-04

## 2025-08-04 RX ORDER — PIMOZIDE 1 MG/1
2 TABLET ORAL 2 TIMES DAILY
Qty: 360 TABLET | Refills: 0 | Status: SHIPPED | OUTPATIENT
Start: 2025-08-04

## 2025-08-05 ENCOUNTER — HOSPITAL ENCOUNTER (OUTPATIENT)
Dept: RADIATION ONCOLOGY | Age: 60
Discharge: HOME | End: 2025-08-05
Payer: COMMERCIAL

## 2025-08-05 VITALS
DIASTOLIC BLOOD PRESSURE: 100 MMHG | RESPIRATION RATE: 18 BRPM | TEMPERATURE: 96.9 F | HEART RATE: 73 BPM | BODY MASS INDEX: 27.64 KG/M2 | SYSTOLIC BLOOD PRESSURE: 193 MMHG | WEIGHT: 203.8 LBS | OXYGEN SATURATION: 99 %

## 2025-08-05 DIAGNOSIS — C32.2 SQUAMOUS CELL CARCINOMA OF SUBGLOTTIS (HCC): Primary | ICD-10-CM

## 2025-08-05 PROCEDURE — 99212 OFFICE O/P EST SF 10 MIN: CPT | Performed by: RADIOLOGY

## 2025-08-05 RX ORDER — GUAIFENESIN/DEXTROMETHORPHAN 100-10MG/5
5 SYRUP ORAL 3 TIMES DAILY PRN
Qty: 473 ML | Refills: 1 | Status: SHIPPED | OUTPATIENT
Start: 2025-08-05 | End: 2025-10-07

## 2025-08-05 RX ORDER — FLUCONAZOLE 100 MG/1
100 TABLET ORAL DAILY
Qty: 14 TABLET | Refills: 0 | Status: SHIPPED | OUTPATIENT
Start: 2025-08-05 | End: 2025-08-19

## 2025-08-05 ASSESSMENT — PAIN SCALES - GENERAL: PAINLEVEL_OUTOF10: 9

## 2025-08-11 ENCOUNTER — OFFICE VISIT (OUTPATIENT)
Dept: PRIMARY CARE CLINIC | Age: 60
End: 2025-08-11
Payer: COMMERCIAL

## 2025-08-11 VITALS
DIASTOLIC BLOOD PRESSURE: 73 MMHG | WEIGHT: 204.1 LBS | TEMPERATURE: 97.2 F | HEIGHT: 72 IN | HEART RATE: 76 BPM | OXYGEN SATURATION: 96 % | BODY MASS INDEX: 27.64 KG/M2 | RESPIRATION RATE: 19 BRPM | SYSTOLIC BLOOD PRESSURE: 125 MMHG

## 2025-08-11 DIAGNOSIS — R07.0 PAIN IN THROAT: Primary | ICD-10-CM

## 2025-08-11 DIAGNOSIS — R05.9 COUGH, UNSPECIFIED TYPE: ICD-10-CM

## 2025-08-11 PROCEDURE — 99203 OFFICE O/P NEW LOW 30 MIN: CPT | Performed by: PHYSICIAN ASSISTANT

## 2025-08-11 PROCEDURE — 3074F SYST BP LT 130 MM HG: CPT | Performed by: PHYSICIAN ASSISTANT

## 2025-08-11 PROCEDURE — 3078F DIAST BP <80 MM HG: CPT | Performed by: PHYSICIAN ASSISTANT

## 2025-08-11 RX ORDER — CODEINE PHOSPHATE AND GUAIFENESIN 10; 100 MG/5ML; MG/5ML
5 SOLUTION ORAL 3 TIMES DAILY PRN
Qty: 30 ML | Refills: 0 | Status: SHIPPED | OUTPATIENT
Start: 2025-08-11 | End: 2025-08-13

## 2025-08-11 ASSESSMENT — ENCOUNTER SYMPTOMS
COUGH: 1
SORE THROAT: 1
SHORTNESS OF BREATH: 0

## 2025-08-12 ENCOUNTER — OFFICE VISIT (OUTPATIENT)
Dept: ONCOLOGY | Age: 60
End: 2025-08-12
Payer: COMMERCIAL

## 2025-08-12 ENCOUNTER — HOSPITAL ENCOUNTER (OUTPATIENT)
Dept: LAB | Age: 60
Discharge: HOME | End: 2025-08-12
Payer: COMMERCIAL

## 2025-08-12 VITALS
OXYGEN SATURATION: 98 % | SYSTOLIC BLOOD PRESSURE: 140 MMHG | BODY MASS INDEX: 26.42 KG/M2 | WEIGHT: 194.8 LBS | TEMPERATURE: 95.7 F | HEART RATE: 72 BPM | DIASTOLIC BLOOD PRESSURE: 84 MMHG

## 2025-08-12 DIAGNOSIS — Z92.21 HISTORY OF ANTINEOPLASTIC CHEMOTHERAPY: ICD-10-CM

## 2025-08-12 DIAGNOSIS — C32.2 SQUAMOUS CELL CARCINOMA OF SUBGLOTTIS (HCC): Primary | ICD-10-CM

## 2025-08-12 DIAGNOSIS — D64.9 ANEMIA, UNSPECIFIED TYPE: ICD-10-CM

## 2025-08-12 DIAGNOSIS — C32.2 SQUAMOUS CELL CARCINOMA OF SUBGLOTTIS (HCC): ICD-10-CM

## 2025-08-12 DIAGNOSIS — I89.0 LYMPHEDEMA: ICD-10-CM

## 2025-08-12 LAB
ALBUMIN SERPL-MCNC: 3.9 G/DL (ref 3.5–5.2)
ALBUMIN/GLOB SERPL: 0.8 {RATIO} (ref 1–2.5)
ALP SERPL-CCNC: 142 U/L (ref 40–129)
ALT SERPL-CCNC: 8 U/L (ref 10–50)
ANION GAP SERPL CALCULATED.3IONS-SCNC: 13 MMOL/L (ref 9–16)
AST SERPL-CCNC: 14 U/L (ref 10–50)
BASOPHILS # BLD: 0.03 K/UL (ref 0–0.2)
BASOPHILS NFR BLD: 0 % (ref 0–2)
BILIRUB SERPL-MCNC: 0.4 MG/DL (ref 0–1.2)
BUN SERPL-MCNC: 23 MG/DL (ref 6–20)
BUN/CREAT SERPL: 19 (ref 9–20)
CALCIUM SERPL-MCNC: 10.4 MG/DL (ref 8.6–10.4)
CHLORIDE SERPL-SCNC: 98 MMOL/L (ref 98–107)
CO2 SERPL-SCNC: 20 MMOL/L (ref 20–31)
CREAT SERPL-MCNC: 1.2 MG/DL (ref 0.7–1.2)
EOSINOPHIL # BLD: 0.18 K/UL (ref 0–0.44)
EOSINOPHILS RELATIVE PERCENT: 2 % (ref 1–4)
ERYTHROCYTE [DISTWIDTH] IN BLOOD BY AUTOMATED COUNT: 15.7 % (ref 11.8–14.4)
FERRITIN SERPL-MCNC: 984 NG/ML (ref 30–400)
FOLATE SERPL-MCNC: 5.3 NG/ML (ref 4.8–24.2)
GFR, ESTIMATED: 70 ML/MIN/1.73M2
GLUCOSE SERPL-MCNC: 119 MG/DL (ref 74–99)
HCT VFR BLD AUTO: 33.5 % (ref 40.7–50.3)
HGB BLD-MCNC: 10.4 G/DL (ref 13–17)
IMM GRANULOCYTES # BLD AUTO: 0.08 K/UL (ref 0–0.3)
IMM GRANULOCYTES NFR BLD: 1 %
IRON SATN MFR SERPL: 8 % (ref 20–55)
IRON SERPL-MCNC: 20 UG/DL (ref 61–157)
LYMPHOCYTES NFR BLD: 0.64 K/UL (ref 1.1–3.7)
LYMPHOCYTES RELATIVE PERCENT: 6 % (ref 24–43)
MCH RBC QN AUTO: 27.4 PG (ref 25.2–33.5)
MCHC RBC AUTO-ENTMCNC: 31 G/DL (ref 25.2–33.5)
MCV RBC AUTO: 88.2 FL (ref 82.6–102.9)
MONOCYTES NFR BLD: 0.84 K/UL (ref 0.1–1.2)
MONOCYTES NFR BLD: 8 % (ref 3–12)
NEUTROPHILS NFR BLD: 83 % (ref 36–65)
NEUTS SEG NFR BLD: 9.19 K/UL (ref 1.5–8.1)
NRBC BLD-RTO: 0 PER 100 WBC
PLATELET # BLD AUTO: 541 K/UL (ref 138–453)
PMV BLD AUTO: 8.9 FL (ref 8.1–13.5)
POTASSIUM SERPL-SCNC: 5.4 MMOL/L (ref 3.7–5.3)
PROT SERPL-MCNC: 8.6 G/DL (ref 6.6–8.7)
RBC # BLD AUTO: 3.8 M/UL (ref 4.21–5.77)
RBC # BLD: ABNORMAL 10*6/UL
SODIUM SERPL-SCNC: 131 MMOL/L (ref 136–145)
T4 FREE SERPL-MCNC: 1 NG/DL (ref 0.9–1.7)
TIBC SERPL-MCNC: 242 UG/DL (ref 250–450)
TSH SERPL DL<=0.05 MIU/L-ACNC: 26.4 UIU/ML (ref 0.27–4.2)
UNSATURATED IRON BINDING CAPACITY: 222 UG/DL (ref 112–347)
VIT B12 SERPL-MCNC: 470 PG/ML (ref 232–1245)
WBC OTHER # BLD: 11 K/UL (ref 3.5–11.3)

## 2025-08-12 PROCEDURE — 82746 ASSAY OF FOLIC ACID SERUM: CPT

## 2025-08-12 PROCEDURE — 84443 ASSAY THYROID STIM HORMONE: CPT

## 2025-08-12 PROCEDURE — 85025 COMPLETE CBC W/AUTO DIFF WBC: CPT

## 2025-08-12 PROCEDURE — 99214 OFFICE O/P EST MOD 30 MIN: CPT | Performed by: INTERNAL MEDICINE

## 2025-08-12 PROCEDURE — 83550 IRON BINDING TEST: CPT

## 2025-08-12 PROCEDURE — 84439 ASSAY OF FREE THYROXINE: CPT

## 2025-08-12 PROCEDURE — 83540 ASSAY OF IRON: CPT

## 2025-08-12 PROCEDURE — 80053 COMPREHEN METABOLIC PANEL: CPT

## 2025-08-12 PROCEDURE — 82728 ASSAY OF FERRITIN: CPT

## 2025-08-12 PROCEDURE — 3079F DIAST BP 80-89 MM HG: CPT | Performed by: INTERNAL MEDICINE

## 2025-08-12 PROCEDURE — 3077F SYST BP >= 140 MM HG: CPT | Performed by: INTERNAL MEDICINE

## 2025-08-12 PROCEDURE — 82607 VITAMIN B-12: CPT

## 2025-08-12 PROCEDURE — 36415 COLL VENOUS BLD VENIPUNCTURE: CPT

## 2025-08-14 DIAGNOSIS — C32.2 SQUAMOUS CELL CARCINOMA OF SUBGLOTTIS (HCC): ICD-10-CM

## 2025-08-14 RX ORDER — OXYCODONE HYDROCHLORIDE 15 MG/1
15 TABLET ORAL EVERY 4 HOURS PRN
Qty: 84 TABLET | Refills: 0 | Status: SHIPPED | OUTPATIENT
Start: 2025-08-14 | End: 2025-08-28

## 2025-08-19 ENCOUNTER — OFFICE VISIT (OUTPATIENT)
Dept: PALLATIVE CARE | Age: 60
End: 2025-08-19
Payer: COMMERCIAL

## 2025-08-19 ENCOUNTER — HOSPITAL ENCOUNTER (OUTPATIENT)
Dept: NUCLEAR MEDICINE | Age: 60
Discharge: HOME | End: 2025-08-21
Attending: RADIOLOGY
Payer: COMMERCIAL

## 2025-08-19 VITALS
SYSTOLIC BLOOD PRESSURE: 154 MMHG | RESPIRATION RATE: 18 BRPM | TEMPERATURE: 97.7 F | DIASTOLIC BLOOD PRESSURE: 94 MMHG | HEART RATE: 71 BPM

## 2025-08-19 DIAGNOSIS — R13.10 DYSPHAGIA, UNSPECIFIED TYPE: ICD-10-CM

## 2025-08-19 DIAGNOSIS — Z51.5 PALLIATIVE CARE ENCOUNTER: ICD-10-CM

## 2025-08-19 DIAGNOSIS — I89.0 LYMPHEDEMA: ICD-10-CM

## 2025-08-19 DIAGNOSIS — C32.2 SQUAMOUS CELL CARCINOMA OF SUBGLOTTIS (HCC): Primary | ICD-10-CM

## 2025-08-19 DIAGNOSIS — K59.03 DRUG-INDUCED CONSTIPATION: ICD-10-CM

## 2025-08-19 DIAGNOSIS — R63.4 WEIGHT LOSS: ICD-10-CM

## 2025-08-19 DIAGNOSIS — Z93.0 TRACHEOSTOMY IN PLACE (HCC): ICD-10-CM

## 2025-08-19 DIAGNOSIS — C32.2 SQUAMOUS CELL CARCINOMA OF SUBGLOTTIS (HCC): ICD-10-CM

## 2025-08-19 DIAGNOSIS — G47.00 INSOMNIA, UNSPECIFIED TYPE: ICD-10-CM

## 2025-08-19 LAB — GLUCOSE BLD-MCNC: 123 MG/DL (ref 75–110)

## 2025-08-19 PROCEDURE — 2500000003 HC RX 250 WO HCPCS: Performed by: RADIOLOGY

## 2025-08-19 PROCEDURE — 82947 ASSAY GLUCOSE BLOOD QUANT: CPT

## 2025-08-19 PROCEDURE — 99213 OFFICE O/P EST LOW 20 MIN: CPT | Performed by: NURSE PRACTITIONER

## 2025-08-19 PROCEDURE — 3077F SYST BP >= 140 MM HG: CPT | Performed by: NURSE PRACTITIONER

## 2025-08-19 PROCEDURE — 78815 PET IMAGE W/CT SKULL-THIGH: CPT

## 2025-08-19 PROCEDURE — 3080F DIAST BP >= 90 MM HG: CPT | Performed by: NURSE PRACTITIONER

## 2025-08-19 PROCEDURE — A9609 HC RX DIAGNOSTIC RADIOPHARMACEUTICAL: HCPCS | Performed by: RADIOLOGY

## 2025-08-19 PROCEDURE — 3430000000 HC RX DIAGNOSTIC RADIOPHARMACEUTICAL: Performed by: RADIOLOGY

## 2025-08-19 RX ORDER — SENNOSIDES 8.6 MG
1 TABLET ORAL DAILY
Qty: 120 TABLET | Refills: 0 | Status: SHIPPED | OUTPATIENT
Start: 2025-08-19

## 2025-08-19 RX ORDER — FLUDEOXYGLUCOSE F 18 200 MCI/ML
10 INJECTION, SOLUTION INTRAVENOUS
Status: COMPLETED | OUTPATIENT
Start: 2025-08-19 | End: 2025-08-19

## 2025-08-19 RX ORDER — OXYCODONE HYDROCHLORIDE 20 MG/1
20 TABLET ORAL EVERY 4 HOURS PRN
Qty: 42 TABLET | Refills: 0 | Status: SHIPPED | OUTPATIENT
Start: 2025-08-19 | End: 2025-08-27 | Stop reason: SDUPTHER

## 2025-08-19 RX ORDER — SODIUM CHLORIDE 0.9 % (FLUSH) 0.9 %
10 SYRINGE (ML) INJECTION
Status: COMPLETED | OUTPATIENT
Start: 2025-08-19 | End: 2025-08-19

## 2025-08-19 RX ADMIN — FLUDEOXYGLUCOSE F 18 12.53 MILLICURIE: 200 INJECTION, SOLUTION INTRAVENOUS at 12:45

## 2025-08-19 RX ADMIN — SODIUM CHLORIDE, PRESERVATIVE FREE 10 ML: 5 INJECTION INTRAVENOUS at 12:45

## 2025-08-21 ENCOUNTER — TELEPHONE (OUTPATIENT)
Dept: RADIATION ONCOLOGY | Age: 60
End: 2025-08-21

## 2025-08-21 ENCOUNTER — TELEPHONE (OUTPATIENT)
Age: 60
End: 2025-08-21

## 2025-08-21 RX ORDER — LEVOTHYROXINE SODIUM 50 UG/1
50 TABLET ORAL DAILY
Qty: 90 TABLET | Refills: 0 | Status: SHIPPED | OUTPATIENT
Start: 2025-08-21

## 2025-08-26 ENCOUNTER — HOSPITAL ENCOUNTER (OUTPATIENT)
Dept: RADIATION ONCOLOGY | Age: 60
Discharge: HOME | End: 2025-08-26
Payer: COMMERCIAL

## 2025-08-26 VITALS
SYSTOLIC BLOOD PRESSURE: 159 MMHG | TEMPERATURE: 96.7 F | HEART RATE: 69 BPM | OXYGEN SATURATION: 99 % | WEIGHT: 193.5 LBS | DIASTOLIC BLOOD PRESSURE: 94 MMHG | RESPIRATION RATE: 18 BRPM | BODY MASS INDEX: 26.24 KG/M2

## 2025-08-26 PROCEDURE — 99212 OFFICE O/P EST SF 10 MIN: CPT | Performed by: RADIOLOGY

## 2025-08-26 ASSESSMENT — PAIN SCALES - GENERAL: PAINLEVEL_OUTOF10: 9

## 2025-08-26 ASSESSMENT — PAIN DESCRIPTION - LOCATION: LOCATION: THROAT

## 2025-08-27 DIAGNOSIS — C32.2 SQUAMOUS CELL CARCINOMA OF SUBGLOTTIS (HCC): ICD-10-CM

## 2025-08-28 ENCOUNTER — TELEPHONE (OUTPATIENT)
Dept: ONCOLOGY | Age: 60
End: 2025-08-28

## 2025-08-28 RX ORDER — OXYCODONE HYDROCHLORIDE 20 MG/1
20 TABLET ORAL EVERY 4 HOURS PRN
Qty: 90 TABLET | Refills: 0 | Status: SHIPPED | OUTPATIENT
Start: 2025-08-28 | End: 2025-09-12

## 2025-09-05 ENCOUNTER — TELEPHONE (OUTPATIENT)
Dept: RADIATION ONCOLOGY | Age: 60
End: 2025-09-05

## 2025-09-05 DIAGNOSIS — C32.2 SQUAMOUS CELL CARCINOMA OF SUBGLOTTIS (HCC): ICD-10-CM

## 2025-09-05 RX ORDER — NYSTATIN 100000 [USP'U]/ML
500000 SUSPENSION ORAL 4 TIMES DAILY
Qty: 200 ML | Refills: 0 | Status: SHIPPED | OUTPATIENT
Start: 2025-09-05 | End: 2025-09-15

## 2025-09-05 RX ORDER — OXYCODONE HYDROCHLORIDE 20 MG/1
20 TABLET ORAL EVERY 4 HOURS PRN
Qty: 90 TABLET | Refills: 0 | Status: CANCELLED | OUTPATIENT
Start: 2025-09-12 | End: 2025-09-27

## (undated) DEVICE — ELECTRODE PT RET AD L9FT HI MOIST COND ADH HYDRGEL CORDED

## (undated) DEVICE — SKIN AFFIX SURG ADHESIVE 72/CS 0.55ML: Brand: MEDLINE

## (undated) DEVICE — SYRINGE MED 10ML LUERLOCK TIP W/O SFTY DISP

## (undated) DEVICE — DECANTER FLD 9IN ST BG FOR ASEP TRNSF OF FLD

## (undated) DEVICE — TOWEL,OR,DSP,ST,BLUE,STD,4/PK,20PK/CS: Brand: MEDLINE

## (undated) DEVICE — NEEDLE HYPO 27GA L15IN REG BVL W O SFTY FOR SYR DISPOSABLE

## (undated) DEVICE — POSITIONER,HEAD,MULTIRING,36CS: Brand: MEDLINE

## (undated) DEVICE — Device

## (undated) DEVICE — SYRINGE,CONTROL,LL,FINGER,GRIP: Brand: MEDLINE INDUSTRIES, INC.

## (undated) DEVICE — SUTURE VICRYL + SZ 3-0 L27IN ABSRB UD L26MM SH 1/2 CIR VCP416H

## (undated) DEVICE — SPONGE DRN W4XL4IN RAYON/POLYESTER 6 PLY NONWOVEN PRECUT 2 PER PK

## (undated) DEVICE — THIN OFFSET (9.0 X 0.38 X 25.0MM)

## (undated) DEVICE — GAUZE,SPONGE,FLUFF,6"X6.75",STRL,5/TRAY: Brand: MEDLINE

## (undated) DEVICE — SUTURE PERMAHAND SZ 2-0 L30IN NONABSORBABLE BLK SILK W/O A305H

## (undated) DEVICE — CORD ES L12FT BPLR FRCP

## (undated) DEVICE — SUTURE NONABSORBABLE MONOFILAMENT 4-0 PS-2 18 IN BLK ETHILON 1667G

## (undated) DEVICE — APPLICATOR MEDICATED 10.5 CC SOLUTION HI LT ORNG CHLORAPREP

## (undated) DEVICE — SUTURE PROL SZ 2-0 L30IN NONABSORBABLE BLU L26MM SH 1/2 CIR 8833H

## (undated) DEVICE — SUTURE VICRYL SZ 3-0 L18IN ABSRB UD PS-2 L19MM 3/8 CRV PRIM J497H

## (undated) DEVICE — MDHZ LAPAROTOMY: Brand: MEDLINE INDUSTRIES, INC.

## (undated) DEVICE — SOLUTION IRRIG 1000ML 09% SOD CHL USP PIC PLAS CONTAINER

## (undated) DEVICE — TUBING, SUCTION, 3/16" X 20', STRAIGHT: Brand: MEDLINE

## (undated) DEVICE — PAD,ABDOMINAL,5"X9",ST,LF,25/BX: Brand: MEDLINE INDUSTRIES, INC.

## (undated) DEVICE — GARMENT,MEDLINE,DVT,INT,CALF,MED, GEN2: Brand: MEDLINE

## (undated) DEVICE — BLADE CLIPPER GEN PURP NS

## (undated) DEVICE — PROVE COVER: Brand: UNBRANDED

## (undated) DEVICE — KIT,ANTI FOG,W/SPONGE & FLUID,SOFT PACK: Brand: MEDLINE

## (undated) DEVICE — ZIMMER® STERILE DISPOSABLE TOURNIQUET CUFF WITH PLC, DUAL PORT, SINGLE BLADDER, 12 IN. (30 CM)

## (undated) DEVICE — 3M™ TEGADERM™ TRANSPARENT FILM DRESSING FRAME STYLE, 1626W, 4 IN X 4-3/4 IN (10 CM X 12 CM), 50/CT 4CT/CASE: Brand: 3M™ TEGADERM™

## (undated) DEVICE — SURGICAL SUCTION CONNECTING TUBE WITH MALE CONNECTOR AND SUCTION CLAMP, 2 FT. LONG (.6 M), 5 MM I.D.: Brand: CONMED

## (undated) DEVICE — MARKER,SKIN,WI/RULER AND LABELS: Brand: MEDLINE

## (undated) DEVICE — DRAPE C ARM W104XL188CM FLD MOB XR

## (undated) DEVICE — TUBING, SUCTION, 9/32" X 20', STRAIGHT: Brand: MEDLINE INDUSTRIES, INC.

## (undated) DEVICE — PREMIUM DRY TRAY LF: Brand: MEDLINE INDUSTRIES, INC.

## (undated) DEVICE — STRAP,POSITIONING,KNEE/BODY,FOAM,4X60": Brand: MEDLINE

## (undated) DEVICE — ELECTRODE ELECSURG NDL 2.8 INX7.2 CM COAT INSUL EDGE

## (undated) DEVICE — STRAP ARMBRD W1.5XL32IN FOAM STR YET SFT W/ HK AND LOOP

## (undated) DEVICE — SPONGE,NEURO,0.5"X3",XR,STRL,LF,10/PK: Brand: MEDLINE

## (undated) DEVICE — DRAPE,REIN 53X77,STERILE: Brand: MEDLINE

## (undated) DEVICE — SOLUTION PREP PAINT POV IOD FOR SKIN MUCOUS MEM

## (undated) DEVICE — GLOVE ORANGE PI 7 1/2   MSG9075

## (undated) DEVICE — PAD,NON-ADHERENT,3X8,STERILE,LF,1/PK: Brand: MEDLINE

## (undated) DEVICE — GLOVE ORANGE PI 8   MSG9080

## (undated) DEVICE — BLADE ES ELASTOMERIC COAT INSUL DURABLE BEND UPTO 90DEG

## (undated) DEVICE — GLOVE ORANGE PI 7   MSG9070

## (undated) DEVICE — SOLUTION IV 100ML 0.9% SOD CHL PLAS CONT USP VIAFLX 1 PER

## (undated) DEVICE — TOWEL,OR,DSP,ST,NATURAL,DLX,4/PK,20PK/CS: Brand: MEDLINE

## (undated) DEVICE — MDHZ MINOR EXTREMITY: Brand: MEDLINE INDUSTRIES, INC.

## (undated) DEVICE — SUTURE MONOCRYL SZ 4-0 L18IN ABSRB UD L19MM PS-2 3/8 CIR PRIM Y496G

## (undated) DEVICE — GAUZE,SPONGE,4"X4",16PLY,XRAY,STRL,LF: Brand: MEDLINE

## (undated) DEVICE — COVER,MAYO STAND,STERILE: Brand: MEDLINE

## (undated) DEVICE — SVMMC HD AND NK PK

## (undated) DEVICE — CATHETER SUCT 14FR BVL TIP SGL W/ CTRL PRT STR PK AIRLFE TR

## (undated) DEVICE — CONTAINER,SPECIMEN,OR STERILE,4OZ: Brand: MEDLINE

## (undated) DEVICE — SUTURE PERMAHAND SZ 3-0 L30IN NONABSORBABLE BLK SILK BRAID A304H